# Patient Record
Sex: FEMALE | Race: WHITE | Employment: OTHER | URBAN - METROPOLITAN AREA
[De-identification: names, ages, dates, MRNs, and addresses within clinical notes are randomized per-mention and may not be internally consistent; named-entity substitution may affect disease eponyms.]

---

## 2017-01-23 PROBLEM — E78.5 HYPERLIPIDEMIA: Status: ACTIVE | Noted: 2017-01-23

## 2017-01-23 PROBLEM — G43.109 MIGRAINE WITH VERTIGO: Status: ACTIVE | Noted: 2017-01-23

## 2017-01-23 PROBLEM — R06.00 DYSPNEA: Status: ACTIVE | Noted: 2017-01-23

## 2017-01-23 PROBLEM — K21.9 GERD (GASTROESOPHAGEAL REFLUX DISEASE): Status: ACTIVE | Noted: 2017-01-23

## 2017-01-23 PROBLEM — I48.91 ATRIAL FIBRILLATION (HCC): Status: ACTIVE | Noted: 2017-01-23

## 2017-01-23 PROBLEM — I10 HYPERTENSION: Status: ACTIVE | Noted: 2017-01-23

## 2017-02-26 ENCOUNTER — HOSPITAL ENCOUNTER (EMERGENCY)
Age: 72
Discharge: HOME | End: 2017-02-26
Attending: EMERGENCY MEDICINE
Payer: MEDICARE

## 2017-02-26 VITALS
OXYGEN SATURATION: 97 % | WEIGHT: 182 LBS | SYSTOLIC BLOOD PRESSURE: 124 MMHG | BODY MASS INDEX: 31.07 KG/M2 | TEMPERATURE: 98 F | DIASTOLIC BLOOD PRESSURE: 53 MMHG | HEIGHT: 64 IN | HEART RATE: 58 BPM | RESPIRATION RATE: 16 BRPM

## 2017-02-26 DIAGNOSIS — I10 ESSENTIAL HYPERTENSION: ICD-10-CM

## 2017-02-26 DIAGNOSIS — Z01.30 BLOOD PRESSURE CHECK: Primary | ICD-10-CM

## 2017-02-26 DIAGNOSIS — R00.1 BRADYCARDIA: ICD-10-CM

## 2017-02-26 PROCEDURE — 99283 EMERGENCY DEPT VISIT LOW MDM: CPT | Performed by: EMERGENCY MEDICINE

## 2017-02-27 NOTE — ED PROVIDER NOTES
HPI Comments: Patient to ed with c/o low blood pressure at home stating BP was 127/57 at home--patient reports she takes her blood pressure at home when she feels bad but is unable to give symptoms of why she feels bad today she just states \"I dont know\"--patient is awake, alert and oriented x4 at this time--patient is ambulatory to triage room without difficulty. Heart rate was 47-currently 55 and often in 50's. Patient is a 66 y.o. female presenting with hypotension. The history is provided by the patient. Hypotension    This is a new problem. The current episode started 1 to 2 hours ago. The problem has been resolved. Pertinent negatives include no confusion, no unresponsiveness, no weakness, no tingling and no numbness. Mental status baseline is normal.         Past Medical History:   Diagnosis Date    Arthritis     Biceps tendonitis     CAD (coronary artery disease)     MI 1999    Dysphagia     Dysuria     Elevated glucose     Hypertension     Other ill-defined conditions(799.89)     high cholesterol    Pain in limb     Postmenopausal     Psychiatric disorder     anxiety    Sciatica     Tachycardia     Vaginitis     Vertigo        Past Surgical History:   Procedure Laterality Date    ABDOMEN SURGERY PROC UNLISTED      cyst removed from upper ABD.  HX BREAST BIOPSY Right     HX GYN      hysterectomy    HX KNEE ARTHROSCOPY Bilateral          Family History:   Problem Relation Age of Onset    Coronary Artery Disease Neg Hx        Social History     Social History    Marital status:      Spouse name: N/A    Number of children: N/A    Years of education: N/A     Occupational History    Not on file.      Social History Main Topics    Smoking status: Never Smoker    Smokeless tobacco: Never Used    Alcohol use No    Drug use: No    Sexual activity: Not Currently     Other Topics Concern    Not on file     Social History Narrative         ALLERGIES: Review of patient's allergies indicates no known allergies. Review of Systems   Constitutional: Negative for chills and fever. HENT: Negative for congestion, rhinorrhea and sore throat. Eyes: Negative for photophobia and redness. Respiratory: Negative for cough and shortness of breath. Cardiovascular: Negative for chest pain and leg swelling. Gastrointestinal: Negative for abdominal pain, diarrhea, nausea and vomiting. Endocrine: Negative for polydipsia and polyuria. Genitourinary: Negative for dysuria. Musculoskeletal: Negative for back pain and myalgias. Neurological: Negative for tingling, weakness, numbness and headaches. Psychiatric/Behavioral: Negative for confusion. Vitals:    02/26/17 2055   BP: 141/57   Pulse: (!) 55   Resp: 18   Temp: 97.7 °F (36.5 °C)   SpO2: 96%   Weight: 82.6 kg (182 lb)   Height: 5' 4\" (1.626 m)            Physical Exam   Constitutional: She is oriented to person, place, and time. She appears well-developed and well-nourished. Eyes: Conjunctivae are normal. Pupils are equal, round, and reactive to light. Neck: Normal range of motion. Neck supple. Cardiovascular: Normal rate, regular rhythm, normal heart sounds and intact distal pulses. No murmur heard. Pulmonary/Chest: Breath sounds normal. No respiratory distress. Abdominal: Soft. She exhibits no distension. There is no tenderness. There is no rebound and no guarding. Musculoskeletal: Normal range of motion. She exhibits no edema or tenderness. Neurological: She is alert and oriented to person, place, and time. She has normal strength. No cranial nerve deficit or sensory deficit. She exhibits normal muscle tone. Coordination normal.   Skin: Skin is warm and dry. MDM  Number of Diagnoses or Management Options  Diagnosis management comments: Blood pressure check. No symptoms. Follow up with her cardiologist or primary care doctor later this coming week for blood pressure and heart rate recheck.     ED Course       Procedures

## 2017-02-27 NOTE — ED TRIAGE NOTES
Patient to ed with c/o low blood pressure at home stating BP was 127/57 at home--patient reports she takes her blood pressure at home when she feels bad but is unable to give symptoms of why she feels bad today she just states \"I dont know\"--patient is awake, alert and oriented x4 at this time--patient is ambulatory to triage room without difficulty

## 2017-09-14 ENCOUNTER — HOSPITAL ENCOUNTER (OUTPATIENT)
Dept: MAMMOGRAPHY | Age: 72
Discharge: HOME | End: 2017-09-14
Attending: FAMILY MEDICINE
Payer: MEDICARE

## 2017-09-14 DIAGNOSIS — Z12.31 VISIT FOR SCREENING MAMMOGRAM: ICD-10-CM

## 2017-09-14 PROCEDURE — 77067 SCR MAMMO BI INCL CAD: CPT

## 2017-10-16 PROBLEM — I48.19 PERSISTENT ATRIAL FIBRILLATION (HCC): Status: ACTIVE | Noted: 2017-01-23

## 2018-02-16 ENCOUNTER — APPOINTMENT (OUTPATIENT)
Dept: GENERAL RADIOLOGY | Age: 73
End: 2018-02-16
Attending: STUDENT IN AN ORGANIZED HEALTH CARE EDUCATION/TRAINING PROGRAM
Payer: MEDICARE

## 2018-02-16 ENCOUNTER — HOSPITAL ENCOUNTER (EMERGENCY)
Age: 73
Discharge: HOME | End: 2018-02-16
Attending: EMERGENCY MEDICINE
Payer: MEDICARE

## 2018-02-16 VITALS
HEIGHT: 64 IN | BODY MASS INDEX: 32.1 KG/M2 | OXYGEN SATURATION: 97 % | SYSTOLIC BLOOD PRESSURE: 112 MMHG | WEIGHT: 188 LBS | TEMPERATURE: 97.8 F | HEART RATE: 68 BPM | RESPIRATION RATE: 16 BRPM | DIASTOLIC BLOOD PRESSURE: 58 MMHG

## 2018-02-16 DIAGNOSIS — R06.00 DYSPNEA, UNSPECIFIED TYPE: Primary | ICD-10-CM

## 2018-02-16 LAB
ALBUMIN SERPL-MCNC: 3.6 G/DL (ref 3.2–4.6)
ALBUMIN/GLOB SERPL: 0.8 {RATIO} (ref 1.2–3.5)
ALP SERPL-CCNC: 94 U/L (ref 50–136)
ALT SERPL-CCNC: 36 U/L (ref 12–65)
ANION GAP SERPL CALC-SCNC: 15 MMOL/L (ref 7–16)
AST SERPL-CCNC: 23 U/L (ref 15–37)
ATRIAL RATE: 394 BPM
BASOPHILS # BLD: 0 K/UL (ref 0–0.2)
BASOPHILS NFR BLD: 0 % (ref 0–2)
BILIRUB SERPL-MCNC: 0.7 MG/DL (ref 0.2–1.1)
BNP SERPL-MCNC: 293 PG/ML
BUN SERPL-MCNC: 25 MG/DL (ref 8–23)
CALCIUM SERPL-MCNC: 9 MG/DL (ref 8.3–10.4)
CALCULATED R AXIS, ECG10: -2 DEGREES
CALCULATED T AXIS, ECG11: -4 DEGREES
CHLORIDE SERPL-SCNC: 105 MMOL/L (ref 98–107)
CO2 SERPL-SCNC: 21 MMOL/L (ref 21–32)
CREAT SERPL-MCNC: 1.71 MG/DL (ref 0.6–1)
DIAGNOSIS, 93000: NORMAL
DIFFERENTIAL METHOD BLD: ABNORMAL
EOSINOPHIL # BLD: 0.1 K/UL (ref 0–0.8)
EOSINOPHIL NFR BLD: 1 % (ref 0.5–7.8)
ERYTHROCYTE [DISTWIDTH] IN BLOOD BY AUTOMATED COUNT: 14.9 % (ref 11.9–14.6)
GLOBULIN SER CALC-MCNC: 4.4 G/DL (ref 2.3–3.5)
GLUCOSE SERPL-MCNC: 109 MG/DL (ref 65–100)
HCT VFR BLD AUTO: 43.7 % (ref 35.8–46.3)
HGB BLD-MCNC: 14 G/DL (ref 11.7–15.4)
IMM GRANULOCYTES # BLD: 0 K/UL (ref 0–0.5)
IMM GRANULOCYTES NFR BLD AUTO: 0 % (ref 0–5)
INR PPP: 2
LYMPHOCYTES # BLD: 2.7 K/UL (ref 0.5–4.6)
LYMPHOCYTES NFR BLD: 33 % (ref 13–44)
MCH RBC QN AUTO: 24.9 PG (ref 26.1–32.9)
MCHC RBC AUTO-ENTMCNC: 32 G/DL (ref 31.4–35)
MCV RBC AUTO: 77.8 FL (ref 79.6–97.8)
MONOCYTES # BLD: 0.7 K/UL (ref 0.1–1.3)
MONOCYTES NFR BLD: 8 % (ref 4–12)
NEUTS SEG # BLD: 4.8 K/UL (ref 1.7–8.2)
NEUTS SEG NFR BLD: 58 % (ref 43–78)
PLATELET # BLD AUTO: 281 K/UL (ref 150–450)
PMV BLD AUTO: 10.8 FL (ref 10.8–14.1)
POTASSIUM SERPL-SCNC: 4.4 MMOL/L (ref 3.5–5.1)
PROT SERPL-MCNC: 8 G/DL (ref 6.3–8.2)
PROTHROMBIN TIME: 23.1 SEC (ref 11.5–14.5)
Q-T INTERVAL, ECG07: 412 MS
QRS DURATION, ECG06: 86 MS
QTC CALCULATION (BEZET), ECG08: 441 MS
RBC # BLD AUTO: 5.62 M/UL (ref 4.05–5.25)
SODIUM SERPL-SCNC: 141 MMOL/L (ref 136–145)
TROPONIN I SERPL-MCNC: <0.04 NG/ML (ref 0.02–0.05)
VENTRICULAR RATE, ECG03: 69 BPM
WBC # BLD AUTO: 8.3 K/UL (ref 4.3–11.1)

## 2018-02-16 PROCEDURE — 74011250636 HC RX REV CODE- 250/636: Performed by: EMERGENCY MEDICINE

## 2018-02-16 PROCEDURE — 80053 COMPREHEN METABOLIC PANEL: CPT | Performed by: STUDENT IN AN ORGANIZED HEALTH CARE EDUCATION/TRAINING PROGRAM

## 2018-02-16 PROCEDURE — 96374 THER/PROPH/DIAG INJ IV PUSH: CPT | Performed by: EMERGENCY MEDICINE

## 2018-02-16 PROCEDURE — 71046 X-RAY EXAM CHEST 2 VIEWS: CPT

## 2018-02-16 PROCEDURE — 85610 PROTHROMBIN TIME: CPT | Performed by: EMERGENCY MEDICINE

## 2018-02-16 PROCEDURE — 99284 EMERGENCY DEPT VISIT MOD MDM: CPT | Performed by: EMERGENCY MEDICINE

## 2018-02-16 PROCEDURE — 83880 ASSAY OF NATRIURETIC PEPTIDE: CPT | Performed by: EMERGENCY MEDICINE

## 2018-02-16 PROCEDURE — 84484 ASSAY OF TROPONIN QUANT: CPT | Performed by: EMERGENCY MEDICINE

## 2018-02-16 PROCEDURE — 93005 ELECTROCARDIOGRAM TRACING: CPT | Performed by: STUDENT IN AN ORGANIZED HEALTH CARE EDUCATION/TRAINING PROGRAM

## 2018-02-16 PROCEDURE — 85025 COMPLETE CBC W/AUTO DIFF WBC: CPT | Performed by: STUDENT IN AN ORGANIZED HEALTH CARE EDUCATION/TRAINING PROGRAM

## 2018-02-16 RX ORDER — FUROSEMIDE 10 MG/ML
40 INJECTION INTRAMUSCULAR; INTRAVENOUS
Status: COMPLETED | OUTPATIENT
Start: 2018-02-16 | End: 2018-02-16

## 2018-02-16 RX ADMIN — FUROSEMIDE 40 MG: 10 INJECTION, SOLUTION INTRAMUSCULAR; INTRAVENOUS at 17:16

## 2018-02-16 NOTE — ED PROVIDER NOTES
HPI Comments: 68-year-old female with history of persistent atrial fibrillation, MI in 1999, vertigo, HTN, anxiety presents with shortness of breath and generalized weakness has been gradually worsening over the past 2 weeks. She was seen by her primary care physician last week for flulike symptoms. She was prescribed amoxicillin and \"vertigo medication. \"  She had a mild cough with ear fullness. Cough has improved. She denies chest pain. She has chronic leg swelling that has slightly worsened since sleeping in a lazy boy chair over the past 3 days as this helps with her vertigo. Vertigo has also resolved. She denies fevers or chills. She reports shortness of breath and weakness mostly with doing work. No headache. Denies changes in medications. No vomiting or diarrhea. Patient is a 74 y.o. female presenting with shortness of breath. The history is provided by the patient. Shortness of Breath   Associated symptoms include cough and leg swelling. Pertinent negatives include no fever, no headaches, no rhinorrhea, no chest pain, no vomiting, no abdominal pain and no rash. Past Medical History:   Diagnosis Date    Arthritis     Biceps tendonitis     CAD (coronary artery disease)     MI 1999    Dysphagia     Dysuria     Elevated glucose     Hypertension     Other ill-defined conditions(799.89)     high cholesterol    Pain in limb     Postmenopausal     Psychiatric disorder     anxiety    Sciatica     Tachycardia     Vaginitis     Vertigo        Past Surgical History:   Procedure Laterality Date    ABDOMEN SURGERY PROC UNLISTED      cyst removed from upper ABD.     HX BREAST BIOPSY Right     HX GYN      hysterectomy    HX KNEE ARTHROSCOPY Bilateral          Family History:   Problem Relation Age of Onset    Coronary Artery Disease Neg Hx        Social History     Social History    Marital status:      Spouse name: N/A    Number of children: N/A    Years of education: N/A Occupational History    Not on file. Social History Main Topics    Smoking status: Never Smoker    Smokeless tobacco: Never Used    Alcohol use No    Drug use: No    Sexual activity: Not Currently     Other Topics Concern    Not on file     Social History Narrative         ALLERGIES: Review of patient's allergies indicates no known allergies. Review of Systems   Constitutional: Positive for fatigue. Negative for chills and fever. HENT: Negative for hearing loss, rhinorrhea and sinus pressure. Eyes: Negative for visual disturbance. Respiratory: Positive for cough and shortness of breath. Cardiovascular: Positive for leg swelling. Negative for chest pain and palpitations. Gastrointestinal: Positive for nausea. Negative for abdominal pain, diarrhea and vomiting. Genitourinary: Negative for difficulty urinating and dysuria. Musculoskeletal: Negative for back pain. Skin: Negative for rash. Neurological: Positive for dizziness. Negative for weakness and headaches. Psychiatric/Behavioral: Negative for confusion. Vitals:    02/16/18 1509   BP: 119/66   Pulse: 82   Resp: 16   Temp: 97.5 °F (36.4 °C)   SpO2: 96%   Weight: 85.3 kg (188 lb)   Height: 5' 4\" (1.626 m)            Physical Exam   Constitutional: She appears well-developed and well-nourished. HENT:   Head: Normocephalic and atraumatic. Right Ear: External ear normal. Tympanic membrane is not injected. No middle ear effusion. Left Ear: External ear normal. Tympanic membrane is not injected. No middle ear effusion. Nose: Nose normal.   Mouth/Throat: Oropharynx is clear and moist.   Eyes: Conjunctivae are normal. Pupils are equal, round, and reactive to light. Neck: Normal range of motion. Neck supple. Cardiovascular: Normal heart sounds and intact distal pulses. An irregularly irregular rhythm present. Pulmonary/Chest: Effort normal and breath sounds normal. No respiratory distress. She has no wheezes. Abdominal: Soft. Bowel sounds are normal. She exhibits no distension. There is no tenderness. Musculoskeletal: Normal range of motion. She exhibits edema. Mild bilateral ankle edema   Neurological: She is alert. Skin: Skin is warm and dry. Psychiatric: Judgment normal.   Nursing note and vitals reviewed. MDM  Number of Diagnoses or Management Options  Diagnosis management comments: Parts of this document were created using dragon voice recognition software. The chart has been reviewed but errors may still be present. Persistent A. Fib, rate controlled. Labs unremarkable. Chest x-ray clear. Sats 96%. Blood pressure controlled. 5:08 PM  Last EF 50-55% 3/17. Mild elevation BNP with increased leg swelling, sob, and afib. Will give lasix. No indication for admission. Advised cardiology follow up. Lungs clear. I discussed the results of all labs, procedures, radiographs, and treatments with the patient and available family. Treatment plan is agreed upon and the patient is ready for discharge. Questions about treatment in the ED and differential diagnosis of presenting condition were answered. Patient was given verbal discharge instructions including, but not limited to, importance of returning to the emergency department for any concern of worsening or continued symptoms. Instructions were given to follow up with a primary care provider or specialist within 1-2 days. Adverse effects of medications, if prescribed, were discussed and patient was advised to refrain from significant physical activity until followed up by primary care physician and to not drive or operate heavy machinery after taking any sedating substances.            Amount and/or Complexity of Data Reviewed  Clinical lab tests: ordered and reviewed (Results for orders placed or performed during the hospital encounter of 02/16/18  -CBC WITH AUTOMATED DIFF       Result                                            Value Ref Range                       WBC                                               8.3                           4.3 - 11.1 K/uL                 RBC                                               5.62 (H)                      4.05 - 5.25 M/uL                HGB                                               14.0                          11.7 - 15.4 g/dL                HCT                                               43.7                          35.8 - 46.3 %                   MCV                                               77.8 (L)                      79.6 - 97.8 FL                  MCH                                               24.9 (L)                      26.1 - 32.9 PG                  MCHC                                              32.0                          31.4 - 35.0 g/dL                RDW                                               14.9 (H)                      11.9 - 14.6 %                   PLATELET                                          281                           150 - 450 K/uL                  MPV                                               10.8                          10.8 - 14.1 FL                  DF                                                AUTOMATED                                                     NEUTROPHILS                                       58                            43 - 78 %                       LYMPHOCYTES                                       33                            13 - 44 %                       MONOCYTES                                         8                             4.0 - 12.0 %                    EOSINOPHILS                                       1                             0.5 - 7.8 %                     BASOPHILS                                         0                             0.0 - 2.0 %                     IMMATURE GRANULOCYTES                             0                             0.0 - 5.0 % ABS. NEUTROPHILS                                  4.8                           1.7 - 8.2 K/UL                  ABS. LYMPHOCYTES                                  2.7                           0.5 - 4.6 K/UL                  ABS. MONOCYTES                                    0.7                           0.1 - 1.3 K/UL                  ABS. EOSINOPHILS                                  0.1                           0.0 - 0.8 K/UL                  ABS. BASOPHILS                                    0.0                           0.0 - 0.2 K/UL                  ABS. IMM.  GRANS.                                  0.0                           0.0 - 0.5 K/UL             -METABOLIC PANEL, COMPREHENSIVE       Result                                            Value                         Ref Range                       Sodium                                            141                           136 - 145 mmol/L                Potassium                                         4.4                           3.5 - 5.1 mmol/L                Chloride                                          105                           98 - 107 mmol/L                 CO2                                               21                            21 - 32 mmol/L                  Anion gap                                         15                            7 - 16 mmol/L                   Glucose                                           109 (H)                       65 - 100 mg/dL                  BUN                                               25 (H)                        8 - 23 MG/DL                    Creatinine                                        1.71 (H)                      0.6 - 1.0 MG/DL                 GFR est AA                                        38 (L)                        >60 ml/min/1.73m2               GFR est non-AA                                    31 (L)                        >60 ml/min/1.73m2               Calcium 9.0                           8.3 - 10.4 MG/DL                Bilirubin, total                                  0.7                           0.2 - 1.1 MG/DL                 ALT (SGPT)                                        36                            12 - 65 U/L                     AST (SGOT)                                        23                            15 - 37 U/L                     Alk. phosphatase                                  94                            50 - 136 U/L                    Protein, total                                    8.0                           6.3 - 8.2 g/dL                  Albumin                                           3.6                           3.2 - 4.6 g/dL                  Globulin                                          4.4 (H)                       2.3 - 3.5 g/dL                  A-G Ratio                                         0.8 (L)                       1.2 - 3.5                  -PROTHROMBIN TIME + INR       Result                                            Value                         Ref Range                       Prothrombin time                                  23.1 (H)                      11.5 - 14.5 sec                 INR                                               2.0                                                      -BNP       Result                                            Value                         Ref Range                       BNP                                               293                           pg/mL                      -TROPONIN I       Result                                            Value                         Ref Range                       Troponin-I, Qt.                                   <0.04                         0.02 - 0.05 NG/ML          -EKG, 12 LEAD, INITIAL       Result                                            Value                         Ref Range Ventricular Rate                                  69                            BPM                             Atrial Rate                                       394                           BPM                             QRS Duration                                      86                            ms                              Q-T Interval                                      412                           ms                              QTC Calculation (Bezet)                           441                           ms                              Calculated R Axis                                 -2                            degrees                         Calculated T Axis                                 -4                            degrees                         Diagnosis                                                                                                   Atrial fibrillation   Nonspecific ST and T wave abnormality   Abnormal ECG   When compared with ECG of 24-OCT-2015 19:18,   Atrial fibrillation has replaced Sinus rhythm   Nonspecific T wave abnormality now evident in Inferior leads   Nonspecific T wave abnormality now evident in Anterior leads   Confirmed by Landon Butcher ([id number]) on 2/16/2018 4:06:12 PM     )  Tests in the radiology section of CPT®: ordered and reviewed (Xr Chest Pa Lat    Result Date: 2/16/2018  2 View Chest X-Ray 2/16/2018 3:22 PM INDICATION: Intermittent shortness of breath for the past week COMPARISON: 12/21/2014 FINDINGS: Upright PA and Lateral views are submitted. Cardiac silhouette slightly prominent today. Mediastinal contours normal. The lungs are normally inflated and clear, with normal pulmonary vascularity. There is no focal consolidation, nodule, or pleural effusion. Grossly, the chest wall structures are intact.      IMPRESSION: Borderline cardiomegaly, no acute infiltrates seen.     )  Tests in the medicine section of CPT®: ordered and reviewed          ED Course       Procedures

## 2018-02-16 NOTE — ED NOTES
I have reviewed discharge instructions with the patient and spouse. The patient and spouse verbalized understanding. Patient left ED via Discharge Method: ambulatory to Home with Pavan Cochran her . Opportunity for questions and clarification provided. Patient given 0 scripts. To continue your aftercare when you leave the hospital, you may receive an automated call from our care team to check in on how you are doing. This is a free service and part of our promise to provide the best care and service to meet your aftercare needs.  If you have questions, or wish to unsubscribe from this service please call [phone]. Thank you for Choosing our [facility] Emergency Department.

## 2018-02-16 NOTE — DISCHARGE INSTRUCTIONS
Shortness of Breath: Care Instructions  Your Care Instructions  Shortness of breath has many causes. Sometimes conditions such as anxiety can lead to shortness of breath. Some people get mild shortness of breath when they exercise. Trouble breathing also can be a symptom of a serious problem, such as asthma, lung disease, emphysema, heart problems, and pneumonia. If your shortness of breath continues, you may need tests and treatment. Watch for any changes in your breathing and other symptoms. Follow-up care is a key part of your treatment and safety. Be sure to make and go to all appointments, and call your doctor if you are having problems. It's also a good idea to know your test results and keep a list of the medicines you take. How can you care for yourself at home? · Do not smoke or allow others to smoke around you. If you need help quitting, talk to your doctor about stop-smoking programs and medicines. These can increase your chances of quitting for good. · Get plenty of rest and sleep. · Take your medicines exactly as prescribed. Call your doctor if you think you are having a problem with your medicine. · Find healthy ways to deal with stress. ¨ Exercise daily. ¨ Get plenty of sleep. ¨ Eat regularly and well. When should you call for help? Call 911 anytime you think you may need emergency care. For example, call if:  ? · You have severe shortness of breath. ? · You have symptoms of a heart attack. These may include:  ¨ Chest pain or pressure, or a strange feeling in the chest.  ¨ Sweating. ¨ Shortness of breath. ¨ Nausea or vomiting. ¨ Pain, pressure, or a strange feeling in the back, neck, jaw, or upper belly or in one or both shoulders or arms. ¨ Lightheadedness or sudden weakness. ¨ A fast or irregular heartbeat. After you call 911, the  may tell you to chew 1 adult-strength or 2 to 4 low-dose aspirin. Wait for an ambulance. Do not try to drive yourself.    ?Call your doctor now or seek immediate medical care if:  ? · Your shortness of breath gets worse or you start to wheeze. Wheezing is a high-pitched sound when you breathe. ? · You wake up at night out of breath or have to prop your head up on several pillows to breathe. ? · You are short of breath after only light activity or while at rest.   ? Watch closely for changes in your health, and be sure to contact your doctor if:  ? · You do not get better over the next 1 to 2 days. Where can you learn more? Go to http://cheko-tay.info/. Enter S780 in the search box to learn more about \"Shortness of Breath: Care Instructions. \"  Current as of: May 12, 2017  Content Version: 11.4  © 0835-4098 [employer]. Care instructions adapted under license by [employer] (which disclaims liability or warranty for this information). If you have questions about a medical condition or this instruction, always ask your healthcare professional. [address] any warranty or liability for your use of this information.

## 2018-02-16 NOTE — ED TRIAGE NOTES
Patient advises shortness of breath and increased weakness over past two weeks. Patient denies any complaints of pain, vomiting, diarrhea or change in stool appearance. Patient advises that she is having some nausea however new today. Patient with no further complaints at this time.

## 2018-02-16 NOTE — Clinical Note
Call cardiology on Monday to arrange follow-up appointment. Return for worsening or concerning symptoms.

## 2018-02-26 ENCOUNTER — HOSPITAL ENCOUNTER (OUTPATIENT)
Dept: LAB | Age: 73
Discharge: HOME | End: 2018-02-26
Payer: MEDICARE

## 2018-02-26 DIAGNOSIS — Z79.899 LONG TERM CURRENT USE OF ANTIARRHYTHMIC DRUG: ICD-10-CM

## 2018-02-26 LAB
ALBUMIN SERPL-MCNC: 3.7 G/DL (ref 3.2–4.6)
ALBUMIN/GLOB SERPL: 0.9 {RATIO}
ALP SERPL-CCNC: 91 U/L (ref 50–136)
ALT SERPL-CCNC: 28 U/L (ref 12–65)
ANION GAP SERPL CALC-SCNC: 8 MMOL/L
AST SERPL-CCNC: 15 U/L (ref 15–37)
BILIRUB SERPL-MCNC: 0.6 MG/DL (ref 0.2–1.1)
BUN SERPL-MCNC: 19 MG/DL (ref 8–23)
CALCIUM SERPL-MCNC: 8.8 MG/DL (ref 8.3–10.4)
CHLORIDE SERPL-SCNC: 106 MMOL/L (ref 98–107)
CO2 SERPL-SCNC: 27 MMOL/L (ref 21–32)
CREAT SERPL-MCNC: 1 MG/DL (ref 0.6–1)
GLOBULIN SER CALC-MCNC: 4.3 G/DL
GLUCOSE SERPL-MCNC: 80 MG/DL (ref 65–100)
POTASSIUM SERPL-SCNC: 3.5 MMOL/L (ref 3.5–5.1)
PROT SERPL-MCNC: 8 G/DL (ref 6.3–8.2)
SODIUM SERPL-SCNC: 141 MMOL/L (ref 136–145)
T4 FREE SERPL-MCNC: 1 NG/DL (ref 0.78–1.46)
TSH SERPL DL<=0.005 MIU/L-ACNC: 1.05 UIU/ML (ref 0.36–3.74)

## 2018-02-26 PROCEDURE — 36415 COLL VENOUS BLD VENIPUNCTURE: CPT | Performed by: INTERNAL MEDICINE

## 2018-02-26 PROCEDURE — 84439 ASSAY OF FREE THYROXINE: CPT | Performed by: INTERNAL MEDICINE

## 2018-02-26 PROCEDURE — 84443 ASSAY THYROID STIM HORMONE: CPT | Performed by: INTERNAL MEDICINE

## 2018-02-26 PROCEDURE — 80053 COMPREHEN METABOLIC PANEL: CPT | Performed by: INTERNAL MEDICINE

## 2018-05-01 PROBLEM — Z79.01 LONG TERM (CURRENT) USE OF ANTICOAGULANTS: Status: ACTIVE | Noted: 2018-05-01

## 2019-01-23 ENCOUNTER — HOSPITAL ENCOUNTER (EMERGENCY)
Age: 74
Discharge: HOME | End: 2019-01-23
Attending: EMERGENCY MEDICINE | Admitting: EMERGENCY MEDICINE
Payer: MEDICARE

## 2019-01-23 VITALS
TEMPERATURE: 98.1 F | RESPIRATION RATE: 18 BRPM | BODY MASS INDEX: 32.78 KG/M2 | SYSTOLIC BLOOD PRESSURE: 138 MMHG | DIASTOLIC BLOOD PRESSURE: 61 MMHG | WEIGHT: 192 LBS | HEIGHT: 64 IN | OXYGEN SATURATION: 95 % | HEART RATE: 61 BPM

## 2019-01-23 DIAGNOSIS — A59.9 TRICHOMONAS INFECTION: Primary | ICD-10-CM

## 2019-01-23 LAB
BACTERIA URNS QL MICRO: 0 /HPF
CASTS URNS QL MICRO: 0 /LPF
CRYSTALS URNS QL MICRO: 0 /LPF
EPI CELLS #/AREA URNS HPF: NORMAL /HPF
MUCOUS THREADS URNS QL MICRO: NORMAL /LPF
RBC #/AREA URNS HPF: 0 /HPF
TRICHOMONAS UR QL MICRO: NORMAL
WBC URNS QL MICRO: NORMAL /HPF

## 2019-01-23 PROCEDURE — 74011250636 HC RX REV CODE- 250/636: Performed by: EMERGENCY MEDICINE

## 2019-01-23 PROCEDURE — 81015 MICROSCOPIC EXAM OF URINE: CPT

## 2019-01-23 PROCEDURE — 96372 THER/PROPH/DIAG INJ SC/IM: CPT | Performed by: EMERGENCY MEDICINE

## 2019-01-23 PROCEDURE — 74011250637 HC RX REV CODE- 250/637: Performed by: EMERGENCY MEDICINE

## 2019-01-23 PROCEDURE — 99284 EMERGENCY DEPT VISIT MOD MDM: CPT | Performed by: EMERGENCY MEDICINE

## 2019-01-23 PROCEDURE — 81003 URINALYSIS AUTO W/O SCOPE: CPT | Performed by: EMERGENCY MEDICINE

## 2019-01-23 RX ORDER — METRONIDAZOLE 500 MG/1
500 TABLET ORAL 2 TIMES DAILY
Qty: 14 TAB | Refills: 0 | Status: SHIPPED | OUTPATIENT
Start: 2019-01-23 | End: 2019-01-30

## 2019-01-23 RX ORDER — AZITHROMYCIN 250 MG/1
1000 TABLET, FILM COATED ORAL
Status: COMPLETED | OUTPATIENT
Start: 2019-01-23 | End: 2019-01-23

## 2019-01-23 RX ADMIN — CEFTRIAXONE SODIUM 250 MG: 250 INJECTION, POWDER, FOR SOLUTION INTRAMUSCULAR; INTRAVENOUS at 12:54

## 2019-01-23 RX ADMIN — AZITHROMYCIN 1000 MG: 250 TABLET, FILM COATED ORAL at 12:55

## 2019-01-23 NOTE — ED PROVIDER NOTES
75-year-old -American female presents with urinary discomfort and weight vaginal discharge for the past week. No fever or vomiting. No abdominal pain. No recent antibiotics. The history is provided by the patient. Urinary Pain Pertinent negatives include no nausea, no vomiting, no abdominal pain and no back pain. Past Medical History:  
Diagnosis Date  Arthritis  Biceps tendonitis  CAD (coronary artery disease) MI 1999  Dysphagia  Dysuria  Elevated glucose  Hypertension  Other ill-defined conditions(799.89)   
 high cholesterol  Pain in limb  Postmenopausal   
 Psychiatric disorder   
 anxiety  Sciatica  Tachycardia  Vaginitis  Vertigo Past Surgical History:  
Procedure Laterality Date  ABDOMEN SURGERY PROC UNLISTED    
 cyst removed from upper ABD.  HX BREAST BIOPSY Right  HX GYN    
 hysterectomy  HX KNEE ARTHROSCOPY Bilateral   
 
   
Family History:  
Problem Relation Age of Onset  Coronary Artery Disease Neg Hx Social History Socioeconomic History  Marital status:  Spouse name: Not on file  Number of children: Not on file  Years of education: Not on file  Highest education level: Not on file Social Needs  Financial resource strain: Not on file  Food insecurity - worry: Not on file  Food insecurity - inability: Not on file  Transportation needs - medical: Not on file  Transportation needs - non-medical: Not on file Occupational History  Not on file Tobacco Use  Smoking status: Never Smoker  Smokeless tobacco: Never Used Substance and Sexual Activity  Alcohol use: No  
 Drug use: No  
 Sexual activity: Not Currently Other Topics Concern  Not on file Social History Narrative  Not on file ALLERGIES: Patient has no known allergies. Review of Systems Constitutional: Negative for fever. HENT: Negative for congestion. Respiratory: Negative for cough and shortness of breath. Cardiovascular: Negative for chest pain. Gastrointestinal: Negative for abdominal pain, nausea and vomiting. Genitourinary: Positive for dysuria. Musculoskeletal: Negative for back pain and neck pain. Skin: Negative for rash. Neurological: Negative for headaches. Vitals:  
 01/23/19 1031 BP: 142/56 Pulse: (!) 59 Resp: 18 Temp: 98.1 °F (36.7 °C) SpO2: 93% Weight: 87.1 kg (192 lb) Height: 5' 4\" (1.626 m) Physical Exam  
Constitutional: She is oriented to person, place, and time. She appears well-developed and well-nourished. No distress. HENT:  
Head: Normocephalic and atraumatic. Neck: Normal range of motion. Cardiovascular: Normal rate and regular rhythm. Pulmonary/Chest: Effort normal and breath sounds normal.  
Abdominal: Soft. She exhibits no distension. There is no tenderness. Musculoskeletal: Normal range of motion. Neurological: She is alert and oriented to person, place, and time. Skin: Skin is warm and dry. Psychiatric: She has a normal mood and affect. Her behavior is normal.  
Nursing note and vitals reviewed. MDM Number of Diagnoses or Management Options Diagnosis management comments: Urinalysis has white cells and Trichomonas. No bacteria noted on microscopic. We will treat for STD to include Rocephin, Zithromax and Flagyl. Amount and/or Complexity of Data Reviewed Clinical lab tests: ordered and reviewed Tests in the medicine section of CPT®: ordered and reviewed Risk of Complications, Morbidity, and/or Mortality Presenting problems: low Diagnostic procedures: low Management options: low Procedures

## 2019-01-23 NOTE — ED NOTES
I have reviewed discharge instructions with the patient. The patient verbalized understanding. Patient left ED via Discharge Method:  to Home with family). Opportunity for questions and clarification provided. Patient given 1 scripts. To continue your aftercare when you leave the hospital, you may receive an automated call from our care team to check in on how you are doing. This is a free service and part of our promise to provide the best care and service to meet your aftercare needs.  If you have questions, or wish to unsubscribe from this service please call [phone]. Thank you for Choosing our [facility].

## 2019-01-23 NOTE — DISCHARGE INSTRUCTIONS
Patient Education        Trichomoniasis: Care Instructions  Your Care Instructions  Trichomoniasis is a sexually transmitted infection (STI) that is spread by having sex with an infected partner. Trichomoniasis is commonly called trich (say \"trick\"). In women, trich may cause vaginal itching and a smelly discharge. But in many cases, especially in men, there are no symptoms. Stephanie Sage is treated so that you do not spread it to others. Both you and your sex partner or partners should be treated at the same time so you do not infect each other again. Trich may cause problems with pregnancy. Your doctor will talk with you about treatment for Trich if you are pregnant. Follow-up care is a key part of your treatment and safety. Be sure to make and go to all appointments, and call your doctor if you are having problems. It's also a good idea to know your test results and keep a list of the medicines you take. How can you care for yourself at home? · Take your antibiotics as directed. Do not stop taking them just because you feel better. You need to take the full course of antibiotics. · Do not have sex while you are being treated. If your doctor gave you a single dose of antibiotics, do not have sex for one week after being treated and until your partner also has been treated. · Tell your sex partner (or partners) that he or she will also need to be tested and treated. · Use a cold water compress or cool baths to relieve itching. To prevent trichomoniasis in the future  · Use latex condoms every time you have sex. Use them from the beginning to the end of sexual contact. · Talk to your partner before having sex. Find out if he or she has or is at risk for trich or any other STI. Keep in mind that a person may be able to spread an STI even if he or she does not have symptoms. · Do not have sex if you are being treated for trich or any other STI.   · Do not have sex with anyone who has symptoms of an STI, such as sores on the genitals or mouth. · Having one sex partner (who does not have STIs and does not have sex with anyone else) is a good way to avoid STIs. When should you call for help? Call your doctor now or seek immediate medical care if:    · You have unusual vaginal bleeding.     · You have a fever.     · You have new discharge from the vagina or penis.     · You have pelvic pain.    Watch closely for changes in your health, and be sure to contact your doctor if:    · You do not get better as expected.     · You have any new symptoms or your symptoms get worse. Where can you learn more? Go to http://cheko-tay.info/. Enter V331 in the search box to learn more about \"Trichomoniasis: Care Instructions. \"  Current as of: September 11, 2018  Content Version: 11.9  © 7436-1298 [employer]. Care instructions adapted under license by [employer] (which disclaims liability or warranty for this information). If you have questions about a medical condition or this instruction, always ask your healthcare professional. [address] any warranty or liability for your use of this information.

## 2019-02-11 ENCOUNTER — HOSPITAL ENCOUNTER (OUTPATIENT)
Dept: LAB | Age: 74
Discharge: HOME | End: 2019-02-11
Attending: INTERNAL MEDICINE
Payer: MEDICARE

## 2019-02-11 DIAGNOSIS — Z79.01 LONG TERM (CURRENT) USE OF ANTICOAGULANTS: ICD-10-CM

## 2019-02-11 LAB
ALBUMIN SERPL-MCNC: 3.9 G/DL (ref 3.2–4.6)
ALBUMIN/GLOB SERPL: 1 {RATIO}
ALP SERPL-CCNC: 90 U/L (ref 50–136)
ALT SERPL-CCNC: 47 U/L (ref 12–65)
ANION GAP SERPL CALC-SCNC: 8 MMOL/L
AST SERPL-CCNC: 27 U/L (ref 15–37)
BILIRUB SERPL-MCNC: 0.5 MG/DL (ref 0.2–1.1)
BUN SERPL-MCNC: 20 MG/DL (ref 8–23)
CALCIUM SERPL-MCNC: 8.3 MG/DL (ref 8.3–10.4)
CHLORIDE SERPL-SCNC: 107 MMOL/L (ref 98–107)
CO2 SERPL-SCNC: 25 MMOL/L (ref 21–32)
CREAT SERPL-MCNC: 1.4 MG/DL (ref 0.6–1)
GLOBULIN SER CALC-MCNC: 3.8 G/DL
GLUCOSE SERPL-MCNC: 89 MG/DL (ref 65–100)
POTASSIUM SERPL-SCNC: 3.6 MMOL/L (ref 3.5–5.1)
PROT SERPL-MCNC: 7.7 G/DL (ref 6.3–8.2)
SODIUM SERPL-SCNC: 140 MMOL/L (ref 136–145)
T4 FREE SERPL-MCNC: 1.3 NG/DL (ref 0.78–1.46)
TSH SERPL DL<=0.005 MIU/L-ACNC: 0.96 UIU/ML (ref 0.36–3.74)

## 2019-02-11 PROCEDURE — 84443 ASSAY THYROID STIM HORMONE: CPT

## 2019-02-11 PROCEDURE — 84439 ASSAY OF FREE THYROXINE: CPT

## 2019-02-11 PROCEDURE — 80053 COMPREHEN METABOLIC PANEL: CPT

## 2019-02-11 PROCEDURE — 36415 COLL VENOUS BLD VENIPUNCTURE: CPT

## 2019-04-12 ENCOUNTER — APPOINTMENT (OUTPATIENT)
Dept: GENERAL RADIOLOGY | Age: 74
End: 2019-04-12
Attending: EMERGENCY MEDICINE
Payer: MEDICARE

## 2019-04-12 ENCOUNTER — HOSPITAL ENCOUNTER (EMERGENCY)
Age: 74
Discharge: HOME | End: 2019-04-12
Attending: EMERGENCY MEDICINE
Payer: MEDICARE

## 2019-04-12 VITALS
RESPIRATION RATE: 16 BRPM | OXYGEN SATURATION: 96 % | SYSTOLIC BLOOD PRESSURE: 138 MMHG | TEMPERATURE: 98.2 F | DIASTOLIC BLOOD PRESSURE: 68 MMHG | HEART RATE: 68 BPM

## 2019-04-12 DIAGNOSIS — N18.9 ACUTE RENAL FAILURE SUPERIMPOSED ON CHRONIC KIDNEY DISEASE, UNSPECIFIED CKD STAGE, UNSPECIFIED ACUTE RENAL FAILURE TYPE (HCC): ICD-10-CM

## 2019-04-12 DIAGNOSIS — N17.9 ACUTE RENAL FAILURE SUPERIMPOSED ON CHRONIC KIDNEY DISEASE, UNSPECIFIED CKD STAGE, UNSPECIFIED ACUTE RENAL FAILURE TYPE (HCC): ICD-10-CM

## 2019-04-12 DIAGNOSIS — J20.9 ACUTE BRONCHITIS, UNSPECIFIED ORGANISM: Primary | ICD-10-CM

## 2019-04-12 LAB
ANION GAP SERPL CALC-SCNC: 7 MMOL/L (ref 7–16)
ANION GAP SERPL CALC-SCNC: 9 MMOL/L (ref 7–16)
ATRIAL RATE: 57 BPM
BASOPHILS # BLD: 0 K/UL (ref 0–0.2)
BASOPHILS NFR BLD: 0 % (ref 0–2)
BUN SERPL-MCNC: 26 MG/DL (ref 8–23)
BUN SERPL-MCNC: 28 MG/DL (ref 8–23)
CALCIUM SERPL-MCNC: 8.8 MG/DL (ref 8.3–10.4)
CALCIUM SERPL-MCNC: 9.3 MG/DL (ref 8.3–10.4)
CALCULATED P AXIS, ECG09: 47 DEGREES
CALCULATED R AXIS, ECG10: -6 DEGREES
CALCULATED T AXIS, ECG11: -2 DEGREES
CHLORIDE SERPL-SCNC: 104 MMOL/L (ref 98–107)
CHLORIDE SERPL-SCNC: 108 MMOL/L (ref 98–107)
CO2 SERPL-SCNC: 26 MMOL/L (ref 21–32)
CO2 SERPL-SCNC: 26 MMOL/L (ref 21–32)
CREAT SERPL-MCNC: 1.79 MG/DL (ref 0.6–1)
CREAT SERPL-MCNC: 2.19 MG/DL (ref 0.6–1)
DIAGNOSIS, 93000: NORMAL
DIFFERENTIAL METHOD BLD: ABNORMAL
EOSINOPHIL # BLD: 0.1 K/UL (ref 0–0.8)
EOSINOPHIL NFR BLD: 1 % (ref 0.5–7.8)
ERYTHROCYTE [DISTWIDTH] IN BLOOD BY AUTOMATED COUNT: 14.6 % (ref 11.9–14.6)
GLUCOSE SERPL-MCNC: 119 MG/DL (ref 65–100)
GLUCOSE SERPL-MCNC: 96 MG/DL (ref 65–100)
HCT VFR BLD AUTO: 40.1 % (ref 35.8–46.3)
HGB BLD-MCNC: 12.3 G/DL (ref 11.7–15.4)
IMM GRANULOCYTES # BLD AUTO: 0 K/UL (ref 0–0.5)
IMM GRANULOCYTES NFR BLD AUTO: 0 % (ref 0–5)
LYMPHOCYTES # BLD: 2.1 K/UL (ref 0.5–4.6)
LYMPHOCYTES NFR BLD: 30 % (ref 13–44)
MCH RBC QN AUTO: 25.5 PG (ref 26.1–32.9)
MCHC RBC AUTO-ENTMCNC: 30.7 G/DL (ref 31.4–35)
MCV RBC AUTO: 83.2 FL (ref 79.6–97.8)
MONOCYTES # BLD: 0.5 K/UL (ref 0.1–1.3)
MONOCYTES NFR BLD: 8 % (ref 4–12)
NEUTS SEG # BLD: 4.2 K/UL (ref 1.7–8.2)
NEUTS SEG NFR BLD: 61 % (ref 43–78)
NRBC # BLD: 0 K/UL (ref 0–0.2)
P-R INTERVAL, ECG05: 166 MS
PLATELET # BLD AUTO: 270 K/UL (ref 150–450)
PMV BLD AUTO: 11.1 FL (ref 9.4–12.3)
POTASSIUM SERPL-SCNC: 3 MMOL/L (ref 3.5–5.1)
POTASSIUM SERPL-SCNC: 3 MMOL/L (ref 3.5–5.1)
Q-T INTERVAL, ECG07: 490 MS
QRS DURATION, ECG06: 92 MS
QTC CALCULATION (BEZET), ECG08: 476 MS
RBC # BLD AUTO: 4.82 M/UL (ref 4.05–5.2)
SODIUM SERPL-SCNC: 139 MMOL/L (ref 136–145)
SODIUM SERPL-SCNC: 141 MMOL/L (ref 136–145)
TROPONIN I BLD-MCNC: 0.01 NG/ML (ref 0.02–0.05)
VENTRICULAR RATE, ECG03: 57 BPM
WBC # BLD AUTO: 7 K/UL (ref 4.3–11.1)

## 2019-04-12 PROCEDURE — 99284 EMERGENCY DEPT VISIT MOD MDM: CPT | Performed by: EMERGENCY MEDICINE

## 2019-04-12 PROCEDURE — 80048 BASIC METABOLIC PNL TOTAL CA: CPT

## 2019-04-12 PROCEDURE — 74011250637 HC RX REV CODE- 250/637: Performed by: EMERGENCY MEDICINE

## 2019-04-12 PROCEDURE — 84484 ASSAY OF TROPONIN QUANT: CPT

## 2019-04-12 PROCEDURE — 85025 COMPLETE CBC W/AUTO DIFF WBC: CPT

## 2019-04-12 PROCEDURE — 74011250636 HC RX REV CODE- 250/636: Performed by: EMERGENCY MEDICINE

## 2019-04-12 PROCEDURE — 93005 ELECTROCARDIOGRAM TRACING: CPT | Performed by: EMERGENCY MEDICINE

## 2019-04-12 PROCEDURE — 96360 HYDRATION IV INFUSION INIT: CPT | Performed by: EMERGENCY MEDICINE

## 2019-04-12 PROCEDURE — 71046 X-RAY EXAM CHEST 2 VIEWS: CPT

## 2019-04-12 RX ORDER — POTASSIUM CHLORIDE 20 MEQ/1
40 TABLET, EXTENDED RELEASE ORAL
Status: COMPLETED | OUTPATIENT
Start: 2019-04-12 | End: 2019-04-12

## 2019-04-12 RX ADMIN — POTASSIUM CHLORIDE 40 MEQ: 20 TABLET, EXTENDED RELEASE ORAL at 18:50

## 2019-04-12 RX ADMIN — SODIUM CHLORIDE 1000 ML: 900 INJECTION, SOLUTION INTRAVENOUS at 17:37

## 2019-04-12 NOTE — ED TRIAGE NOTES
Pt ambulatory to triage and states she has been intermittently sweating and having dizziness and feels as if she will pass out. Pt states earlier in the week she was coughing up phlegm that is white. Pt states she went to the doctor and she was given ABT= Amoxicillin and cough medicine and an inhaler. Pt states she is eating and drinking well. Pt denies fever, cp, and SOB. Dr. Brittney Coreas to triage.

## 2019-04-12 NOTE — ED PROVIDER NOTES
The patient is an 73-year-old female who presented to the emergency department today secondary to an episode of near syncope and diaphoretic this morning. Patient states she got up and was getting ready when she just started pouring sweat and got lightheaded. Patient denies any actual loss of consciousness. Patient states that she did not have any chest pain or shortness of breath with onset of the symptoms. She says that she's had the sweating episodes 2 days in a row now. She is currently in taking amoxicillin for suspected community acquired pneumonia which was being treated by her nurse practitioner however no x-ray or blood work was done. She also was given [employer] and an inhaler. She says that the [employer] work she is currently out of them. Signed By: Janell Atkinson DO April 12, 2019 Past Medical History:  
Diagnosis Date  Arthritis  Biceps tendonitis  CAD (coronary artery disease) MI 1999  Dysphagia  Dysuria  Elevated glucose  Hypertension  Other ill-defined conditions(799.89)   
 high cholesterol  Pain in limb  Postmenopausal   
 Psychiatric disorder   
 anxiety  Sciatica  Tachycardia  Vaginitis  Vertigo Past Surgical History:  
Procedure Laterality Date  ABDOMEN SURGERY PROC UNLISTED    
 cyst removed from upper ABD.  HX BREAST BIOPSY Right  HX GYN    
 hysterectomy  HX KNEE ARTHROSCOPY Bilateral   
 
   
Family History:  
Problem Relation Age of Onset  Coronary Artery Disease Neg Hx Social History Socioeconomic History  Marital status:  Spouse name: Not on file  Number of children: Not on file  Years of education: Not on file  Highest education level: Not on file Occupational History  Not on file Social Needs  Financial resource strain: Not on file  Food insecurity:  
  Worry: Not on file Inability: Not on file  Transportation needs:  
  Medical: Not on file Non-medical: Not on file Tobacco Use  Smoking status: Never Smoker  Smokeless tobacco: Never Used Substance and Sexual Activity  Alcohol use: No  
 Drug use: No  
 Sexual activity: Not Currently Lifestyle  Physical activity:  
  Days per week: Not on file Minutes per session: Not on file  Stress: Not on file Relationships  Social connections:  
  Talks on phone: Not on file Gets together: Not on file Attends [employer] service: Not on file Active member of club or organization: Not on file Attends meetings of clubs or organizations: Not on file Relationship status: Not on file  Intimate partner violence:  
  Fear of current or ex partner: Not on file Emotionally abused: Not on file Physically abused: Not on file Forced sexual activity: Not on file Other Topics Concern  Not on file Social History Narrative  Not on file ALLERGIES: Patient has no known allergies. Review of Systems Constitutional: Negative for activity change, appetite change, chills and diaphoresis. HENT: Negative. Eyes: Negative. Respiratory: Positive for cough. Negative for chest tightness and shortness of breath. Cardiovascular: Negative for chest pain, palpitations and leg swelling. Gastrointestinal: Negative. Genitourinary: Negative. Musculoskeletal: Negative. Neurological: Positive for light-headedness. Negative for dizziness, tremors, seizures, syncope, facial asymmetry, speech difficulty, weakness, numbness and headaches. Hematological: Negative. Vitals:  
 04/12/19 1555 BP: 93/65 Pulse: (!) 59 Resp: 16 Temp: 98.1 °F (36.7 °C) SpO2: 99% Physical Exam  
Constitutional: She is oriented to person, place, and time. She appears well-developed. No distress. HENT:  
Head: Normocephalic and atraumatic. Eyes: Pupils are equal, round, and reactive to light. Conjunctivae and EOM are normal.  
Neck: Normal range of motion. Neck supple. Cardiovascular: Normal rate, regular rhythm, normal heart sounds and intact distal pulses. Pulmonary/Chest: Effort normal and breath sounds normal. No stridor. No respiratory distress. She has no wheezes. She has no rales. She exhibits no tenderness. Abdominal: Soft. Bowel sounds are normal. She exhibits no distension and no mass. There is no tenderness. There is no rebound and no guarding. Neurological: She is alert and oriented to person, place, and time. She displays normal reflexes. No cranial nerve deficit or sensory deficit. She exhibits normal muscle tone. Coordination normal.  
Skin: Skin is warm and dry. Capillary refill takes less than 2 seconds. She is not diaphoretic. No erythema. No pallor. Psychiatric: She has a normal mood and affect. MDM Number of Diagnoses or Management Options Acute bronchitis, unspecified organism:  
Acute renal failure superimposed on chronic kidney disease, unspecified CKD stage, unspecified acute renal failure type [facility]):  
Diagnosis management comments: EKG rate of 57. Left axis deviation. No STEMI noted. Nonspecific T-wave flattening noted anterior lateral. 
Creatinine is elevated. We'll obtain orthostatic. We'll give IV fluid. Chest x-ray unremarkable. Troponins negative. EKG and troponin not consistent with acute STEMI. Recommended to stop Augmentin. may contribute to worsening kidney function. After 1 L of normal saline patient kidney function improved. Stable for discharge. Low suspicion for acute ACS, dissection. Stable for: Home and follow up with primary care physician. No further questions or concerns. Amount and/or Complexity of Data Reviewed Clinical lab tests: ordered and reviewed Tests in the radiology section of CPT®: ordered and reviewed Tests in the medicine section of CPT®: ordered and reviewed Procedures

## 2019-04-12 NOTE — DISCHARGE INSTRUCTIONS
Follow up with your doctor for repeat check up of your kidney. Stop Augmentin for now. Take cough medication as needed. Drink plenty of fluids.

## 2019-04-13 NOTE — ED NOTES
I have reviewed discharge instructions with the patient. The patient verbalized understanding. Patient left ED via Discharge Method: ambulatory to Home with family. Opportunity for questions and clarification provided. Patient given 0 scripts. To continue your aftercare when you leave the hospital, you may receive an automated call from our care team to check in on how you are doing. This is a free service and part of our promise to provide the best care and service to meet your aftercare needs.  If you have questions, or wish to unsubscribe from this service please call [phone]. Thank you for Choosing our [facility].

## 2019-05-14 ENCOUNTER — HOSPITAL ENCOUNTER (OUTPATIENT)
Dept: LAB | Age: 74
Discharge: HOME | End: 2019-05-14
Payer: MEDICARE

## 2019-05-14 DIAGNOSIS — I48.0 PAROXYSMAL ATRIAL FIBRILLATION (HCC): ICD-10-CM

## 2019-05-14 PROBLEM — I49.5 SSS (SICK SINUS SYNDROME) (HCC): Status: ACTIVE | Noted: 2019-05-14

## 2019-05-14 PROBLEM — R42 DIZZINESS: Status: ACTIVE | Noted: 2019-05-14

## 2019-05-14 LAB
ALBUMIN SERPL-MCNC: 3.8 G/DL (ref 3.2–4.6)
ALBUMIN/GLOB SERPL: 0.8 {RATIO} (ref 1.2–3.5)
ALP SERPL-CCNC: 112 U/L (ref 50–136)
ALT SERPL-CCNC: 37 U/L (ref 12–65)
ANION GAP SERPL CALC-SCNC: 6 MMOL/L (ref 7–16)
AST SERPL-CCNC: 28 U/L (ref 15–37)
BASOPHILS # BLD: 0 K/UL (ref 0–0.2)
BASOPHILS NFR BLD: 1 % (ref 0–2)
BILIRUB SERPL-MCNC: 0.5 MG/DL (ref 0.2–1.1)
BUN SERPL-MCNC: 25 MG/DL (ref 8–23)
CALCIUM SERPL-MCNC: 8.9 MG/DL (ref 8.3–10.4)
CHLORIDE SERPL-SCNC: 106 MMOL/L (ref 98–107)
CO2 SERPL-SCNC: 30 MMOL/L (ref 21–32)
CREAT SERPL-MCNC: 1.3 MG/DL (ref 0.6–1)
DIFFERENTIAL METHOD BLD: ABNORMAL
EOSINOPHIL # BLD: 0.1 K/UL (ref 0–0.8)
EOSINOPHIL NFR BLD: 1 % (ref 0.5–7.8)
ERYTHROCYTE [DISTWIDTH] IN BLOOD BY AUTOMATED COUNT: 15.1 % (ref 11.9–14.6)
GLOBULIN SER CALC-MCNC: 4.7 G/DL (ref 2.3–3.5)
GLUCOSE SERPL-MCNC: 84 MG/DL (ref 65–100)
HCT VFR BLD AUTO: 41.8 % (ref 35.8–46.3)
HGB BLD-MCNC: 13.1 G/DL (ref 11.7–15.4)
IMM GRANULOCYTES # BLD AUTO: 0 K/UL (ref 0–0.5)
IMM GRANULOCYTES NFR BLD AUTO: 0 % (ref 0–5)
LYMPHOCYTES # BLD: 2.2 K/UL (ref 0.5–4.6)
LYMPHOCYTES NFR BLD: 33 % (ref 13–44)
MAGNESIUM SERPL-MCNC: 2 MG/DL (ref 1.8–2.4)
MCH RBC QN AUTO: 25.2 PG (ref 26.1–32.9)
MCHC RBC AUTO-ENTMCNC: 31.3 G/DL (ref 31.4–35)
MCV RBC AUTO: 80.4 FL (ref 79.6–97.8)
MONOCYTES # BLD: 0.5 K/UL (ref 0.1–1.3)
MONOCYTES NFR BLD: 8 % (ref 4–12)
NEUTS SEG # BLD: 3.8 K/UL (ref 1.7–8.2)
NEUTS SEG NFR BLD: 57 % (ref 43–78)
NRBC # BLD: 0 K/UL (ref 0–0.2)
PLATELET # BLD AUTO: 255 K/UL (ref 150–450)
PMV BLD AUTO: 10.8 FL (ref 9.4–12.3)
POTASSIUM SERPL-SCNC: 3.6 MMOL/L (ref 3.5–5.1)
PROT SERPL-MCNC: 8.5 G/DL (ref 6.3–8.2)
RBC # BLD AUTO: 5.2 M/UL (ref 4.05–5.2)
SODIUM SERPL-SCNC: 142 MMOL/L (ref 136–145)
TSH SERPL DL<=0.005 MIU/L-ACNC: 1.94 UIU/ML (ref 0.36–3.74)
WBC # BLD AUTO: 6.6 K/UL (ref 4.3–11.1)

## 2019-05-14 PROCEDURE — 83735 ASSAY OF MAGNESIUM: CPT

## 2019-05-14 PROCEDURE — 80053 COMPREHEN METABOLIC PANEL: CPT

## 2019-05-14 PROCEDURE — 84443 ASSAY THYROID STIM HORMONE: CPT

## 2019-05-14 PROCEDURE — 85025 COMPLETE CBC W/AUTO DIFF WBC: CPT

## 2019-05-14 PROCEDURE — 36415 COLL VENOUS BLD VENIPUNCTURE: CPT

## 2019-05-14 NOTE — PROGRESS NOTES
Please call her. Labs are fine, K is better. Continue with plan to stop the BB and HCTZ. Keep plan for echo and seeing Dr. Yvan Moran. Call us for issues.    
Thanks

## 2019-06-24 ENCOUNTER — HOSPITAL ENCOUNTER (OUTPATIENT)
Dept: ULTRASOUND IMAGING | Age: 74
Discharge: HOME | End: 2019-06-24
Attending: FAMILY MEDICINE
Payer: MEDICARE

## 2019-06-24 DIAGNOSIS — R55 FAINTNESS: ICD-10-CM

## 2019-06-24 PROCEDURE — 93880 EXTRACRANIAL BILAT STUDY: CPT

## 2019-06-28 ENCOUNTER — APPOINTMENT (OUTPATIENT)
Dept: GENERAL RADIOLOGY | Age: 74
End: 2019-06-28
Attending: EMERGENCY MEDICINE
Payer: MEDICARE

## 2019-06-28 ENCOUNTER — APPOINTMENT (OUTPATIENT)
Dept: CT IMAGING | Age: 74
End: 2019-06-28
Attending: NURSE PRACTITIONER
Payer: MEDICARE

## 2019-06-28 ENCOUNTER — HOSPITAL ENCOUNTER (EMERGENCY)
Age: 74
Discharge: HOME | End: 2019-06-28
Attending: EMERGENCY MEDICINE
Payer: MEDICARE

## 2019-06-28 VITALS
HEIGHT: 64 IN | WEIGHT: 186 LBS | TEMPERATURE: 97.9 F | DIASTOLIC BLOOD PRESSURE: 77 MMHG | RESPIRATION RATE: 18 BRPM | HEART RATE: 72 BPM | SYSTOLIC BLOOD PRESSURE: 148 MMHG | OXYGEN SATURATION: 98 % | BODY MASS INDEX: 31.76 KG/M2

## 2019-06-28 DIAGNOSIS — S80.00XA CONTUSION OF KNEE, UNSPECIFIED LATERALITY, INITIAL ENCOUNTER: ICD-10-CM
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
## 2019-06-28 DIAGNOSIS — S16.1XXA STRAIN OF NECK MUSCLE, INITIAL ENCOUNTER: ICD-10-CM

## 2019-06-28 PROCEDURE — 72040 X-RAY EXAM NECK SPINE 2-3 VW: CPT

## 2019-06-28 PROCEDURE — 73562 X-RAY EXAM OF KNEE 3: CPT

## 2019-06-28 PROCEDURE — 70450 CT HEAD/BRAIN W/O DYE: CPT

## 2019-06-28 PROCEDURE — 99284 EMERGENCY DEPT VISIT MOD MDM: CPT | Performed by: EMERGENCY MEDICINE

## 2019-06-28 RX ORDER — METAXALONE 400 MG/1
400 TABLET ORAL
Qty: 14 TAB | Refills: 0 | Status: SHIPPED | OUTPATIENT
Start: 2019-06-28 | End: 2020-06-19
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
## 2019-06-28 NOTE — ED NOTES
I have reviewed discharge instructions with the patient. The patient verbalized understanding. Patient left ED via Discharge Method: ambulatory to Home with family    Opportunity for questions and clarification provided. Patient 1 script sent to pharmacy. To continue your aftercare when you leave the hospital, you may receive an automated call from our care team to check in on how you are doing. This is a free service and part of our promise to provide the best care and service to meet your aftercare needs.  If you have questions, or wish to unsubscribe from this service please call [phone]. Thank you for Choosing our [facility] Emergency Department. no abrasions, no jaundice, no lesions, no pruritis, and no rashes.

## 2019-06-28 NOTE — ED NOTES
12:27 PM  I have attempted to see pt 3 times and no one in room, I suspect in xray  I will try to see again shortly
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
## 2019-07-05 ENCOUNTER — HOSPITAL ENCOUNTER (OUTPATIENT)
Dept: GENERAL RADIOLOGY | Age: 74
Discharge: HOME | End: 2019-07-05
Payer: MEDICARE

## 2019-07-05 DIAGNOSIS — M54.50 LOW BACK PAIN: ICD-10-CM

## 2019-07-05 PROCEDURE — 72100 X-RAY EXAM L-S SPINE 2/3 VWS: CPT

## 2020-01-12 ENCOUNTER — APPOINTMENT (OUTPATIENT)
Dept: GENERAL RADIOLOGY | Age: 75
End: 2020-01-12
Attending: EMERGENCY MEDICINE
Payer: MEDICARE

## 2020-01-12 ENCOUNTER — HOSPITAL ENCOUNTER (EMERGENCY)
Age: 75
Discharge: HOME | End: 2020-01-12
Attending: EMERGENCY MEDICINE
Payer: MEDICARE

## 2020-01-12 VITALS
WEIGHT: 186 LBS | HEIGHT: 64 IN | HEART RATE: 64 BPM | OXYGEN SATURATION: 95 % | TEMPERATURE: 98.5 F | RESPIRATION RATE: 16 BRPM | DIASTOLIC BLOOD PRESSURE: 73 MMHG | BODY MASS INDEX: 31.76 KG/M2 | SYSTOLIC BLOOD PRESSURE: 127 MMHG

## 2020-01-12 DIAGNOSIS — J20.9 ACUTE BRONCHITIS, UNSPECIFIED ORGANISM: Primary | ICD-10-CM

## 2020-01-12 LAB
FLUAV AG NPH QL IA: NEGATIVE
FLUBV AG NPH QL IA: NEGATIVE
SPECIMEN SOURCE: NORMAL

## 2020-01-12 PROCEDURE — 71046 X-RAY EXAM CHEST 2 VIEWS: CPT

## 2020-01-12 PROCEDURE — 99282 EMERGENCY DEPT VISIT SF MDM: CPT | Performed by: EMERGENCY MEDICINE

## 2020-01-12 PROCEDURE — 87804 INFLUENZA ASSAY W/OPTIC: CPT

## 2020-01-12 RX ORDER — AZITHROMYCIN 250 MG/1
TABLET, FILM COATED ORAL
Qty: 6 TAB | Refills: 0 | Status: SHIPPED | OUTPATIENT
Start: 2020-01-12 | End: 2020-05-22 | Stop reason: ALTCHOICE

## 2020-01-12 NOTE — ED NOTES
I have reviewed discharge instructions with the spouse. The patient verbalized understanding. Patient left ED via Discharge Method: ambulatory to Home with   Opportunity for questions and clarification provided. Patient  Scripts sent to pharmacy         To continue your aftercare when you leave the hospital, you may receive an automated call from our care team to check in on how you are doing. This is a free service and part of our promise to provide the best care and service to meet your aftercare needs.  If you have questions, or wish to unsubscribe from this service please call [phone]. Thank you for Choosing our [facility] Emergency Department.

## 2020-01-12 NOTE — DISCHARGE INSTRUCTIONS
Complete the course of antibiotics as prescribed.  follow-up with your doctor or return for any other acute concerns

## 2020-01-12 NOTE — ED TRIAGE NOTES
Pt reports she has been having pain in her torso when she coughs, generalized body aches, nasal congestion for a week. Pt states she has slight yellow sputum as well. Pt states she has been feeling nauseous as well. Also reports headache. Pt has tenderness to sternal area when palpated in triage.

## 2020-06-19 ENCOUNTER — HOSPITAL ENCOUNTER (OUTPATIENT)
Dept: LAB | Age: 75
Discharge: HOME | End: 2020-06-19
Attending: INTERNAL MEDICINE
Payer: MEDICARE

## 2020-06-19 DIAGNOSIS — Z79.899 LONG TERM CURRENT USE OF ANTIARRHYTHMIC DRUG: ICD-10-CM

## 2020-06-19 DIAGNOSIS — R53.83 FATIGUE, UNSPECIFIED TYPE: ICD-10-CM

## 2020-06-19 LAB
ALBUMIN SERPL-MCNC: 3.9 G/DL (ref 3.2–4.6)
ALBUMIN/GLOB SERPL: 1.1 {RATIO} (ref 1.2–3.5)
ALP SERPL-CCNC: 146 U/L (ref 50–136)
ALT SERPL-CCNC: 31 U/L (ref 12–65)
ANION GAP SERPL CALC-SCNC: 8 MMOL/L (ref 7–16)
AST SERPL-CCNC: 18 U/L (ref 15–37)
BILIRUB SERPL-MCNC: 0.7 MG/DL (ref 0.2–1.1)
BUN SERPL-MCNC: 22 MG/DL (ref 8–23)
CALCIUM SERPL-MCNC: 8.5 MG/DL (ref 8.3–10.4)
CHLORIDE SERPL-SCNC: 112 MMOL/L (ref 98–107)
CO2 SERPL-SCNC: 22 MMOL/L (ref 21–32)
CREAT SERPL-MCNC: 1.41 MG/DL (ref 0.6–1)
GLOBULIN SER CALC-MCNC: 3.7 G/DL (ref 2.3–3.5)
GLUCOSE SERPL-MCNC: 105 MG/DL (ref 65–100)
POTASSIUM SERPL-SCNC: 4.1 MMOL/L (ref 3.5–5.1)
PROT SERPL-MCNC: 7.6 G/DL (ref 6.3–8.2)
SODIUM SERPL-SCNC: 142 MMOL/L (ref 136–145)
T4 FREE SERPL-MCNC: 1.2 NG/DL (ref 0.78–1.46)
TSH SERPL DL<=0.005 MIU/L-ACNC: 1.25 UIU/ML (ref 0.36–3.74)

## 2020-06-19 PROCEDURE — 80053 COMPREHEN METABOLIC PANEL: CPT

## 2020-06-19 PROCEDURE — 36415 COLL VENOUS BLD VENIPUNCTURE: CPT

## 2020-06-19 PROCEDURE — 84443 ASSAY THYROID STIM HORMONE: CPT

## 2020-06-19 PROCEDURE — 84439 ASSAY OF FREE THYROXINE: CPT

## 2021-03-05 ENCOUNTER — HOSPITAL ENCOUNTER (EMERGENCY)
Age: 76
Discharge: HOME | End: 2021-03-05
Attending: STUDENT IN AN ORGANIZED HEALTH CARE EDUCATION/TRAINING PROGRAM
Payer: MEDICARE

## 2021-03-05 ENCOUNTER — APPOINTMENT (OUTPATIENT)
Dept: GENERAL RADIOLOGY | Age: 76
End: 2021-03-05
Attending: EMERGENCY MEDICINE
Payer: MEDICARE

## 2021-03-05 VITALS
SYSTOLIC BLOOD PRESSURE: 176 MMHG | BODY MASS INDEX: 31.58 KG/M2 | WEIGHT: 185 LBS | HEART RATE: 65 BPM | TEMPERATURE: 98.1 F | OXYGEN SATURATION: 96 % | RESPIRATION RATE: 18 BRPM | HEIGHT: 64 IN | DIASTOLIC BLOOD PRESSURE: 80 MMHG

## 2021-03-05 DIAGNOSIS — Z71.1 PHYSICALLY WELL BUT WORRIED: ICD-10-CM

## 2021-03-05 DIAGNOSIS — R45.0 NERVOUSNESS: Primary | ICD-10-CM

## 2021-03-05 LAB
ALBUMIN SERPL-MCNC: 4 G/DL (ref 3.2–4.6)
ALBUMIN/GLOB SERPL: 0.8 {RATIO} (ref 1.2–3.5)
ALP SERPL-CCNC: 142 U/L (ref 50–136)
ALT SERPL-CCNC: 28 U/L (ref 12–65)
ANION GAP SERPL CALC-SCNC: 9 MMOL/L (ref 7–16)
AST SERPL-CCNC: 36 U/L (ref 15–37)
ATRIAL RATE: 68 BPM
BASOPHILS # BLD: 0 K/UL (ref 0–0.2)
BASOPHILS NFR BLD: 0 % (ref 0–2)
BILIRUB SERPL-MCNC: 0.6 MG/DL (ref 0.2–1.1)
BUN SERPL-MCNC: 16 MG/DL (ref 8–23)
CALCIUM SERPL-MCNC: 9.6 MG/DL (ref 8.3–10.4)
CALCULATED P AXIS, ECG09: 74 DEGREES
CALCULATED R AXIS, ECG10: -5 DEGREES
CALCULATED T AXIS, ECG11: 71 DEGREES
CHLORIDE SERPL-SCNC: 108 MMOL/L (ref 98–107)
CO2 SERPL-SCNC: 21 MMOL/L (ref 21–32)
CREAT SERPL-MCNC: 1.17 MG/DL (ref 0.6–1)
DIAGNOSIS, 93000: NORMAL
DIFFERENTIAL METHOD BLD: ABNORMAL
EOSINOPHIL # BLD: 0 K/UL (ref 0–0.8)
EOSINOPHIL NFR BLD: 0 % (ref 0.5–7.8)
ERYTHROCYTE [DISTWIDTH] IN BLOOD BY AUTOMATED COUNT: 15.5 % (ref 11.9–14.6)
GLOBULIN SER CALC-MCNC: 4.8 G/DL (ref 2.3–3.5)
GLUCOSE SERPL-MCNC: 165 MG/DL (ref 65–100)
HCT VFR BLD AUTO: 37.4 % (ref 35.8–46.3)
HGB BLD-MCNC: 11.1 G/DL (ref 11.7–15.4)
IMM GRANULOCYTES # BLD AUTO: 0 K/UL (ref 0–0.5)
IMM GRANULOCYTES NFR BLD AUTO: 0 % (ref 0–5)
LYMPHOCYTES # BLD: 0.8 K/UL (ref 0.5–4.6)
LYMPHOCYTES NFR BLD: 11 % (ref 13–44)
MCH RBC QN AUTO: 23.7 PG (ref 26.1–32.9)
MCHC RBC AUTO-ENTMCNC: 29.7 G/DL (ref 31.4–35)
MCV RBC AUTO: 79.9 FL (ref 79.6–97.8)
MONOCYTES # BLD: 0 K/UL (ref 0.1–1.3)
MONOCYTES NFR BLD: 0 % (ref 4–12)
NEUTS SEG # BLD: 6.1 K/UL (ref 1.7–8.2)
NEUTS SEG NFR BLD: 88 % (ref 43–78)
NRBC # BLD: 0 K/UL (ref 0–0.2)
P-R INTERVAL, ECG05: 160 MS
PLATELET # BLD AUTO: 300 K/UL (ref 150–450)
PMV BLD AUTO: 10.9 FL (ref 9.4–12.3)
POTASSIUM SERPL-SCNC: 4.9 MMOL/L (ref 3.5–5.1)
PROT SERPL-MCNC: 8.8 G/DL (ref 6.3–8.2)
Q-T INTERVAL, ECG07: 380 MS
QRS DURATION, ECG06: 100 MS
QTC CALCULATION (BEZET), ECG08: 404 MS
RBC # BLD AUTO: 4.68 M/UL (ref 4.05–5.2)
SODIUM SERPL-SCNC: 138 MMOL/L (ref 136–145)
TROPONIN-HIGH SENSITIVITY: 10.5 PG/ML (ref 0–14)
VENTRICULAR RATE, ECG03: 68 BPM
WBC # BLD AUTO: 6.9 K/UL (ref 4.3–11.1)

## 2021-03-05 PROCEDURE — 85025 COMPLETE CBC W/AUTO DIFF WBC: CPT

## 2021-03-05 PROCEDURE — 74011250636 HC RX REV CODE- 250/636: Performed by: STUDENT IN AN ORGANIZED HEALTH CARE EDUCATION/TRAINING PROGRAM

## 2021-03-05 PROCEDURE — 93005 ELECTROCARDIOGRAM TRACING: CPT | Performed by: EMERGENCY MEDICINE

## 2021-03-05 PROCEDURE — 84484 ASSAY OF TROPONIN QUANT: CPT

## 2021-03-05 PROCEDURE — 80053 COMPREHEN METABOLIC PANEL: CPT

## 2021-03-05 PROCEDURE — 71045 X-RAY EXAM CHEST 1 VIEW: CPT

## 2021-03-05 PROCEDURE — 99285 EMERGENCY DEPT VISIT HI MDM: CPT

## 2021-03-05 RX ORDER — MECLIZINE HYDROCHLORIDE 25 MG/1
25 TABLET ORAL
Status: COMPLETED | OUTPATIENT
Start: 2021-03-05 | End: 2021-03-05

## 2021-03-05 RX ADMIN — MECLIZINE HYDROCHLORIDE 25 MG: 25 TABLET ORAL at 03:23

## 2021-03-05 NOTE — ED TRIAGE NOTES
Pt coming from home by [facility] for palpitations and dizziness that started around 2300. States she went to bed and when she woke up around 0200 she felt worse. Hx of vertigo. States weakness all over. EMS states she was able to walk through the house without distress. Denies headache. Denies fever. Denies SOb or chest px. States she is having palpitations. Hx of HTN. /80, HR 77, 98.2 temp oral, RR 16, 98% RA.

## 2021-03-05 NOTE — ED NOTES
I have reviewed discharge instructions with the patient and caregiver. The patient and caregiver verbalized understanding. Patient left ED via Discharge Method: wheelchair to Home with her daughter. Opportunity for questions and clarification provided. Patient given 0 scripts. To continue your aftercare when you leave the hospital, you may receive an automated call from our care team to check in on how you are doing.  This is a free service and part of our promise to provide the best care and service to meet your aftercare needs. \" If you have questions, or wish to unsubscribe from this service please call [phone].  Thank you for Choosing our [address] [place] [address] Emergency Department.

## 2021-03-05 NOTE — ED PROVIDER NOTES
41-year-old female history of vertigo as well as anxiety. Reports she woke from bed, states her  was not home when she became anxious as well as agitated. She also endorsed an episode of dizziness at that time. She called family to bring her to the emergency department. She reports no symptoms at this time. States she feels well. Reports she was concerned for possible stroke or heart attack although she had no symptoms of either. Reports she was shaking uncontrollably at home. Denies fever or actual chills. Reports she was very agitated but feels much improved at this time. Denies fever, chills, chest pain, shortness breath, abdominal pain, nausea, vomiting, diarrhea. Past Medical History:   Diagnosis Date    Arthritis     Biceps tendonitis     CAD (coronary artery disease)     MI 1999    Dysphagia     Dysuria     Elevated glucose     Hypertension     Other ill-defined conditions(799.89)     high cholesterol    Pain in limb     Postmenopausal     Psychiatric disorder     anxiety    Sciatica     Tachycardia     Vaginitis     Vertigo        Past Surgical History:   Procedure Laterality Date    HX BREAST BIOPSY Right     HX GYN      hysterectomy    HX KNEE ARTHROSCOPY Bilateral     MI ABDOMEN SURGERY PROC UNLISTED      cyst removed from upper ABD.          Family History:   Problem Relation Age of Onset    Coronary Artery Disease Neg Hx        Social History     Socioeconomic History    Marital status:      Spouse name: Not on file    Number of children: Not on file    Years of education: Not on file    Highest education level: Not on file   Occupational History    Not on file   Social Needs    Financial resource strain: Not on file    Food insecurity     Worry: Not on file     Inability: Not on file    Transportation needs     Medical: Not on file     Non-medical: Not on file   Tobacco Use    Smoking status: Never Smoker    Smokeless tobacco: Never Used Substance and Sexual Activity    Alcohol use: No    Drug use: No    Sexual activity: Not Currently   Lifestyle    Physical activity     Days per week: Not on file     Minutes per session: Not on file    Stress: Not on file   Relationships    Social connections     Talks on phone: Not on file     Gets together: Not on file     Attends Yarsanism service: Not on file     Active member of club or organization: Not on file     Attends meetings of clubs or organizations: Not on file     Relationship status: Not on file    Intimate partner violence     Fear of current or ex partner: Not on file     Emotionally abused: Not on file     Physically abused: Not on file     Forced sexual activity: Not on file   Other Topics Concern    Not on file   Social History Narrative    Not on file         ALLERGIES: Patient has no known allergies. Review of Systems   Constitutional: Negative for chills, fatigue and fever. HENT: Negative for facial swelling and sore throat. Eyes: Negative for visual disturbance. Respiratory: Negative for cough, chest tightness and shortness of breath. Cardiovascular: Negative for chest pain and palpitations. Gastrointestinal: Negative for abdominal pain, diarrhea, nausea and vomiting. Genitourinary: Negative for difficulty urinating, dysuria and flank pain. Musculoskeletal: Negative for myalgias, neck pain and neck stiffness. Skin: Negative for color change. Neurological: Negative for dizziness, speech difficulty, weakness, numbness and headaches. Psychiatric/Behavioral: Negative for confusion. Vitals:    03/05/21 0259 03/05/21 0301 03/05/21 0313   BP: (!) 141/73 (!) 141/73    Pulse: 67 72    Resp: 24 18    Temp:  98.1 °F (36.7 °C)    SpO2: 98% 98% 98%   Weight:  83.9 kg (185 lb)    Height:  5' 4\" (1.626 m)             Physical Exam  Vitals signs and nursing note reviewed. Constitutional:       Appearance: Normal appearance.  She is not ill-appearing or toxic-appearing. HENT:      Head: Normocephalic and atraumatic. Nose: Nose normal.      Mouth/Throat:      Mouth: Mucous membranes are moist.   Eyes:      Extraocular Movements: Extraocular movements intact. Pupils: Pupils are equal, round, and reactive to light. Neck:      Musculoskeletal: Normal range of motion. No neck rigidity. Cardiovascular:      Rate and Rhythm: Normal rate and regular rhythm. Pulses: Normal pulses. Heart sounds: Normal heart sounds. Pulmonary:      Effort: Pulmonary effort is normal. No respiratory distress. Breath sounds: Normal breath sounds. Abdominal:      General: Abdomen is flat. There is no distension. Palpations: Abdomen is soft. Tenderness: There is no abdominal tenderness. Musculoskeletal: Normal range of motion. Skin:     General: Skin is warm and dry. Neurological:      General: No focal deficit present. Mental Status: She is alert and oriented to person, place, and time. Cranial Nerves: No cranial nerve deficit. Sensory: No sensory deficit. Motor: No weakness. Comments: Normal finger-nose-finger exam.   Psychiatric:         Mood and Affect: Mood normal.          MDM  Number of Diagnoses or Management Options  Nervousness  Physically well but worried  Diagnosis management comments: Extremely well-appearing 56-year-old female. Presents the ER after episode of nervousness with shaking at home. She also endorsed an episode of dizziness. She was empirically given meclizine here in the ER. Denies any symptoms at this time, states she feels extremely well. EKG obtained shows sinus rhythm, rate 68, , , QTc 404, normal axis, no significant ST elevation or depression. Labs are normal white count, stable H&H, normal Electrolytes, baseline kidney dysfunction, GFR 58, troponin 10.5.   Patient with no dizziness nor any symptoms currently, no physical exam abnormalities nor significant lab abnormalities. We discharged home. Follow-up with family medicine in 2 to 3 days peer return to the ER for worsening or worrisome symptoms. She voiced understanding agreement with this plan.        Amount and/or Complexity of Data Reviewed  Clinical lab tests: ordered and reviewed  Tests in the radiology section of CPT®: ordered and reviewed  Independent visualization of images, tracings, or specimens: yes           Procedures

## 2021-03-08 ENCOUNTER — TRANSCRIBE ORDER (OUTPATIENT)
Dept: SCHEDULING | Age: 76
End: 2021-03-08

## 2021-03-08 DIAGNOSIS — R42 DIZZINESS AND GIDDINESS: Primary | ICD-10-CM

## 2021-03-14 ENCOUNTER — HOSPITAL ENCOUNTER (EMERGENCY)
Age: 76
Discharge: HOME | End: 2021-03-14
Attending: STUDENT IN AN ORGANIZED HEALTH CARE EDUCATION/TRAINING PROGRAM
Payer: MEDICARE

## 2021-03-14 ENCOUNTER — APPOINTMENT (OUTPATIENT)
Dept: GENERAL RADIOLOGY | Age: 76
End: 2021-03-14
Attending: EMERGENCY MEDICINE
Payer: MEDICARE

## 2021-03-14 VITALS
DIASTOLIC BLOOD PRESSURE: 70 MMHG | HEART RATE: 60 BPM | SYSTOLIC BLOOD PRESSURE: 165 MMHG | TEMPERATURE: 98 F | WEIGHT: 185 LBS | HEIGHT: 64 IN | OXYGEN SATURATION: 98 % | RESPIRATION RATE: 16 BRPM | BODY MASS INDEX: 31.58 KG/M2

## 2021-03-14 DIAGNOSIS — H81.90 EPISODIC RECURRENT VERTIGO: Primary | ICD-10-CM

## 2021-03-14 LAB
ALBUMIN SERPL-MCNC: 3.4 G/DL (ref 3.2–4.6)
ALBUMIN/GLOB SERPL: 0.8 {RATIO} (ref 1.2–3.5)
ALP SERPL-CCNC: 140 U/L (ref 50–136)
ALT SERPL-CCNC: 21 U/L (ref 12–65)
ANION GAP SERPL CALC-SCNC: 6 MMOL/L (ref 7–16)
AST SERPL-CCNC: 16 U/L (ref 15–37)
BASOPHILS # BLD: 0 K/UL (ref 0–0.2)
BASOPHILS NFR BLD: 0 % (ref 0–2)
BILIRUB SERPL-MCNC: 0.4 MG/DL (ref 0.2–1.1)
BUN SERPL-MCNC: 15 MG/DL (ref 8–23)
CALCIUM SERPL-MCNC: 8.6 MG/DL (ref 8.3–10.4)
CHLORIDE SERPL-SCNC: 111 MMOL/L (ref 98–107)
CO2 SERPL-SCNC: 24 MMOL/L (ref 21–32)
CREAT SERPL-MCNC: 0.97 MG/DL (ref 0.6–1)
DIFFERENTIAL METHOD BLD: ABNORMAL
EOSINOPHIL # BLD: 0.1 K/UL (ref 0–0.8)
EOSINOPHIL NFR BLD: 2 % (ref 0.5–7.8)
ERYTHROCYTE [DISTWIDTH] IN BLOOD BY AUTOMATED COUNT: 15.6 % (ref 11.9–14.6)
GLOBULIN SER CALC-MCNC: 4.1 G/DL (ref 2.3–3.5)
GLUCOSE SERPL-MCNC: 78 MG/DL (ref 65–100)
HCT VFR BLD AUTO: 33 % (ref 35.8–46.3)
HGB BLD-MCNC: 9.7 G/DL (ref 11.7–15.4)
IMM GRANULOCYTES # BLD AUTO: 0 K/UL (ref 0–0.5)
IMM GRANULOCYTES NFR BLD AUTO: 0 % (ref 0–5)
INR PPP: 1.9
LYMPHOCYTES # BLD: 1.9 K/UL (ref 0.5–4.6)
LYMPHOCYTES NFR BLD: 29 % (ref 13–44)
MCH RBC QN AUTO: 23.7 PG (ref 26.1–32.9)
MCHC RBC AUTO-ENTMCNC: 29.4 G/DL (ref 31.4–35)
MCV RBC AUTO: 80.7 FL (ref 79.6–97.8)
MONOCYTES # BLD: 0.8 K/UL (ref 0.1–1.3)
MONOCYTES NFR BLD: 12 % (ref 4–12)
NEUTS SEG # BLD: 3.6 K/UL (ref 1.7–8.2)
NEUTS SEG NFR BLD: 57 % (ref 43–78)
NRBC # BLD: 0 K/UL (ref 0–0.2)
PLATELET # BLD AUTO: 278 K/UL (ref 150–450)
PMV BLD AUTO: 11.2 FL (ref 9.4–12.3)
POTASSIUM SERPL-SCNC: 3.8 MMOL/L (ref 3.5–5.1)
PROT SERPL-MCNC: 7.5 G/DL (ref 6.3–8.2)
PROTHROMBIN TIME: 22.3 SEC (ref 12.5–14.7)
RBC # BLD AUTO: 4.09 M/UL (ref 4.05–5.2)
SODIUM SERPL-SCNC: 141 MMOL/L (ref 136–145)
TROPONIN-HIGH SENSITIVITY: 12 PG/ML (ref 0–14)
TROPONIN-HIGH SENSITIVITY: 12.5 PG/ML (ref 0–14)
WBC # BLD AUTO: 6.4 K/UL (ref 4.3–11.1)

## 2021-03-14 PROCEDURE — 85025 COMPLETE CBC W/AUTO DIFF WBC: CPT

## 2021-03-14 PROCEDURE — 93005 ELECTROCARDIOGRAM TRACING: CPT

## 2021-03-14 PROCEDURE — 71046 X-RAY EXAM CHEST 2 VIEWS: CPT

## 2021-03-14 PROCEDURE — 84484 ASSAY OF TROPONIN QUANT: CPT

## 2021-03-14 PROCEDURE — 99284 EMERGENCY DEPT VISIT MOD MDM: CPT

## 2021-03-14 PROCEDURE — 85610 PROTHROMBIN TIME: CPT

## 2021-03-14 PROCEDURE — 80053 COMPREHEN METABOLIC PANEL: CPT

## 2021-03-14 RX ORDER — SCOLOPAMINE TRANSDERMAL SYSTEM 1 MG/1
1 PATCH, EXTENDED RELEASE TRANSDERMAL
Qty: 10 PATCH | Refills: 0 | Status: SHIPPED | OUTPATIENT
Start: 2021-03-14 | End: 2021-05-13

## 2021-03-14 NOTE — DISCHARGE INSTRUCTIONS
Use scopolamine patches for dizziness along with meclizine. Follow-up with family physician in 1 to 2 days. Return to the ER for worsening or worrisome symptoms. You will need repeat hemoglobin checked in 2 to 3 days.

## 2021-03-14 NOTE — ED PROVIDER NOTES
64-year-old female presents to the emergency department with intermittent vertigo. She was seen here in the ER 1 week ago with the same complaint. Actually evaluate patient then. At that time her symptoms had resolved. Today she reports intermittent episodes of continued vertigo, worse with moving her head, worse with laying down flat. Reports this is the same symptoms she has had every time that she has had vertigo. Reports intermittent episodes of lightheadedness as well. Denies fever, chills, chest pain, shortness breath, abdominal pain, nausea, vomiting, diarrhea. She does endorse dark-colored stool. Patient is on warfarin. Denies any falls or head trauma. Past Medical History:   Diagnosis Date    Arthritis     Biceps tendonitis     CAD (coronary artery disease)     MI 1999    Dysphagia     Dysuria     Elevated glucose     Hypertension     Other ill-defined conditions(799.89)     high cholesterol    Pain in limb     Postmenopausal     Psychiatric disorder     anxiety    Sciatica     Tachycardia     Vaginitis     Vertigo        Past Surgical History:   Procedure Laterality Date    HX BREAST BIOPSY Right     HX GYN      hysterectomy    HX KNEE ARTHROSCOPY Bilateral     MI ABDOMEN SURGERY PROC UNLISTED      cyst removed from upper ABD.          Family History:   Problem Relation Age of Onset    Coronary Artery Disease Neg Hx        Social History     Socioeconomic History    Marital status:      Spouse name: Not on file    Number of children: Not on file    Years of education: Not on file    Highest education level: Not on file   Occupational History    Not on file   Social Needs    Financial resource strain: Not on file    Food insecurity     Worry: Not on file     Inability: Not on file    Transportation needs     Medical: Not on file     Non-medical: Not on file   Tobacco Use    Smoking status: Never Smoker    Smokeless tobacco: Never Used   Substance and Sexual Activity    Alcohol use: No    Drug use: No    Sexual activity: Not Currently   Lifestyle    Physical activity     Days per week: Not on file     Minutes per session: Not on file    Stress: Not on file   Relationships    Social connections     Talks on phone: Not on file     Gets together: Not on file     Attends Orthodox service: Not on file     Active member of club or organization: Not on file     Attends meetings of clubs or organizations: Not on file     Relationship status: Not on file    Intimate partner violence     Fear of current or ex partner: Not on file     Emotionally abused: Not on file     Physically abused: Not on file     Forced sexual activity: Not on file   Other Topics Concern    Not on file   Social History Narrative    Not on file         ALLERGIES: Patient has no known allergies. Review of Systems   Constitutional: Negative for chills, fatigue and fever. HENT: Negative for facial swelling and sore throat. Eyes: Negative for visual disturbance. Respiratory: Negative for cough, chest tightness and shortness of breath. Cardiovascular: Negative for chest pain and palpitations. Gastrointestinal: Negative for abdominal pain, diarrhea, nausea and vomiting. Genitourinary: Negative for difficulty urinating, dysuria and flank pain. Musculoskeletal: Negative for myalgias, neck pain and neck stiffness. Skin: Negative for color change. Neurological: Positive for dizziness. Negative for speech difficulty, weakness, numbness and headaches. Psychiatric/Behavioral: Negative for confusion. Vitals:    03/14/21 1545   BP: 138/65   Pulse: (!) 52   Resp: 16   Temp: 98 °F (36.7 °C)   SpO2: 98%   Weight: 83.9 kg (185 lb)   Height: 5' 4\" (1.626 m)            Physical Exam  Vitals signs and nursing note reviewed. Exam conducted with a chaperone present. Constitutional:       Appearance: Normal appearance. She is not ill-appearing or toxic-appearing.    HENT:      Head: Normocephalic and atraumatic. Nose: Nose normal.      Mouth/Throat:      Mouth: Mucous membranes are moist.   Eyes:      Extraocular Movements: Extraocular movements intact. Pupils: Pupils are equal, round, and reactive to light. Neck:      Musculoskeletal: Normal range of motion. No neck rigidity. Cardiovascular:      Rate and Rhythm: Normal rate and regular rhythm. Pulses: Normal pulses. Heart sounds: Normal heart sounds. Pulmonary:      Effort: Pulmonary effort is normal. No respiratory distress. Breath sounds: Normal breath sounds. Abdominal:      General: Abdomen is flat. There is no distension. Palpations: Abdomen is soft. Tenderness: There is no abdominal tenderness. Genitourinary:     Comments: Rectal exam revealed brown stool, chaperoned by the patient's nurse. Stool was minimally positive, Hemoccult turned to a light green color, not the deep blue color which represents positive test  Musculoskeletal: Normal range of motion. Skin:     General: Skin is warm and dry. Neurological:      General: No focal deficit present. Mental Status: She is alert and oriented to person, place, and time. Cranial Nerves: No cranial nerve deficit. Sensory: No sensory deficit. Motor: No weakness. Coordination: Coordination normal.      Gait: Gait normal.      Comments: Normal finger nose finger exam, normal heel to shin. Ambulates w/o difficulty   Psychiatric:         Mood and Affect: Mood normal.          MDM  Number of Diagnoses or Management Options  Episodic recurrent vertigo  Diagnosis management comments: 68-year-old female history of vertigo who presents to the ER with continued episodes which been intermittent of dizziness. Reports the room is spinning. States this is consistent with multiple prior episodes. She reports she just keeps having persistent intermittent symptoms.  Denies sensory or strength abnormalities, denies difficulty walking or gait abnormalities. Patient again only has symptoms while lying flat and states it comes and goes. Reports taking meclizine at home. Patient had normal troponin x2. Labs show white count 6.4, hemoglobin 9.7, 9 days ago hemoglobin was 11.1. Patient with question mild positive Hemoccult but no melena or hematochezia. Grossly normal electrolytes, normal kidney function. I discussed these findings with patient and family. I Offered admission, but through shared decision-making, patient opts for outpatient follow-up, she has appointment tomorrow with her primary care physician. Patient with normal vital signs and is in no distress. Patient with no ataxia, no other concerning signs or symptoms. No dizziness at this time. Will give scopolamine patches for use at home if symptoms of vertigo return. Return to the ER for worsening or worrisome symptoms. She voiced understanding and agreement with this plan.        Amount and/or Complexity of Data Reviewed  Clinical lab tests: ordered and reviewed  Tests in the radiology section of CPT®: reviewed and ordered  Decide to obtain previous medical records or to obtain history from someone other than the patient: yes  Review and summarize past medical records: yes  Independent visualization of images, tracings, or specimens: yes    Risk of Complications, Morbidity, and/or Mortality  Presenting problems: moderate  Diagnostic procedures: moderate  Management options: moderate           Procedures

## 2021-03-14 NOTE — ED TRIAGE NOTES
Patient ambulatory to triage with mask in place. Patient reports dizziness that worse when lying down. Pt also reports palpations and shob. Denies chest pain, fever or chills.

## 2021-03-15 ENCOUNTER — HOSPITAL ENCOUNTER (OUTPATIENT)
Dept: MRI IMAGING | Age: 76
Discharge: HOME | End: 2021-03-15
Attending: FAMILY MEDICINE
Payer: MEDICARE

## 2021-03-15 DIAGNOSIS — R42 DIZZINESS AND GIDDINESS: ICD-10-CM

## 2021-03-15 LAB
ATRIAL RATE: 52 BPM
CALCULATED P AXIS, ECG09: 56 DEGREES
CALCULATED R AXIS, ECG10: 1 DEGREES
CALCULATED T AXIS, ECG11: 22 DEGREES
DIAGNOSIS, 93000: NORMAL
P-R INTERVAL, ECG05: 154 MS
Q-T INTERVAL, ECG07: 468 MS
QRS DURATION, ECG06: 94 MS
QTC CALCULATION (BEZET), ECG08: 435 MS
VENTRICULAR RATE, ECG03: 52 BPM

## 2021-03-15 PROCEDURE — 70551 MRI BRAIN STEM W/O DYE: CPT

## 2021-03-15 NOTE — ED NOTES
I have reviewed discharge instructions with the patient. The patient verbalized understanding. Patient left ED via Discharge Method: wheelchair to Home with family. Opportunity for questions and clarification provided. Patient given 1 scripts. To continue your aftercare when you leave the hospital, you may receive an automated call from our care team to check in on how you are doing. This is a free service and part of our promise to provide the best care and service to meet your aftercare needs.  If you have questions, or wish to unsubscribe from this service please call [phone]. Thank you for Choosing our [facility].

## 2021-05-19 ENCOUNTER — HOSPITAL ENCOUNTER (OUTPATIENT)
Age: 76
Setting detail: OUTPATIENT SURGERY
Discharge: HOME | End: 2021-05-19
Attending: INTERNAL MEDICINE | Admitting: INTERNAL MEDICINE
Payer: MEDICARE

## 2021-05-19 ENCOUNTER — ANESTHESIA (OUTPATIENT)
Dept: ENDOSCOPY | Age: 76
End: 2021-05-19
Payer: MEDICARE

## 2021-05-19 ENCOUNTER — ANESTHESIA EVENT (OUTPATIENT)
Dept: ENDOSCOPY | Age: 76
End: 2021-05-19
Payer: MEDICARE

## 2021-05-19 VITALS
DIASTOLIC BLOOD PRESSURE: 72 MMHG | HEIGHT: 65 IN | BODY MASS INDEX: 30.99 KG/M2 | RESPIRATION RATE: 14 BRPM | TEMPERATURE: 97 F | WEIGHT: 186 LBS | HEART RATE: 71 BPM | SYSTOLIC BLOOD PRESSURE: 151 MMHG | OXYGEN SATURATION: 97 %

## 2021-05-19 LAB
INR BLD: 1.1 (ref 0.9–1.2)
PT BLD: 12.9 SECS (ref 9.6–11.6)

## 2021-05-19 PROCEDURE — 88305 TISSUE EXAM BY PATHOLOGIST: CPT

## 2021-05-19 PROCEDURE — 85610 PROTHROMBIN TIME: CPT

## 2021-05-19 PROCEDURE — 74011000250 HC RX REV CODE- 250: Performed by: NURSE ANESTHETIST, CERTIFIED REGISTERED

## 2021-05-19 PROCEDURE — 76060000032 HC ANESTHESIA 0.5 TO 1 HR: Performed by: INTERNAL MEDICINE

## 2021-05-19 PROCEDURE — 74011250636 HC RX REV CODE- 250/636: Performed by: ANESTHESIOLOGY

## 2021-05-19 PROCEDURE — 76040000026: Performed by: INTERNAL MEDICINE

## 2021-05-19 PROCEDURE — 74011250636 HC RX REV CODE- 250/636: Performed by: NURSE ANESTHETIST, CERTIFIED REGISTERED

## 2021-05-19 PROCEDURE — 77030021593 HC FCPS BIOP ENDOSC BSC -A: Performed by: INTERNAL MEDICINE

## 2021-05-19 PROCEDURE — 2709999900 HC NON-CHARGEABLE SUPPLY: Performed by: INTERNAL MEDICINE

## 2021-05-19 RX ORDER — GLYCOPYRROLATE 0.2 MG/ML
INJECTION INTRAMUSCULAR; INTRAVENOUS AS NEEDED
Status: DISCONTINUED | OUTPATIENT
Start: 2021-05-19 | End: 2021-05-19 | Stop reason: HOSPADM

## 2021-05-19 RX ORDER — PROPOFOL 10 MG/ML
INJECTION, EMULSION INTRAVENOUS AS NEEDED
Status: DISCONTINUED | OUTPATIENT
Start: 2021-05-19 | End: 2021-05-19 | Stop reason: HOSPADM

## 2021-05-19 RX ORDER — SODIUM CHLORIDE, SODIUM LACTATE, POTASSIUM CHLORIDE, CALCIUM CHLORIDE 600; 310; 30; 20 MG/100ML; MG/100ML; MG/100ML; MG/100ML
1000 INJECTION, SOLUTION INTRAVENOUS CONTINUOUS
Status: DISCONTINUED | OUTPATIENT
Start: 2021-05-19 | End: 2021-05-19 | Stop reason: HOSPADM

## 2021-05-19 RX ORDER — PROPOFOL 10 MG/ML
INJECTION, EMULSION INTRAVENOUS
Status: DISCONTINUED | OUTPATIENT
Start: 2021-05-19 | End: 2021-05-19 | Stop reason: HOSPADM

## 2021-05-19 RX ORDER — LIDOCAINE HYDROCHLORIDE 20 MG/ML
INJECTION, SOLUTION EPIDURAL; INFILTRATION; INTRACAUDAL; PERINEURAL AS NEEDED
Status: DISCONTINUED | OUTPATIENT
Start: 2021-05-19 | End: 2021-05-19 | Stop reason: HOSPADM

## 2021-05-19 RX ORDER — SODIUM CHLORIDE, SODIUM LACTATE, POTASSIUM CHLORIDE, CALCIUM CHLORIDE 600; 310; 30; 20 MG/100ML; MG/100ML; MG/100ML; MG/100ML
INJECTION, SOLUTION INTRAVENOUS
Status: DISCONTINUED | OUTPATIENT
Start: 2021-05-19 | End: 2021-05-19 | Stop reason: HOSPADM

## 2021-05-19 RX ORDER — EPHEDRINE SULFATE/0.9% NACL/PF 50 MG/5 ML
SYRINGE (ML) INTRAVENOUS AS NEEDED
Status: DISCONTINUED | OUTPATIENT
Start: 2021-05-19 | End: 2021-05-19 | Stop reason: HOSPADM

## 2021-05-19 RX ADMIN — PROPOFOL 160 MCG/KG/MIN: 10 INJECTION, EMULSION INTRAVENOUS at 12:11

## 2021-05-19 RX ADMIN — GLYCOPYRROLATE 0.1 MG: 0.2 INJECTION, SOLUTION INTRAMUSCULAR; INTRAVENOUS at 12:32

## 2021-05-19 RX ADMIN — SODIUM CHLORIDE, SODIUM LACTATE, POTASSIUM CHLORIDE, AND CALCIUM CHLORIDE: 600; 310; 30; 20 INJECTION, SOLUTION INTRAVENOUS at 12:07

## 2021-05-19 RX ADMIN — PROPOFOL 40 MG: 10 INJECTION, EMULSION INTRAVENOUS at 12:11

## 2021-05-19 RX ADMIN — SODIUM CHLORIDE, SODIUM LACTATE, POTASSIUM CHLORIDE, AND CALCIUM CHLORIDE 1000 ML: 600; 310; 30; 20 INJECTION, SOLUTION INTRAVENOUS at 11:50

## 2021-05-19 RX ADMIN — LIDOCAINE HYDROCHLORIDE 100 MG: 20 INJECTION, SOLUTION EPIDURAL; INFILTRATION; INTRACAUDAL; PERINEURAL at 12:11

## 2021-05-19 RX ADMIN — GLYCOPYRROLATE 0.1 MG: 0.2 INJECTION, SOLUTION INTRAMUSCULAR; INTRAVENOUS at 12:24

## 2021-05-19 RX ADMIN — PROPOFOL 20 MG: 10 INJECTION, EMULSION INTRAVENOUS at 12:14

## 2021-05-19 RX ADMIN — Medication 10 MG: at 12:41

## 2021-05-19 RX ADMIN — GLYCOPYRROLATE 0.1 MG: 0.2 INJECTION, SOLUTION INTRAMUSCULAR; INTRAVENOUS at 12:26

## 2021-05-19 RX ADMIN — PROPOFOL 20 MG: 10 INJECTION, EMULSION INTRAVENOUS at 12:13

## 2021-05-19 RX ADMIN — GLYCOPYRROLATE 0.1 MG: 0.2 INJECTION, SOLUTION INTRAMUSCULAR; INTRAVENOUS at 12:28

## 2021-05-19 RX ADMIN — PROPOFOL 20 MG: 10 INJECTION, EMULSION INTRAVENOUS at 12:12

## 2021-05-19 RX ADMIN — Medication 10 MG: at 12:32

## 2021-05-19 NOTE — ANESTHESIA POSTPROCEDURE EVALUATION
Procedure(s):  COLONOSCOPY / BMI 31  ESOPHAGOGASTRODUODENOSCOPY (EGD)  ESOPHAGOGASTRODUODENAL (EGD) BIOPSY. total IV anesthesia    Anesthesia Post Evaluation        Patient location during evaluation: PACU  Patient participation: complete - patient participated  Level of consciousness: awake and alert  Pain management: adequate  Airway patency: patent  Anesthetic complications: no  Cardiovascular status: acceptable  Respiratory status: acceptable  Hydration status: acceptable  Post anesthesia nausea and vomiting:  none      INITIAL Post-op Vital signs:   Vitals Value Taken Time   /79 05/19/21 1325   Temp 36.1 °C (97 °F) 05/19/21 1248   Pulse 81 05/19/21 1328   Resp 14 05/19/21 1316   SpO2 95 % 05/19/21 1328   Vitals shown include unvalidated device data.

## 2021-05-19 NOTE — OP NOTES
Gastroenterology Procedure Note           Procedure:  Colonoscopy    Date of Procedure:  5/19/2021    Patient:  Kleber Gaytan     1945    Indication:   Melena, DEBORAH    Sedation:  MAC    Pre-Procedure Exam:    Mental status:  alert and oriented  Airway:  normal oropharyngeal airway and neck mobility  CV:  regular rate and rhythm   Respiratory:  clear to auscultation    Procedure:  A History and Physical has been performed, and patient medication allergies have been reviewed. Risks of perforation, hemorrhage, adverse drug reaction, and aspiration were discussed. Informed consent was obtained for the procedure, including sedation. The patient was placed in the left lateral decubitus position. The heart rate, oxygen saturations, blood pressure, and response to care were monitored throughout the procedure. After performing a rectal exam, the colonoscope was passed through the anus and advanced under direct vision to the cecum, identified by appendiceal orifice and ileocecal valve. The quality of prep was adequate. A careful inspection was made as the colonoscope was withdrawn, including a retroflexed view of the rectum. The patient tolerated the procedure well. Findings:     ANUS:  Anal exam reveals no masses or hemorrhoids. RECTUM:  Internal hemorrhoids are seen. SIGMOID COLON:  Multiple diverticula are seen. DESCENDING COLON:  The mucosa is normal with good vascular pattern and without ulcers, diverticula, and polyps. SPLENIC FLEXURE:  The splenic flexure is normal.   TRANSVERSE COLON:  The mucosa is normal with good vascular pattern and without ulcers, diverticula, and polyps. HEPATIC FLEXURE:  The hepatic flexure is normal.   ASCENDING COLON:  The mucosa is normal with good vascular pattern and without ulcers, diverticula, and polyps. CECUM:  The appendiceal orifice appears normal. The ileocecal valve appears normal.   TERMINAL ILEUM:  The terminal ileum was not entered. Specimen:  No    Estimated Blood Loss:  None    Implant:  None           Impression:    Diverticulosis. Internal hemorrhoids. Plan:  1. High fiber diet. 2. No further screening colonoscopies. 3. Return to office in 2 months.     Signed:  Carolann Berry MD  5/19/2021  12:06 PM

## 2021-05-19 NOTE — ANESTHESIA PREPROCEDURE EVALUATION
Relevant Problems   No relevant active problems       Anesthetic History               Review of Systems / Medical History  Patient summary reviewed and pertinent labs reviewed    Pulmonary                   Neuro/Psych         Psychiatric history     Cardiovascular            Dysrhythmias   Past MI and CAD    Exercise tolerance: <4 METS  Comments: H/o afib and SSS but denies pacemaker or recent active cardiac condition   GI/Hepatic/Renal                Endo/Other        Anemia     Other Findings              Physical Exam    Airway  Mallampati: I  TM Distance: 4 - 6 cm    Mouth opening: Normal     Cardiovascular    Rhythm: regular  Rate: normal         Dental  No notable dental hx       Pulmonary  Breath sounds clear to auscultation               Abdominal         Other Findings            Anesthetic Plan    ASA: 3  Anesthesia type: total IV anesthesia          Induction: Intravenous  Anesthetic plan and risks discussed with: Patient

## 2021-05-19 NOTE — OP NOTES
Gastroenterology Procedure Note           Procedure:  Esophagogastroduodenoscopy    Date of Procedure:  5/19/2021    Patient:  Alvaro Vaca     1945    Indication:  Melena, DEBORAH    Sedation:  MAC    Pre-Procedure Physical Exam:    Mental status:  alert and oriented  Airway:  normal oropharyngeal airway and neck mobility  CV:  regular rate and rhythm  Respiratory:  clear to auscultation    Procedure:  A History and Physical has been performed, and patient medication allergies have been reviewed. Risks of perforation, hemorrhage, adverse drug reaction, and aspiration were discussed. Informed consent was obtained for the procedure, including sedation. The patient was placed in the left lateral decubitus position. The heart rate, oxygen saturations, blood pressure, and response to care were monitored throughout the procedure. The gastroscope was passed through the mouth and advanced under direct vision to the second portion of the duodenum. A careful inspection was made as the gastroscope was withdrawn, including a retroflexed view of the proximal stomach. The patient tolerated the procedure well. Findings:     OROPHARYNX: Cords and pyriform recesses appear normal.   ESOPHAGUS: The esophagus is normal. The proximal, mid, and distal portions are normal. The Z-Line is intact. STOMACH: A 2 cm hiatal hernia is seen. A 1 cm subepithelial lesion is seen in the cardia. The fundus on antegrade and retroflexed views is normal. The body, antrum, and pylorus are normal.   DUODENUM: The bulb and second portions are normal. Biopsied. Specimen:  Yes    Estimated Blood Loss:  Minimal    Implant:  None           Impression:    Small hiatal hernia. Gastric subepithelial lesion. Plan:  1. Will discuss EUS to evaluate gastric nodule. 2. Colonoscopy today.     Signed:  Rodri Chavez MD  5/19/2021  12:05 PM

## 2021-05-19 NOTE — H&P
History and Physical for Procedures             Date: 5/19/2021     History of Present Illness:  Patient presents to undergo EGD and colonoscopy. Past Medical History:   Diagnosis Date    Arrhythmia     A-fib    Arthritis     Biceps tendonitis     CAD (coronary artery disease)     MI 1999    Dysphagia     Dysuria     Elevated glucose     GERD (gastroesophageal reflux disease)     managed with medication    Hypertension     controlled with medication    Other ill-defined conditions(799.89)     high cholesterol    Pain in limb     Postmenopausal     Psychiatric disorder     anxiety    Sciatica     Tachycardia     Vaginitis     Vertigo      Past Surgical History:   Procedure Laterality Date    HX BREAST BIOPSY Right     HX GYN      hysterectomy    HX KNEE ARTHROSCOPY Bilateral     IA ABDOMEN SURGERY PROC UNLISTED      cyst removed from upper ABD. Family History   Problem Relation Age of Onset    Coronary Artery Disease Neg Hx      Social History     Tobacco Use    Smoking status: Never Smoker    Smokeless tobacco: Never Used   Substance Use Topics    Alcohol use: No        No Known Allergies  Current Facility-Administered Medications   Medication Dose Route Frequency    lactated Ringers infusion 1,000 mL  1,000 mL IntraVENous CONTINUOUS        Review of Systems:  A detailed 10 organ review of systems is obtained with pertinent positives as listed in the History of Present Illness. All others are negative. Objective:     Physical Exam:  Visit Vitals  BP (!) 197/79   Pulse 61   Temp 98.3 °F (36.8 °C)   Resp 17   Ht 5' 5\" (1.651 m)   Wt 84.4 kg (186 lb)   SpO2 99%   BMI 30.95 kg/m²      General:  Alert and oriented. Heart: Regular rate and rhythm  Lungs:  Clear to auscultation bilaterally  Abdomen: Soft, nontender, nondistended    Impression/Plan:     Proceed with EGD and colonoscopy as planned.  I have discussed with the patient the technique, benefits, alternatives, and risks of these procedures, including medication reaction, immediate or delayed bleeding, or perforation of the gastrointestinal tract.      Signed By: Vinicio Andrews MD     May 19, 2021

## 2021-05-19 NOTE — PROGRESS NOTES
's pre-procedure visit and prayer with patient as requested.     Mare Vuong MDiv, BS  Board Certified

## 2021-05-19 NOTE — DISCHARGE INSTRUCTIONS
Gastrointestinal Esophagogastroduodenoscopy (EGD) - Upper Exam Discharge Instructions    1. Call Dr. Denney Lennox at [phone] for any problems or questions. 2. Contact the doctor's office for follow up appointment as directed. 3. Medication may cause drowsiness for several hours, therefore:  · Do not drive or operate machinery for remainder of the day. · No alcohol today. · Do not make any important or legal decisions for 24 hours. · Do not sign any legal documents for 24 hours. 5. Ordinarily, you may resume regular diet and activity after exam unless otherwise  specified by your physician. 6. For mild soreness in your throat you may use Cepacol throat lozenges or warm  salt-water gargles as needed. Any additional instructions:  Plan:  1. Will discuss EUS to evaluate gastric nodule     Instructions given to Stalinfrancine Camara and other family members. Instructions given by:      Gastrointestinal Colonoscopy/Flexible Sigmoidoscopy - Lower Exam Discharge Instructions  1. Call Dr. Denney Lennox at [phone] for any problems or questions. 2. Contact the doctors office for follow up appointment as directed  3. Medication may cause drowsiness for several hours, therefore:  · Do not drive or operate machinery for reminder of the day. · No alcohol today. · Do not make any important or legal decisions for 24 hours. · Do not sign any legal documents for 24 hours. 4. Ordinarily, you may resume regular diet and activity after exam unless otherwise specified by your physician. 5. Because of air put into your colon during exam, you may experience some abdominal distension, relieved by the passage of gas, for several hours. 6. Contact your physician if you have any of the following:  · Excessive amount of bleeding - large amount when having a bowel movement. Occasional specks of blood with bowel movement would not be unusual.  · Severe abdominal pain  · Fever or Chills  Any additional instructions:  Plan:  1. High fiber diet. 2. No further screening colonoscopies. 3. Return to office in 2 months. Instructions given to [facility] and other family members.   Instructions given by:

## 2021-07-12 ENCOUNTER — HOSPITAL ENCOUNTER (INPATIENT)
Age: 76
LOS: 7 days | Discharge: NURSING, REHAB OR LONG-TERM CARE | DRG: 522 | End: 2021-07-19
Attending: EMERGENCY MEDICINE | Admitting: HOSPITALIST
Payer: MEDICARE

## 2021-07-12 ENCOUNTER — APPOINTMENT (OUTPATIENT)
Dept: GENERAL RADIOLOGY | Age: 76
DRG: 522 | End: 2021-07-12
Attending: EMERGENCY MEDICINE
Payer: MEDICARE

## 2021-07-12 DIAGNOSIS — S72.001A CLOSED DISPLACED FRACTURE OF RIGHT FEMORAL NECK (HCC): Primary | ICD-10-CM

## 2021-07-12 DIAGNOSIS — I48.91 ATRIAL FIBRILLATION, UNSPECIFIED TYPE (HCC): ICD-10-CM

## 2021-07-12 PROBLEM — S72.009A HIP FRACTURE (HCC): Status: ACTIVE | Noted: 2021-07-12

## 2021-07-12 LAB
ABO + RH BLD: NORMAL
ALBUMIN SERPL-MCNC: 3.7 G/DL (ref 3.2–4.6)
ALBUMIN/GLOB SERPL: 1 {RATIO} (ref 1.2–3.5)
ALP SERPL-CCNC: 135 U/L (ref 50–136)
ALT SERPL-CCNC: 34 U/L (ref 12–65)
ANION GAP SERPL CALC-SCNC: 6 MMOL/L (ref 7–16)
APPEARANCE UR: CLEAR
AST SERPL-CCNC: 26 U/L (ref 15–37)
ATRIAL RATE: 72 BPM
BASOPHILS # BLD: 0 K/UL (ref 0–0.2)
BASOPHILS NFR BLD: 1 % (ref 0–2)
BILIRUB SERPL-MCNC: 0.6 MG/DL (ref 0.2–1.1)
BILIRUB UR QL: NEGATIVE
BLOOD GROUP ANTIBODIES SERPL: NORMAL
BUN SERPL-MCNC: 21 MG/DL (ref 8–23)
CALCIUM SERPL-MCNC: 8.9 MG/DL (ref 8.3–10.4)
CALCULATED P AXIS, ECG09: 74 DEGREES
CALCULATED R AXIS, ECG10: 12 DEGREES
CALCULATED T AXIS, ECG11: 59 DEGREES
CHLORIDE SERPL-SCNC: 113 MMOL/L (ref 98–107)
CO2 SERPL-SCNC: 22 MMOL/L (ref 21–32)
COLOR UR: YELLOW
CREAT SERPL-MCNC: 1.19 MG/DL (ref 0.6–1)
DIAGNOSIS, 93000: NORMAL
DIFFERENTIAL METHOD BLD: ABNORMAL
EOSINOPHIL # BLD: 0 K/UL (ref 0–0.8)
EOSINOPHIL NFR BLD: 0 % (ref 0.5–7.8)
ERYTHROCYTE [DISTWIDTH] IN BLOOD BY AUTOMATED COUNT: 17.4 % (ref 11.9–14.6)
FERRITIN SERPL-MCNC: 10 NG/ML (ref 8–388)
FOLATE SERPL-MCNC: 6.5 NG/ML (ref 3.1–17.5)
GLOBULIN SER CALC-MCNC: 3.8 G/DL (ref 2.3–3.5)
GLUCOSE SERPL-MCNC: 78 MG/DL (ref 65–100)
GLUCOSE UR STRIP.AUTO-MCNC: NEGATIVE MG/DL
HCT VFR BLD AUTO: 29.1 % (ref 35.8–46.3)
HGB BLD-MCNC: 8.4 G/DL (ref 11.7–15.4)
HGB UR QL STRIP: NEGATIVE
IMM GRANULOCYTES # BLD AUTO: 0 K/UL (ref 0–0.5)
IMM GRANULOCYTES NFR BLD AUTO: 1 % (ref 0–5)
INR PPP: 2.2
IRON SATN MFR SERPL: 4 %
IRON SERPL-MCNC: 21 UG/DL (ref 35–150)
KETONES UR QL STRIP.AUTO: NEGATIVE MG/DL
LEUKOCYTE ESTERASE UR QL STRIP.AUTO: NEGATIVE
LYMPHOCYTES # BLD: 2.3 K/UL (ref 0.5–4.6)
LYMPHOCYTES NFR BLD: 27 % (ref 13–44)
MAGNESIUM SERPL-MCNC: 2 MG/DL (ref 1.8–2.4)
MCH RBC QN AUTO: 21.5 PG (ref 26.1–32.9)
MCHC RBC AUTO-ENTMCNC: 28.9 G/DL (ref 31.4–35)
MCV RBC AUTO: 74.4 FL (ref 79.6–97.8)
MONOCYTES # BLD: 0.7 K/UL (ref 0.1–1.3)
MONOCYTES NFR BLD: 8 % (ref 4–12)
NEUTS SEG # BLD: 5.4 K/UL (ref 1.7–8.2)
NEUTS SEG NFR BLD: 64 % (ref 43–78)
NITRITE UR QL STRIP.AUTO: NEGATIVE
NRBC # BLD: 0 K/UL (ref 0–0.2)
P-R INTERVAL, ECG05: 168 MS
PH UR STRIP: 7 [PH] (ref 5–9)
PLATELET # BLD AUTO: 315 K/UL (ref 150–450)
PMV BLD AUTO: 10.7 FL (ref 9.4–12.3)
POTASSIUM SERPL-SCNC: 3.5 MMOL/L (ref 3.5–5.1)
PROT SERPL-MCNC: 7.5 G/DL (ref 6.3–8.2)
PROT UR STRIP-MCNC: NEGATIVE MG/DL
PROTHROMBIN TIME: 25 SEC (ref 12.5–14.7)
Q-T INTERVAL, ECG07: 466 MS
QRS DURATION, ECG06: 102 MS
QTC CALCULATION (BEZET), ECG08: 510 MS
RBC # BLD AUTO: 3.91 M/UL (ref 4.05–5.2)
SODIUM SERPL-SCNC: 141 MMOL/L (ref 136–145)
SP GR UR REFRACTOMETRY: 1.01 (ref 1–1.02)
SPECIMEN EXP DATE BLD: NORMAL
TIBC SERPL-MCNC: 484 UG/DL (ref 250–450)
TRANSFERRIN SERPL-MCNC: 347 MG/DL (ref 202–364)
UROBILINOGEN UR QL STRIP.AUTO: 0.2 EU/DL (ref 0.2–1)
VENTRICULAR RATE, ECG03: 72 BPM
VIT B12 SERPL-MCNC: 964 PG/ML (ref 193–986)
WBC # BLD AUTO: 8.5 K/UL (ref 4.3–11.1)

## 2021-07-12 PROCEDURE — 83540 ASSAY OF IRON: CPT

## 2021-07-12 PROCEDURE — 99285 EMERGENCY DEPT VISIT HI MDM: CPT

## 2021-07-12 PROCEDURE — 82746 ASSAY OF FOLIC ACID SERUM: CPT

## 2021-07-12 PROCEDURE — 85610 PROTHROMBIN TIME: CPT

## 2021-07-12 PROCEDURE — 82728 ASSAY OF FERRITIN: CPT

## 2021-07-12 PROCEDURE — 81003 URINALYSIS AUTO W/O SCOPE: CPT

## 2021-07-12 PROCEDURE — 93005 ELECTROCARDIOGRAM TRACING: CPT | Performed by: EMERGENCY MEDICINE

## 2021-07-12 PROCEDURE — 74011250636 HC RX REV CODE- 250/636

## 2021-07-12 PROCEDURE — 65270000029 HC RM PRIVATE

## 2021-07-12 PROCEDURE — 96376 TX/PRO/DX INJ SAME DRUG ADON: CPT

## 2021-07-12 PROCEDURE — 80053 COMPREHEN METABOLIC PANEL: CPT

## 2021-07-12 PROCEDURE — 74011250636 HC RX REV CODE- 250/636: Performed by: EMERGENCY MEDICINE

## 2021-07-12 PROCEDURE — 86901 BLOOD TYPING SEROLOGIC RH(D): CPT

## 2021-07-12 PROCEDURE — 73502 X-RAY EXAM HIP UNI 2-3 VIEWS: CPT

## 2021-07-12 PROCEDURE — 82607 VITAMIN B-12: CPT

## 2021-07-12 PROCEDURE — 86580 TB INTRADERMAL TEST: CPT | Performed by: HOSPITALIST

## 2021-07-12 PROCEDURE — 71045 X-RAY EXAM CHEST 1 VIEW: CPT

## 2021-07-12 PROCEDURE — 77030040361 HC SLV COMPR DVT MDII -B

## 2021-07-12 PROCEDURE — 77030036696 HC BOOT TRACT BUCKS S2SG -A

## 2021-07-12 PROCEDURE — 51702 INSERT TEMP BLADDER CATH: CPT

## 2021-07-12 PROCEDURE — 74011000258 HC RX REV CODE- 258: Performed by: HOSPITALIST

## 2021-07-12 PROCEDURE — 99284 EMERGENCY DEPT VISIT MOD MDM: CPT

## 2021-07-12 PROCEDURE — 96374 THER/PROPH/DIAG INJ IV PUSH: CPT

## 2021-07-12 PROCEDURE — 83735 ASSAY OF MAGNESIUM: CPT

## 2021-07-12 PROCEDURE — 73552 X-RAY EXAM OF FEMUR 2/>: CPT

## 2021-07-12 PROCEDURE — 85025 COMPLETE CBC W/AUTO DIFF WBC: CPT

## 2021-07-12 PROCEDURE — 74011000302 HC RX REV CODE- 302: Performed by: HOSPITALIST

## 2021-07-12 PROCEDURE — 74011250636 HC RX REV CODE- 250/636: Performed by: HOSPITALIST

## 2021-07-12 PROCEDURE — 2709999900 HC NON-CHARGEABLE SUPPLY

## 2021-07-12 RX ORDER — HYDRALAZINE HYDROCHLORIDE 20 MG/ML
10 INJECTION INTRAMUSCULAR; INTRAVENOUS
Status: DISCONTINUED | OUTPATIENT
Start: 2021-07-12 | End: 2021-07-19 | Stop reason: HOSPADM

## 2021-07-12 RX ORDER — SENNOSIDES 8.6 MG/1
2 TABLET ORAL
Status: DISCONTINUED | OUTPATIENT
Start: 2021-07-12 | End: 2021-07-19 | Stop reason: HOSPADM

## 2021-07-12 RX ORDER — HYDROCODONE BITARTRATE AND ACETAMINOPHEN 7.5; 325 MG/1; MG/1
1 TABLET ORAL
Status: DISCONTINUED | OUTPATIENT
Start: 2021-07-12 | End: 2021-07-14

## 2021-07-12 RX ORDER — MORPHINE SULFATE 2 MG/ML
INJECTION, SOLUTION INTRAMUSCULAR; INTRAVENOUS
Status: COMPLETED
Start: 2021-07-12 | End: 2021-07-12

## 2021-07-12 RX ORDER — SODIUM CHLORIDE 0.9 % (FLUSH) 0.9 %
5-40 SYRINGE (ML) INJECTION EVERY 8 HOURS
Status: DISCONTINUED | OUTPATIENT
Start: 2021-07-12 | End: 2021-07-16

## 2021-07-12 RX ORDER — MORPHINE SULFATE 2 MG/ML
2 INJECTION, SOLUTION INTRAMUSCULAR; INTRAVENOUS
Status: DISCONTINUED | OUTPATIENT
Start: 2021-07-12 | End: 2021-07-14

## 2021-07-12 RX ORDER — SODIUM CHLORIDE 9 MG/ML
75 INJECTION, SOLUTION INTRAVENOUS CONTINUOUS
Status: CANCELLED | OUTPATIENT
Start: 2021-07-12 | End: 2021-07-14

## 2021-07-12 RX ORDER — VENLAFAXINE 25 MG/1
25 TABLET ORAL DAILY
Status: DISCONTINUED | OUTPATIENT
Start: 2021-07-13 | End: 2021-07-19 | Stop reason: HOSPADM

## 2021-07-12 RX ORDER — ONDANSETRON 2 MG/ML
4 INJECTION INTRAMUSCULAR; INTRAVENOUS
Status: DISCONTINUED | OUTPATIENT
Start: 2021-07-12 | End: 2021-07-19 | Stop reason: HOSPADM

## 2021-07-12 RX ORDER — PANTOPRAZOLE SODIUM 40 MG/1
40 TABLET, DELAYED RELEASE ORAL
Status: DISCONTINUED | OUTPATIENT
Start: 2021-07-12 | End: 2021-07-19 | Stop reason: HOSPADM

## 2021-07-12 RX ORDER — ACETAMINOPHEN 650 MG/1
650 SUPPOSITORY RECTAL
Status: DISCONTINUED | OUTPATIENT
Start: 2021-07-12 | End: 2021-07-19 | Stop reason: HOSPADM

## 2021-07-12 RX ORDER — CLONIDINE HYDROCHLORIDE 0.2 MG/1
0.2 TABLET ORAL
Status: DISCONTINUED | OUTPATIENT
Start: 2021-07-12 | End: 2021-07-19 | Stop reason: HOSPADM

## 2021-07-12 RX ORDER — AMLODIPINE BESYLATE 5 MG/1
5 TABLET ORAL DAILY
Status: DISCONTINUED | OUTPATIENT
Start: 2021-07-13 | End: 2021-07-19 | Stop reason: HOSPADM

## 2021-07-12 RX ORDER — AMIODARONE HYDROCHLORIDE 200 MG/1
200 TABLET ORAL DAILY
Status: DISCONTINUED | OUTPATIENT
Start: 2021-07-13 | End: 2021-07-19 | Stop reason: HOSPADM

## 2021-07-12 RX ORDER — LOSARTAN POTASSIUM 25 MG/1
25 TABLET ORAL DAILY
Status: DISCONTINUED | OUTPATIENT
Start: 2021-07-13 | End: 2021-07-19 | Stop reason: HOSPADM

## 2021-07-12 RX ORDER — POTASSIUM CHLORIDE 20 MEQ/1
40 TABLET, EXTENDED RELEASE ORAL
Status: DISPENSED | OUTPATIENT
Start: 2021-07-12 | End: 2021-07-13

## 2021-07-12 RX ORDER — HYDROMORPHONE HYDROCHLORIDE 1 MG/ML
0.5 INJECTION, SOLUTION INTRAMUSCULAR; INTRAVENOUS; SUBCUTANEOUS ONCE
Status: COMPLETED | OUTPATIENT
Start: 2021-07-12 | End: 2021-07-12

## 2021-07-12 RX ORDER — ACETAMINOPHEN 325 MG/1
650 TABLET ORAL
Status: DISCONTINUED | OUTPATIENT
Start: 2021-07-12 | End: 2021-07-19 | Stop reason: HOSPADM

## 2021-07-12 RX ORDER — SODIUM CHLORIDE 0.9 % (FLUSH) 0.9 %
5-40 SYRINGE (ML) INJECTION AS NEEDED
Status: DISCONTINUED | OUTPATIENT
Start: 2021-07-12 | End: 2021-07-16

## 2021-07-12 RX ORDER — POTASSIUM CHLORIDE AND SODIUM CHLORIDE 900; 300 MG/100ML; MG/100ML
INJECTION, SOLUTION INTRAVENOUS CONTINUOUS
Status: DISPENSED | OUTPATIENT
Start: 2021-07-13 | End: 2021-07-13

## 2021-07-12 RX ADMIN — HYDROMORPHONE HYDROCHLORIDE 0.5 MG: 1 INJECTION, SOLUTION INTRAMUSCULAR; INTRAVENOUS; SUBCUTANEOUS at 17:42

## 2021-07-12 RX ADMIN — POTASSIUM CHLORIDE AND SODIUM CHLORIDE: 900; 300 INJECTION, SOLUTION INTRAVENOUS at 23:08

## 2021-07-12 RX ADMIN — HYDROMORPHONE HYDROCHLORIDE 0.5 MG: 1 INJECTION, SOLUTION INTRAMUSCULAR; INTRAVENOUS; SUBCUTANEOUS at 18:42

## 2021-07-12 RX ADMIN — PHYTONADIONE 10 MG: 10 INJECTION, EMULSION INTRAMUSCULAR; INTRAVENOUS; SUBCUTANEOUS at 23:08

## 2021-07-12 RX ADMIN — Medication 5 ML: at 23:01

## 2021-07-12 RX ADMIN — MORPHINE SULFATE 2 MG: 2 INJECTION, SOLUTION INTRAMUSCULAR; INTRAVENOUS at 22:17

## 2021-07-12 RX ADMIN — TUBERCULIN PURIFIED PROTEIN DERIVATIVE 5 UNITS: 5 INJECTION, SOLUTION INTRADERMAL at 23:03

## 2021-07-12 NOTE — ED PROVIDER NOTES
Patient presents the ER after having a fall. Patient states she was outside bending over fixing her flowers when she fell. States she landed onto her right hip. Since then has had significant intense pain and inability to walk. Denies any other trauma physically denies any head trauma. Denies any associated lightheadedness or palpitations before falling. EMS reports significant pain to right hip with trying to move patient. She denies any chest pain shortness of breath or abdominal pain. The history is provided by the patient and the EMS personnel. Hip Pain   This is a new problem. The current episode started 1 to 2 hours ago. The problem occurs constantly. The problem has not changed since onset. The pain is present in the right hip. The quality of the pain is described as aching. The pain is at a severity of 5/10. The pain is moderate. Associated symptoms include limited range of motion. Pertinent negatives include no back pain. There has been a history of trauma. Past Medical History:   Diagnosis Date    Arrhythmia     A-fib    Arthritis     Biceps tendonitis     CAD (coronary artery disease)     MI 1999    Dysphagia     Dysuria     Elevated glucose     GERD (gastroesophageal reflux disease)     managed with medication    Hypertension     controlled with medication    Other ill-defined conditions(799.89)     high cholesterol    Pain in limb     Postmenopausal     Psychiatric disorder     anxiety    Sciatica     Tachycardia     Vaginitis     Vertigo        Past Surgical History:   Procedure Laterality Date    COLONOSCOPY N/A 5/19/2021    COLONOSCOPY / BMI 31 performed by Ann-Marie Baker MD at [facility] ENDOSCOPY    HX BREAST BIOPSY Right     HX GYN      hysterectomy    HX KNEE ARTHROSCOPY Bilateral     TX ABDOMEN SURGERY PROC UNLISTED      cyst removed from upper ABD.          Family History:   Problem Relation Age of Onset    Coronary Artery Disease Neg Hx        Social History Socioeconomic History    Marital status:      Spouse name: Not on file    Number of children: Not on file    Years of education: Not on file    Highest education level: Not on file   Occupational History    Not on file   Tobacco Use    Smoking status: Never Smoker    Smokeless tobacco: Never Used   Vaping Use    Vaping Use: Never used   Substance and Sexual Activity    Alcohol use: No    Drug use: No    Sexual activity: Not Currently   Other Topics Concern    Not on file   Social History Narrative    Not on file     Social Determinants of Health     Financial Resource Strain:     Difficulty of Paying Living Expenses:    Food Insecurity:     Worried About Running Out of Food in the Last Year:     [address] in the Last Year:    Transportation Needs:     Lack of Transportation (Medical):  Lack of Transportation (Non-Medical):    Physical Activity:     Days of Exercise per Week:     Minutes of Exercise per Session:    Stress:     Feeling of Stress :    Social Connections:     Frequency of Communication with Friends and Family:     Frequency of Social Gatherings with Friends and Family:     Attends [employer] Services:     Active Member of Clubs or Organizations:     Attends Club or Organization Meetings:     Marital Status:    Intimate Partner Violence:     Fear of Current or Ex-Partner:     Emotionally Abused:     Physically Abused:     Sexually Abused: ALLERGIES: Patient has no known allergies. Review of Systems   Constitutional: Negative for fatigue and fever. HENT: Negative for congestion, dental problem, trouble swallowing and voice change. Eyes: Negative for photophobia and redness. Respiratory: Negative for choking, chest tightness and shortness of breath. Cardiovascular: Negative for chest pain and leg swelling. Gastrointestinal: Negative for abdominal pain, anal bleeding, rectal pain and vomiting.    Endocrine: Negative for polyphagia and polyuria. Genitourinary: Negative for enuresis and flank pain. Musculoskeletal: Negative for back pain, gait problem and neck stiffness. Skin: Negative for color change and pallor. Neurological: Negative for tremors, syncope and weakness. Hematological: Negative for adenopathy. Does not bruise/bleed easily. Psychiatric/Behavioral: Negative for behavioral problems, confusion and dysphoric mood. All other systems reviewed and are negative. There were no vitals filed for this visit. Physical Exam  Vitals and nursing note reviewed. Constitutional:       General: She is not in acute distress. Appearance: Normal appearance. She is not ill-appearing. HENT:      Head: Normocephalic and atraumatic. Right Ear: External ear normal.      Left Ear: External ear normal.      Nose: No congestion or rhinorrhea. Eyes:      Extraocular Movements: Extraocular movements intact. Pupils: Pupils are equal, round, and reactive to light. Cardiovascular:      Rate and Rhythm: Normal rate and regular rhythm. Pulses: Normal pulses. Dorsalis pedis pulses are 2+ on the right side and 2+ on the left side. Heart sounds: Normal heart sounds. Pulmonary:      Effort: Pulmonary effort is normal. No respiratory distress. Breath sounds: Normal breath sounds. No stridor. No wheezing. Abdominal:      General: Abdomen is flat. Bowel sounds are normal. There is no distension. Palpations: Abdomen is soft. There is no mass. Tenderness: There is no abdominal tenderness. Musculoskeletal:         General: No swelling, deformity or signs of injury. Cervical back: Normal range of motion and neck supple. No rigidity. Right hip: Tenderness present. Decreased range of motion. Skin:     General: Skin is warm. Capillary Refill: Capillary refill takes less than 2 seconds. Coloration: Skin is not jaundiced or pale. Findings: No bruising or erythema. Neurological:      General: No focal deficit present. Mental Status: She is alert and oriented to person, place, and time. Cranial Nerves: No cranial nerve deficit. Sensory: No sensory deficit. Motor: No weakness. Coordination: Coordination normal.   Psychiatric:         Mood and Affect: Mood normal.         Behavior: Behavior normal.          MDM  Number of Diagnoses or Management Options  Diagnosis management comments: Tenderness and decreased range of motion at right hip. Neurovascular intact distally with equal pulses. Plan for x-rays right hip. We will check screening labs or EKG as well as coags. 7:41 PM  X-ray is consistent with closed right displaced femoral neck fracture. Patient remains neurovascular intact distally. INR is 2.2, hemoglobin today is 8, previous was 9 but no reported blood in stool or hematemesis.   Plan discussed case with hospitalist and orthopedist       Amount and/or Complexity of Data Reviewed  Clinical lab tests: ordered and reviewed  Tests in the radiology section of CPT®: ordered and reviewed  Review and summarize past medical records: yes  Discuss the patient with other providers: yes  Independent visualization of images, tracings, or specimens: yes (EKG interpretation: Sinus rhythm, rate of 72, normal axis, right bundle branch block, no acute ST segment changes, comparison EKG performed March 14, 2021)    Risk of Complications, Morbidity, and/or Mortality  Presenting problems: moderate  Diagnostic procedures: moderate  Management options: high  General comments: High risk due to use of parenteral narcotic medication    Patient Progress  Patient progress: stable         Procedures      Results Include:    Recent Results (from the past 24 hour(s))   CBC WITH AUTOMATED DIFF    Collection Time: 07/12/21  5:20 PM   Result Value Ref Range    WBC 8.5 4.3 - 11.1 K/uL    RBC 3.91 (L) 4.05 - 5.2 M/uL    HGB 8.4 (L) 11.7 - 15.4 g/dL    HCT 29.1 (L) 35.8 - 46.3 %    MCV 74.4 (L) 79.6 - 97.8 FL    MCH 21.5 (L) 26.1 - 32.9 PG    MCHC 28.9 (L) 31.4 - 35.0 g/dL    RDW 17.4 (H) 11.9 - 14.6 %    PLATELET 041 616 - 832 K/uL    MPV 10.7 9.4 - 12.3 FL    ABSOLUTE NRBC 0.00 0.0 - 0.2 K/uL    DF AUTOMATED      NEUTROPHILS 64 43 - 78 %    LYMPHOCYTES 27 13 - 44 %    MONOCYTES 8 4.0 - 12.0 %    EOSINOPHILS 0 (L) 0.5 - 7.8 %    BASOPHILS 1 0.0 - 2.0 %    IMMATURE GRANULOCYTES 1 0.0 - 5.0 %    ABS. NEUTROPHILS 5.4 1.7 - 8.2 K/UL    ABS. LYMPHOCYTES 2.3 0.5 - 4.6 K/UL    ABS. MONOCYTES 0.7 0.1 - 1.3 K/UL    ABS. EOSINOPHILS 0.0 0.0 - 0.8 K/UL    ABS. BASOPHILS 0.0 0.0 - 0.2 K/UL    ABS. IMM. GRANS. 0.0 0.0 - 0.5 K/UL   METABOLIC PANEL, COMPREHENSIVE    Collection Time: 07/12/21  5:20 PM   Result Value Ref Range    Sodium 141 136 - 145 mmol/L    Potassium 3.5 3.5 - 5.1 mmol/L    Chloride 113 (H) 98 - 107 mmol/L    CO2 22 21 - 32 mmol/L    Anion gap 6 (L) 7 - 16 mmol/L    Glucose 78 65 - 100 mg/dL    BUN 21 8 - 23 MG/DL    Creatinine 1.19 (H) 0.6 - 1.0 MG/DL    GFR est AA 56 (L) >60 ml/min/1.73m2    GFR est non-AA 47 (L) >60 ml/min/1.73m2    Calcium 8.9 8.3 - 10.4 MG/DL    Bilirubin, total 0.6 0.2 - 1.1 MG/DL    ALT (SGPT) 34 12 - 65 U/L    AST (SGOT) 26 15 - 37 U/L    Alk.  phosphatase 135 50 - 136 U/L    Protein, total 7.5 6.3 - 8.2 g/dL    Albumin 3.7 3.2 - 4.6 g/dL    Globulin 3.8 (H) 2.3 - 3.5 g/dL    A-G Ratio 1.0 (L) 1.2 - 3.5     TYPE & SCREEN    Collection Time: 07/12/21  5:20 PM   Result Value Ref Range    Crossmatch Expiration 07/15/2021,2359     ABO/Rh(D) [place] POSITIVE     Antibody screen PENDING    PROTHROMBIN TIME + INR    Collection Time: 07/12/21  5:20 PM   Result Value Ref Range    Prothrombin time 25.0 (H) 12.5 - 14.7 sec    INR 2.2     EKG, 12 LEAD, INITIAL    Collection Time: 07/12/21  5:28 PM   Result Value Ref Range    Ventricular Rate 72 BPM    Atrial Rate 72 BPM    P-R Interval 168 ms    QRS Duration 102 ms    Q-T Interval 466 ms    QTC Calculation (Bezet) 510 ms Calculated P Axis 74 degrees    Calculated R Axis 12 degrees    Calculated T Axis 59 degrees    Diagnosis       !! AGE AND GENDER SPECIFIC ECG ANALYSIS !! Normal sinus rhythm  Incomplete right bundle branch block  Septal infarct (cited on or before 12-APR-2019)  Prolonged QT  Abnormal ECG  When compared with ECG of 14-MAR-2021 15:47,  Questionable change in initial forces of Septal leads  QT has lengthened       Voice dictation software was used during the making of this note. This software is not perfect and grammatical and other typographical errors may be present. This note has been proofread, but may still contain errors.   Karla Mercer MD; 7/12/2021 @7:41 PM   ===================================================================

## 2021-07-12 NOTE — ED TRIAGE NOTES
Pt arrives via EMS from home. Fell 2 steps from [facility]. Reports significant right leg shortening and rotation. Given nitrous for pain. Did not hit head. Hx of a fib, takes warfarin. Alert and oriented.  VSS

## 2021-07-12 NOTE — H&P
INTERNAL MEDICINE H&P/CONSULT    Subjective:     64years old female with history of afib on warfarin, HTN, vertigo and and anxiety, presents after having a fall. Patient states she was outside bending over fixing her flowers when she fell. States she landed onto her right hip. Since then has had significant intense pain and inability to walk. Denies any other trauma physically denies any head trauma. Denies any associated lightheadedness or palpitations before falling. EMS reports significant pain to right hip with trying to move patient. She denies any chest pain shortness of breath or abdominal pain. On further questioning, pt slipped when getting down 1 step outdoor, no head injury or LOC. Pain controlled at this time. She had black hard stools in last 2 months and had upper and lower endoscopies w/ no source of bleeding. Past Medical History:   Diagnosis Date    Arrhythmia     A-fib    Arthritis     Biceps tendonitis     CAD (coronary artery disease)     MI 1999    Dysphagia     Dysuria     Elevated glucose     GERD (gastroesophageal reflux disease)     managed with medication    Hypertension     controlled with medication    Other ill-defined conditions(799.89)     high cholesterol    Pain in limb     Postmenopausal     Psychiatric disorder     anxiety    Sciatica     Tachycardia     Vaginitis     Vertigo       Past Surgical History:   Procedure Laterality Date    COLONOSCOPY N/A 5/19/2021    COLONOSCOPY / BMI 31 performed by S[facility]so [facility] at [facility] ENDOSCOPY    HX BREAST BIOPSY Right     HX GYN      hysterectomy    HX KNEE ARTHROSCOPY Bilateral     OR ABDOMEN SURGERY PROC UNLISTED      cyst removed from upper ABD. Prior to Admission medications    Medication Sig Start Date End Date Taking?  Authorizing Provider   losartan (COZAAR) 100 mg tablet TAKE 1 TABLET BY MOUTH DAILY 6/24/21   Tabitha Cabrera MD   multivits,Stress Formula-Zinc tablet Take 1 Tab by mouth daily.    Provider, Historical   aspirin delayed-release 81 mg tablet Take  by mouth daily. Provider, Historical   acetaminophen (Tylenol Extra Strength) 500 mg tablet Take 1,000 mg by mouth daily. Provider, Historical   atorvastatin (LIPITOR) 20 mg tablet Take 1 Tab by mouth daily. 8/16/20   Leverette Heimlich, MD   amLODIPine (NORVASC) 5 mg tablet Take 1 Tab by mouth daily. 6/25/20   Leverette Heimlich, MD   amiodarone (CORDARONE) 200 mg tablet Take 1 Tab by mouth daily. Indications: 200mg bid for 2 weeks and then 200mg daily 5/28/20   Leverette Heimlich, MD   cyanocobalamin 1,000 mcg tablet Take 1,000 mcg by mouth daily. Provider, Historical   famotidine (PEPCID) 40 mg tablet Take 40 mg by mouth daily. Provider, Historical   warfarin (COUMADIN) 2.5 mg tablet 1 to 1&1/2 tabs every day or as directed  Patient taking differently: 1 to 1&1/2 tabs every day or as directed take 1/2 tab on Mondays and 1 tab all other days 5/22/20   Leverette Heimlich, MD   [facility]) 75 mg tablet Take 25 mg by mouth daily. Provider, Historical   meclizine (ANTIVERT) 25 mg tablet Take 25 mg by mouth three (3) times daily as needed. Other, MD Matteo     No Known Allergies   Social History     Tobacco Use    Smoking status: Never Smoker    Smokeless tobacco: Never Used   Substance Use Topics    Alcohol use: No        Family History:  HTN    Review of Systems   A comprehensive review of systems was negative except for that written in the HPI. Objective: Intake / Output:  No intake/output data recorded. 07/11 0701 - 07/12 1900  In: 150   Out: -     Physical Exam:  Visit Vitals  BP (!) 174/78   Pulse 69   Temp 98 °F (36.7 °C)   Resp 15   SpO2 94%     General appearance: awake, alert, cooperative, moderate distress, appears stated age - Pale, No icterus, Dry mucous membranes  Head: Normocephalic, without obvious abnormality, atraumatic  Back: symmetric, no curvature. ROM normal. No CVA tenderness.   Lungs: clear to auscultation bilaterally   Heart: regular rate and rhythm, S1, S2 normal, no murmur, click, rub or gallop. Abdomen: soft, no tenderness, no distension, normal bowel sound, no masses, no organomegaly  Extremities: atraumatic, no cyanosis - Bilateral lower limbs edema none. R groin tenderness  Skin: No rashes or ulceration. Neurologic: Grossly intact    ECG: sinus rhythm     Data Review (Labs):   Recent Results (from the past 24 hour(s))   CBC WITH AUTOMATED DIFF    Collection Time: 07/12/21  5:20 PM   Result Value Ref Range    WBC 8.5 4.3 - 11.1 K/uL    RBC 3.91 (L) 4.05 - 5.2 M/uL    HGB 8.4 (L) 11.7 - 15.4 g/dL    HCT 29.1 (L) 35.8 - 46.3 %    MCV 74.4 (L) 79.6 - 97.8 FL    MCH 21.5 (L) 26.1 - 32.9 PG    MCHC 28.9 (L) 31.4 - 35.0 g/dL    RDW 17.4 (H) 11.9 - 14.6 %    PLATELET 523 233 - 785 K/uL    MPV 10.7 9.4 - 12.3 FL    ABSOLUTE NRBC 0.00 0.0 - 0.2 K/uL    DF AUTOMATED      NEUTROPHILS 64 43 - 78 %    LYMPHOCYTES 27 13 - 44 %    MONOCYTES 8 4.0 - 12.0 %    EOSINOPHILS 0 (L) 0.5 - 7.8 %    BASOPHILS 1 0.0 - 2.0 %    IMMATURE GRANULOCYTES 1 0.0 - 5.0 %    ABS. NEUTROPHILS 5.4 1.7 - 8.2 K/UL    ABS. LYMPHOCYTES 2.3 0.5 - 4.6 K/UL    ABS. MONOCYTES 0.7 0.1 - 1.3 K/UL    ABS. EOSINOPHILS 0.0 0.0 - 0.8 K/UL    ABS. BASOPHILS 0.0 0.0 - 0.2 K/UL    ABS. IMM. GRANS. 0.0 0.0 - 0.5 K/UL   METABOLIC PANEL, COMPREHENSIVE    Collection Time: 07/12/21  5:20 PM   Result Value Ref Range    Sodium 141 136 - 145 mmol/L    Potassium 3.5 3.5 - 5.1 mmol/L    Chloride 113 (H) 98 - 107 mmol/L    CO2 22 21 - 32 mmol/L    Anion gap 6 (L) 7 - 16 mmol/L    Glucose 78 65 - 100 mg/dL    BUN 21 8 - 23 MG/DL    Creatinine 1.19 (H) 0.6 - 1.0 MG/DL    GFR est AA 56 (L) >60 ml/min/1.73m2    GFR est non-AA 47 (L) >60 ml/min/1.73m2    Calcium 8.9 8.3 - 10.4 MG/DL    Bilirubin, total 0.6 0.2 - 1.1 MG/DL    ALT (SGPT) 34 12 - 65 U/L    AST (SGOT) 26 15 - 37 U/L    Alk.  phosphatase 135 50 - 136 U/L    Protein, total 7.5 6.3 - 8.2 g/dL    Albumin 3.7 3.2 - 4.6 g/dL    Globulin 3.8 (H) 2.3 - 3.5 g/dL    A-G Ratio 1.0 (L) 1.2 - 3.5     TYPE & SCREEN    Collection Time: 07/12/21  5:20 PM   Result Value Ref Range    Crossmatch Expiration 07/15/2021,2359     ABO/Rh(D) Jorge A Ibrahim POSITIVE     Antibody screen PENDING    PROTHROMBIN TIME + INR    Collection Time: 07/12/21  5:20 PM   Result Value Ref Range    Prothrombin time 25.0 (H) 12.5 - 14.7 sec    INR 2.2     EKG, 12 LEAD, INITIAL    Collection Time: 07/12/21  5:28 PM   Result Value Ref Range    Ventricular Rate 72 BPM    Atrial Rate 72 BPM    P-R Interval 168 ms    QRS Duration 102 ms    Q-T Interval 466 ms    QTC Calculation (Bezet) 510 ms    Calculated P Axis 74 degrees    Calculated R Axis 12 degrees    Calculated T Axis 59 degrees    Diagnosis       !! AGE AND GENDER SPECIFIC ECG ANALYSIS !! Normal sinus rhythm  Incomplete right bundle branch block  Septal infarct (cited on or before 12-APR-2019)  Prolonged QT  Abnormal ECG  When compared with ECG of 14-MAR-2021 15:47,  Questionable change in initial forces of Septal leads  QT has lengthened         Assessment:     1- R. Femoral neck fx, displaced, due to mechanical fall. 2- HTN, not well controlled  3- Hx of afib, on warfarin, therapeutic, INR 2.2  4- Microcytic anemia, Hb 8.4 today lower than baseline, no SSx of bleeding    Plan:     Bed rest  Pain control  Vitamin K iv x1  Hold warfarin  Monitor INR, goal for surgery per Orthopedic  Follow UA and anemia w/u  PPD placed  NPO tonight  IVF hydration  Blood pressure control  AM lab  Continue essential home medications. Patient is full code. Further management depends on patient progress. Thank you for the oppourtinity to contribute in the care of your patient.     Signed By: Jonny Justice MD     July 12, 2021

## 2021-07-13 ENCOUNTER — ANESTHESIA EVENT (OUTPATIENT)
Dept: SURGERY | Age: 76
DRG: 522 | End: 2021-07-13
Payer: MEDICARE

## 2021-07-13 LAB
ANION GAP SERPL CALC-SCNC: 7 MMOL/L (ref 7–16)
BUN SERPL-MCNC: 16 MG/DL (ref 8–23)
CALCIUM SERPL-MCNC: 8.4 MG/DL (ref 8.3–10.4)
CHLORIDE SERPL-SCNC: 113 MMOL/L (ref 98–107)
CO2 SERPL-SCNC: 23 MMOL/L (ref 21–32)
CREAT SERPL-MCNC: 0.95 MG/DL (ref 0.6–1)
ERYTHROCYTE [DISTWIDTH] IN BLOOD BY AUTOMATED COUNT: 17.3 % (ref 11.9–14.6)
GLUCOSE SERPL-MCNC: 111 MG/DL (ref 65–100)
HCT VFR BLD AUTO: 28.6 % (ref 35.8–46.3)
HGB BLD-MCNC: 8.3 G/DL (ref 11.7–15.4)
INR PPP: 1.7
MCH RBC QN AUTO: 21.5 PG (ref 26.1–32.9)
MCHC RBC AUTO-ENTMCNC: 29 G/DL (ref 31.4–35)
MCV RBC AUTO: 74.1 FL (ref 79.6–97.8)
MM INDURATION POC: 0 MM (ref 0–5)
NRBC # BLD: 0 K/UL (ref 0–0.2)
PLATELET # BLD AUTO: 309 K/UL (ref 150–450)
PMV BLD AUTO: 10.1 FL (ref 9.4–12.3)
POTASSIUM SERPL-SCNC: 3.9 MMOL/L (ref 3.5–5.1)
PPD POC: NEGATIVE NEGATIVE
PROTHROMBIN TIME: 19.8 SEC (ref 12.5–14.7)
RBC # BLD AUTO: 3.86 M/UL (ref 4.05–5.2)
SODIUM SERPL-SCNC: 143 MMOL/L (ref 136–145)
WBC # BLD AUTO: 10.3 K/UL (ref 4.3–11.1)

## 2021-07-13 PROCEDURE — 74011250637 HC RX REV CODE- 250/637: Performed by: HOSPITALIST

## 2021-07-13 PROCEDURE — 2709999900 HC NON-CHARGEABLE SUPPLY

## 2021-07-13 PROCEDURE — 85610 PROTHROMBIN TIME: CPT

## 2021-07-13 PROCEDURE — 36415 COLL VENOUS BLD VENIPUNCTURE: CPT

## 2021-07-13 PROCEDURE — 80048 BASIC METABOLIC PNL TOTAL CA: CPT

## 2021-07-13 PROCEDURE — 74011250636 HC RX REV CODE- 250/636: Performed by: HOSPITALIST

## 2021-07-13 PROCEDURE — 85027 COMPLETE CBC AUTOMATED: CPT

## 2021-07-13 PROCEDURE — 65270000029 HC RM PRIVATE

## 2021-07-13 RX ORDER — CEFAZOLIN SODIUM/WATER 2 G/20 ML
2 SYRINGE (ML) INTRAVENOUS
Status: DISCONTINUED | OUTPATIENT
Start: 2021-07-13 | End: 2021-07-13

## 2021-07-13 RX ORDER — CEFAZOLIN SODIUM/WATER 2 G/20 ML
2 SYRINGE (ML) INTRAVENOUS
Status: COMPLETED | OUTPATIENT
Start: 2021-07-14 | End: 2021-07-14

## 2021-07-13 RX ADMIN — Medication 10 ML: at 22:03

## 2021-07-13 RX ADMIN — POTASSIUM CHLORIDE AND SODIUM CHLORIDE: 900; 300 INJECTION, SOLUTION INTRAVENOUS at 08:50

## 2021-07-13 RX ADMIN — HYDROCODONE BITARTRATE AND ACETAMINOPHEN 1 TABLET: 7.5; 325 TABLET ORAL at 16:32

## 2021-07-13 RX ADMIN — LOSARTAN POTASSIUM 25 MG: 25 TABLET, FILM COATED ORAL at 08:55

## 2021-07-13 RX ADMIN — VENLAFAXINE 25 MG: 25 TABLET ORAL at 08:56

## 2021-07-13 RX ADMIN — AMIODARONE HYDROCHLORIDE 200 MG: 200 TABLET ORAL at 08:56

## 2021-07-13 RX ADMIN — MORPHINE SULFATE 2 MG: 2 INJECTION, SOLUTION INTRAMUSCULAR; INTRAVENOUS at 05:27

## 2021-07-13 RX ADMIN — HYDROCODONE BITARTRATE AND ACETAMINOPHEN 1 TABLET: 7.5; 325 TABLET ORAL at 22:03

## 2021-07-13 RX ADMIN — Medication 10 ML: at 05:19

## 2021-07-13 RX ADMIN — HYDROCODONE BITARTRATE AND ACETAMINOPHEN 1 TABLET: 7.5; 325 TABLET ORAL at 08:56

## 2021-07-13 RX ADMIN — PANTOPRAZOLE SODIUM 40 MG: 40 TABLET, DELAYED RELEASE ORAL at 16:32

## 2021-07-13 RX ADMIN — AMLODIPINE BESYLATE 5 MG: 5 TABLET ORAL at 08:56

## 2021-07-13 RX ADMIN — Medication 10 ML: at 13:30

## 2021-07-13 RX ADMIN — MORPHINE SULFATE 2 MG: 2 INJECTION, SOLUTION INTRAMUSCULAR; INTRAVENOUS at 11:28

## 2021-07-13 NOTE — PROGRESS NOTES
's follow-up visit with Ms. Vibha Medina. I offered prayer as requested for relief for her hip and surgery on tomorrow. Pateint and family expressed appreciation for the visit.      Kee ChapmanPenn State Health Milton S. Hershey Medical Centerbecca  Board Certified

## 2021-07-13 NOTE — CONSULTS
Consult    Patient: Caitlin Johns MRN: [id number]  SSN: [id number]    YOB: 1945  Age: 64 y.o. Sex: female      Subjective:      Caitlin Johns is a 64 y.o. female who fell and injured her right hip. She was seen in the emergency room and found to have a right displaced femoral neck fracture. She does take Coumadin for her atrial fibrillation. Her INR was over 2 when she first came in and she did get vitamin K yesterday. This morning his INR is 1.7. Denies any other pain besides her right hip. She says the pain medication seems to help as long she does not move this with her right hip. Past Medical History:   Diagnosis Date    Arrhythmia     A-fib    Arthritis     Biceps tendonitis     CAD (coronary artery disease)     MI 1999    Dysphagia     Dysuria     Elevated glucose     GERD (gastroesophageal reflux disease)     managed with medication    Hypertension     controlled with medication    Other ill-defined conditions(799.89)     high cholesterol    Pain in limb     Postmenopausal     Psychiatric disorder     anxiety    Sciatica     Tachycardia     Vaginitis     Vertigo      Past Surgical History:   Procedure Laterality Date    COLONOSCOPY N/A 5/19/2021    COLONOSCOPY / BMI 31 performed by Emmie Milan MD at [facility] ENDOSCOPY    HX BREAST BIOPSY Right     HX GYN      hysterectomy    HX KNEE ARTHROSCOPY Bilateral     [place] ABDOMEN SURGERY PROC UNLISTED      cyst removed from upper ABD.       FAMHX -No history of inflammatory arthritis   Social History     Tobacco Use    Smoking status: Never Smoker    Smokeless tobacco: Never Used   Substance Use Topics    Alcohol use: No      Current Facility-Administered Medications   Medication Dose Route Frequency Provider Last Rate Last Admin    amiodarone (CORDARONE) tablet 200 mg  200 mg Oral DAILY Candance Nares, MD        amLODIPine (NORVASC) tablet 5 mg  5 mg Oral DAILY Candance Nares, MD Patton Hoffmann losartan (COZAAR) tablet 25 mg  25 mg Oral DAILY Mag Ortiz MD        venlafaxine [facility]) tablet 25 mg  25 mg Oral DAILY Mag Ortiz MD        sodium chloride (NS) flush 5-40 mL  5-40 mL IntraVENous Q8H Mag Ortiz MD   10 mL at 07/13/21 0519    sodium chloride (NS) flush 5-40 mL  5-40 mL IntraVENous PRN Mag Ortiz MD        acetaminophen (TYLENOL) tablet 650 mg  650 mg Oral Q6H PRN Mag Ortiz MD        [facility] acetaminophen (TYLENOL) suppository 650 mg  650 mg Rectal Q6H PRN Mag Ortiz MD        senna (SENOKOT) tablet 17.2 mg  2 Tablet Oral BID PRN Mag Ortiz MD        ondansetron [facility]) injection 4 mg  4 mg IntraVENous Q6H PRN Mag Ortiz MD        cloNIDine HCL (CATAPRES) tablet 0.2 mg  0.2 mg Oral BID PRN Mag Ortiz MD        morphine injection 2 mg  2 mg IntraVENous Q4H PRN Mag Ortiz MD   2 mg at 07/13/21 7455    tuberculin injection 5 Units  5 Units IntraDERMal Elfego Stacy MD   5 Units at 07/12/21 2303    hydrALAZINE (APRESOLINE) 20 mg/mL injection 10 mg  10 mg IntraVENous Q6H PRN Mag Ortiz MD        HYDROcodone-acetaminophen (NORCO) 7.5-325 mg per tablet 1 Tablet  1 Tablet Oral Q4H PRN Mag Ortiz MD        potassium chloride (K-DUR, KLOR-CON) SR tablet 40 mEq  40 mEq Oral NOW Mag Ortiz MD        pantoprazole (PROTONIX) tablet 40 mg  40 mg Oral ACB&D Mag Ortiz MD        0.9% sodium chloride with KCl 40 mEq/L infusion   IntraVENous CONTINUOUS Mag Ortiz  mL/hr at 07/12/21 2308 New Bag at 07/12/21 2308        No Known Allergies    Review of Systems:  A comprehensive review of systems was negative except for that written in the History of Present Illness.     Objective:     Vitals:    07/12/21 2122 07/12/21 2220 07/13/21 0018 07/13/21 0337   BP: (!) 175/79 (!) 159/65 (!) 149/71 (!) 156/70   Pulse:  78 69 69   Resp:  15 16 16   Temp:  98.4 °F (36.9 °C) 99.1 °F (37.3 °C) 99.4 °F (37.4 °C)   SpO2:  97% 95% 90% Physical Exam:  Physical Exam:  General:  Alert, cooperative, no distress, appears stated age. Orientation she is alert and oriented person place time and situation   Eyes:  Conjunctivae/corneas clear. PERRL, EOMs intact. Fundi benign   Ears:  Normal TMs and external ear canals both ears. Nose: Nares normal. Septum midline. Mucosa normal. No drainage or sinus tenderness. Mouth/Throat: Lips, mucosa, and tongue normal. Teeth and gums normal.   Neck: Supple, symmetrical, trachea midline, no adenopathy, thyroid: no enlargment/tenderness/nodules, no carotid bruit and no JVD. Back:   Symmetric, no curvature. ROM normal. No CVA tenderness. Lungs:   Clear to auscultation bilaterally. Heart:  Regular rate and rhythm, S1, S2 normal, no murmur, click, rub or gallop. Abdomen:   Soft, non-tender. Bowel sounds normal. No masses,  No organomegaly. No lymphadenopathy in all 4 extremities  Alignment- pt has some obvious deformity of the right hip  Range of motion- with any range of motion right hip  Vascular-distal pulses palpable in right lower extremity  Sensory/motor-deep tendon reflexes normal right lower extremity. Motor and sensory function intact.   Stability- is difficult to assess stability because of the presence of the fracture  Tenderness to palpation over the right greater trochanter area  Skin- no rashes ulcerations or open wounds right lower extremity  Gait-pt cannot put any weight on the right lower extremity      Assessment:     Hospital Problems  Date Reviewed: 12/18/2020        Codes Class Noted POA    Hip fracture [facility]) ICD-10-CM: E76.313L  ICD-9-CM: 820.8  7/12/2021 Unknown            Closed displaced right femoral neck fracture    Xrays and or studies:    AP and lateral views of the right hip shows a displaced right femoral neck fracture  Plan:     Since she does take Coumadin and her INR is still 1.7 this morning I do think it would be reasonable to hold off on surgery and try to proceed tomorrow if her INR is improved. It is not the last thing we could give her some plasma to see if we can get this down below 1.5 before proceeding with her right hip hemiarthroplasty. As long as the primary care team is okay with this that would be the plan. I guess the other option would be to try to give her plasma and do this this afternoon. I will talk to the hospitalist team and see which they think is more appropriate for her.   So the plan will be to proceed with right hip hemiarthroplasty for right femoral neck fracture either this afternoon or tomorrow    Signed By: Maura Hyde MD

## 2021-07-13 NOTE — PROGRESS NOTES
Hospitalist Progress Note     Name:  Cynthia Umanzor  Age:76 y.o. Sex:female   :  1945    MRN:  [id number]   Admit Date:  2021  Presenting Complaint: Hip Pain    Initial Admission Diagnosis: Hip fracture [facility]) [address] Course/Interval history:     Patient with past medical history of    AF on Warfartin   Hypertension   Vertigo   Anxiety     Patient came to hospital after a fall. She landed on the right hip and is found to have right femoral neck fracture. INR is 2.2 on admission and is down to 1.7 today after Warfarin is on hold. Orthopedics is consulted and plans to operate on her tomorrow. Subjective (21):    21   Complaining of pain at the right hip with manipulation. Improved with pain medications. Review of Systems: 14 point review of systems is otherwise negative with the exception of the elements mentioned above. Assessment & Plan     S/P fall   Right femoral neck fracture   Pain control   Plan for ORIF tomorrow due to INR is being prolonged still today. AF on Warfarin prior to admission   Hold Warfarin for now. INR is 1.7 today. Will check INR tomorrow. HR is 67 today and controlled. On Amiodarone 200 mg po q day. EKG on 2021 shows sinus rhythm. GERD   On PPI   Continue this     Hypertension  Monitor blood pressure and manage accordingly. Continue home medications, Amlodipine and Losartan. I have discussed the plan of care with patient and spouse at bedside.       Dispo/Discharge Planning:  to be determined     Diet:  ADULT DIET Regular; Low Fat/Low Chol/High Fiber/PILI  DIET NPO  DVT PPx: SCD   Code status: Full Code    Objective:     Patient Vitals for the past 24 hrs:   Temp Pulse Resp BP SpO2   21 0723 98.3 °F (36.8 °C) 67 16 (!) 155/74 98 %   21 0337 99.4 °F (37.4 °C) 69 16 (!) 156/70 90 %   21 0018 99.1 °F (37.3 °C) 69 16 (!) 149/71 95 %   21 2220 98.4 °F (36.9 °C) 78 15 (!) 159/65 97 %   07/12/21 2122    (!) 175/79    07/12/21 2021    (!) 171/71    07/12/21 1822  69 15 (!) 174/78    07/12/21 1742 98 °F (36.7 °C) 69 (!) 52 (!) 178/78 94 %     Oxygen Therapy  O2 Sat (%): 98 % (07/13/21 0723)  Pulse via Oximetry: 70 beats per minute (07/12/21 1742)  O2 Device: None (Room air) (07/13/21 0325)    There is no height or weight on file to calculate BMI. Physical Exam:   General:  No acute distress, speaking in full sentences, no use of accessory muscles   Lungs:  Clear to auscultation bilaterally   CV:  Regular rate and rhythm with normal S1 and S2   Abdomen:  Soft, nontender, nondistended, normoactive bowel sounds   Extremities:  No cyanosis clubbing or edema. Pain at the right hip area. Limitation of movement.    Neuro:  Nonfocal, A&O x3   Psych:  Normal affect     Data Review:  I have reviewed all labs, meds, and studies from the last 24 hours:    Labs:    Recent Results (from the past 24 hour(s))   TRANSFERRIN SATURATION    Collection Time: 07/12/21  5:19 PM   Result Value Ref Range    Iron 21 (L) 35 - 150 ug/dL    TIBC 484 (H) 250 - 450 ug/dL    Transferrin Saturation 4 (L) >20 %   TRANSFERRIN    Collection Time: 07/12/21  5:19 PM   Result Value Ref Range    Transferrin 347 202 - 364 mg/dL   FOLATE    Collection Time: 07/12/21  5:19 PM   Result Value Ref Range    Folate 6.5 3.1 - 17.5 ng/mL   VITAMIN B12    Collection Time: 07/12/21  5:19 PM   Result Value Ref Range    Vitamin B12 964 193 - 986 pg/mL   FERRITIN    Collection Time: 07/12/21  5:19 PM   Result Value Ref Range    Ferritin 10 8 - 388 NG/ML   CBC WITH AUTOMATED DIFF    Collection Time: 07/12/21  5:20 PM   Result Value Ref Range    WBC 8.5 4.3 - 11.1 K/uL    RBC 3.91 (L) 4.05 - 5.2 M/uL    HGB 8.4 (L) 11.7 - 15.4 g/dL    HCT 29.1 (L) 35.8 - 46.3 %    MCV 74.4 (L) 79.6 - 97.8 FL    MCH 21.5 (L) 26.1 - 32.9 PG    MCHC 28.9 (L) 31.4 - 35.0 g/dL    RDW 17.4 (H) 11.9 - 14.6 %    PLATELET 711 500 - 779 K/uL    MPV 10.7 9.4 - 12.3 FL ABSOLUTE NRBC 0.00 0.0 - 0.2 K/uL    DF AUTOMATED      NEUTROPHILS 64 43 - 78 %    LYMPHOCYTES 27 13 - 44 %    MONOCYTES 8 4.0 - 12.0 %    EOSINOPHILS 0 (L) 0.5 - 7.8 %    BASOPHILS 1 0.0 - 2.0 %    IMMATURE GRANULOCYTES 1 0.0 - 5.0 %    ABS. NEUTROPHILS 5.4 1.7 - 8.2 K/UL    ABS. LYMPHOCYTES 2.3 0.5 - 4.6 K/UL    ABS. MONOCYTES 0.7 0.1 - 1.3 K/UL    ABS. EOSINOPHILS 0.0 0.0 - 0.8 K/UL    ABS. BASOPHILS 0.0 0.0 - 0.2 K/UL    ABS. IMM. GRANS. 0.0 0.0 - 0.5 K/UL   METABOLIC PANEL, COMPREHENSIVE    Collection Time: 07/12/21  5:20 PM   Result Value Ref Range    Sodium 141 136 - 145 mmol/L    Potassium 3.5 3.5 - 5.1 mmol/L    Chloride 113 (H) 98 - 107 mmol/L    CO2 22 21 - 32 mmol/L    Anion gap 6 (L) 7 - 16 mmol/L    Glucose 78 65 - 100 mg/dL    BUN 21 8 - 23 MG/DL    Creatinine 1.19 (H) 0.6 - 1.0 MG/DL    GFR est AA 56 (L) >60 ml/min/1.73m2    GFR est non-AA 47 (L) >60 ml/min/1.73m2    Calcium 8.9 8.3 - 10.4 MG/DL    Bilirubin, total 0.6 0.2 - 1.1 MG/DL    ALT (SGPT) 34 12 - 65 U/L    AST (SGOT) 26 15 - 37 U/L    Alk.  phosphatase 135 50 - 136 U/L    Protein, total 7.5 6.3 - 8.2 g/dL    Albumin 3.7 3.2 - 4.6 g/dL    Globulin 3.8 (H) 2.3 - 3.5 g/dL    A-G Ratio 1.0 (L) 1.2 - 3.5     TYPE & SCREEN    Collection Time: 07/12/21  5:20 PM   Result Value Ref Range    Crossmatch Expiration 07/15/2021,2359     ABO/Rh(D) Hendry Lorenzo POSITIVE     Antibody screen NEG    PROTHROMBIN TIME + INR    Collection Time: 07/12/21  5:20 PM   Result Value Ref Range    Prothrombin time 25.0 (H) 12.5 - 14.7 sec    INR 2.2     MAGNESIUM    Collection Time: 07/12/21  5:20 PM   Result Value Ref Range    Magnesium 2.0 1.8 - 2.4 mg/dL   EKG, 12 LEAD, INITIAL    Collection Time: 07/12/21  5:28 PM   Result Value Ref Range    Ventricular Rate 72 BPM    Atrial Rate 72 BPM    P-R Interval 168 ms    QRS Duration 102 ms    Q-T Interval 466 ms    QTC Calculation (Bezet) 510 ms    Calculated P Axis 74 degrees    Calculated R Axis 12 degrees    Calculated T Axis 59 degrees    Diagnosis       !! AGE AND GENDER SPECIFIC ECG ANALYSIS !!   Normal sinus rhythm  Incomplete right bundle branch block  Septal infarct (cited on or before 12-APR-2019)  Prolonged QT  Abnormal ECG  When compared with ECG of 14-MAR-2021 15:47,  Questionable change in initial forces of Septal leads  QT has lengthened  Confirmed by Varun Cassidy ([id number]) on 7/12/2021 8:48:53 PM     URINALYSIS W/ RFLX MICROSCOPIC    Collection Time: 07/12/21  8:52 PM   Result Value Ref Range    Color YELLOW      Appearance CLEAR      Specific gravity 1.013 1.001 - 1.023      pH (UA) 7.0 5.0 - 9.0      Protein Negative NEG mg/dL    Glucose Negative mg/dL    Ketone Negative NEG mg/dL    Bilirubin Negative NEG      Blood Negative NEG      Urobilinogen 0.2 0.2 - 1.0 EU/dL    Nitrites Negative NEG      Leukocyte Esterase Negative NEG     METABOLIC PANEL, BASIC    Collection Time: 07/13/21  5:10 AM   Result Value Ref Range    Sodium 143 136 - 145 mmol/L    Potassium 3.9 3.5 - 5.1 mmol/L    Chloride 113 (H) 98 - 107 mmol/L    CO2 23 21 - 32 mmol/L    Anion gap 7 7 - 16 mmol/L    Glucose 111 (H) 65 - 100 mg/dL    BUN 16 8 - 23 MG/DL    Creatinine 0.95 0.6 - 1.0 MG/DL    GFR est AA >60 >60 ml/min/1.73m2    GFR est non-AA >60 >60 ml/min/1.73m2    Calcium 8.4 8.3 - 10.4 MG/DL   CBC W/O DIFF    Collection Time: 07/13/21  5:10 AM   Result Value Ref Range    WBC 10.3 4.3 - 11.1 K/uL    RBC 3.86 (L) 4.05 - 5.2 M/uL    HGB 8.3 (L) 11.7 - 15.4 g/dL    HCT 28.6 (L) 35.8 - 46.3 %    MCV 74.1 (L) 79.6 - 97.8 FL    MCH 21.5 (L) 26.1 - 32.9 PG    MCHC 29.0 (L) 31.4 - 35.0 g/dL    RDW 17.3 (H) 11.9 - 14.6 %    PLATELET 082 669 - 597 K/uL    MPV 10.1 9.4 - 12.3 FL    ABSOLUTE NRBC 0.00 0.0 - 0.2 K/uL   PROTHROMBIN TIME + INR    Collection Time: 07/13/21  5:10 AM   Result Value Ref Range    Prothrombin time 19.8 (H) 12.5 - 14.7 sec    INR 1.7         All Micro Results     None          EKG Results     Procedure 720 Value Units Date/Time    EKG 12 LEAD INITIAL [[id number]] Collected: 07/12/21 1728    Order Status: Completed Updated: 07/12/21 2049     Ventricular Rate 72 BPM      Atrial Rate 72 BPM      P-R Interval 168 ms      QRS Duration 102 ms      Q-T Interval 466 ms      QTC Calculation (Bezet) 510 ms      Calculated P Axis 74 degrees      Calculated R Axis 12 degrees      Calculated T Axis 59 degrees      Diagnosis --     !! AGE AND GENDER SPECIFIC ECG ANALYSIS !! Normal sinus rhythm  Incomplete right bundle branch block  Septal infarct (cited on or before 12-APR-2019)  Prolonged QT  Abnormal ECG  When compared with ECG of 14-MAR-2021 15:47,  Questionable change in initial forces of Septal leads  QT has lengthened  Confirmed by Paul Machuca ([id number]) on 7/12/2021 8:48:53 PM            Other Studies:  XR HIP RT W OR WO PELV 2-3 VWS    Result Date: 7/12/2021  THREE-VIEW RIGHT HIP, TWO-VIEW RIGHT FEMUR: CLINICAL HISTORY:  Right hip pain after a fall. COMPARISON:  None. FINDINGS:  There is an oblique fracture through the right femoral neck. The distal fragment is displaced approximately 2 cm superolaterally. The distal femur appears intact moderate osteoarthritis at the knee. No persistent radiopaque foreign body is seen. Displaced, oblique transcervical fracture of the right hip. XR FEMUR RT 2 VS    Result Date: 7/12/2021  THREE-VIEW RIGHT HIP, TWO-VIEW RIGHT FEMUR: CLINICAL HISTORY:  Right hip pain after a fall. COMPARISON:  None. FINDINGS:  There is an oblique fracture through the right femoral neck. The distal fragment is displaced approximately 2 cm superolaterally. The distal femur appears intact moderate osteoarthritis at the knee. No persistent radiopaque foreign body is seen. Displaced, oblique transcervical fracture of the right hip. XR CHEST PORT    Result Date: 7/12/2021  PORTABLE CHEST, July 12, 2021 at 1854 hours CLINICAL HISTORY:  Preoperative evaluation for right hip fracture repair. COMPARISON:  March 14, 2021.  FINDINGS:  AP supine image demonstrates no confluent infiltrate or significant pleural fluid. The heart size is within normal limits without evidence of congestive heart failure or pneumothorax. The bony thorax appears intact on this view. There are overlying radiopaque support devices. NO ACUTE CARDIOPULMONARY DISEASE OR BONY ABNORMALITY IDENTIFIED.       Current Meds:   Current Facility-Administered Medications   Medication Dose Route Frequency    [START ON 7/14/2021] ceFAZolin (ANCEF) 2 g/20 mL in sterile water IV syringe  2 g IntraVENous ON CALL TO OR    amiodarone (CORDARONE) tablet 200 mg  200 mg Oral DAILY    amLODIPine (NORVASC) tablet 5 mg  5 mg Oral DAILY    losartan (COZAAR) tablet 25 mg  25 mg Oral DAILY    venlafaxine (EFFEXOR) tablet 25 mg  25 mg Oral DAILY    sodium chloride (NS) flush 5-40 mL  5-40 mL IntraVENous Q8H    sodium chloride (NS) flush 5-40 mL  5-40 mL IntraVENous PRN    acetaminophen (TYLENOL) tablet 650 mg  650 mg Oral Q6H PRN    Or    acetaminophen (TYLENOL) suppository 650 mg  650 mg Rectal Q6H PRN    senna (SENOKOT) tablet 17.2 mg  2 Tablet Oral BID PRN    ondansetron (ZOFRAN) injection 4 mg  4 mg IntraVENous Q6H PRN    cloNIDine HCL (CATAPRES) tablet 0.2 mg  0.2 mg Oral BID PRN    morphine injection 2 mg  2 mg IntraVENous Q4H PRN    tuberculin injection 5 Units  5 Units IntraDERMal ONCE    hydrALAZINE (APRESOLINE) 20 mg/mL injection 10 mg  10 mg IntraVENous Q6H PRN    HYDROcodone-acetaminophen (NORCO) 7.5-325 mg per tablet 1 Tablet  1 Tablet Oral Q4H PRN    pantoprazole (PROTONIX) tablet 40 mg  40 mg Oral ACB&D       Problem List:  Hospital Problems as of 7/13/2021 Date Reviewed: 12/18/2020        Codes Class Noted - Resolved POA    * (Principal) [facility] ICD-10-CM: I68.008A  ICD-9-CM: 820.8  7/12/2021 - Present Unknown        Long term (current) use of anticoagulants ICD-10-CM: Z79.01  ICD-9-CM: V58.61  5/1/2018 - Present Yes        Hypertension ICD-10-CM: I10  ICD-9-CM: 401.9  1/23/2017 - Present Yes        Paroxysmal atrial fibrillation (HCC) ICD-10-CM: I48.0  ICD-9-CM: 427.31  1/23/2017 - Present Yes        GERD (gastroesophageal reflux disease) ICD-10-CM: K21.9  ICD-9-CM: 530.81  1/23/2017 - Present Yes        Hyperlipidemia ICD-10-CM: E78.5  ICD-9-CM: 272.4  1/23/2017 - Present Yes                   Part of this note was written by using a voice dictation software and the note has been proof read but may still contain some grammatical/other typographical errors.     Signed By: Aki Horn MD   [employer] [facility]    July 13, 2021  5:15 PM

## 2021-07-13 NOTE — PROGRESS NOTES
Problem: Falls - Risk of  Goal: *Absence of Falls  Description: Document [place] Fall Risk and appropriate interventions in the flowsheet.   Outcome: Progressing Towards Goal  Note: Fall Risk Interventions:            Medication Interventions: Evaluate medications/consider consulting pharmacy    Elimination Interventions: Call light in reach    History of Falls Interventions: Bed/chair exit alarm, Door open when patient unattended, Room close to nurse's station         Problem: Patient Education: Go to Patient Education Activity  Goal: Patient/Family Education  Outcome: Progressing Towards Goal

## 2021-07-13 NOTE — PROGRESS NOTES
Conferred with staff and explored patient needs. A follow-up visit is planned for today.      Daphne Izquierdo 66  Board Certified

## 2021-07-13 NOTE — PROGRESS NOTES
Spoke with Dr. Blossom Schumacher about possible surgery for this patient. He states we will wait till tomorrow and have surgery. Patient is allowed to eat today and will be NPO at midnight.

## 2021-07-13 NOTE — PROGRESS NOTES
Pt is a 63 yo female admitted for surgical repair of a right femoral neck fracture. Pt will have surgery once her INR is in an acceptable range, possibly tomorrow. Chart reviewed for baseline assessment. PT/OT will be ordered post-op. PPD ordered 7/12/21. SW following to assist with pt's dc planning. Care Management Interventions  PCP Verified by CM:  Yes  Last Visit to PCP: 06/22/21  Discharge Durable Medical Equipment: No  Physical Therapy Consult: No  Occupational Therapy Consult: No  Speech Therapy Consult: No  Current Support Network: Own Home, Lives with Spouse  Confirm Follow Up Transport: Family  Discharge Location  Discharge Placement: Unable to determine at this time

## 2021-07-13 NOTE — PROGRESS NOTES
TRANSFER - IN REPORT:    Verbal report received from [facility] (name) on Taylor Rivas  being received from ER (unit) for routine progression of care      Report consisted of patients Situation, Background, Assessment and   Recommendations(SBAR). Information from the following report(s) SBAR, ED Summary, Intake/Output, MAR, Recent Results and Med Rec Status was reviewed with the receiving nurse. Opportunity for questions and clarification was provided. Assessment completed upon patients arrival to unit and care assumed.

## 2021-07-13 NOTE — ANESTHESIA PREPROCEDURE EVALUATION
Relevant Problems   RESPIRATORY SYSTEM   (+) Dyspnea      CARDIOVASCULAR   (+) Hypertension   (+) Paroxysmal atrial fibrillation (HCC)   (+) SSS (sick sinus syndrome) (HCC)      GASTROINTESTINAL   (+) GERD (gastroesophageal reflux disease)       Anesthetic History   No history of anesthetic complications            Review of Systems / Medical History  Patient summary reviewed and pertinent labs reviewed    Pulmonary  Within defined limits                 Neuro/Psych         Psychiatric history     Cardiovascular    Hypertension: well controlled        Dysrhythmias (Coumadin held since 7/11) : atrial fibrillation  Past MI (1999 - Pt reports no intervention and no further sequelae)    Exercise tolerance: >4 METS  Comments: Echo 5/19 - EF nml, mild MR, mild asymmetric septal hypertrophy    Stress test 2018 - no ischemia, low risk scan   GI/Hepatic/Renal     GERD           Endo/Other        Arthritis and anemia (Hgb 8.3)     Other Findings   Comments: INR 1.7 (7/13)           Physical Exam    Airway  Mallampati: II  TM Distance: 4 - 6 cm  Neck ROM: normal range of motion   Mouth opening: Normal     Cardiovascular    Rhythm: irregular  Rate: normal         Dental    Dentition: Full lower dentures and Full upper dentures     Pulmonary  Breath sounds clear to auscultation               Abdominal         Other Findings            Anesthetic Plan    ASA: 3  Anesthesia type: spinal            Anesthetic plan and risks discussed with: Patient and Son / Daughter      Discussed possibility of SAB if INR < 1.5

## 2021-07-13 NOTE — PROGRESS NOTES
Patient arrived to room 717 at this time via stretcher and patient transport. Spouse at bedside. Patient complaints of 10/10 pain upon arrival with movement of R leg/hip. 2 mg IV morphine given at this time. CHG surgical bath completed upon arrival. Dual skin assessment performed with ANDRE Puente. Skin dry and intact with no signs of pressure injury.

## 2021-07-13 NOTE — ED NOTES
TRANSFER - OUT REPORT:    Verbal report given to Yoselyn(name) on Ivonne Watt  being transferred to 717(unit) for routine progression of care       Report consisted of patients Situation, Background, Assessment and   Recommendations(SBAR). Information from the following report(s) SBAR, ED Summary, [facility] and Recent Results was reviewed with the receiving nurse. Lines:   Peripheral IV 07/12/21 Anterior;Proximal;Right Forearm (Active)        Opportunity for questions and clarification was provided.       Patient transported with:   GRID

## 2021-07-14 ENCOUNTER — APPOINTMENT (OUTPATIENT)
Dept: GENERAL RADIOLOGY | Age: 76
DRG: 522 | End: 2021-07-14
Attending: ORTHOPAEDIC SURGERY
Payer: MEDICARE

## 2021-07-14 ENCOUNTER — ANESTHESIA (OUTPATIENT)
Dept: SURGERY | Age: 76
DRG: 522 | End: 2021-07-14
Payer: MEDICARE

## 2021-07-14 LAB
HGB BLD-MCNC: 8.4 G/DL (ref 11.7–15.4)
INR BLD: 1.1 (ref 0.9–1.2)
INR PPP: 1.2
PROTHROMBIN TIME: 15.7 SEC (ref 12.5–14.7)
PT BLD: 12.8 SECS (ref 9.6–11.6)

## 2021-07-14 PROCEDURE — 74011250637 HC RX REV CODE- 250/637: Performed by: ORTHOPAEDIC SURGERY

## 2021-07-14 PROCEDURE — 65270000029 HC RM PRIVATE

## 2021-07-14 PROCEDURE — 74011000258 HC RX REV CODE- 258: Performed by: ORTHOPAEDIC SURGERY

## 2021-07-14 PROCEDURE — 77030003665 HC NDL SPN BBMI -A: Performed by: ANESTHESIOLOGY

## 2021-07-14 PROCEDURE — 85018 HEMOGLOBIN: CPT

## 2021-07-14 PROCEDURE — 77030006835 HC BLD SAW SAG STRY -B: Performed by: ORTHOPAEDIC SURGERY

## 2021-07-14 PROCEDURE — 77030040922 HC BLNKT HYPOTHRM STRY -A: Performed by: ANESTHESIOLOGY

## 2021-07-14 PROCEDURE — 74011250637 HC RX REV CODE- 250/637: Performed by: HOSPITALIST

## 2021-07-14 PROCEDURE — 73502 X-RAY EXAM HIP UNI 2-3 VIEWS: CPT

## 2021-07-14 PROCEDURE — 0SRR01Z REPLACEMENT OF RIGHT HIP JOINT, FEMORAL SURFACE WITH METAL SYNTHETIC SUBSTITUTE, OPEN APPROACH: ICD-10-PCS | Performed by: ORTHOPAEDIC SURGERY

## 2021-07-14 PROCEDURE — 77030017016 HC DSG ANTIMIC BARR2 S&N -B: Performed by: ORTHOPAEDIC SURGERY

## 2021-07-14 PROCEDURE — 74011250636 HC RX REV CODE- 250/636: Performed by: ORTHOPAEDIC SURGERY

## 2021-07-14 PROCEDURE — 27236 TREAT THIGH FRACTURE: CPT | Performed by: ORTHOPAEDIC SURGERY

## 2021-07-14 PROCEDURE — 94760 N-INVAS EAR/PLS OXIMETRY 1: CPT

## 2021-07-14 PROCEDURE — 77030033067 HC SUT PDO STRATFX SPIR J&J -B: Performed by: ORTHOPAEDIC SURGERY

## 2021-07-14 PROCEDURE — C1776 JOINT DEVICE (IMPLANTABLE): HCPCS | Performed by: ORTHOPAEDIC SURGERY

## 2021-07-14 PROCEDURE — 74011000250 HC RX REV CODE- 250: Performed by: NURSE ANESTHETIST, CERTIFIED REGISTERED

## 2021-07-14 PROCEDURE — 36415 COLL VENOUS BLD VENIPUNCTURE: CPT

## 2021-07-14 PROCEDURE — 77030007880 HC KT SPN EPDRL BBMI -B: Performed by: ANESTHESIOLOGY

## 2021-07-14 PROCEDURE — 2709999900 HC NON-CHARGEABLE SUPPLY: Performed by: ORTHOPAEDIC SURGERY

## 2021-07-14 PROCEDURE — 74011250636 HC RX REV CODE- 250/636: Performed by: NURSE ANESTHETIST, CERTIFIED REGISTERED

## 2021-07-14 PROCEDURE — 74011000250 HC RX REV CODE- 250: Performed by: ORTHOPAEDIC SURGERY

## 2021-07-14 PROCEDURE — 76010000161 HC OR TIME 1 TO 1.5 HR INTENSV-TIER 1: Performed by: ORTHOPAEDIC SURGERY

## 2021-07-14 PROCEDURE — 77030018673: Performed by: ORTHOPAEDIC SURGERY

## 2021-07-14 PROCEDURE — 74011250636 HC RX REV CODE- 250/636: Performed by: ANESTHESIOLOGY

## 2021-07-14 PROCEDURE — 77030008462 HC STPLR SKN PROX J&J -A: Performed by: ORTHOPAEDIC SURGERY

## 2021-07-14 PROCEDURE — 77010033678 HC OXYGEN DAILY

## 2021-07-14 PROCEDURE — 76060000033 HC ANESTHESIA 1 TO 1.5 HR: Performed by: ORTHOPAEDIC SURGERY

## 2021-07-14 PROCEDURE — 74011000250 HC RX REV CODE- 250: Performed by: ANESTHESIOLOGY

## 2021-07-14 PROCEDURE — 2709999900 HC NON-CHARGEABLE SUPPLY

## 2021-07-14 PROCEDURE — 85610 PROTHROMBIN TIME: CPT

## 2021-07-14 PROCEDURE — 77030031139 HC SUT VCRL2 J&J -A: Performed by: ORTHOPAEDIC SURGERY

## 2021-07-14 PROCEDURE — 77030018836 HC SOL IRR NACL ICUM -A: Performed by: ORTHOPAEDIC SURGERY

## 2021-07-14 PROCEDURE — 76210000006 HC OR PH I REC 0.5 TO 1 HR: Performed by: ORTHOPAEDIC SURGERY

## 2021-07-14 DEVICE — BIPOLAR COMPONENT
Type: IMPLANTABLE DEVICE | Site: HIP | Status: FUNCTIONAL
Brand: UHR

## 2021-07-14 DEVICE — LOW FRICTION ION TREATMENT
Type: IMPLANTABLE DEVICE | Site: HIP | Status: FUNCTIONAL
Brand: C-TAPER HEAD

## 2021-07-14 DEVICE — HIP H4 HEMI UNI BIPLR -- IMPL CAPPED H4: Type: IMPLANTABLE DEVICE | Status: FUNCTIONAL

## 2021-07-14 DEVICE — HIP STEM
Type: IMPLANTABLE DEVICE | Site: HIP | Status: FUNCTIONAL
Brand: OMNIFIT

## 2021-07-14 RX ORDER — SODIUM CHLORIDE 0.9 % (FLUSH) 0.9 %
5-40 SYRINGE (ML) INJECTION AS NEEDED
Status: DISCONTINUED | OUTPATIENT
Start: 2021-07-14 | End: 2021-07-14 | Stop reason: HOSPADM

## 2021-07-14 RX ORDER — FERROUS SULFATE, DRIED 160(50) MG
1 TABLET, EXTENDED RELEASE ORAL
Status: DISCONTINUED | OUTPATIENT
Start: 2021-07-14 | End: 2021-07-19 | Stop reason: HOSPADM

## 2021-07-14 RX ORDER — SODIUM CHLORIDE, SODIUM LACTATE, POTASSIUM CHLORIDE, CALCIUM CHLORIDE 600; 310; 30; 20 MG/100ML; MG/100ML; MG/100ML; MG/100ML
75 INJECTION, SOLUTION INTRAVENOUS CONTINUOUS
Status: DISCONTINUED | OUTPATIENT
Start: 2021-07-14 | End: 2021-07-14 | Stop reason: HOSPADM

## 2021-07-14 RX ORDER — MINERAL OIL
OIL (ML) MISCELLANEOUS AS NEEDED
Status: DISCONTINUED | OUTPATIENT
Start: 2021-07-14 | End: 2021-07-14 | Stop reason: HOSPADM

## 2021-07-14 RX ORDER — PROPOFOL 10 MG/ML
INJECTION, EMULSION INTRAVENOUS
Status: DISCONTINUED | OUTPATIENT
Start: 2021-07-14 | End: 2021-07-14 | Stop reason: HOSPADM

## 2021-07-14 RX ORDER — MORPHINE SULFATE 2 MG/ML
2 INJECTION, SOLUTION INTRAMUSCULAR; INTRAVENOUS
Status: DISCONTINUED | OUTPATIENT
Start: 2021-07-14 | End: 2021-07-19 | Stop reason: HOSPADM

## 2021-07-14 RX ORDER — OXYCODONE AND ACETAMINOPHEN 5; 325 MG/1; MG/1
1 TABLET ORAL AS NEEDED
Status: DISCONTINUED | OUTPATIENT
Start: 2021-07-14 | End: 2021-07-14 | Stop reason: HOSPADM

## 2021-07-14 RX ORDER — SODIUM CHLORIDE 0.9 % (FLUSH) 0.9 %
5-40 SYRINGE (ML) INJECTION EVERY 8 HOURS
Status: DISCONTINUED | OUTPATIENT
Start: 2021-07-14 | End: 2021-07-14 | Stop reason: HOSPADM

## 2021-07-14 RX ORDER — SODIUM CHLORIDE 0.9 % (FLUSH) 0.9 %
5-40 SYRINGE (ML) INJECTION AS NEEDED
Status: DISCONTINUED | OUTPATIENT
Start: 2021-07-14 | End: 2021-07-19 | Stop reason: HOSPADM

## 2021-07-14 RX ORDER — EPHEDRINE SULFATE/0.9% NACL/PF 50 MG/5 ML
SYRINGE (ML) INTRAVENOUS AS NEEDED
Status: DISCONTINUED | OUTPATIENT
Start: 2021-07-14 | End: 2021-07-14 | Stop reason: HOSPADM

## 2021-07-14 RX ORDER — BUPIVACAINE HYDROCHLORIDE 7.5 MG/ML
INJECTION, SOLUTION INTRASPINAL
Status: COMPLETED | OUTPATIENT
Start: 2021-07-14 | End: 2021-07-14

## 2021-07-14 RX ORDER — VANCOMYCIN HYDROCHLORIDE 1 G/20ML
INJECTION, POWDER, LYOPHILIZED, FOR SOLUTION INTRAVENOUS AS NEEDED
Status: DISCONTINUED | OUTPATIENT
Start: 2021-07-14 | End: 2021-07-14 | Stop reason: HOSPADM

## 2021-07-14 RX ORDER — HYDROMORPHONE HYDROCHLORIDE 2 MG/ML
0.5 INJECTION, SOLUTION INTRAMUSCULAR; INTRAVENOUS; SUBCUTANEOUS
Status: DISCONTINUED | OUTPATIENT
Start: 2021-07-14 | End: 2021-07-14 | Stop reason: HOSPADM

## 2021-07-14 RX ORDER — HYDROCODONE BITARTRATE AND ACETAMINOPHEN 7.5; 325 MG/1; MG/1
2 TABLET ORAL
Status: DISCONTINUED | OUTPATIENT
Start: 2021-07-14 | End: 2021-07-19 | Stop reason: HOSPADM

## 2021-07-14 RX ORDER — MIDAZOLAM HYDROCHLORIDE 1 MG/ML
1 INJECTION, SOLUTION INTRAMUSCULAR; INTRAVENOUS
Status: DISCONTINUED | OUTPATIENT
Start: 2021-07-14 | End: 2021-07-14 | Stop reason: HOSPADM

## 2021-07-14 RX ORDER — MAG HYDROX/ALUMINUM HYD/SIMETH 200-200-20
30 SUSPENSION, ORAL (FINAL DOSE FORM) ORAL
Status: DISCONTINUED | OUTPATIENT
Start: 2021-07-14 | End: 2021-07-19 | Stop reason: HOSPADM

## 2021-07-14 RX ORDER — ACETAMINOPHEN 325 MG/1
650 TABLET ORAL ONCE
Status: DISCONTINUED | OUTPATIENT
Start: 2021-07-14 | End: 2021-07-14 | Stop reason: HOSPADM

## 2021-07-14 RX ORDER — NALOXONE HYDROCHLORIDE 0.4 MG/ML
0.2 INJECTION, SOLUTION INTRAMUSCULAR; INTRAVENOUS; SUBCUTANEOUS AS NEEDED
Status: DISCONTINUED | OUTPATIENT
Start: 2021-07-14 | End: 2021-07-14 | Stop reason: HOSPADM

## 2021-07-14 RX ORDER — SODIUM CHLORIDE 0.9 % (FLUSH) 0.9 %
5-40 SYRINGE (ML) INJECTION EVERY 8 HOURS
Status: DISCONTINUED | OUTPATIENT
Start: 2021-07-14 | End: 2021-07-19 | Stop reason: HOSPADM

## 2021-07-14 RX ORDER — LIDOCAINE HYDROCHLORIDE 10 MG/ML
0.1 INJECTION INFILTRATION; PERINEURAL AS NEEDED
Status: DISCONTINUED | OUTPATIENT
Start: 2021-07-14 | End: 2021-07-14 | Stop reason: HOSPADM

## 2021-07-14 RX ADMIN — HYDROCODONE BITARTRATE AND ACETAMINOPHEN 2 TABLET: 7.5; 325 TABLET ORAL at 20:05

## 2021-07-14 RX ADMIN — PANTOPRAZOLE SODIUM 40 MG: 40 TABLET, DELAYED RELEASE ORAL at 06:46

## 2021-07-14 RX ADMIN — CEFAZOLIN SODIUM 1 G: 1 INJECTION, POWDER, FOR SOLUTION INTRAMUSCULAR; INTRAVENOUS at 21:12

## 2021-07-14 RX ADMIN — AMLODIPINE BESYLATE 5 MG: 5 TABLET ORAL at 06:46

## 2021-07-14 RX ADMIN — BUPIVACAINE HYDROCHLORIDE IN DEXTROSE 12 MG: 7.5 INJECTION, SOLUTION SUBARACHNOID at 13:00

## 2021-07-14 RX ADMIN — HYDROCODONE BITARTRATE AND ACETAMINOPHEN 1 TABLET: 7.5; 325 TABLET ORAL at 06:45

## 2021-07-14 RX ADMIN — PANTOPRAZOLE SODIUM 40 MG: 40 TABLET, DELAYED RELEASE ORAL at 17:51

## 2021-07-14 RX ADMIN — CEFAZOLIN SODIUM 2 G: 100 INJECTION, POWDER, LYOPHILIZED, FOR SOLUTION INTRAVENOUS at 13:16

## 2021-07-14 RX ADMIN — VENLAFAXINE 25 MG: 25 TABLET ORAL at 06:46

## 2021-07-14 RX ADMIN — PROPOFOL 30 MG: 10 INJECTION, EMULSION INTRAVENOUS at 12:55

## 2021-07-14 RX ADMIN — Medication 20 MG: at 13:30

## 2021-07-14 RX ADMIN — Medication 10 ML: at 21:12

## 2021-07-14 RX ADMIN — SODIUM CHLORIDE, SODIUM LACTATE, POTASSIUM CHLORIDE, AND CALCIUM CHLORIDE 75 ML/HR: 600; 310; 30; 20 INJECTION, SOLUTION INTRAVENOUS at 11:37

## 2021-07-14 RX ADMIN — Medication 20 MG: at 14:14

## 2021-07-14 RX ADMIN — PHENYLEPHRINE HYDROCHLORIDE 100 MCG: 10 INJECTION INTRAVENOUS at 13:52

## 2021-07-14 RX ADMIN — PROPOFOL 100 MCG/KG/MIN: 10 INJECTION, EMULSION INTRAVENOUS at 13:00

## 2021-07-14 RX ADMIN — Medication 1 TABLET: at 17:51

## 2021-07-14 RX ADMIN — Medication 20 MG: at 13:20

## 2021-07-14 RX ADMIN — Medication 10 MG: at 13:36

## 2021-07-14 RX ADMIN — AMIODARONE HYDROCHLORIDE 200 MG: 200 TABLET ORAL at 06:50

## 2021-07-14 RX ADMIN — ACETAMINOPHEN 650 MG: 325 TABLET ORAL at 21:12

## 2021-07-14 RX ADMIN — Medication 10 MG: at 14:05

## 2021-07-14 RX ADMIN — Medication 10 ML: at 06:47

## 2021-07-14 NOTE — ANESTHESIA POSTPROCEDURE EVALUATION
Procedure(s):  RIGHT HIP HEMIARTHROPLASTY/TAO. general    Anesthesia Post Evaluation      Multimodal analgesia: multimodal analgesia used between 6 hours prior to anesthesia start to PACU discharge  Patient location during evaluation: PACU  Patient participation: complete - patient participated  Level of consciousness: awake and alert  Pain management: adequate  Airway patency: patent  Anesthetic complications: no  Cardiovascular status: acceptable  Respiratory status: acceptable  Hydration status: acceptable  Post anesthesia nausea and vomiting:  none  Final Post Anesthesia Temperature Assessment:  Normothermia (36.0-37.5 degrees C)      INITIAL Post-op Vital signs:   Vitals Value Taken Time   /51 07/14/21 1427   Temp 37.1 °C (98.7 °F) 07/14/21 1417   Pulse 70 07/14/21 1430   Resp 12 07/14/21 1417   SpO2 93 % 07/14/21 1430   Vitals shown include unvalidated device data.

## 2021-07-14 NOTE — PROGRESS NOTES
Spiritual Care visit. Initial Visit, Pre Surgery Consult. Visit and prayer before patient goes to surgery.     Visit by Valarie Middleton M.Ed., Th.B. ,Staff

## 2021-07-14 NOTE — PROGRESS NOTES
Hospitalist Progress Note     Name:  Mai Manzo  Age:76 y.o. Sex:female   :  1945    MRN:  [id number]   Admit Date:  2021  Presenting Complaint: Hip Pain    Initial Admission Diagnosis: Hip fracture [facility]) [address] Course/Interval history:     Patient with past medical history of    AF on Warfartin   Hypertension   Vertigo   Anxiety     Patient came to hospital after a fall. She landed on the right hip and is found to have right femoral neck fracture. INR is 2.2 on admission and is down to 1.7 today after Warfarin is on hold. Orthopedics is consulted and plans to operate on her tomorrow. Subjective (21):    21   Complaining of pain at the right hip with manipulation. Improved with pain medications. 21   INR is lower to 1.2 this morning. Orthopedics service saw the patient and plans for ORIF today. Patient denies fever. No shortness of breath. No chest pain. Review of Systems: 14 point review of systems is otherwise negative with the exception of the elements mentioned above. Assessment & Plan     S/P fall   Right femoral neck fracture   Pain control   Plan for ORIF today. Jayy Adas is 1.2 and is acceptable for surgery. AF on Warfarin prior to admission   Hold Warfarin for now. INR is 1.2 today. HR is 78 today and controlled. On Amiodarone 200 mg po q day. EKG on 2021 shows sinus rhythm. GERD   On PPI   Continue this     Hypertension  Monitor blood pressure and manage accordingly. Continue home medications, Amlodipine and Losartan. BP is 160/70's ranges. Will monitor closely. I have discussed the plan of care with patient and spouse and daughter at bedside.       Dispo/Discharge Planning:  to be determined     Diet:  DIET NPO  DVT PPx: SCD   Code status: Full Code    Objective:     Patient Vitals for the past 24 hrs:   Temp Pulse Resp BP SpO2   21 0734 97.3 °F (36.3 °C) 78 16 (!) 160/74 91 %   21 0427 100.3 °F (37.9 °C) 69 20 (!) 149/81 90 %   07/13/21 2311 99.9 °F (37.7 °C) 70 20 (!) 160/68 92 %   07/13/21 1933 100.1 °F (37.8 °C) 68 16 (!) 163/72 91 %   07/13/21 1525 98 °F (36.7 °C) 85 16 (!) 149/74 94 %   07/13/21 1134 98.2 °F (36.8 °C) 79 15 (!) 150/96 92 %     Oxygen Therapy  O2 Sat (%): 91 % (07/14/21 0734)  Pulse via Oximetry: 70 beats per minute (07/12/21 1742)  O2 Device: None (Room air) (07/13/21 0325)    Body mass index is 32.3 kg/m². Physical Exam:   General:  No acute distress, speaking in full sentences, no use of accessory muscles. Patient is resting comfortably in bed. Lungs:  Clear to auscultation bilaterally   CV:  Regular rate and rhythm with normal S1 and S2   Abdomen:  Soft, nontender, nondistended, normoactive bowel sounds   Extremities:  No cyanosis clubbing or edema. Pain at the right hip area. Limitation of movement.    Neuro:  Nonfocal, A&O x3   Psych:  Normal affect     Data Review:  I have reviewed all labs, meds, and studies from the last 24 hours:    Labs:    Recent Results (from the past 24 hour(s))   PLEASE READ & DOCUMENT PPD TEST IN 24 HRS    Collection Time: 07/13/21 11:03 PM   Result Value Ref Range    PPD Negative Negative    mm Induration 0 0 - 5 mm   PROTHROMBIN TIME + INR    Collection Time: 07/14/21  5:29 AM   Result Value Ref Range    Prothrombin time 15.7 (H) 12.5 - 14.7 sec    INR 1.2     HEMOGLOBIN    Collection Time: 07/14/21  5:29 AM   Result Value Ref Range    HGB 8.4 (L) 11.7 - 15.4 g/dL       All Micro Results     None          EKG Results     Procedure 720 Value Units Date/Time    EKG 12 LEAD INITIAL [[id number]] Collected: 07/12/21 1728    Order Status: Completed Updated: 07/12/21 2049     Ventricular Rate 72 BPM      Atrial Rate 72 BPM      P-R Interval 168 ms      QRS Duration 102 ms      Q-T Interval 466 ms      QTC Calculation (Bezet) 510 ms      Calculated P Axis 74 degrees      Calculated R Axis 12 degrees      Calculated T Axis 59 degrees Diagnosis --     !! AGE AND GENDER SPECIFIC ECG ANALYSIS !! Normal sinus rhythm  Incomplete right bundle branch block  Septal infarct (cited on or before 12-APR-2019)  Prolonged QT  Abnormal ECG  When compared with ECG of 14-MAR-2021 15:47,  Questionable change in initial forces of Septal leads  QT has lengthened  Confirmed by Christy Sharp ([id number]) on 7/12/2021 8:48:53 PM            Other Studies:  No results found.     Current Meds:   Current Facility-Administered Medications   Medication Dose Route Frequency    ceFAZolin (ANCEF) 2 g/20 mL in sterile water IV syringe  2 g IntraVENous ON CALL TO OR    amiodarone (CORDARONE) tablet 200 mg  200 mg Oral DAILY    amLODIPine (NORVASC) tablet 5 mg  5 mg Oral DAILY    losartan (COZAAR) tablet 25 mg  25 mg Oral DAILY    venlafaxine (EFFEXOR) tablet 25 mg  25 mg Oral DAILY    sodium chloride (NS) flush 5-40 mL  5-40 mL IntraVENous Q8H    sodium chloride (NS) flush 5-40 mL  5-40 mL IntraVENous PRN    acetaminophen (TYLENOL) tablet 650 mg  650 mg Oral Q6H PRN    Or    acetaminophen (TYLENOL) suppository 650 mg  650 mg Rectal Q6H PRN    senna (SENOKOT) tablet 17.2 mg  2 Tablet Oral BID PRN    ondansetron (ZOFRAN) injection 4 mg  4 mg IntraVENous Q6H PRN    cloNIDine HCL (CATAPRES) tablet 0.2 mg  0.2 mg Oral BID PRN    morphine injection 2 mg  2 mg IntraVENous Q4H PRN    hydrALAZINE (APRESOLINE) 20 mg/mL injection 10 mg  10 mg IntraVENous Q6H PRN    HYDROcodone-acetaminophen (NORCO) 7.5-325 mg per tablet 1 Tablet  1 Tablet Oral Q4H PRN    pantoprazole (PROTONIX) tablet 40 mg  40 mg Oral ACB&D       Problem List:  Hospital Problems as of 7/14/2021 Date Reviewed: 12/18/2020        Codes Class Noted - Resolved POA    * (Principal) [facility] ICD-10-CM: V54.750V  ICD-9-CM: 820.8  7/12/2021 - Present Unknown        Long term (current) use of anticoagulants ICD-10-CM: Z79.01  ICD-9-CM: V58.61  5/1/2018 - Present Yes        Hypertension ICD-10-CM: I10  ICD-9-CM: 401.9  1/23/2017 - Present Yes        Paroxysmal atrial fibrillation (HCC) ICD-10-CM: I48.0  ICD-9-CM: 427.31  1/23/2017 - Present Yes        GERD (gastroesophageal reflux disease) ICD-10-CM: K21.9  ICD-9-CM: 530.81  1/23/2017 - Present Yes        Hyperlipidemia ICD-10-CM: E78.5  ICD-9-CM: 272.4  1/23/2017 - Present Yes                   Part of this note was written by using a voice dictation software and the note has been proof read but may still contain some grammatical/other typographical errors.     Signed By: Simi Espinoza MD   [facility]    July 14, 2021  5:15 PM

## 2021-07-14 NOTE — ANESTHESIA PROCEDURE NOTES
Spinal Block    Start time: 7/14/2021 12:52 PM  End time: 7/14/2021 1:00 PM  Performed by: Monika Kahn MD  Authorized by: Monika Kahn MD     Pre-procedure:   Indications: primary anesthetic  Preanesthetic Checklist: patient identified, risks and benefits discussed, anesthesia consent, site marked, patient being monitored and timeout performed    Timeout Time: 12:52 EDT          Spinal Block:   Patient Position:  Seated  Prep Region:  Lumbar  Prep: chlorhexidine      Location:  L3-4  Technique:  Single shot    Local Dose (mL):  1    Needle:   Needle Type:  Pencan  Needle Gauge:  25 G  Attempts:  1      Events: CSF confirmed, no blood with aspiration and no paresthesia        Assessment:  Insertion:  Uncomplicated  Patient tolerance:  Patient tolerated the procedure well with no immediate complications

## 2021-07-14 NOTE — INTERVAL H&P NOTE
Update History & Physical    The Patient's History and Physical of 7/12/2021 was reviewed with the patient and I examined the patient. There was no change. The surgical site was confirmed by the patient and me. Plan:  The risk, benefits, expected outcome, and alternative to the recommended procedure have been discussed with the patient. Patient understands and wants to proceed with right hip hemiarthroplasty for right femoral neck fracture.   Electronically signed by Alka Singh MD on 7/14/2021 at 6:50 AM

## 2021-07-14 NOTE — INTERVAL H&P NOTE
Update History & Physical    The Patient's History and Physical of 7/12/2021 was reviewed with the patient and I examined the patient. There was no change, he did have an INR today which is 1.2. The surgical site was confirmed by the patient and me. Plan:  The risk, benefits, expected outcome, and alternative to the recommended procedure have been discussed with the patient. Patient understands and wants to proceed with right hip hemiarthroplasty for right femoral neck fracture.     Electronically signed by Alice Charles MD on 7/14/2021 at 12:39 PM

## 2021-07-14 NOTE — OP NOTES
Operative Report    Patient: Cynthia Umanzor MRN: [id number]  SSN: [id number]    YOB: 1945  Age: 64 y.o. Sex: female       Date of Surgery: 7/14/2021     History:  Cynthia Umanzor is a 64 y.o. female who fell and injured her right hip. She was seen in the emergency room and found to have a right femoral neck fracture. She does take Coumadin and her INR was elevated. However she did respond to some vitamin K and by this morning her INR had normalized to 1.2 so we thought was reasonable to go ahead and proceed with her right hip hemiarthroplasty. I had talked to the patient and her family regarding the nature of her injury as well as the nature of her proposed surgical procedure which would be a partial hip replacement. We try to explain exactly what this would involve. After talking him about this she seemed to feel comfortable consenting. I talked to the patient and/or their representative and explained the exact nature the procedure. I also went through a detailed list of the material risks associated with  the procedure which included risk of bleeding, infection, injury to nearby structures, worsening the situation, as well as the risks associate with anesthesia and finally death. Also talked with him regarding the benefits and alternatives to the procedure.     Preoperative Diagnosis: Closed fracture of right hip, initial encounter ([facility] [S72.001A]     Postoperative Diagnosis: Closed fracture of right hip, initial encounter ([facility] [S72.001A]     Surgeon(s) and Role:     * Karl Kimbrough MD - Primary    Anesthesia: Spinal     Procedure: Procedure(s):  RIGHT HIP HEMIARTHROPLASTY for right femoral neck fracture/open treatment of right neck fracture with prosthetic replacement    Procedure in Detail: After successful induction of spinal anesthesia patient was placed in the left lateral decubitus position the right hip was prepped draped usual sterile fashion I then utilized a standard anterolateral approach to the femoral neck. The femoral neck was exposed and I made a provisional cut with an oscillating saw I then used a power corkscrew to remove the femoral head from the acetabulum. After this was done we sized this for a 46 mm femoral head. I then began the process of preparing the proximal femur using first a box osteotome then a canal finder and then starting with a #5 broach and ending with a 9 broach. The #9 broach appeared to fill the canal very well. I then remove the broach and irrigated with a copious amount of normal saline for both the proximal femur as well as the acetabulum and then placed the actual stem corresponding with the size of the final broach. Once the stem was in place I placed a 28 mm head which was a -3 head as well as a 46 mm bipolar component and then reduced the hip and took the hip through full range of motion making sure that it was very stable. Once I was assured of this I then irrigated once more and reattached the gluteus minimus and medius tendons using #1 Vicryl in figure-of-eight fashion to suture the tendons directly back to the area of the proximal femur near the area of the greater trochanter. After this was done I closed the deep fascia with #2 strata fix suture and then the subcutaneous tissue with 2.0 strata fix suture and the skin was closed with staples. Dressings were applied. The patient was awakened and taken to PACU in stable condition        Estimated Blood Loss: 120 cc    Tourniquet Time: * No tourniquets in log *      Implants:   Implant Name Type Inv.  Item Serial No.  Lot No. LRB No. Used Action   HEAD FEM OD46MM ID28MM UNIV CO CHROM COMPHSVE SZ HOWARD FOR - [id number]  HEAD FEM OD46MM ID28MM UNIV CO CHROM COMPHSVE SZ JANIYAY FOR  [employer] HOWM_WD [id number] Right 1 Implanted   HEAD FEM [id number] -3MM OFFSET C TAPR CO CHROM MTL ON POLY - [id number]  HEAD FEM [id number] -3MM OFFSET C TAPR CO CHROM MTL ON POLY  [employer] HOWM_WD [id number] Right 1 Implanted   STEM BPLR SZ 9 L150MM NK L35MM 41MM OFFSET 132DEG CO CHROME - [id number]  STEM BPLR SZ 9 L150MM NK L35MM 41MM OFFSET 132DEG CO CHROME  [employer] HOWM_WD [id number] Right 1 Implanted               Specimens: * No specimens in log *        Drains: None                Complications: None    Counts: Sponge and needle counts were correct times two.     Signed By:  Aleda Epley, MD     July 14, 2021

## 2021-07-14 NOTE — PROGRESS NOTES
TRANSFER - OUT REPORT:    Verbal report given to ANDRE Salas on [employer]  being transferred to Preop for routine progression of care       Report consisted of patients Situation, Background, Assessment and   Recommendations(SBAR). Information from the following report(s) SBAR was reviewed with the receiving nurse. Lines:   Peripheral IV 07/12/21 Anterior;Proximal;Right Forearm (Active)   Site Assessment Clean, dry, & intact 07/13/21 1921   Phlebitis Assessment 0 07/13/21 1921   Infiltration Assessment 0 07/13/21 1921   Dressing Status Clean, dry, & intact 07/13/21 1921   Dressing Type Transparent;Tape 07/13/21 1921   Hub Color/Line Status Pink;Capped 07/13/21 1921   Alcohol Cap Used No 07/12/21 2220        Opportunity for questions and clarification was provided.

## 2021-07-14 NOTE — PERIOP NOTES
TRANSFER - OUT REPORT:    Verbal report given to Kelsey Montalvo on Brit Stephens  being transferred to [facility] for routine progression of care       Report consisted of patients Situation, Background, Assessment and   Recommendations(SBAR). Information from the following report(s) SBAR, OR Summary, Procedure Summary, Intake/Output, MAR and Cardiac Rhythm sinus was reviewed with the receiving nurse. Lines:   Peripheral IV 07/12/21 Anterior;Proximal;Right Forearm (Active)   Site Assessment Clean, dry, & intact 07/14/21 1417   Phlebitis Assessment 0 07/14/21 1417   Infiltration Assessment 0 07/14/21 1417   Dressing Status Clean, dry, & intact 07/14/21 1417   Dressing Type Transparent;Tape 07/14/21 1417   Hub Color/Line Status Pink;Patent; Infusing;Flushed 07/14/21 1137   Alcohol Cap Used No 07/14/21 0734        Opportunity for questions and clarification was provided. Patient transported with:   O2 @ 2 liters  Tech    VTE prophylaxis orders have been written for Brit Stephens. Patient and family given floor number and nurses name. Family updated re: pt status after security code verified.

## 2021-07-14 NOTE — PROGRESS NOTES
TRANSFER - IN REPORT:    Verbal report received from ANDRE Coon on [employer]  being received from [phone] for routine progression of care      Report consisted of patients Situation, Background, Assessment and   Recommendations(SBAR). Information from the following report(s) SBAR was reviewed with the receiving nurse. Opportunity for questions and clarification was provided. Assessment completed upon patients arrival to unit and care assumed.

## 2021-07-15 ENCOUNTER — APPOINTMENT (OUTPATIENT)
Dept: GENERAL RADIOLOGY | Age: 76
DRG: 522 | End: 2021-07-15
Attending: FAMILY MEDICINE
Payer: MEDICARE

## 2021-07-15 LAB
BASOPHILS # BLD: 0 K/UL (ref 0–0.2)
BASOPHILS NFR BLD: 0 % (ref 0–2)
DIFFERENTIAL METHOD BLD: ABNORMAL
EOSINOPHIL # BLD: 0.2 K/UL (ref 0–0.8)
EOSINOPHIL NFR BLD: 1 % (ref 0.5–7.8)
ERYTHROCYTE [DISTWIDTH] IN BLOOD BY AUTOMATED COUNT: 17.7 % (ref 11.9–14.6)
HCT VFR BLD AUTO: 31.6 % (ref 35.8–46.3)
HGB BLD-MCNC: 8.9 G/DL (ref 11.7–15.4)
IMM GRANULOCYTES # BLD AUTO: 0 K/UL (ref 0–0.5)
IMM GRANULOCYTES NFR BLD AUTO: 0 % (ref 0–5)
LYMPHOCYTES # BLD: 1.3 K/UL (ref 0.5–4.6)
LYMPHOCYTES NFR BLD: 10 % (ref 13–44)
MCH RBC QN AUTO: 21.3 PG (ref 26.1–32.9)
MCHC RBC AUTO-ENTMCNC: 28.2 G/DL (ref 31.4–35)
MCV RBC AUTO: 75.8 FL (ref 79.6–97.8)
MM INDURATION POC: 0 MM (ref 0–5)
MONOCYTES # BLD: 1.1 K/UL (ref 0.1–1.3)
MONOCYTES NFR BLD: 9 % (ref 4–12)
NEUTS SEG # BLD: 10.2 K/UL (ref 1.7–8.2)
NEUTS SEG NFR BLD: 80 % (ref 43–78)
NRBC # BLD: 0 K/UL (ref 0–0.2)
PLATELET # BLD AUTO: 215 K/UL (ref 150–450)
PMV BLD AUTO: 9.7 FL (ref 9.4–12.3)
PPD POC: NEGATIVE NEGATIVE
RBC # BLD AUTO: 4.17 M/UL (ref 4.05–5.2)
WBC # BLD AUTO: 12.9 K/UL (ref 4.3–11.1)

## 2021-07-15 PROCEDURE — 65270000029 HC RM PRIVATE

## 2021-07-15 PROCEDURE — 36415 COLL VENOUS BLD VENIPUNCTURE: CPT

## 2021-07-15 PROCEDURE — 74011000258 HC RX REV CODE- 258: Performed by: ORTHOPAEDIC SURGERY

## 2021-07-15 PROCEDURE — 97530 THERAPEUTIC ACTIVITIES: CPT

## 2021-07-15 PROCEDURE — 85025 COMPLETE CBC W/AUTO DIFF WBC: CPT

## 2021-07-15 PROCEDURE — 97535 SELF CARE MNGMENT TRAINING: CPT

## 2021-07-15 PROCEDURE — 77030027138 HC INCENT SPIROMETER -A

## 2021-07-15 PROCEDURE — 2709999900 HC NON-CHARGEABLE SUPPLY

## 2021-07-15 PROCEDURE — 71045 X-RAY EXAM CHEST 1 VIEW: CPT

## 2021-07-15 PROCEDURE — 74011250637 HC RX REV CODE- 250/637: Performed by: ORTHOPAEDIC SURGERY

## 2021-07-15 PROCEDURE — 74011250636 HC RX REV CODE- 250/636: Performed by: ORTHOPAEDIC SURGERY

## 2021-07-15 PROCEDURE — 97165 OT EVAL LOW COMPLEX 30 MIN: CPT

## 2021-07-15 PROCEDURE — 97162 PT EVAL MOD COMPLEX 30 MIN: CPT

## 2021-07-15 PROCEDURE — 77030038269 HC DRN EXT URIN PURWCK BARD -A

## 2021-07-15 RX ADMIN — PANTOPRAZOLE SODIUM 40 MG: 40 TABLET, DELAYED RELEASE ORAL at 16:15

## 2021-07-15 RX ADMIN — PANTOPRAZOLE SODIUM 40 MG: 40 TABLET, DELAYED RELEASE ORAL at 05:03

## 2021-07-15 RX ADMIN — Medication 5 ML: at 15:23

## 2021-07-15 RX ADMIN — Medication 10 ML: at 05:03

## 2021-07-15 RX ADMIN — Medication 1 TABLET: at 12:14

## 2021-07-15 RX ADMIN — Medication 5 ML: at 13:30

## 2021-07-15 RX ADMIN — CEFAZOLIN SODIUM 1 G: 1 INJECTION, POWDER, FOR SOLUTION INTRAMUSCULAR; INTRAVENOUS at 05:02

## 2021-07-15 RX ADMIN — AMIODARONE HYDROCHLORIDE 200 MG: 200 TABLET ORAL at 09:35

## 2021-07-15 RX ADMIN — VENLAFAXINE 25 MG: 25 TABLET ORAL at 12:14

## 2021-07-15 RX ADMIN — Medication 10 ML: at 21:18

## 2021-07-15 RX ADMIN — HYDROCODONE BITARTRATE AND ACETAMINOPHEN 2 TABLET: 7.5; 325 TABLET ORAL at 09:35

## 2021-07-15 RX ADMIN — ACETAMINOPHEN 650 MG: 325 TABLET ORAL at 21:11

## 2021-07-15 RX ADMIN — LOSARTAN POTASSIUM 25 MG: 25 TABLET, FILM COATED ORAL at 09:35

## 2021-07-15 RX ADMIN — Medication 1 TABLET: at 09:35

## 2021-07-15 RX ADMIN — AMLODIPINE BESYLATE 5 MG: 5 TABLET ORAL at 09:35

## 2021-07-15 RX ADMIN — Medication 1 TABLET: at 16:15

## 2021-07-15 RX ADMIN — Medication 5 ML: at 13:29

## 2021-07-15 RX ADMIN — HYDROCODONE BITARTRATE AND ACETAMINOPHEN 2 TABLET: 7.5; 325 TABLET ORAL at 05:02

## 2021-07-15 NOTE — PROGRESS NOTES
ACUTE PHYSICAL THERAPY GOALS:  (Developed with and agreed upon by patient and/or caregiver.)  Goals:  (1.)Ms. Lida Bowles will move from supine to sit and sit to supine , scoot up and down and roll side to side in bed with MODIFIED INDEPENDENCE within 7 treatment day(s). (2.)Ms. Lida Bowles will transfer from bed to chair and chair to bed with CONTACT GUARD ASSIST using the least restrictive device within 7 treatment day(s). (3.)Ms. Lida Bowles will ambulate with CONTACT GUARD ASSIST for 15--20 feet with the least restrictive device within 7 treatment day(s). (4.)Ms. Lida Bowles will participate in RLE AAROM exercises in supine and sitting to improve movement, strength and mobility within 7 treatment day(s)    PHYSICAL THERAPY ASSESSMENT: Initial Assessment, Daily Note and AM PT Treatment Day # 1      Chad Gleason is a 64 y.o. female   PRIMARY DIAGNOSIS: Hip fracture ([address]  Hip fracture ([address] [S72.009A]  Procedure(s) (LRB):  RIGHT HIP HEMIARTHROPLASTY/TAO (Right)  1 Day Post-Op     Reason for Referral:    ICD-10: Treatment Diagnosis: Generalized Muscle Weakness (M62.81)  Difficulty in walking, Not elsewhere classified (R26.2)  History of falling (Z91.81)  INPATIENT: Payor: HUMAN MEDICARE / Plan: [facility] [employer] MEDICARE HMO / Product Type: Managed Care Medicare /     ASSESSMENT:     REHAB RECOMMENDATIONS:   Recommendation to date pending progress:  Setting:   Short-term Rehab  Equipment:    None     PRIOR LEVEL OF FUNCTION:  (Prior to Hospitalization) INITIAL/CURRENT LEVEL OF FUNCTION:  (Most Recently Demonstrated)   Bed Mobility:   Independent  Sit to Stand:   Independent  Transfers:   Independent  Gait/Mobility:   Independent Bed Mobility:   Maximal Assistance  (x 2 for sit to supine)  Sit to Stand:   Not tested  Transfers:   Not tested  Gait/Mobility:   Not tested     ASSESSMENT:  Ms. Lida Bowles is a 64year old AAF s/p RIGHT HIP HEMIARTHROPLASTY/TAO (Right) secondary to a fall at home resulting in a RLE hip fracture. Her PMH includes A-fib, HTN, Vertigo and anxiety. She is WBAT on her RLE. She presents today lying back in the bed stiff and fearful of moving secondary to pain. She reports she had already been given pain medication. She is anxious about moving. She barely tolerates moving her HOB. She needed much encouragement to work on [address] on her RLE and she only tolerated 1/4 of knee flexion in the bed. Supine to sit was a work in progress using the bed controls and bed pad to move her into a propped sitting position on the EOB. She avoided weight bearing on her RLE hip and needed min assist for forward bending to sit up more without propping. She resisted most movements and remained propped back on the EOB. No standing or gait were attempted this morning. Nursing was requested to assist sit to supine and it required max assist x 2. She also needed max assist x 2 for positioning higher up in the bed.    is functioning below her PLOF and would benefit from continued skilled PT while in the hospital.       SUBJECTIVE:   Ms. Nisha Langley states, \"You know I also have vertigo and cannot tolerate lying too flat\"    SOCIAL HISTORY/LIVING ENVIRONMENT:   Home Environment: Private residence  # Steps to Enter: 1  One/Two Story Residence: One story  Living Alone: No  Support Systems: Spouse/Significant Other/Partner  OBJECTIVE:     PAIN: VITAL SIGNS: LINES/DRAINS:   Pre Treatment:  6./10  Post Treatment: 10/10 with movement     Visit Vitals  /65 (BP 1 Location: Left arm)   Pulse 76   Temp 98.3 °F (36.8 °C)   Resp 16   Wt 88 kg (194 lb 1.6 oz)   SpO2 90%   Breastfeeding No   BMI 32.30 kg/m²      O2 Device: None (Room air)     GROSS EVALUATION:   Within Functional Limits Abnormal/ Functional Abnormal/ Non-Functional (see comments) Not Tested Comments:   AROM [] [] [x] []    PROM [] [] [x] []    Strength [] [x] [] []    Balance [] [x] [] []    Posture [x] [] [] []    Sensation [] [] [] [] Coordination [] [] [] [x]    Tone [x] [] [] []    Edema [] [x] [] [] BLE with RLE> LLE   Activity Tolerance [] [x] [] [] Low pain tolerance and resistive with all movement    [] [] [] []      COGNITION/  PERCEPTION: Intact Impaired   (see comments) Comments:   Orientation [x] []    Vision [x] []    Hearing [x] []    Command Following [x] []    Safety Awareness [x] []     [] []      MOBILITY: I Mod I S SBA CGA Min Mod Max Total  NT x2 Comments:   Bed Mobility    Rolling [] [] [] [] [] [] [] [] [] [x] []    Supine to Sit [] [] [] [] [] [] [] [x] [] [] [] Bed controls used to assist   Scooting [] [] [] [] [] [] [] [x] [] [] []    Sit to Supine [] [] [] [] [] [] [] [x] [] [] [x]    Transfers    Sit to Stand [] [] [] [] [] [] [] [] [] [x] []    Bed to Chair [] [] [] [] [] [] [] [] [] [x] []    Stand to Sit [] [] [] [] [] [] [] [] [] [x] []    I=Independent, Mod I=Modified Independent, S=Supervision, SBA=Standby Assistance, CGA=Contact Guard Assistance,   Min=Minimal Assistance, Mod=Moderate Assistance, Max=Maximal Assistance, Total=Total Assistance, NT=Not Tested  GAIT: I Mod I S SBA CGA Min Mod Max Total  NT x2 Comments:   Level of Assistance [] [] [] [] [] [] [] [] [] [x] []    Distance 0    DME N/A    Gait Quality N/A    Weightbearing Status WBAT RLE     I=Independent, Mod I=Modified Independent, S=Supervision, SBA=Standby Assistance, CGA=Contact Guard Assistance,   Min=Minimal Assistance, Mod=Moderate Assistance, Max=Maximal Assistance, Total=Total Assistance, NT=Not Tested    [facility] [address] [facility] Inpatient Short Form       How much difficulty does the patient currently have. .. Unable A Lot A Little None   1. Turning over in bed (including adjusting bedclothes, sheets and blankets)? [] 1   [] 2   [x] 3   [] 4   2. Sitting down on and standing up from a chair with arms ( e.g., wheelchair, bedside commode, etc.)   [x] 1   [] 2   [] 3   [] 4   3.   Moving from lying on back to sitting on the side of the bed? [] 1   [x] 2   [] 3   [] 4   How much help from another person does the patient currently need. .. Total A Lot A Little None   4. Moving to and from a bed to a chair (including a wheelchair)? [x] 1   [] 2   [] 3   [] 4   5. Need to walk in hospital room? [x] 1   [] 2   [] 3   [] 4   6. Climbing 3-5 steps with a railing? [x] 1   [] 2   [] 3   [] 4   © 2007, Trustees of [address] [place] [address] [address] [id number], under license to [employer]. All rights reserved     Score:  Initial: 9 Most Recent: X (Date: -- )    Interpretation of Tool:  Represents activities that are increasingly more difficult (i.e. Bed mobility, Transfers, Gait). PLAN:   FREQUENCY/DURATION: PT Plan of Care: BID for duration of hospital stay or until stated goals are met, whichever comes first.    PROBLEM LIST:   (Skilled intervention is medically necessary to address:)  1. Decreased ADL/Functional Activities  2. Decreased Activity Tolerance  3. Decreased AROM/PROM  4. Decreased Gait Ability  5. Decreased Strength  6. Decreased Transfer Abilities  7. Increased Pain   INTERVENTIONS PLANNED:   (Benefits and precautions of physical therapy have been discussed with the patient.)  1. Therapeutic Activity  2. Therapeutic Exercise/HEP  3. Neuromuscular Re-education  4. Gait Training  5. Education     TREATMENT:     EVALUATION: Moderate Complexity : (Untimed Charge)    TREATMENT:   ($$ Therapeutic Activity: 8-22 mins    )  Therapeutic Activity (16 Minutes): Therapeutic activity included Supine to Sit, Sit to Supine, Scooting and Sitting balance  to improve functional Mobility, Strength, ROM and Activity tolerance.     TREATMENT GRID:   Date:  7/15/21 Date:   Date:     Activity/Exercise Parameters Parameters Parameters   Ankle pumps 10 B     Heel slides AA RLE 1/4 range     Hip abduction AA RLE 1/4 range     Seated knee extension 10 RLD     Forward trunk flexion 2 x 10 AA sitting beside patient rocking forward                   AFTER TREATMENT POSITION/PRECAUTIONS:  Bed, Needs within reach and RN notified    INTERDISCIPLINARY COLLABORATION:  RN/PCT    TOTAL TREATMENT DURATION:  PT Patient Time In/Time Out  Time In: 1018  Time Out: 3013 Hardik Espinoza

## 2021-07-15 NOTE — PROGRESS NOTES
CM met with pt and family at bedside, STR list provided to family and instructed them that CM staff will follow up with them to get choices to make referrals if needed. Family wants to discus about home with [facility] vs STR.

## 2021-07-15 NOTE — PROGRESS NOTES
Progress Note    Patient: Mai Manzo MRN: [id number]  SSN: [id number]    YOB: 1945  Age: 64 y.o. Sex: female      Admit Date: 7/12/2021    LOS: 3 days     Subjective:     Patient seems to be doing very well with her pain she says the pain medication is helping    Objective:     Vitals:    07/14/21 2219 07/14/21 2319 07/15/21 0322 07/15/21 0718   BP:  (!) 125/55 (!) 140/55 113/65   Pulse:  70 77 76   Resp:  16 16 16   Temp: 98.3 °F (36.8 °C) 98.6 °F (37 °C) 98.4 °F (36.9 °C) 98.3 °F (36.8 °C)   SpO2:  92% 93% 90%   Weight:            Intake and Output:  Current Shift: No intake/output data recorded. Last three shifts: 07/13 1901 - 07/15 0700  In: 800 [I.V.:800]  Out: 0 [Urine:950]    alert and oriented to person place time and situation  Skin - dressing clean dry and intact  Motor and sensory function intact in RIGHT LOWER extremity  Pulses palpable in RIGHT LOWER extremity       Lab/Data Review:  CBC:   Lab Results   Component Value Date/Time    WBC 12.9 (H) 07/15/2021 05:03 AM    HGB 8.9 (L) 07/15/2021 05:03 AM    HCT 31.6 (L) 07/15/2021 05:03 AM     07/15/2021 05:03 AM          Assessment:     Acute postop blood loss anemia with hemoglobin stable at 8.9, POD #1 from right hip hemiarthroplasty for right femoral neck fracture    Plan:     Patient may be weightbearing as tolerated on the right lower extremity. They can be full active and passive range of motion of the right hip. They can have dry dressing change starting on postoperative day 2 and then after that daily and as needed. She may restart her Coumadin on postoperative day 2 as DVT prophylaxis as well as SCDs. They will need to have orthopedic follow-up approximately 2 weeks after discharge.     Signed By: Jyothi Devlin MD     July 15, 2021

## 2021-07-15 NOTE — PROGRESS NOTES
Hospitalist Progress Note     Name:  Mai Manzo  Age:76 y.o. Sex:female   :  1945    MRN:  [id number]   Admit Date:  2021    Presenting Complaint: Hip Pain    Initial Admission Diagnosis: Hip fracture [facility]) [address] Course/Interval history:     Ms. Richard Pollack is a 65 y/o AAF with past medical history of AF on warfarin, hypertension, vertigo, and anxiety. She was transported to the hospital after a fall. She landed on her right hip and was found to have a right femoral neck fracture. INR is 2.2 on admission. Orthopedic surgery performed right hip hemiarthroplasty on . Subjective (07/15/21):    POD1. No AEO. She denies CP/SOB. Patient may resume warfarin tomorrow per Ortho. Assessment & Plan     S/P fall   Right femoral neck fracture s/p R hip hemiarthroplasty  Pain control   INR 1.2 and was acceptable for surgery on      AF on warfarin prior to admission   Hold warfarin for now. Cont amiodarone 200 mg po q day. EKG on 2021 shows sinus rhythm. Jul/15: May resume her home warfarin on POD2 per Ortho    Hypertension  Monitor blood pressure and manage accordingly. Continue home medications, Amlodipine and Losartan. BP is 160/70's ranges. Will monitor closely.      Anemia, chronic  Jul/15: Trend labs    GERD   On PPI     Dispo/Discharge Plannin-3 midnights    Diet:  ADULT DIET Regular  DVT PPx: SCD  Code status: Full Code    Objective:     Patient Vitals for the past 24 hrs:   Temp Pulse Resp BP SpO2   07/15/21 1127 98.3 °F (36.8 °C) 90 18 109/62 98 %   07/15/21 0718 98.3 °F (36.8 °C) 76 16 113/65 90 %   07/15/21 0322 98.4 °F (36.9 °C) 77 16 (!) 140/55 93 %   21 2319 98.6 °F (37 °C) 70 16 (!) 125/55 92 %   21 2219 98.3 °F (36.8 °C)       21 2050 (!) 101.9 °F (38.8 °C) 82 16 (!) 135/54 91 %   21 1634 98.1 °F (36.7 °C) 80 15 138/75 93 %   21 1446 99 °F (37.2 °C) 67 17 (!) 106/52 93 %   21 1442  66 18 (!) 111/56 94 %   07/14/21 1436  73 16 (!) 108/53 93 %   07/14/21 1431  74 16 (!) 118/49 95 %   07/14/21 1427  69 12 (!) 103/51 93 %   07/14/21 1422  68 15 (!) 104/53 98 %   07/14/21 1417 98.7 °F (37.1 °C) 69 12 (!) 91/54 98 %   07/14/21 1411     (!) 87 %     Oxygen Therapy  O2 Sat (%): 98 % (07/15/21 1127)  Pulse via Oximetry: 67 beats per minute (07/14/21 1446)  O2 Device: None (Room air) (07/14/21 2319)  O2 Flow Rate (L/min): 2 l/min (07/14/21 1446)    Body mass index is 32.3 kg/m². Physical Exam:   General:  No acute distress, speaking in full sentences, no use of accessory muscles   Lungs:  Clear to auscultation bilaterally   CV:  Regular rate and rhythm with normal S1 and S2   Abdomen:  Soft, nontender, nondistended  Extremities:  No cyanosis clubbing or edema   Neuro:  Nonfocal, A&O x3   Psych:  Normal affect     Data Review:  I have reviewed all labs, meds, and studies from the last 24 hours:    Labs:    Recent Results (from the past 24 hour(s))   CBC WITH AUTOMATED DIFF    Collection Time: 07/15/21  5:03 AM   Result Value Ref Range    WBC 12.9 (H) 4.3 - 11.1 K/uL    RBC 4.17 4.05 - 5.2 M/uL    HGB 8.9 (L) 11.7 - 15.4 g/dL    HCT 31.6 (L) 35.8 - 46.3 %    MCV 75.8 (L) 79.6 - 97.8 FL    MCH 21.3 (L) 26.1 - 32.9 PG    MCHC 28.2 (L) 31.4 - 35.0 g/dL    RDW 17.7 (H) 11.9 - 14.6 %    PLATELET 355 063 - 125 K/uL    MPV 9.7 9.4 - 12.3 FL    ABSOLUTE NRBC 0.00 0.0 - 0.2 K/uL    DF AUTOMATED      NEUTROPHILS 80 (H) 43 - 78 %    LYMPHOCYTES 10 (L) 13 - 44 %    MONOCYTES 9 4.0 - 12.0 %    EOSINOPHILS 1 0.5 - 7.8 %    BASOPHILS 0 0.0 - 2.0 %    IMMATURE GRANULOCYTES 0 0.0 - 5.0 %    ABS. NEUTROPHILS 10.2 (H) 1.7 - 8.2 K/UL    ABS. LYMPHOCYTES 1.3 0.5 - 4.6 K/UL    ABS. MONOCYTES 1.1 0.1 - 1.3 K/UL    ABS. EOSINOPHILS 0.2 0.0 - 0.8 K/UL    ABS. BASOPHILS 0.0 0.0 - 0.2 K/UL    ABS. IMM.  GRANS. 0.0 0.0 - 0.5 K/UL       All Micro Results     None          EKG Results     Procedure 720 Value Units Date/Time EKG 12 LEAD INITIAL [[id number]] Collected: 07/12/21 1728    Order Status: Completed Updated: 07/12/21 2049     Ventricular Rate 72 BPM      Atrial Rate 72 BPM      P-R Interval 168 ms      QRS Duration 102 ms      Q-T Interval 466 ms      QTC Calculation (Bezet) 510 ms      Calculated P Axis 74 degrees      Calculated R Axis 12 degrees      Calculated T Axis 59 degrees      Diagnosis --     !! AGE AND GENDER SPECIFIC ECG ANALYSIS !! Normal sinus rhythm  Incomplete right bundle branch block  Septal infarct (cited on or before 12-APR-2019)  Prolonged QT  Abnormal ECG  When compared with ECG of 14-MAR-2021 15:47,  Questionable change in initial forces of Septal leads  QT has lengthened  Confirmed by Peterson Desouza ([id number]) on 7/12/2021 8:48:53 PM            Other Studies:  XR HIP RT W OR WO PELV 2-3 VWS    Result Date: 7/14/2021  Exam: XR HIP RT W OR WO PELV 2-3 VWS on 7/14/2021 3:57 PM Clinical History: The Female patient is 64years old  presenting for hip replacement. Comparison:  none Findings: 3 views of the pelvis and right hip were obtained. No fracture or dislocation is identified. A bipolar right hip prosthesis has been placed with anatomic alignment and positioning demonstrated. There are overlying surgical staples. 1. Bipolar right hip prosthesis in place.        Current Meds:   Current Facility-Administered Medications   Medication Dose Route Frequency    morphine injection 2 mg  2 mg IntraVENous Q1H PRN    HYDROcodone-acetaminophen (NORCO) 7.5-325 mg per tablet 2 Tablet  2 Tablet Oral Q4H PRN    sodium chloride (NS) flush 5-40 mL  5-40 mL IntraVENous Q8H    sodium chloride (NS) flush 5-40 mL  5-40 mL IntraVENous PRN    alum-mag hydroxide-simeth (MYLANTA) oral suspension 30 mL  30 mL Oral Q4H PRN    calcium-vitamin D (OS-DAGOBERTO +D3) 500 mg-200 unit per tablet 1 Tablet  1 Tablet Oral TID WITH MEALS    amiodarone (CORDARONE) tablet 200 mg  200 mg Oral DAILY    amLODIPine (NORVASC) tablet 5 mg  5 mg Oral DAILY    losartan (COZAAR) tablet 25 mg  25 mg Oral DAILY    venlafaxine (EFFEXOR) tablet 25 mg  25 mg Oral DAILY    sodium chloride (NS) flush 5-40 mL  5-40 mL IntraVENous Q8H    sodium chloride (NS) flush 5-40 mL  5-40 mL IntraVENous PRN    acetaminophen (TYLENOL) tablet 650 mg  650 mg Oral Q6H PRN    Or    acetaminophen (TYLENOL) suppository 650 mg  650 mg Rectal Q6H PRN    senna (SENOKOT) tablet 17.2 mg  2 Tablet Oral BID PRN    ondansetron (ZOFRAN) injection 4 mg  4 mg IntraVENous Q6H PRN    cloNIDine HCL (CATAPRES) tablet 0.2 mg  0.2 mg Oral BID PRN    hydrALAZINE (APRESOLINE) 20 mg/mL injection 10 mg  10 mg IntraVENous Q6H PRN    pantoprazole (PROTONIX) tablet 40 mg  40 mg Oral ACB&D       Problem List:  Hospital Problems as of 7/15/2021 Date Reviewed: 12/18/2020        Codes Class Noted - Resolved POA    * (Principal) [facility] ICD-10-CM: L06.866Y  ICD-9-CM: 820.8  7/12/2021 - Present Unknown        Long term (current) use of anticoagulants ICD-10-CM: Z79.01  ICD-9-CM: V58.61  5/1/2018 - Present Yes        Hypertension ICD-10-CM: I10  ICD-9-CM: 401.9  1/23/2017 - Present Yes        Paroxysmal atrial fibrillation (HCC) ICD-10-CM: I48.0  ICD-9-CM: 427.31  1/23/2017 - Present Yes        GERD (gastroesophageal reflux disease) ICD-10-CM: K21.9  ICD-9-CM: 530.81  1/23/2017 - Present Yes        Hyperlipidemia ICD-10-CM: E78.5  ICD-9-CM: 272.4  1/23/2017 - Present Yes               Part of this note was written by using a voice dictation software and the note has been proof read but may still contain some grammatical/other typographical errors.     Signed By: Jarrett Merino NP   [employer] [facility]    July 15, 2021  5:15 PM

## 2021-07-15 NOTE — ADDENDUM NOTE
Addendum  created 07/15/21 1018 by Kim Atkinson CRNA    Flowsheet accepted, Intraprocedure Flowsheets edited

## 2021-07-15 NOTE — PROGRESS NOTES
ACUTE OT GOALS:  (Developed with and agreed upon by patient and/or caregiver.)  1. Patient will complete lower body bathing and dressing with MIN A and adaptive equipment as needed. 2.Patient will complete upper body bathing and dressing with SETUP ASSIST and adaptive equipment as needed. 3. Patient will complete toileting with MOD A.   4. Patient will tolerate 25 minutes of OT treatment with 1-2 rest breaks to increase activity tolerance for ADLs. 5. Patient will complete functional transfers with MIN A and adaptive equipment as needed. 6. Patient will complete functional activity with MIN A and adaptive equipment as needed.     Timeframe: 7 visits       OCCUPATIONAL THERAPY ASSESSMENT: Initial Assessment and Daily Note OT Treatment Day # 1    Serjio Alexis is a 64 y.o. female   PRIMARY DIAGNOSIS: Hip fracture ([facility] [address]  Hip fracture ([facility] [address] [S72.009A]  Procedure(s) (LRB):  RIGHT HIP HEMIARTHROPLASTY/TAO (Right)  1 Day Post-Op  Reason for Referral:    ICD-10: Treatment Diagnosis: Generalized Muscle Weakness (M62.81)  Other lack of cordination (R27.8)  Difficulty in walking, Not elsewhere classified (R26.2)  History of falling (Z91.81)  INPATIENT: Payor: [employer] MEDICARE / Plan: [facility] [employer] MEDICARE HMO / Product Type: [employer] /   ASSESSMENT:     REHAB RECOMMENDATIONS:   Recommendation to date pending progress:  Setting:   Short-term Rehab  Equipment:    To Be Determined     PRIOR LEVEL OF FUNCTION:  (Prior to Hospitalization)  INITIAL/CURRENT LEVEL OF FUNCTION:  (Based on today's evaluation)   Bathing:   Independent  Dressing:   Independent  Feeding/Grooming:   Independent  Toileting:   Independent  Functional Mobility:   Independent Bathing:   Maximal Assistance  Dressing:   Maximal Assistance  Feeding/Grooming:   Minimal Assistance  Toileting:   Maximal Assistance  Functional Mobility:   Maximal Assistance x 2     ASSESSMENT:  Ms. Trev Bauer presented to the hospital following a fall at home. S/p R hip total arthroplasty and is WBAT RLE. At baseline, pt is independent for all ADLs/IADLs. Today, pt was received supine in bed. Completed bed mobility maxA x2, sat EOB with CGA (VCs to remain in midline), performed grooming task sitting EOB CGA, total A for LB dressing, sit>stand maxA x2, unable to ambulate on this date. Ms. Kevyn Love is currently functioning below baseline due to decreased strength, coordination, balance, and activity tolerance for completion on ADLs/functional mobility. Would benefit from skilled OT services in order to address functional deficits and OT goals listed above. Recommend STR. SUBJECTIVE:   Ms. Kevyn Love states, \"No walking today. \"    SOCIAL HISTORY/LIVING ENVIRONMENT: pt lives with her spouse in a one level home with one-step to get in, tub shower with a shower chair, independent for ADLs/IADLs  Home Environment: Private residence  # Steps to Enter: 1  One/Two Story Residence: One story  Living Alone: No  Support Systems: Spouse/Significant Other/Partner    OBJECTIVE:     PAIN: VITAL SIGNS: LINES/DRAINS:   Pre Treatment: Pain Screen  Pain Scale 1: Numeric (0 - 10)  Pain Intensity 1: 10  Pain Location 1: Hip  Pain Orientation 1: Right  Post Treatment: no change    none  O2 Device: None (Room air)     GROSS EVALUATION:  BUEs Within Functional Limits Abnormal/ Functional Abnormal/ Non-Functional (see comments) Not Tested Comments:   AROM [x] [] [] []    PROM [] [] [] [x]    Strength [x] [] [] []    Balance [] [x] [] []    Posture [] [x] [] []    Sensation [x] [] [] []    Coordination [] [x] [] []    Tone [x] [] [] []    Edema [x] [] [] []    Activity Tolerance [] [x] [] []     [] [] [] []      COGNITION/  PERCEPTION: Intact Impaired   (see comments) Comments:   Orientation [x] []    Vision [x] []    Hearing [x] []    Judgment/ Insight [] [x] Poor insight into current functional deficits    Attention [x] []    Memory [x] []    Command Following [x] []    Emotional Regulation [x] []     [] []      ACTIVITIES OF DAILY LIVING: I Mod I S SBA CGA Min Mod Max Total NT Comments   BASIC ADLs:              Bathing/ Showering [] [] [] [] [] [] [] [] [] [x]    Toileting [] [] [] [] [] [] [] [] [] [x]    Dressing [] [] [] [] [] [] [] [] [x] [] Donned socks    Feeding [] [] [] [] [] [] [] [] [] [x]    Grooming [] [] [] [] [x] [] [] [] [] [] Washed face sitting EOB   Personal Device Care [] [] [] [] [] [] [] [] [] [x]    Functional Mobility [] [] [] [] [] [] [] [x] [] [] x2 with RW, unable to ambulate    I=Independent, Mod I=Modified Independent, S=Supervision, SBA=Standby Assistance, CGA=Contact Guard Assistance,   Min=Minimal Assistance, Mod=Moderate Assistance, Max=Maximal Assistance, Total=Total Assistance, NT=Not Tested    MOBILITY: I Mod I S SBA CGA Min Mod Max Total  NT x2 Comments:   Supine to sit [] [] [] [] [] [] [] [x] [] [] [x]    Sit to supine [] [] [] [] [] [] [] [x] [] [] [x]    Sit to stand [] [] [] [] [] [] [] [x] [] [] [x]    Bed to chair [] [] [] [] [] [] [] [] [] [x] [] Unable to tolerate    I=Independent, Mod I=Modified Independent, S=Supervision, SBA=Standby Assistance, CGA=Contact Guard Assistance,   Min=Minimal Assistance, Mod=Moderate Assistance, Max=Maximal Assistance, Total=Total Assistance, NT=Not Tested    [facility] [address] [address] AM-PAC 6 Clicks   Daily Activity Inpatient Short Form        How much help from another person does the patient currently need. .. Total A Lot A Little None   1. Putting on and taking off regular lower body clothing? [x] 1   [] 2   [] 3   [] 4   2. Bathing (including washing, rinsing, drying)? [] 1   [x] 2   [] 3   [] 4   3. Toileting, which includes using toilet, bedpan or urinal?   [] 1   [x] 2   [] 3   [] 4   4. Putting on and taking off regular upper body clothing? [] 1   [] 2   [x] 3   [] 4   5. Taking care of personal grooming such as brushing teeth? [] 1   [] 2   [x] 3   [] 4   6. Eating meals?    [] 1   [] 2   [] 3 [x] 4   © 2007, Trustees of [address] [place] [address] [address] [address], under license to [employer]. All rights reserved     Score:  Initial: 15 completed on 7/15/21 Most Recent: X (Date: -- )   Interpretation of Tool:  Represents activities that are increasingly more difficult (i.e. Bed mobility, Transfers, Gait). PLAN:   FREQUENCY/DURATION: OT Plan of Care: 6 times/week for duration of hospital stay or until stated goals are met, whichever comes first.    PROBLEM LIST:   (Skilled intervention is medically necessary to address:)  1. Decreased ADL/Functional Activities  2. Decreased Activity Tolerance  3. Decreased Balance  4. Decreased Cognition  5. Decreased Coordination  6. Decreased Strength  7. Decreased Transfer Abilities  8. Increased Pain   INTERVENTIONS PLANNED:   (Benefits and precautions of occupational therapy have been discussed with the patient.)  1. Self Care Training  2. Therapeutic Activity  3. Therapeutic Exercise/HEP  4. Neuromuscular Re-education  5. Education     TREATMENT:     EVALUATION: Low Complexity : (Untimed Charge)    TREATMENT:   ($$ Self Care/Home Management: 8-22 mins    )  Co-Treatment PT/OT necessary due to patient's decreased overall endurance/tolerance levels, as well as need for high level skilled assistance to complete functional transfers/mobility and functional tasks  Self Care (10 Minutes): Self care including Lower Body Dressing and Grooming to increase independence and decrease level of assistance required.     TREATMENT GRID:  N/A    AFTER TREATMENT POSITION/PRECAUTIONS:  Bed, Needs within reach and RN notified    INTERDISCIPLINARY COLLABORATION:  RN/PCT, PT/PTA and OT/COSTELLO    TOTAL TREATMENT DURATION:  OT Patient Time In/Time Out  Time In: 1332  Time Out: [address] Vitor Brady OT

## 2021-07-15 NOTE — PROGRESS NOTES
ACUTE PHYSICAL THERAPY GOALS:  (Developed with and agreed upon by patient and/or caregiver.)  Goals:  (1.)Ms. Hayden Guzman will move from supine to sit and sit to supine , scoot up and down and roll side to side in bed with MODIFIED INDEPENDENCE within 7 treatment day(s). (2.)Ms. Hayden Guzman will transfer from bed to chair and chair to bed with CONTACT GUARD ASSIST using the least restrictive device within 7 treatment day(s). (3.)Ms. Hayden Guzman will ambulate with CONTACT GUARD ASSIST for 15--20 feet with the least restrictive device within 7 treatment day(s). (4.)Ms. Hayden Guzman will participate in RLE AAROM exercises in supine and sitting to improve movement, strength and mobility within 7 treatment day(s)    PHYSICAL THERAPY: Daily Note and PM Treatment Day # 1    Briana Hernandez is a 64 y.o. female   PRIMARY DIAGNOSIS: Hip fracture (H[facility])  Hip fracture ([facility] [S72.009A]  Procedure(s) (LRB):  RIGHT HIP HEMIARTHROPLASTY/TAO (Right)  1 Day Post-Op    ASSESSMENT:     REHAB RECOMMENDATIONS: CURRENT LEVEL OF FUNCTION:  (Most Recently Demonstrated)   Recommendation to date pending progress:  Setting:   Short-term Rehab  Equipment:    To Be Determined Bed Mobility:   Maximal Assistance x 2  Sit to Stand:   Moderate Assistance x 2 bed elevation used to assist  Transfers:   Unable to perform  Gait/Mobility:   Unable to perform     ASSESSMENT:  Ms. Hayden Guzman presents this afternoon in supine with her  and brother in law at the bedside. She reports she has been given pain medication. She remains anxious regarding moving. PT/OT co-treated this afternoon to allow for higher level activities to be attempted. She participated in RLE AAROM exercises in supine with much encouragement to help with the movements. Supine to sit remains max assist x 2 with slightly improved propped sitting on the EOB.   She continues to need verbal and manual cues for staying forward during sitting and she avoids weight bearing on her RLE hip.  Sit to stand was mod assist x 2 with use of the RW and bed elevation. Static standing with the RW was poor with flexed standing posture and limited weight on her RLE. She maintained standing for 1-2 minutes with min assist x 2 then asked to sit. Sit to supine was max assist x 2 and she required max assist x 2 for positioning in the bed. PT/OT discussed the need for STR with the patient based on her inability to assist much with her mobility at this time. PT will continue to follow and progress towards her goals as tolerated. SUBJECTIVE:   Ms. Tonia Gurrola states, Glendy Cuello is it that time again?   I was resting really good\"    SOCIAL HISTORY/ LIVING ENVIRONMENT:   Home Environment: Private residence  # Steps to Enter: 1  One/Two Story Residence: One story  Living Alone: No  Support Systems: Spouse/Significant Other/Partner  OBJECTIVE:     PAIN: VITAL SIGNS: LINES/DRAINS:   Pre Treatment: Pain Screen  Pain Scale 1: Numeric (0 - 10)  Pain Intensity 1: 10  Pain Location 1: Hip  Pain Orientation 1: Right  Post Treatment: 10/10 with RLE movement     Visit Vitals  /62 (BP 1 Location: Left arm)   Pulse 90   Temp 98.3 °F (36.8 °C)   Resp 18   Wt 88 kg (194 lb 1.6 oz)   SpO2 98%   Breastfeeding No   BMI 32.30 kg/m²      O2 Device: None (Room air)     MOBILITY: I Mod I S SBA CGA Min Mod Max Total  NT x2 Comments:   Bed Mobility    Rolling [] [] [] [] [] [] [] [] [] [x] []    Supine to Sit [] [] [] [] [] [] [] [x] [] [] [x]    Scooting [] [] [] [] [] [] [] [x] [] [] [x]    Sit to Supine [] [] [] [] [] [] [] [x] [] [] [x]    Transfers    Sit to Stand [] [] [] [] [] [] [x] [] [] [] [x] Bed elevation used to assist   Bed to Chair [] [] [] [] [] [] [] [] [] [x] []    Stand to Sit [] [] [] [] [] [] [] [] [] [x] []    I=Independent, Mod I=Modified Independent, S=Supervision, SBA=Standby Assistance, CGA=Contact Guard Assistance,   Min=Minimal Assistance, Mod=Moderate Assistance, Max=Maximal Assistance, Total=Total Assistance, NT=Not Tested    BALANCE: Good Fair+ Fair Fair- Poor NT Comments   Sitting Static [] [] [x] [] [] [] Prop sits and leans to the left to avoid RLE weight bearing   Sitting Dynamic [] [] [x] [] [] []              Standing Static [] [] [] [] [x] [] Poor standing posture with weight primarily on her LLE    Standing Dynamic [] [] [] [] [] []      GAIT: I Mod I S SBA CGA Min Mod Max Total  NT x2 Comments:   Level of Assistance [] [] [] [] [] [] [] [] [] [] []    Distance 0    DME N/A    Gait Quality N/A    Weightbearing  Status N/A     I=Independent, Mod I=Modified Independent, S=Supervision, SBA=Standby Assistance, CGA=Contact Guard Assistance,   Min=Minimal Assistance, Mod=Moderate Assistance, Max=Maximal Assistance, Total=Total Assistance, NT=Not Tested    PLAN:   FREQUENCY/DURATION: PT Plan of Care: BID for duration of hospital stay or until stated goals are met, whichever comes first.  TREATMENT:     TREATMENT:   ($$ Therapeutic Activity: 23-37 mins    )  Co-Treatment PT/OT necessary due to patient's decreased overall endurance/tolerance levels, as well as need for high level skilled assistance to complete functional transfers/mobility and functional tasks  Therapeutic Activity (23 Minutes): Therapeutic activity included Supine to Sit, Sit to Supine, Scooting, Sitting balance , Standing balance and sit to stand to improve functional Mobility, Strength, ROM and Activity tolerance.     TREATMENT GRID:   Date:  7/15/21 Date:   Date:     Activity/Exercise Parameters Parameters Parameters   RLE ankle pumps 10 times AA     RLE heel slides thru 1/4 range 3 x 5 times AA     RLE hip abd/adduction thru 1/4 range 10 AA     Forward trunk flexion 10 times                         AFTER TREATMENT POSITION/PRECAUTIONS:  Bed, Needs within reach and RN notified    INTERDISCIPLINARY COLLABORATION:  RN/PCT and RN Case Manager/     TOTAL TREATMENT DURATION:  PT Patient Time In/Time Out  Time In: 1332  Time Out: [address] [facility]

## 2021-07-16 PROBLEM — S72.001A FRACTURE OF FEMORAL NECK, RIGHT, CLOSED (HCC): Status: ACTIVE | Noted: 2021-07-16

## 2021-07-16 LAB
ANION GAP SERPL CALC-SCNC: 8 MMOL/L (ref 7–16)
BASOPHILS # BLD: 0 K/UL (ref 0–0.2)
BASOPHILS NFR BLD: 0 % (ref 0–2)
BUN SERPL-MCNC: 26 MG/DL (ref 8–23)
CALCIUM SERPL-MCNC: 8.4 MG/DL (ref 8.3–10.4)
CHLORIDE SERPL-SCNC: 106 MMOL/L (ref 98–107)
CO2 SERPL-SCNC: 24 MMOL/L (ref 21–32)
CREAT SERPL-MCNC: 1.4 MG/DL (ref 0.6–1)
DIFFERENTIAL METHOD BLD: ABNORMAL
EOSINOPHIL # BLD: 0.1 K/UL (ref 0–0.8)
EOSINOPHIL NFR BLD: 1 % (ref 0.5–7.8)
ERYTHROCYTE [DISTWIDTH] IN BLOOD BY AUTOMATED COUNT: 17.5 % (ref 11.9–14.6)
GLUCOSE SERPL-MCNC: 111 MG/DL (ref 65–100)
HCT VFR BLD AUTO: 26.4 % (ref 35.8–46.3)
HGB BLD-MCNC: 7.7 G/DL (ref 11.7–15.4)
IMM GRANULOCYTES # BLD AUTO: 0.1 K/UL (ref 0–0.5)
IMM GRANULOCYTES NFR BLD AUTO: 0 % (ref 0–5)
INR PPP: 1.2
LYMPHOCYTES # BLD: 1.3 K/UL (ref 0.5–4.6)
LYMPHOCYTES NFR BLD: 10 % (ref 13–44)
MCH RBC QN AUTO: 21.5 PG (ref 26.1–32.9)
MCHC RBC AUTO-ENTMCNC: 29.2 G/DL (ref 31.4–35)
MCV RBC AUTO: 73.7 FL (ref 79.6–97.8)
MM INDURATION POC: 0 MM (ref 0–5)
MONOCYTES # BLD: 1.2 K/UL (ref 0.1–1.3)
MONOCYTES NFR BLD: 9 % (ref 4–12)
NEUTS SEG # BLD: 10.9 K/UL (ref 1.7–8.2)
NEUTS SEG NFR BLD: 80 % (ref 43–78)
NRBC # BLD: 0.02 K/UL (ref 0–0.2)
PLATELET # BLD AUTO: 202 K/UL (ref 150–450)
PMV BLD AUTO: 10.3 FL (ref 9.4–12.3)
POTASSIUM SERPL-SCNC: 4.2 MMOL/L (ref 3.5–5.1)
PPD POC: NEGATIVE NEGATIVE
PROTHROMBIN TIME: 15.3 SEC (ref 12.5–14.7)
RBC # BLD AUTO: 3.58 M/UL (ref 4.05–5.2)
SODIUM SERPL-SCNC: 138 MMOL/L (ref 136–145)
WBC # BLD AUTO: 13.6 K/UL (ref 4.3–11.1)

## 2021-07-16 PROCEDURE — 97530 THERAPEUTIC ACTIVITIES: CPT

## 2021-07-16 PROCEDURE — 85610 PROTHROMBIN TIME: CPT

## 2021-07-16 PROCEDURE — 36415 COLL VENOUS BLD VENIPUNCTURE: CPT

## 2021-07-16 PROCEDURE — 97535 SELF CARE MNGMENT TRAINING: CPT

## 2021-07-16 PROCEDURE — 2709999900 HC NON-CHARGEABLE SUPPLY

## 2021-07-16 PROCEDURE — 97112 NEUROMUSCULAR REEDUCATION: CPT

## 2021-07-16 PROCEDURE — 74011250636 HC RX REV CODE- 250/636: Performed by: NURSE PRACTITIONER

## 2021-07-16 PROCEDURE — 74011250637 HC RX REV CODE- 250/637: Performed by: NURSE PRACTITIONER

## 2021-07-16 PROCEDURE — 65270000029 HC RM PRIVATE

## 2021-07-16 PROCEDURE — 80048 BASIC METABOLIC PNL TOTAL CA: CPT

## 2021-07-16 PROCEDURE — 85025 COMPLETE CBC W/AUTO DIFF WBC: CPT

## 2021-07-16 PROCEDURE — 77030040830 HC CATH URETH FOL MDII -A

## 2021-07-16 PROCEDURE — 74011250637 HC RX REV CODE- 250/637: Performed by: ORTHOPAEDIC SURGERY

## 2021-07-16 RX ORDER — SODIUM CHLORIDE, SODIUM LACTATE, POTASSIUM CHLORIDE, CALCIUM CHLORIDE 600; 310; 30; 20 MG/100ML; MG/100ML; MG/100ML; MG/100ML
150 INJECTION, SOLUTION INTRAVENOUS CONTINUOUS
Status: DISCONTINUED | OUTPATIENT
Start: 2021-07-16 | End: 2021-07-17

## 2021-07-16 RX ORDER — WARFARIN 2.5 MG/1
2.5 TABLET ORAL ONCE
Status: COMPLETED | OUTPATIENT
Start: 2021-07-16 | End: 2021-07-16

## 2021-07-16 RX ADMIN — ACETAMINOPHEN 650 MG: 325 TABLET ORAL at 05:59

## 2021-07-16 RX ADMIN — Medication 1 TABLET: at 09:00

## 2021-07-16 RX ADMIN — Medication 10 ML: at 06:00

## 2021-07-16 RX ADMIN — AMLODIPINE BESYLATE 5 MG: 5 TABLET ORAL at 09:00

## 2021-07-16 RX ADMIN — LOSARTAN POTASSIUM 25 MG: 25 TABLET, FILM COATED ORAL at 09:00

## 2021-07-16 RX ADMIN — HYDROCODONE BITARTRATE AND ACETAMINOPHEN 1 TABLET: 7.5; 325 TABLET ORAL at 09:56

## 2021-07-16 RX ADMIN — VENLAFAXINE 25 MG: 25 TABLET ORAL at 09:00

## 2021-07-16 RX ADMIN — Medication 10 ML: at 13:59

## 2021-07-16 RX ADMIN — Medication 1 TABLET: at 11:38

## 2021-07-16 RX ADMIN — Medication 10 ML: at 21:24

## 2021-07-16 RX ADMIN — ACETAMINOPHEN 650 MG: 325 TABLET ORAL at 21:28

## 2021-07-16 RX ADMIN — HYDROCODONE BITARTRATE AND ACETAMINOPHEN 1 TABLET: 7.5; 325 TABLET ORAL at 18:02

## 2021-07-16 RX ADMIN — SODIUM CHLORIDE, SODIUM LACTATE, POTASSIUM CHLORIDE, AND CALCIUM CHLORIDE 150 ML/HR: 600; 310; 30; 20 INJECTION, SOLUTION INTRAVENOUS at 20:19

## 2021-07-16 RX ADMIN — WARFARIN SODIUM 2.5 MG: 2.5 TABLET ORAL at 13:57

## 2021-07-16 RX ADMIN — Medication 10 ML: at 05:59

## 2021-07-16 RX ADMIN — SODIUM CHLORIDE, SODIUM LACTATE, POTASSIUM CHLORIDE, AND CALCIUM CHLORIDE 100 ML/HR: 600; 310; 30; 20 INJECTION, SOLUTION INTRAVENOUS at 08:55

## 2021-07-16 RX ADMIN — Medication 1 TABLET: at 16:45

## 2021-07-16 RX ADMIN — PANTOPRAZOLE SODIUM 40 MG: 40 TABLET, DELAYED RELEASE ORAL at 16:45

## 2021-07-16 RX ADMIN — AMIODARONE HYDROCHLORIDE 200 MG: 200 TABLET ORAL at 09:00

## 2021-07-16 RX ADMIN — PANTOPRAZOLE SODIUM 40 MG: 40 TABLET, DELAYED RELEASE ORAL at 05:59

## 2021-07-16 NOTE — PROGRESS NOTES
Sent the following message to Jerardo Duong NP via [employer] \"Just a heads up, this patient has only voided maybe 150ml today. I know you started fluids this morning and she's drank 600ml. \"    Ordered ramos catheter insertion.

## 2021-07-16 NOTE — PROGRESS NOTES
Dr. Ted Khoury notified of pt being placed on 4L O2 via nc to maintain oxygen saturation above 90%, O2 sat back up to 93% with O2. Notified MD of early sepsis risk message in chart. Per MD, she will review pts chart and place orders if necessary. Current plan of care continued.

## 2021-07-16 NOTE — PROGRESS NOTES
Problem: Falls - Risk of  Goal: *Absence of Falls  Description: Document Zenobia Adairccasin Fall Risk and appropriate interventions in the flowsheet. Outcome: Progressing Towards Goal  Note: Fall Risk Interventions:  Mobility Interventions: Communicate number of staff needed for ambulation/transfer, Patient to call before getting OOB         Medication Interventions: Patient to call before getting OOB, Teach patient to arise slowly    Elimination Interventions: Call light in reach, Patient to call for help with toileting needs, Toileting schedule/hourly rounds    History of Falls Interventions: Bed/chair exit alarm         Problem: Patient Education: Go to Patient Education Activity  Goal: Patient/Family Education  Outcome: Progressing Towards Goal     Problem: Pressure Injury - Risk of  Goal: *Prevention of pressure injury  Description: Document Forrest Scale and appropriate interventions in the flowsheet.   Outcome: Progressing Towards Goal  Note: Pressure Injury Interventions:  Sensory Interventions: Assess changes in LOC    Moisture Interventions: Absorbent underpads, Limit adult briefs, Maintain skin hydration (lotion/cream), Minimize layers    Activity Interventions: Increase time out of bed, Pressure redistribution bed/mattress(bed type), PT/OT evaluation    Mobility Interventions: HOB 30 degrees or less, Pressure redistribution bed/mattress (bed type), PT/OT evaluation    Nutrition Interventions: Document food/fluid/supplement intake, Offer support with meals,snacks and hydration    Friction and Shear Interventions: HOB 30 degrees or less, Minimize layers                Problem: Patient Education: Go to Patient Education Activity  Goal: Patient/Family Education  Outcome: Progressing Towards Goal

## 2021-07-16 NOTE — PROGRESS NOTES
Warfarin dosing per pharmacist    Tomás Turner is a 64 y.o. female. Height: 5' 5\" (165.1 cm) Weight: 88 kg (194 lb 1.6 oz)    Indication:  Afib    Goal INR:  2-3    Home dose:  1.25 mg Mon, 2.5 mg all other days    Risk factors or significant drug interactions:  Amiodarone (home med)    Other anticoagulants:  none    Daily Monitoring  Date  INR     Warfarin dose HGB              Notes  7/16  1.2  2.5 mg  7.7        Pharmacy is consulted to assist in dosing Ms. Jones's warfarin this admission. Patients INR was therapeutic upon admission at 2.2. Prior to surgery 10 mg IV vitamin K was administered 7/12 and INR decreased to 1.7. Reinitiating warfarin therapy today, INR is subtherapeutic at 1.2. Based on outpatient records patient is very sensitive to warfarin. Will give home dose of 2.5 mg tonight and continue to closely monitor rise in INR. Daily INR will continue. Pharmacy will continue to monitor and adjust dosing as clinically indicated. Please call with any questions.     Thank you,  Lj Bolaños, PharmD  Clinical Pharmacy Specialist  [phone]

## 2021-07-16 NOTE — PROGRESS NOTES
ACUTE PHYSICAL THERAPY GOALS:  (Developed with and agreed upon by patient and/or caregiver.)  Goals:  (1.)Ms. Jada Morin will move from supine to sit and sit to supine , scoot up and down and roll side to side in bed with MODIFIED INDEPENDENCE within 7 treatment day(s). (2.)Ms. Jada Morin will transfer from bed to chair and chair to bed with CONTACT GUARD ASSIST using the least restrictive device within 7 treatment day(s). (3.)Ms. Jada Morin will ambulate with CONTACT GUARD ASSIST for 15--20 feet with the least restrictive device within 7 treatment day(s). (4.)Ms. Jada Morin will participate in RLE AAROM exercises in supine and sitting to improve movement, strength and mobility within 7 treatment day(s)    PHYSICAL THERAPY: Daily Note and PM Treatment Day # 2    Ted Burnett is a 64 y.o. female   PRIMARY DIAGNOSIS: Fracture of femoral neck, right, closed ([address]  Hip fracture ([facility] [address] [S72.009A]  Procedure(s) (LRB):  RIGHT HIP HEMIARTHROPLASTY/TAO (Right)  2 Days Post-Op    ASSESSMENT:     REHAB RECOMMENDATIONS: CURRENT LEVEL OF FUNCTION:  (Most Recently Demonstrated)   Recommendation to date pending progress:  Setting:   Short-term Rehab  Equipment:    To Be Determined Bed Mobility:   Maximal Assistance x 2  Sit to Stand:   Maximal Assistance x 2   Transfers:   Unable to perform  Gait/Mobility:   Unable to perform     ASSESSMENT:  Ms. Jada Morin demonstrated good progress this session with improved participation and mobility. She continues to require max Ax2, but was able to assist more with scooting and supine to sit. Sitting balance improved with occasional unsupported sitting. Able to perform STS and maintain standing for 20 sec with min-mod Ax2. Good session.       SUBJECTIVE:   Ms. Jada Morin states, \"I'm trying\"    SOCIAL HISTORY/ LIVING ENVIRONMENT:   Home Environment: Private residence  # Steps to Enter: 1  One/Two Story Residence: One story  Living Alone: No  Support Systems: Spouse/Significant Other/Partner  OBJECTIVE:     PAIN: VITAL SIGNS: LINES/DRAINS:   Pre Treatment: Pain Screen  Pain Scale 1: Numeric (0 - 10)  Pain Intensity 1: 0  Post Treatment: 0     Visit Vitals  /60 (BP 1 Location: Left upper arm, BP Patient Position: At rest)   Pulse 71   Temp 99.2 °F (37.3 °C)   Resp 18   Ht 5' 5\" (1.651 m)   Wt 88 kg (194 lb 1.6 oz)   SpO2 94%   Breastfeeding No   BMI 32.30 kg/m²      O2 Device: None (Room air)     MOBILITY: I Mod I S SBA CGA Min Mod Max Total  NT x2 Comments:   Bed Mobility    Rolling [] [] [] [] [] [] [] [x] [] [] [x]    Supine to Sit [] [] [] [] [] [] [] [x] [] [] [x]    Scooting [] [] [] [] [] [] [] [x] [] [] [x]    Sit to Supine [] [] [] [] [] [] [] [x] [] [] [x]    Transfers    Sit to Stand [] [] [] [] [] [] [] [x] [] [] [x]    Bed to Chair [] [] [] [] [] [] [] [] [] [x] []    Stand to Sit [] [] [] [] [] [] [] [x] [] [] [x]    I=Independent, Mod I=Modified Independent, S=Supervision, SBA=Standby Assistance, CGA=Contact Guard Assistance,   Min=Minimal Assistance, Mod=Moderate Assistance, Max=Maximal Assistance, Total=Total Assistance, NT=Not Tested    BALANCE: Good Fair+ Fair Fair- Poor NT Comments   Sitting Static [] [] [] [x] [] [] Prop sits and leans to the left to avoid RLE weight bearing   Sitting Dynamic [] [] [] [x] [] []              Standing Static [] [] [] [x] [x] []    Standing Dynamic [] [] [] [] [x] []      GAIT: I Mod I S SBA CGA Min Mod Max Total  NT x2 Comments:   Level of Assistance [] [] [] [] [] [] [] [] [] [x] []    Distance 0    DME N/A    Gait Quality N/A    Weightbearing  Status N/A     I=Independent, Mod I=Modified Independent, S=Supervision, SBA=Standby Assistance, CGA=Contact Guard Assistance,   Min=Minimal Assistance, Mod=Moderate Assistance, Max=Maximal Assistance, Total=Total Assistance, NT=Not Tested    PLAN:   FREQUENCY/DURATION: PT Plan of Care: BID for duration of hospital stay or until stated goals are met, whichever comes first.  TREATMENT: TREATMENT:   ($$ Therapeutic Activity: 38-52 mins    )  Co-Treatment PT/OT necessary due to patient's decreased overall endurance/tolerance levels, as well as need for high level skilled assistance to complete functional transfers/mobility and functional tasks  Therapeutic Activity (41 Minutes): Therapeutic activity included Rolling, Supine to Sit, Sit to Supine, Scooting, Lateral Scooting, Transfer Training, Sitting balance  and Standing balance to improve functional Mobility, Strength, ROM and Activity tolerance.     TREATMENT GRID:   Date:  7/15/21 Date:   Date:     Activity/Exercise Parameters Parameters Parameters   RLE ankle pumps 10 times AA     RLE heel slides thru 1/4 range 3 x 5 times AA     RLE hip abd/adduction thru 1/4 range 10 AA     Forward trunk flexion 10 times                         AFTER TREATMENT POSITION/PRECAUTIONS:  Bed, Needs within reach and RN notified    INTERDISCIPLINARY COLLABORATION:  RN/PCT, PT/PTA and OT/COSTELLO    TOTAL TREATMENT DURATION:  PT Patient Time In/Time Out  Time In: 1407  Time Out: [address] [facility]

## 2021-07-16 NOTE — PROGRESS NOTES
SW met with pt to discuss choice of STR facility. She said she thought they [place] Referral submitted to [facility] and she was accepted for admission for Monday pending insurance approval.  Pt asked SW to contact her dtr. TC to pt's dtr, Boom Abdullahi. She confirmed that their preference is [facility]. SW updated her re: bed offer and she was in agreement. Insurance auth request initaited today. SW following to facilitate pt's transfer to rehab once insurance approves and pt is medically cleared for dc.

## 2021-07-16 NOTE — PROGRESS NOTES
ACUTE PHYSICAL THERAPY GOALS:  (Developed with and agreed upon by patient and/or caregiver.)  Goals:  (1.)Ms. Omaira Riojas will move from supine to sit and sit to supine , scoot up and down and roll side to side in bed with MODIFIED INDEPENDENCE within 7 treatment day(s). (2.)Ms. Omaira Riojas will transfer from bed to chair and chair to bed with CONTACT GUARD ASSIST using the least restrictive device within 7 treatment day(s). (3.)Ms. Omaira Riojas will ambulate with CONTACT GUARD ASSIST for 15--20 feet with the least restrictive device within 7 treatment day(s). (4.)Ms. Omaira Riojas will participate in RLE AAROM exercises in supine and sitting to improve movement, strength and mobility within 7 treatment day(s)    PHYSICAL THERAPY: Daily Note and AM Treatment Day # 2    Carol Aquino is a 64 y.o. female   PRIMARY DIAGNOSIS: Hip fracture ([facility]  Hip fracture ([facility] [address] [facility] [S72.009A]  Procedure(s) (LRB):  RIGHT HIP HEMIARTHROPLASTY/TAO (Right)  2 Days Post-Op    ASSESSMENT:     REHAB RECOMMENDATIONS: CURRENT LEVEL OF FUNCTION:  (Most Recently Demonstrated)   Recommendation to date pending progress:  Setting:   Short-term Rehab  Equipment:    To Be Determined Bed Mobility:   Maximal Assistance x 2  Sit to Stand:   Not tested   Transfers:   Unable to perform  Gait/Mobility:   Unable to perform     ASSESSMENT:  Ms. Omaira Riojas is making limited progress toward goals at this time. She continues to require max Ax2 for supine to sit, additional time, and extensive cueing for technique. She demonstrated poor sitting balance EOB and was only able to sit unsupported for a few seconds at a time when cued manually for hand placement. Continues to be limited by pain and fear.       SUBJECTIVE:   Ms. Omaira Riojas states, \"It's going to hurt\"    SOCIAL HISTORY/ LIVING ENVIRONMENT:   Home Environment: Private residence  # Steps to Enter: 1  One/Two Story Residence: One story  Living Alone: No  Support Systems: Spouse/Significant Other/Partner  OBJECTIVE:     PAIN: VITAL SIGNS: LINES/DRAINS:   Pre Treatment: Pain Screen  Pain Scale 1: Numeric (0 - 10)  Pain Intensity 1: 6  Post Treatment: 10/10 with RLE movement     Visit Vitals  /67 (BP 1 Location: Left upper arm, BP Patient Position: At rest)   Pulse 74   Temp 98.6 °F (37 °C)   Resp 17   Ht 5' 5\" (1.651 m)   Wt 88 kg (194 lb 1.6 oz)   SpO2 93%   Breastfeeding No   BMI 32.30 kg/m²      O2 Device: None (Room air)     MOBILITY: I Mod I S SBA CGA Min Mod Max Total  NT x2 Comments:   Bed Mobility    Rolling [] [] [] [] [] [] [] [x] [] [] [x]    Supine to Sit [] [] [] [] [] [] [] [x] [] [] [x]    Scooting [] [] [] [] [] [] [] [x] [] [] [x]    Sit to Supine [] [] [] [] [] [] [] [x] [x] [] [x]    Transfers    Sit to Stand [] [] [] [] [] [] [] [] [] [x] []    Bed to Chair [] [] [] [] [] [] [] [] [] [x] []    Stand to Sit [] [] [] [] [] [] [] [] [] [x] []    I=Independent, Mod I=Modified Independent, S=Supervision, SBA=Standby Assistance, CGA=Contact Guard Assistance,   Min=Minimal Assistance, Mod=Moderate Assistance, Max=Maximal Assistance, Total=Total Assistance, NT=Not Tested    BALANCE: Good Fair+ Fair Fair- Poor NT Comments   Sitting Static [] [] [] [x] [] [] Prop sits and leans to the left to avoid RLE weight bearing   Sitting Dynamic [] [] [] [x] [x] []              Standing Static [] [] [] [] [] [x]    Standing Dynamic [] [] [] [] [] [x]      GAIT: I Mod I S SBA CGA Min Mod Max Total  NT x2 Comments:   Level of Assistance [] [] [] [] [] [] [] [] [] [x] []    Distance 0    DME N/A    Gait Quality N/A    Weightbearing  Status N/A     I=Independent, Mod I=Modified Independent, S=Supervision, SBA=Standby Assistance, CGA=Contact Guard Assistance,   Min=Minimal Assistance, Mod=Moderate Assistance, Max=Maximal Assistance, Total=Total Assistance, NT=Not Tested    PLAN:   FREQUENCY/DURATION: PT Plan of Care: BID for duration of hospital stay or until stated goals are met, whichever comes first.  TREATMENT:     TREATMENT:   ($$ Therapeutic Activity: 38-52 mins    )  Co-Treatment PT/OT necessary due to patient's decreased overall endurance/tolerance levels, as well as need for high level skilled assistance to complete functional transfers/mobility and functional tasks  Therapeutic Activity (39 Minutes): Therapeutic activity included Rolling, Supine to Sit, Sit to Supine, Scooting, Lateral Scooting and Sitting balance  to improve functional Mobility, Strength, ROM and Activity tolerance.     TREATMENT GRID:   Date:  7/15/21 Date:   Date:     Activity/Exercise Parameters Parameters Parameters   RLE ankle pumps 10 times AA     RLE heel slides thru 1/4 range 3 x 5 times AA     RLE hip abd/adduction thru 1/4 range 10 AA     Forward trunk flexion 10 times                         AFTER TREATMENT POSITION/PRECAUTIONS:  Bed, Needs within reach and RN notified    INTERDISCIPLINARY COLLABORATION:  RN/PCT, PT/PTA and OT/COSTELLO    TOTAL TREATMENT DURATION:  PT Patient Time In/Time Out  Time In: 0946  Time Out: 1005 [address]

## 2021-07-16 NOTE — PROGRESS NOTES
Hospitalist Progress Note     Name:  Rachel Ragsdale  Age:76 y.o. Sex:female   :  1945    MRN:  [id number]   Admit Date:  2021    Presenting Complaint: Hip Pain    Initial Admission Diagnosis: Hip fracture [facility]) [address] Course/Interval history:     Ms. Nisha Langley is a 63 y/o AAF with past medical history of AF on warfarin, hypertension, vertigo, and anxiety. She was transported to the hospital after a fall. She landed on her right hip and was found to have a right femoral neck fracture. INR is 2.2 on admission. Orthopedic surgery performed right hip hemiarthroplasty on . Jul/15: POD1. No AEO. She denies CP/SOB. Patient may resume warfarin tomorrow per Ortho. Subjective (21):    No AEO. Pain is better controlled today. Educated her on IS use. She states her intake is down; starting maintenance fluids. Warfarin resumed. Planning for STR. F/u with Ortho in 2 weeks. Assessment & Plan     S/P fall   Right femoral neck fracture s/p R hip hemiarthroplasty  Pain control   INR 1.2 and was acceptable for surgery on      AF on warfarin prior to admission   Hold warfarin for now. Cont amiodarone 200 mg po q day. EKG on 2021 shows sinus rhythm. : Resume her home warfarin today     Hypertension  Monitor blood pressure and manage accordingly. Continue home medications, Amlodipine and Losartan.   BP is 160/70's ranges.  Will monitor closely.      Anemia, chronic  Jul/15: Trend labs  : Check iron studies     GERD   On PPI     Body mass index 32.30    Dispo/Discharge Plannin-3 midnights    Diet:  ADULT DIET Regular  DVT PPx: warfarin  Code status: Full Code    Objective:     Patient Vitals for the past 24 hrs:   Temp Pulse Resp BP SpO2   21 0753 98.1 °F (36.7 °C) 68 18 (!) 112/59 91 %   21 0439 98.3 °F (36.8 °C) 69 20 (!) 108/54 95 %   21 0047 98.9 °F (37.2 °C) 71 19 122/68 97 %   07/15/21 2030 98.9 °F (37.2 °C) 82 20 (!) 146/75 93 %   07/15/21 2025     (!) 74 %   07/15/21 1558 98.3 °F (36.8 °C) 87 19 115/65 96 %   07/15/21 1127 98.3 °F (36.8 °C) 90 18 109/62 98 %     Oxygen Therapy  O2 Sat (%): 91 % (07/16/21 0753)  Pulse via Oximetry: 67 beats per minute (07/14/21 1446)  O2 Device: None (Room air) (07/16/21 0900)  O2 Flow Rate (L/min): 3 l/min (07/16/21 0047)    Body mass index is 32.3 kg/m². Physical Exam:   General:  No acute distress, speaking in full sentences, no use of accessory muscles   Lungs:  Clear to auscultation bilaterally   CV:  Regular rate and rhythm with normal S1 and S2   Abdomen:  Soft, nontender, nondistended  Extremities:  No cyanosis clubbing or edema   Neuro:  Nonfocal, A&O x3   Psych:  Normal affect     Data Review:  I have reviewed all labs, meds, and studies from the last 24 hours:    Labs:    Recent Results (from the past 24 hour(s))   CBC WITH AUTOMATED DIFF    Collection Time: 07/16/21  5:43 AM   Result Value Ref Range    WBC 13.6 (H) 4.3 - 11.1 K/uL    RBC 3.58 (L) 4.05 - 5.2 M/uL    HGB 7.7 (L) 11.7 - 15.4 g/dL    HCT 26.4 (L) 35.8 - 46.3 %    MCV 73.7 (L) 79.6 - 97.8 FL    MCH 21.5 (L) 26.1 - 32.9 PG    MCHC 29.2 (L) 31.4 - 35.0 g/dL    RDW 17.5 (H) 11.9 - 14.6 %    PLATELET 570 100 - 190 K/uL    MPV 10.3 9.4 - 12.3 FL    ABSOLUTE NRBC 0.02 0.0 - 0.2 K/uL    DF AUTOMATED      NEUTROPHILS 80 (H) 43 - 78 %    LYMPHOCYTES 10 (L) 13 - 44 %    MONOCYTES 9 4.0 - 12.0 %    EOSINOPHILS 1 0.5 - 7.8 %    BASOPHILS 0 0.0 - 2.0 %    IMMATURE GRANULOCYTES 0 0.0 - 5.0 %    ABS. NEUTROPHILS 10.9 (H) 1.7 - 8.2 K/UL    ABS. LYMPHOCYTES 1.3 0.5 - 4.6 K/UL    ABS. MONOCYTES 1.2 0.1 - 1.3 K/UL    ABS. EOSINOPHILS 0.1 0.0 - 0.8 K/UL    ABS. BASOPHILS 0.0 0.0 - 0.2 K/UL    ABS. IMM.  GRANS. 0.1 0.0 - 0.5 K/UL   METABOLIC PANEL, BASIC    Collection Time: 07/16/21  5:43 AM   Result Value Ref Range    Sodium 138 136 - 145 mmol/L    Potassium 4.2 3.5 - 5.1 mmol/L    Chloride 106 98 - 107 mmol/L    CO2 24 21 - 32 mmol/L    Anion gap 8 7 - 16 mmol/L    Glucose 111 (H) 65 - 100 mg/dL    BUN 26 (H) 8 - 23 MG/DL    Creatinine 1.40 (H) 0.6 - 1.0 MG/DL    GFR est AA 47 (L) >60 ml/min/1.73m2    GFR est non-AA 39 (L) >60 ml/min/1.73m2    Calcium 8.4 8.3 - 10.4 MG/DL       All Micro Results     None          EKG Results     Procedure 720 Value Units Date/Time    EKG 12 LEAD INITIAL [[id number]] Collected: 07/12/21 1728    Order Status: Completed Updated: 07/12/21 2049     Ventricular Rate 72 BPM      Atrial Rate 72 BPM      P-R Interval 168 ms      QRS Duration 102 ms      Q-T Interval 466 ms      QTC Calculation (Bezet) 510 ms      Calculated P Axis 74 degrees      Calculated R Axis 12 degrees      Calculated T Axis 59 degrees      Diagnosis --     !! AGE AND GENDER SPECIFIC ECG ANALYSIS !! Normal sinus rhythm  Incomplete right bundle branch block  Septal infarct (cited on or before 12-APR-2019)  Prolonged QT  Abnormal ECG  When compared with ECG of 14-MAR-2021 15:47,  Questionable change in initial forces of Septal leads  QT has lengthened  Confirmed by Kate Shah ([id number]) on 7/12/2021 8:48:53 PM            Other Studies:  XR CHEST SNGL V    Result Date: 7/15/2021   Portable view of the chest COMPARISON: July 12, 2021. CLINICAL HISTORY: Hypoxia. FINDINGS: Heart is mildly enlarged. Mediastinal contour is within normal limits. There is no focal pulmonary consolidation, pleural effusion or pneumothorax. No pulmonary edema. Surrounding bones are intact. 1. No evidence of pneumonia or pulmonary edema.  2. No significant change compared to prior exam.      Current Meds:   Current Facility-Administered Medications   Medication Dose Route Frequency    lactated Ringers infusion  100 mL/hr IntraVENous CONTINUOUS    warfarin (COUMADIN) tablet 2.5 mg  2.5 mg Oral DAILY    morphine injection 2 mg  2 mg IntraVENous Q1H PRN    HYDROcodone-acetaminophen (NORCO) 7.5-325 mg per tablet 2 Tablet  2 Tablet Oral Q4H PRN    sodium chloride (NS) flush 5-40 mL  5-40 mL IntraVENous Q8H    sodium chloride (NS) flush 5-40 mL  5-40 mL IntraVENous PRN    alum-mag hydroxide-simeth (MYLANTA) oral suspension 30 mL  30 mL Oral Q4H PRN    calcium-vitamin D (OS-DAGOBERTO +D3) 500 mg-200 unit per tablet 1 Tablet  1 Tablet Oral TID WITH MEALS    amiodarone (CORDARONE) tablet 200 mg  200 mg Oral DAILY    amLODIPine (NORVASC) tablet 5 mg  5 mg Oral DAILY    losartan (COZAAR) tablet 25 mg  25 mg Oral DAILY    venlafaxine (EFFEXOR) tablet 25 mg  25 mg Oral DAILY    sodium chloride (NS) flush 5-40 mL  5-40 mL IntraVENous Q8H    sodium chloride (NS) flush 5-40 mL  5-40 mL IntraVENous PRN    acetaminophen (TYLENOL) tablet 650 mg  650 mg Oral Q6H PRN    Or    acetaminophen (TYLENOL) suppository 650 mg  650 mg Rectal Q6H PRN    senna (SENOKOT) tablet 17.2 mg  2 Tablet Oral BID PRN    ondansetron (ZOFRAN) injection 4 mg  4 mg IntraVENous Q6H PRN    cloNIDine HCL (CATAPRES) tablet 0.2 mg  0.2 mg Oral BID PRN    hydrALAZINE (APRESOLINE) 20 mg/mL injection 10 mg  10 mg IntraVENous Q6H PRN    pantoprazole (PROTONIX) tablet 40 mg  40 mg Oral ACB&D       Problem List:  Hospital Problems as of 7/16/2021 Date Reviewed: 12/18/2020        Codes Class Noted - Resolved POA    * (Principal) [facility] fracture ([facility] ICD-10-CM: C19.236S  ICD-9-CM: 820.8  7/12/2021 - Present Unknown        Long term (current) use of anticoagulants ICD-10-CM: Z79.01  ICD-9-CM: V58.61  5/1/2018 - Present Yes        Hypertension ICD-10-CM: I10  ICD-9-CM: 401.9  1/23/2017 - Present Yes        Paroxysmal atrial fibrillation (HCC) ICD-10-CM: I48.0  ICD-9-CM: 427.31  1/23/2017 - Present Yes        GERD (gastroesophageal reflux disease) ICD-10-CM: K21.9  ICD-9-CM: 530.81  1/23/2017 - Present Yes        Hyperlipidemia ICD-10-CM: E78.5  ICD-9-CM: 272.4  1/23/2017 - Present Yes              Part of this note was written by using a voice dictation software and the note has been proof read but may still contain some grammatical/other typographical errors.     Signed By: Vamsi Jewell NP   [facility]    July 16, 2021  5:15 PM

## 2021-07-16 NOTE — PROGRESS NOTES
Sent the following message to Bel Peres NP via perfect serve \"Just a heads up, this patient has only voided maybe 150ml today. I know you started fluids this morning and she's drank 600ml. \"

## 2021-07-16 NOTE — PROGRESS NOTES
ACUTE OT GOALS:  (Developed with and agreed upon by patient and/or caregiver.)  1. Patient will complete lower body bathing and dressing with MIN A and adaptive equipment as needed. 2.Patient will complete upper body bathing and dressing with SETUP ASSIST and adaptive equipment as needed. 3. Patient will complete toileting with MOD A.   4. Patient will tolerate 25 minutes of OT treatment with 1-2 rest breaks to increase activity tolerance for ADLs. 5. Patient will complete functional transfers with MIN A and adaptive equipment as needed. 6. Patient will complete functional activity with MIN A and adaptive equipment as needed.     Timeframe: 7 visits   OCCUPATIONAL THERAPY: Daily Note OT Treatment Day # 2    Neftali Bernal is a 64 y.o. female   PRIMARY DIAGNOSIS: Fracture of femoral neck, right, closed ([facility]  Hip fracture ([facility] [address] [S72.009A]  Procedure(s) (LRB):  RIGHT HIP HEMIARTHROPLASTY/TAO (Right)  2 Days Post-Op  Payor: Dominick Thorne / Plan: [address] [place] [address] HMO / Product Type: [employer] /   ASSESSMENT:     REHAB RECOMMENDATIONS: CURRENT LEVEL OF FUNCTION:  (Most Recently Demonstrated)   Recommendation to date pending progress:  Setting:   Short-term Rehab  Equipment:    To Be Determined Bathing:   Maximal Assistance  Dressing:   Maximal Assistance  Feeding/Grooming:   Maximal Assistance  Toileting:   Not tested  Functional Mobility:   Maximal Assistance x 2     ASSESSMENT:  AM Session:  Ms. Amina Villatoro is slowly progressing with OT services but continues to demonstrate decreased strength, coordination, balance, and activity tolerance for completion of ADLs/functional mobility. Today, pt was received supine in bed. MaxA x2 for bed mobility. MaxA to sit EOB due to posterior and left side lean. Grooming task sitting EOB maxA due to poor sitting balance. MaxA LB dressing. MaxA x2 for scooting EOB.  Pt received pain medication prior to session--pt became very drowsy and unable to keep eyes open. RN notified of session. Continue with POC in order to address functional deficits and OT goals listed above. PM Session:  Pt received supine in bed. MaxA x2 bed mobility. MaxA sitting balance exercises--unable to maintain midline without maxA due to posterior and left side lean. Sit>stand maxA x2. Static standing maxA with L side lean to offload weight off RLE. Promoted R side weight shift through elbow prop--maxA to facilitate and maintain. Pt did show progress and decreased pain in PM session--able to participate longer. Continue with OT POC in order to address functional deficits and OT goals listed above. SUBJECTIVE:   Ms. Brandon Gutierrez states, \"Do I have to do it? \"    SOCIAL HISTORY/LIVING ENVIRONMENT: pt lives with her spouse in a one level home with one-step to get in, tub shower with a shower chair, independent for ADLs/IADLs  Home Environment: Private residence  # Steps to Enter: 1  One/Two Story Residence: One story  Living Alone: No  Support Systems: Spouse/Significant Other/Partner    OBJECTIVE:     PAIN: VITAL SIGNS: LINES/DRAINS:   Pre Treatment: Pain Screen  Pain Scale 1: Numeric (0 - 10)  Pain Intensity 1: 6  Post Treatment: no change    IV  O2 Device: None (Room air)     ACTIVITIES OF DAILY LIVING: I Mod I S SBA CGA Min Mod Max Total NT Comments   BASIC ADLs:              Bathing/ Showering [] [] [] [] [] [] [] [x] [] [] Washed UB   Toileting [] [] [] [] [] [] [] [] [] [x]    Dressing [] [] [] [] [] [] [] [x] [] [] Doffed and donned gown    Feeding [] [] [] [] [] [] [] [] [] [x]    Grooming [] [] [] [] [] [] [] [x] [] [] Washed face sitting EOB   Personal Device Care [] [] [] [] [] [] [] [] [] [x]    Functional Mobility [] [] [] [] [] [] [] [x] [] [] x2   I=Independent, Mod I=Modified Independent, S=Supervision, SBA=Standby Assistance, CGA=Contact Guard Assistance,   Min=Minimal Assistance, Mod=Moderate Assistance, Max=Maximal Assistance, Total=Total Assistance, NT=Not Tested    MOBILITY: I Mod I S SBA CGA Min Mod Max Total  NT x2 Comments:   Supine to sit [] [] [] [] [] [] [] [x] [] [] [x]    Sit to supine [] [] [] [] [] [] [] [x] [] [] [x]    Sit to stand [] [] [] [] [] [] [] [x] [] [] [x] Posterior and L side lean    Bed to chair [] [] [] [] [] [] [] [] [] [x] []    I=Independent, Mod I=Modified Independent, S=Supervision, SBA=Standby Assistance, CGA=Contact Guard Assistance,   Min=Minimal Assistance, Mod=Moderate Assistance, Max=Maximal Assistance, Total=Total Assistance, NT=Not Tested    BALANCE: Good Fair+ Fair Fair- Poor NT Comments   Sitting Static [] [] [] [] [x] [] Posterior and L side lean    Sitting Dynamic [] [] [] [] [x] [] Posterior and L side lean             Standing Static [] [] [] [] [x] [] Posterior and L side lean   Standing Dynamic [] [] [] [] [x] [] Posterior and L side lean     PLAN:   FREQUENCY/DURATION: OT Plan of Care: 6 times/week for duration of hospital stay or until stated goals are met, whichever comes first.    TREATMENT:   TREATMENT:   ($$ Self Care/Home Management: 23-37 mins$$ Neuromuscular Re-Education: 53-67 mins   )  Co-Treatment PT/OT necessary due to patient's decreased overall endurance/tolerance levels, as well as need for high level skilled assistance to complete functional transfers/mobility and functional tasks  Self Care (28 Minutes): Self care including Upper Body Bathing, Upper Body Dressing, Lower Body Dressing and Grooming to increase independence and decrease level of assistance required. Neuromuscular Re-education (51 Minutes): Neuromuscular Re-education included Balance Training, Postural training, Sitting balance training and Standing balance training to improve Balance, Coordination, Functional Mobility and Postural Control.     TREATMENT GRID:  N/A    AFTER TREATMENT POSITION/PRECAUTIONS:  Alarm Activated, Bed, Needs within reach and RN notified    INTERDISCIPLINARY COLLABORATION:  RN/PCT, PT/PTA and OT/COSTELLO    TOTAL TREATMENT DURATION:  OT Patient Time In/Time Out  Time In: 0946  Time Out: Avelina [address], OT

## 2021-07-16 NOTE — PROGRESS NOTES
Progress Note    Patient: Taylor Rivas MRN: [id number]  SSN: [id number]    YOB: 1945  Age: 64 y.o. Sex: female      Admit Date: 7/12/2021    LOS: 4 days     Subjective:     Patient states her hip pain feels much better today    Objective:     Vitals:    07/15/21 2030 07/16/21 0047 07/16/21 0439 07/16/21 0753   BP: (!) 146/75 122/68 (!) 108/54 (!) 112/59   Pulse: 82 71 69 68   Resp: 20 19 20 18   Temp: 98.9 °F (37.2 °C) 98.9 °F (37.2 °C) 98.3 °F (36.8 °C) 98.1 °F (36.7 °C)   SpO2: 93% 97% 95% 91%   Weight:            Intake and Output:  Current Shift: No intake/output data recorded. Last three shifts: No intake/output data recorded. alert and oriented to person place time and situation  Skin - dressing clean dry and intact  Motor and sensory function intact in RIGHT LOWER extremity  Pulses palpable in RIGHT LOWER extremity       Lab/Data Review:  CBC:   Lab Results   Component Value Date/Time    WBC 13.6 (H) 07/16/2021 05:43 AM    HGB 7.7 (L) 07/16/2021 05:43 AM    HCT 26.4 (L) 07/16/2021 05:43 AM     07/16/2021 05:43 AM          Assessment:     Acute postop blood loss anemia with hemoglobin stable at 7.7, POD #2 from right hip hemiarthroplasty for right femoral neck fracture    Plan:     Patient may be weightbearing as tolerated on the right lower extremity. They can be full active and passive range of motion of the right hip. They can have dry dressing change starting on postoperative day 2 and then after that daily and as needed. They will need aspirin 325 mg 1 p.o. daily for 4 weeks as DVT prophylaxis as well as SCDs while hospitalized unless they are on a preoperative anticoagulant chronically and in this case they can restart this on postoperative day 2 and use this instead of the aspirin. They will need to have orthopedic follow-up approximately 2 weeks after discharge.     Signed By: Maura Hyde MD     July 16, 2021

## 2021-07-17 PROBLEM — D62 ACUTE POSTOPERATIVE ANEMIA DUE TO EXPECTED BLOOD LOSS: Status: ACTIVE | Noted: 2021-07-17

## 2021-07-17 LAB
ANION GAP SERPL CALC-SCNC: 8 MMOL/L (ref 7–16)
BUN SERPL-MCNC: 27 MG/DL (ref 8–23)
CALCIUM SERPL-MCNC: 8.3 MG/DL (ref 8.3–10.4)
CHLORIDE SERPL-SCNC: 107 MMOL/L (ref 98–107)
CK SERPL-CCNC: 443 U/L (ref 21–215)
CO2 SERPL-SCNC: 22 MMOL/L (ref 21–32)
CREAT SERPL-MCNC: 1.01 MG/DL (ref 0.6–1)
ERYTHROCYTE [DISTWIDTH] IN BLOOD BY AUTOMATED COUNT: 17.4 % (ref 11.9–14.6)
FERRITIN SERPL-MCNC: 66 NG/ML (ref 8–388)
GLUCOSE SERPL-MCNC: 100 MG/DL (ref 65–100)
HCT VFR BLD AUTO: 24.8 % (ref 35.8–46.3)
HGB BLD-MCNC: 7.2 G/DL (ref 11.7–15.4)
HISTORY CHECKED?,CKHIST: NORMAL
INR PPP: 1.2
IRON SATN MFR SERPL: 3 %
IRON SERPL-MCNC: 8 UG/DL (ref 35–150)
MCH RBC QN AUTO: 21.4 PG (ref 26.1–32.9)
MCHC RBC AUTO-ENTMCNC: 29 G/DL (ref 31.4–35)
MCV RBC AUTO: 73.8 FL (ref 79.6–97.8)
NRBC # BLD: 0.02 K/UL (ref 0–0.2)
PLATELET # BLD AUTO: 176 K/UL (ref 150–450)
PMV BLD AUTO: 10.2 FL (ref 9.4–12.3)
POTASSIUM SERPL-SCNC: 4.1 MMOL/L (ref 3.5–5.1)
PROTHROMBIN TIME: 15.5 SEC (ref 12.5–14.7)
RBC # BLD AUTO: 3.36 M/UL (ref 4.05–5.2)
SODIUM SERPL-SCNC: 137 MMOL/L (ref 136–145)
TIBC SERPL-MCNC: 275 UG/DL (ref 250–450)
WBC # BLD AUTO: 10.3 K/UL (ref 4.3–11.1)

## 2021-07-17 PROCEDURE — 85027 COMPLETE CBC AUTOMATED: CPT

## 2021-07-17 PROCEDURE — 74011250637 HC RX REV CODE- 250/637: Performed by: ORTHOPAEDIC SURGERY

## 2021-07-17 PROCEDURE — 82550 ASSAY OF CK (CPK): CPT

## 2021-07-17 PROCEDURE — 74011250636 HC RX REV CODE- 250/636: Performed by: NURSE PRACTITIONER

## 2021-07-17 PROCEDURE — 74011250637 HC RX REV CODE- 250/637: Performed by: HOSPITALIST

## 2021-07-17 PROCEDURE — 80048 BASIC METABOLIC PNL TOTAL CA: CPT

## 2021-07-17 PROCEDURE — 36415 COLL VENOUS BLD VENIPUNCTURE: CPT

## 2021-07-17 PROCEDURE — 97530 THERAPEUTIC ACTIVITIES: CPT

## 2021-07-17 PROCEDURE — 82728 ASSAY OF FERRITIN: CPT

## 2021-07-17 PROCEDURE — 36430 TRANSFUSION BLD/BLD COMPNT: CPT

## 2021-07-17 PROCEDURE — P9016 RBC LEUKOCYTES REDUCED: HCPCS

## 2021-07-17 PROCEDURE — 65270000029 HC RM PRIVATE

## 2021-07-17 PROCEDURE — 86900 BLOOD TYPING SEROLOGIC ABO: CPT

## 2021-07-17 PROCEDURE — 85610 PROTHROMBIN TIME: CPT

## 2021-07-17 PROCEDURE — 30233N1 TRANSFUSION OF NONAUTOLOGOUS RED BLOOD CELLS INTO PERIPHERAL VEIN, PERCUTANEOUS APPROACH: ICD-10-PCS | Performed by: HOSPITALIST

## 2021-07-17 PROCEDURE — 2709999900 HC NON-CHARGEABLE SUPPLY

## 2021-07-17 PROCEDURE — 83540 ASSAY OF IRON: CPT

## 2021-07-17 PROCEDURE — 86923 COMPATIBILITY TEST ELECTRIC: CPT

## 2021-07-17 RX ORDER — SODIUM CHLORIDE 9 MG/ML
250 INJECTION, SOLUTION INTRAVENOUS AS NEEDED
Status: DISCONTINUED | OUTPATIENT
Start: 2021-07-17 | End: 2021-07-19 | Stop reason: HOSPADM

## 2021-07-17 RX ORDER — WARFARIN 2.5 MG/1
2.5 TABLET ORAL EVERY EVENING
Status: DISCONTINUED | OUTPATIENT
Start: 2021-07-17 | End: 2021-07-18

## 2021-07-17 RX ADMIN — AMIODARONE HYDROCHLORIDE 200 MG: 200 TABLET ORAL at 08:21

## 2021-07-17 RX ADMIN — PANTOPRAZOLE SODIUM 40 MG: 40 TABLET, DELAYED RELEASE ORAL at 17:01

## 2021-07-17 RX ADMIN — HYDROCODONE BITARTRATE AND ACETAMINOPHEN 2 TABLET: 7.5; 325 TABLET ORAL at 22:54

## 2021-07-17 RX ADMIN — Medication 10 ML: at 22:12

## 2021-07-17 RX ADMIN — Medication 1 TABLET: at 17:01

## 2021-07-17 RX ADMIN — SODIUM CHLORIDE, SODIUM LACTATE, POTASSIUM CHLORIDE, AND CALCIUM CHLORIDE 150 ML/HR: 600; 310; 30; 20 INJECTION, SOLUTION INTRAVENOUS at 05:28

## 2021-07-17 RX ADMIN — Medication 1 TABLET: at 08:20

## 2021-07-17 RX ADMIN — Medication 10 ML: at 14:00

## 2021-07-17 RX ADMIN — AMLODIPINE BESYLATE 5 MG: 5 TABLET ORAL at 08:22

## 2021-07-17 RX ADMIN — ACETAMINOPHEN 650 MG: 325 TABLET ORAL at 11:10

## 2021-07-17 RX ADMIN — VENLAFAXINE 25 MG: 25 TABLET ORAL at 08:20

## 2021-07-17 RX ADMIN — LOSARTAN POTASSIUM 25 MG: 25 TABLET, FILM COATED ORAL at 08:21

## 2021-07-17 RX ADMIN — WARFARIN SODIUM 2.5 MG: 2.5 TABLET ORAL at 17:01

## 2021-07-17 RX ADMIN — Medication 1 TABLET: at 11:09

## 2021-07-17 RX ADMIN — Medication 10 ML: at 05:27

## 2021-07-17 RX ADMIN — ACETAMINOPHEN 650 MG: 325 TABLET ORAL at 17:01

## 2021-07-17 RX ADMIN — PANTOPRAZOLE SODIUM 40 MG: 40 TABLET, DELAYED RELEASE ORAL at 05:28

## 2021-07-17 NOTE — PROGRESS NOTES
Problem: Falls - Risk of  Goal: *Absence of Falls  Description: Document Sasha Peacock Fall Risk and appropriate interventions in the flowsheet. Outcome: Progressing Towards Goal  Note: Fall Risk Interventions:  Mobility Interventions: Communicate number of staff needed for ambulation/transfer         Medication Interventions: Patient to call before getting OOB    Elimination Interventions: Call light in reach    History of Falls Interventions: Door open when patient unattended         Problem: Pressure Injury - Risk of  Goal: *Prevention of pressure injury  Description: Document Forrest Scale and appropriate interventions in the flowsheet.   Outcome: Progressing Towards Goal  Note: Pressure Injury Interventions:  Sensory Interventions: Assess changes in LOC    Moisture Interventions: Absorbent underpads, Limit adult briefs, Maintain skin hydration (lotion/cream), Minimize layers    Activity Interventions: Increase time out of bed    Mobility Interventions: HOB 30 degrees or less    Nutrition Interventions: Document food/fluid/supplement intake    Friction and Shear Interventions: HOB 30 degrees or less, Minimize layers

## 2021-07-17 NOTE — PROGRESS NOTES
ACUTE PHYSICAL THERAPY GOALS:  (Developed with and agreed upon by patient and/or caregiver.)  Goals:  (1.)Ms. Karishma Estrella will move from supine to sit and sit to supine , scoot up and down and roll side to side in bed with MODIFIED INDEPENDENCE within 7 treatment day(s). (2.)Ms. Karishma Estrella will transfer from bed to chair and chair to bed with CONTACT GUARD ASSIST using the least restrictive device within 7 treatment day(s). (3.)Ms. Karishma Estrella will ambulate with CONTACT GUARD ASSIST for 15--20 feet with the least restrictive device within 7 treatment day(s). (4.)Ms. Karishma Estrella will participate in RLE AAROM exercises in supine and sitting to improve movement, strength and mobility within 7 treatment day(s)    PHYSICAL THERAPY: Daily Note and PM Treatment Day # 3    Ahmet Estrella is a 64 y.o. female   PRIMARY DIAGNOSIS: Fracture of femoral neck, right, closed ([facility]  Hip fracture ([facility] [facility] [S72.009A]  Procedure(s) (LRB):  RIGHT HIP HEMIARTHROPLASTY/TAO (Right)  3 Days Post-Op    ASSESSMENT:     REHAB RECOMMENDATIONS: CURRENT LEVEL OF FUNCTION:  (Most Recently Demonstrated)   Recommendation to date pending progress:  Setting:   Short-term Rehab  Equipment:    To Be Determined Bed Mobility:   Maximal Assistance x 2  Sit to Stand:   Maximal Assistance x 2   Transfers:   Unable to perform  Gait/Mobility:   Unable to perform     ASSESSMENT:  Ms. Karishma Estrella reports having less pain this PM, but is still very stiff and limited in mobility. RN preparing to administer blood, so no OOB at this time. Worked on static and dynamic supported and unsupported sitting with bed in chair position. Reaching, leaning, and abdominal strengthening with cueing.       SUBJECTIVE:   Ms. Karishma Estrella states, \"I do need a new gown\"    SOCIAL HISTORY/ LIVING ENVIRONMENT:   Home Environment: Private residence  # Steps to Enter: 1  One/Two Story Residence: One story  Living Alone: No  Support Systems: Spouse/Significant Other/Partner  OBJECTIVE: PAIN: VITAL SIGNS: LINES/DRAINS:   Pre Treatment: Pain Screen  Pain Scale 1: Numeric (0 - 10)  Pain Intensity 1: 2  Post Treatment: 0, post treatment in bed     Visit Vitals  BP (!) 139/57 (BP 1 Location: Left upper arm, BP Patient Position: At rest;Sitting)   Pulse 66   Temp 98.1 °F (36.7 °C)   Resp 18   Ht 5' 5\" (1.651 m)   Wt 88 kg (194 lb 1.6 oz)   SpO2 94%   Breastfeeding No   BMI 32.30 kg/m²      O2 Device: None (Room air)     MOBILITY: I Mod I S SBA CGA Min Mod Max Total  NT x2 Comments:   Bed Mobility    Rolling [] [] [] [] [] [] [] [x] [] [] []    Supine to Sit [] [] [] [] [] [] [] [] [] [] []    Scooting [] [] [] [] [] [] [] [x] [] [] []    Sit to Supine [] [] [] [] [] [] [] [] [] [] []    Transfers    Sit to Stand [] [] [] [] [] [] [] [] [] [x] []    Bed to Chair [] [] [] [] [] [] [] [] [] [x] []    Stand to Sit [] [] [] [] [] [] [] [] [] [x] []    I=Independent, Mod I=Modified Independent, S=Supervision, SBA=Standby Assistance, CGA=Contact Guard Assistance,   Min=Minimal Assistance, Mod=Moderate Assistance, Max=Maximal Assistance, Total=Total Assistance, NT=Not Tested    BALANCE: Good Fair+ Fair Fair- Poor NT Comments   Sitting Static [] [] [] [] [] [] Prop sits and leans to the left to avoid RLE weight bearing   Sitting Dynamic [] [] [] [] [] [x]              Standing Static [] [] [] [] [] [x]    Standing Dynamic [] [] [] [] [] [x]      GAIT: I Mod I S SBA CGA Min Mod Max Total  NT x2 Comments:   Level of Assistance [] [] [] [] [] [] [] [] [] [x] []    Distance 0    DME N/A    Gait Quality N/A    Weightbearing  Status N/A     I=Independent, Mod I=Modified Independent, S=Supervision, SBA=Standby Assistance, CGA=Contact Guard Assistance,   Min=Minimal Assistance, Mod=Moderate Assistance, Max=Maximal Assistance, Total=Total Assistance, NT=Not Tested    PLAN:   FREQUENCY/DURATION: PT Plan of Care: BID for duration of hospital stay or until stated goals are met, whichever comes first.  TREATMENT: TREATMENT:   ($$ Therapeutic Activity: 23-37 mins    )  Therapeutic Activity (24 Minutes): Therapeutic activity included Rolling, Scooting, Sitting balance  and reaching, leaning, and weight shifting in bed during self-care to improve functional Mobility, ROM and Activity tolerance.     TREATMENT GRID:   Date:  7/15/21 Date:   Date:     Activity/Exercise Parameters Parameters Parameters   RLE ankle pumps 10 times AA     RLE heel slides thru 1/4 range 3 x 5 times AA     RLE hip abd/adduction thru 1/4 range 10 AA     Forward trunk flexion 10 times                         AFTER TREATMENT POSITION/PRECAUTIONS:  Bed, Needs within reach, RN notified and Visitors at bedside    INTERDISCIPLINARY COLLABORATION:  RN/PCT and PT/PTA    TOTAL TREATMENT DURATION:  PT Patient Time In/Time Out  Time In: 1338  Time Out: [facility] , PTA

## 2021-07-17 NOTE — PROGRESS NOTES
Warfarin dosing per pharmacist    Caitlin Johns is a 64 y.o. female. Height: 5' 5\" (165.1 cm) Weight: 88 kg (194 lb 1.6 oz)    Indication:  Afib    Goal INR:  2-3    Home dose:  1.25 mg Mon, 2.5 mg all other days    Risk factors or significant drug interactions:  Amiodarone (home med)    Other anticoagulants:  none    Daily Monitoring  Date  INR     Warfarin dose HGB              Notes  7/16  1.2  2.5 mg  7.7  7/17  1.2  2.5 mg  7.2        Pharmacy is consulted to assist in dosing Ms. Jones's warfarin this admission. Patients INR was therapeutic upon admission at 2.2. Prior to surgery 10 mg IV vitamin K was administered 7/12 and INR decreased to 1.7. INR remains subtherapeutic today at 1.2. Based on outpatient records patient is very sensitive to warfarin. Will give home dose of 2.5 mg again tonight and continue to closely monitor. Daily INR will continue. Pharmacy will continue to monitor and adjust dosing as clinically indicated. Please call with any questions.     Thank you,  Lindsey Faye, PharmD  Clinical Pharmacy Specialist  [phone]

## 2021-07-17 NOTE — PROGRESS NOTES
Orthopedic Progress Note    2021  Admit Date: 2021  Admit Diagnosis: Hip fracture ([address] [S72.009A]    Post Op day: 3 Days Post-Op    Subjective:     Sheila Royaadwoakamila Jones     Slow progress with PT      Objective:     Vital Signs:    Temp (24hrs), Av.7 °F (37.1 °C), Min:97.7 °F (36.5 °C), Max:99.2 °F (37.3 °C)      LAB:    [unfilled]  Lab Results   Component Value Date/Time    INR 1.2 2021 05:51 AM    INR 1.2 2021 11:26 AM     Lab Results   Component Value Date/Time    HGB 7.2 (L) 2021 05:51 AM    HGB 7.7 (L) 2021 05:43 AM       Physical Exam:    No apparent distress   No neurovascular issues reported  No gross problems noted     Plan:     Continue PT/OT rehab as indicated    Nursing and SS for disposition plans         Signed By: Rob Rivero MD

## 2021-07-17 NOTE — PROGRESS NOTES
4908 - Report received from night shift RN. Patient assessed. No distress at this time. Will continue to monitor.

## 2021-07-17 NOTE — PROGRESS NOTES
ACUTE PHYSICAL THERAPY GOALS:  (Developed with and agreed upon by patient and/or caregiver.)  Goals:  (1.)Ms. Austin Herrera will move from supine to sit and sit to supine , scoot up and down and roll side to side in bed with MODIFIED INDEPENDENCE within 7 treatment day(s). (2.)Ms. Austin Herrera will transfer from bed to chair and chair to bed with CONTACT GUARD ASSIST using the least restrictive device within 7 treatment day(s). (3.)Ms. Austin Herrera will ambulate with CONTACT GUARD ASSIST for 15--20 feet with the least restrictive device within 7 treatment day(s). (4.)Ms. Austin Herrera will participate in RLE AAROM exercises in supine and sitting to improve movement, strength and mobility within 7 treatment day(s)    PHYSICAL THERAPY: Daily Note and AM Treatment Day # 3    Sy Herrera is a 64 y.o. female   PRIMARY DIAGNOSIS: Fracture of femoral neck, right, closed ([facility] [address]  Hip fracture ([facility] [address] [S72.009A]  Procedure(s) (LRB):  RIGHT HIP HEMIARTHROPLASTY/TAO (Right)  3 Days Post-Op    ASSESSMENT:     REHAB RECOMMENDATIONS: CURRENT LEVEL OF FUNCTION:  (Most Recently Demonstrated)   Recommendation to date pending progress:  Setting:   Short-term Rehab  Equipment:    To Be Determined Bed Mobility:   Maximal Assistance x 2  Sit to Stand:   Maximal Assistance x 2   Transfers:   Unable to perform  Gait/Mobility:   Unable to perform     ASSESSMENT:  Ms. Austin Herrera presented with increased pain during mobility this session, but participated well. She required additional time, cueing, and assist for all mobility. Sitting EOB with CGA-mod A to maintain sitting balance with frequent cueing to sit forward and to the R.   A few LE exercises, unsafe to attempt standing due to no assist.      SUBJECTIVE:   Ms. Austin Herrera states, \"Give me a minute\"    SOCIAL HISTORY/ LIVING ENVIRONMENT:   Home Environment: Private residence  # Steps to Enter: 1  One/Two Story Residence: One story  Living Alone: No  Support Systems: Spouse/Significant Other/Partner  OBJECTIVE:     PAIN: VITAL SIGNS: LINES/DRAINS:   Pre Treatment: Pain Screen  Pain Scale 1: Numeric (0 - 10)  Pain Intensity 1: 3  Post Treatment: 0, post treatment in bed     Visit Vitals  /66 (BP 1 Location: Left upper arm, BP Patient Position: Supine)   Pulse 70   Temp 98.3 °F (36.8 °C)   Resp 17   Ht 5' 5\" (1.651 m)   Wt 88 kg (194 lb 1.6 oz)   SpO2 96%   Breastfeeding No   BMI 32.30 kg/m²      O2 Device: None (Room air)     MOBILITY: I Mod I S SBA CGA Min Mod Max Total  NT x2 Comments:   Bed Mobility    Rolling [] [] [] [] [] [] [] [x] [] [] []    Supine to Sit [] [] [] [] [] [] [] [x] [] [] []    Scooting [] [] [] [] [] [] [] [x] [] [] []    Sit to Supine [] [] [] [] [] [] [] [x] [] [] []    Transfers    Sit to Stand [] [] [] [] [] [] [] [] [] [x] []    Bed to Chair [] [] [] [] [] [] [] [] [] [x] []    Stand to Sit [] [] [] [] [] [] [] [] [] [x] []    I=Independent, Mod I=Modified Independent, S=Supervision, SBA=Standby Assistance, CGA=Contact Guard Assistance,   Min=Minimal Assistance, Mod=Moderate Assistance, Max=Maximal Assistance, Total=Total Assistance, NT=Not Tested    BALANCE: Good Fair+ Fair Fair- Poor NT Comments   Sitting Static [] [] [] [x] [] [] Prop sits and leans to the left to avoid RLE weight bearing   Sitting Dynamic [] [] [] [x] [] []              Standing Static [] [] [] [] [] [x]    Standing Dynamic [] [] [] [] [] [x]      GAIT: I Mod I S SBA CGA Min Mod Max Total  NT x2 Comments:   Level of Assistance [] [] [] [] [] [] [] [] [] [x] []    Distance 0    DME N/A    Gait Quality N/A    Weightbearing  Status N/A     I=Independent, Mod I=Modified Independent, S=Supervision, SBA=Standby Assistance, CGA=Contact Guard Assistance,   Min=Minimal Assistance, Mod=Moderate Assistance, Max=Maximal Assistance, Total=Total Assistance, NT=Not Tested    PLAN:   FREQUENCY/DURATION: PT Plan of Care: BID for duration of hospital stay or until stated goals are met, whichever comes first.  TREATMENT:     TREATMENT:   ($$ Therapeutic Activity: 38-52 mins    )  Therapeutic Activity (43 Minutes): Therapeutic activity included Rolling, Supine to Sit, Sit to Supine, Scooting, Lateral Scooting and Sitting balance  to improve functional Mobility, Strength, ROM and Activity tolerance.     TREATMENT GRID:   Date:  7/15/21 Date:   Date:     Activity/Exercise Parameters Parameters Parameters   RLE ankle pumps 10 times AA     RLE heel slides thru 1/4 range 3 x 5 times AA     RLE hip abd/adduction thru 1/4 range 10 AA     Forward trunk flexion 10 times                         AFTER TREATMENT POSITION/PRECAUTIONS:  Bed, Needs within reach, RN notified and Visitors at bedside    INTERDISCIPLINARY COLLABORATION:  RN/PCT and PT/PTA    TOTAL TREATMENT DURATION:  PT Patient Time In/Time Out  Time In: 1020  Time Out: [facility]

## 2021-07-17 NOTE — PROGRESS NOTES
Problem: Falls - Risk of  Goal: *Absence of Falls  Description: Document Seema Sylvester Fall Risk and appropriate interventions in the flowsheet. Outcome: Progressing Towards Goal  Note: Fall Risk Interventions:  Mobility Interventions: Communicate number of staff needed for ambulation/transfer, OT consult for ADLs, Patient to call before getting OOB, PT Consult for mobility concerns         Medication Interventions: Patient to call before getting OOB, Teach patient to arise slowly    Elimination Interventions: Call light in reach, Patient to call for help with toileting needs, Toileting schedule/hourly rounds    History of Falls Interventions: Door open when patient unattended, Investigate reason for fall, Room close to nurse's station         Problem: Patient Education: Go to Patient Education Activity  Goal: Patient/Family Education  Outcome: Progressing Towards Goal     Problem: Pressure Injury - Risk of  Goal: *Prevention of pressure injury  Description: Document Forrest Scale and appropriate interventions in the flowsheet.   Outcome: Progressing Towards Goal  Note: Pressure Injury Interventions:  Sensory Interventions: Assess changes in LOC    Moisture Interventions: Absorbent underpads, Limit adult briefs, Maintain skin hydration (lotion/cream), Minimize layers    Activity Interventions: Increase time out of bed, Pressure redistribution bed/mattress(bed type), PT/OT evaluation    Mobility Interventions: HOB 30 degrees or less, Pressure redistribution bed/mattress (bed type), PT/OT evaluation    Nutrition Interventions: Document food/fluid/supplement intake, Offer support with meals,snacks and hydration    Friction and Shear Interventions: HOB 30 degrees or less, Minimize layers                Problem: Patient Education: Go to Patient Education Activity  Goal: Patient/Family Education  Outcome: Progressing Towards Goal

## 2021-07-17 NOTE — PROGRESS NOTES
Hospitalist Progress Note     Name:  Chiara Hernandez  Age:76 y.o. Sex:female   :  1945    MRN:  [id number]   Admit Date:  2021    Presenting Complaint: Hip Pain    Initial Admission Diagnosis: Hip fracture [facility]) [address] Course/Interval history:     Ms. Austin Herrera is a 63 y/o AAF with past medical history of AF on warfarin, hypertension, vertigo, and anxiety. She was transported to the hospital after a fall. She landed on her right hip and was found to have a right femoral neck fracture. INR is 2.2 on admission. Orthopedic surgery performed right hip hemiarthroplasty on . Jul/15: POD1. No AEO. She denies CP/SOB. Patient may resume warfarin tomorrow per Ortho. Subjective (21): Patient seen at bedside, currently resting in bed currently on 2 L via NC. Patient has been at bedside patient reports feeling better, denies any headache, nausea vomiting, fever chills palpitations. Right hip pain is optimally controlled. No other overnight events. ROS: 10 point review of system is negative except for what mentioned above    Assessment & Plan     S/P fall   Right femoral neck fracture s/p R hip hemiarthroplasty  Pain control   INR 1.2 and was acceptable for surgery on      AF on warfarin prior to admission   Hold warfarin for now. Cont amiodarone 200 mg po q day. EKG on 2021 shows sinus rhythm. : Resume her home warfarin today  : INR 1.2 today     Hypertension  Monitor blood pressure and manage accordingly. Continue home medications, Amlodipine and Losartan.   BP is 160/70's ranges. Will monitor closely.      Anemia, chronic  Jul/15: Trend labs  : Check iron studies  : Serum iron 8, TIBC 275, ferritin 66. Patient will benefit from oral iron therapy however in view of acute post operative anemia from expected blood loss patient will be given 1 unit of PRBC.   Will recheck CBC in the morning.     GERD   On PPI Body mass index 32.30    Dispo/Discharge Planning:  Plan for short-term rehab placement on 7/19/2021. Diet:  ADULT DIET Regular  DVT PPx: warfarin  Code status: Full Code    Objective:     Patient Vitals for the past 24 hrs:   Temp Pulse Resp BP SpO2   07/17/21 0342 97.7 °F (36.5 °C) 67 18 122/72 94 %   07/16/21 2349 98.9 °F (37.2 °C) 65 18 114/71 92 %   07/16/21 2028 99.2 °F (37.3 °C) 74 18 (!) 145/72 94 %   07/16/21 1549 99.2 °F (37.3 °C) 71 18 134/60 94 %   07/16/21 1045 98.6 °F (37 °C) 74 17 116/67 93 %   07/16/21 0753 98.1 °F (36.7 °C) 68 18 (!) 112/59 91 %     Oxygen Therapy  O2 Sat (%): 94 % (07/17/21 0342)  Pulse via Oximetry: 67 beats per minute (07/14/21 1446)  O2 Device: None (Room air) (07/16/21 1541)  O2 Flow Rate (L/min): 3 l/min (07/16/21 0047)    Body mass index is 32.3 kg/m².     Physical Exam:   General:  No acute distress, speaking in full sentences, no use of accessory muscles   Lungs:  Clear to auscultation bilaterally   CV:  Regular rate and rhythm with normal S1 and S2   Abdomen:  Soft, nontender, nondistended  Extremities:  No cyanosis clubbing or edema   Neuro:              GCS 15, no motor or sensory deficits, cranial nerves II 12 grossly intact  Psych:  Normal affect     Data Review:  I have reviewed all labs, meds, and studies from the last 24 hours:    Labs:    Recent Results (from the past 24 hour(s))   PROTHROMBIN TIME + INR    Collection Time: 07/16/21 11:26 AM   Result Value Ref Range    Prothrombin time 15.3 (H) 12.5 - 14.7 sec    INR 1.2     CBC W/O DIFF    Collection Time: 07/17/21  5:51 AM   Result Value Ref Range    WBC 10.3 4.3 - 11.1 K/uL    RBC 3.36 (L) 4.05 - 5.2 M/uL    HGB 7.2 (L) 11.7 - 15.4 g/dL    HCT 24.8 (L) 35.8 - 46.3 %    MCV 73.8 (L) 79.6 - 97.8 FL    MCH 21.4 (L) 26.1 - 32.9 PG    MCHC 29.0 (L) 31.4 - 35.0 g/dL    RDW 17.4 (H) 11.9 - 14.6 %    PLATELET 926 985 - 656 K/uL    MPV 10.2 9.4 - 12.3 FL    ABSOLUTE NRBC 0.02 0.0 - 0.2 K/uL   METABOLIC PANEL, BASIC Collection Time: 07/17/21  5:51 AM   Result Value Ref Range    Sodium 137 136 - 145 mmol/L    Potassium 4.1 3.5 - 5.1 mmol/L    Chloride 107 98 - 107 mmol/L    CO2 22 21 - 32 mmol/L    Anion gap 8 7 - 16 mmol/L    Glucose 100 65 - 100 mg/dL    BUN 27 (H) 8 - 23 MG/DL    Creatinine 1.01 (H) 0.6 - 1.0 MG/DL    GFR est AA >60 >60 ml/min/1.73m2    GFR est non-AA 56 (L) >60 ml/min/1.73m2    Calcium 8.3 8.3 - 10.4 MG/DL   PROTHROMBIN TIME + INR    Collection Time: 07/17/21  5:51 AM   Result Value Ref Range    Prothrombin time 15.5 (H) 12.5 - 14.7 sec    INR 1.2     FERRITIN    Collection Time: 07/17/21  5:51 AM   Result Value Ref Range    Ferritin 66 8 - 388 NG/ML   TRANSFERRIN SATURATION    Collection Time: 07/17/21  5:51 AM   Result Value Ref Range    Iron 8 (L) 35 - 150 ug/dL    TIBC 275 250 - 450 ug/dL    Transferrin Saturation 3 (L) >20 %   CK    Collection Time: 07/17/21  5:51 AM   Result Value Ref Range     (H) 21 - 215 U/L       All Micro Results     None          EKG Results     Procedure 720 Value Units Date/Time    EKG 12 LEAD INITIAL [[id number]] Collected: 07/12/21 1728    Order Status: Completed Updated: 07/12/21 2049     Ventricular Rate 72 BPM      Atrial Rate 72 BPM      P-R Interval 168 ms      QRS Duration 102 ms      Q-T Interval 466 ms      QTC Calculation (Bezet) 510 ms      Calculated P Axis 74 degrees      Calculated R Axis 12 degrees      Calculated T Axis 59 degrees      Diagnosis --     !! AGE AND GENDER SPECIFIC ECG ANALYSIS !! Normal sinus rhythm  Incomplete right bundle branch block  Septal infarct (cited on or before 12-APR-2019)  Prolonged QT  Abnormal ECG  When compared with ECG of 14-MAR-2021 15:47,  Questionable change in initial forces of Septal leads  QT has lengthened  Confirmed by Rusty Cervantes ([id number]) on 7/12/2021 8:48:53 PM            Other Studies:  No results found.     Current Meds:   Current Facility-Administered Medications   Medication Dose Route Frequency    0.9% sodium chloride infusion 250 mL  250 mL IntraVENous PRN    Warfarin Dosing Placeholder   Other Rx Dosing/Monitoring    morphine injection 2 mg  2 mg IntraVENous Q1H PRN    HYDROcodone-acetaminophen (NORCO) 7.5-325 mg per tablet 2 Tablet  2 Tablet Oral Q4H PRN    sodium chloride (NS) flush 5-40 mL  5-40 mL IntraVENous Q8H    sodium chloride (NS) flush 5-40 mL  5-40 mL IntraVENous PRN    alum-mag hydroxide-simeth (MYLANTA) oral suspension 30 mL  30 mL Oral Q4H PRN    calcium-vitamin D (OS-DAGOBERTO +D3) 500 mg-200 unit per tablet 1 Tablet  1 Tablet Oral TID WITH MEALS    amiodarone (CORDARONE) tablet 200 mg  200 mg Oral DAILY    amLODIPine (NORVASC) tablet 5 mg  5 mg Oral DAILY    losartan (COZAAR) tablet 25 mg  25 mg Oral DAILY    venlafaxine (EFFEXOR) tablet 25 mg  25 mg Oral DAILY    acetaminophen (TYLENOL) tablet 650 mg  650 mg Oral Q6H PRN    Or    acetaminophen (TYLENOL) suppository 650 mg  650 mg Rectal Q6H PRN    senna (SENOKOT) tablet 17.2 mg  2 Tablet Oral BID PRN    ondansetron (ZOFRAN) injection 4 mg  4 mg IntraVENous Q6H PRN    cloNIDine HCL (CATAPRES) tablet 0.2 mg  0.2 mg Oral BID PRN    hydrALAZINE (APRESOLINE) 20 mg/mL injection 10 mg  10 mg IntraVENous Q6H PRN    pantoprazole (PROTONIX) tablet 40 mg  40 mg Oral ACB&D       Problem List:  Hospital Problems as of 7/17/2021 Date Reviewed: 12/18/2020        Codes Class Noted - Resolved POA    * (Principal) Fracture of femoral neck, right, closed ([facility] ICD-10-CM: S72.001A  ICD-9-CM: 820.8  7/16/2021 - Present Unknown        Hip fracture ([facility] ICD-10-CM: K73.142N  ICD-9-CM: 820.8  7/12/2021 - Present Unknown        Long term (current) use of anticoagulants ICD-10-CM: Z79.01  ICD-9-CM: V58.61  5/1/2018 - Present Yes        Hypertension ICD-10-CM: I10  ICD-9-CM: 401.9  1/23/2017 - Present Yes        Paroxysmal atrial fibrillation (HCC) ICD-10-CM: I48.0  ICD-9-CM: 427.31  1/23/2017 - Present Yes        GERD (gastroesophageal reflux disease) ICD-10-CM: K21.9  ICD-9-CM: 530.81  1/23/2017 - Present Yes        Hyperlipidemia ICD-10-CM: E78.5  ICD-9-CM: 272.4  1/23/2017 - Present Yes              Part of this note was written by using a voice dictation software and the note has been proof read but may still contain some grammatical/other typographical errors.     Signed By: Hale Phalen, MD   [facility] Hospitalist Service    July 17, 2021  5:15 PM

## 2021-07-18 LAB
ABO + RH BLD: NORMAL
BLD PROD TYP BPU: NORMAL
BLOOD GROUP ANTIBODIES SERPL: NORMAL
BPU ID: NORMAL
CROSSMATCH RESULT,%XM: NORMAL
HCT VFR BLD AUTO: 28.1 % (ref 35.8–46.3)
HGB BLD-MCNC: 8.3 G/DL (ref 11.7–15.4)
INR PPP: 1
PROTHROMBIN TIME: 13.9 SEC (ref 12.5–14.7)
SPECIMEN EXP DATE BLD: NORMAL
STATUS OF UNIT,%ST: NORMAL
UNIT DIVISION, %UDIV: 0

## 2021-07-18 PROCEDURE — 85018 HEMOGLOBIN: CPT

## 2021-07-18 PROCEDURE — 85610 PROTHROMBIN TIME: CPT

## 2021-07-18 PROCEDURE — 36415 COLL VENOUS BLD VENIPUNCTURE: CPT

## 2021-07-18 PROCEDURE — 65270000029 HC RM PRIVATE

## 2021-07-18 PROCEDURE — 74011250637 HC RX REV CODE- 250/637: Performed by: ORTHOPAEDIC SURGERY

## 2021-07-18 PROCEDURE — 97535 SELF CARE MNGMENT TRAINING: CPT

## 2021-07-18 PROCEDURE — 97530 THERAPEUTIC ACTIVITIES: CPT

## 2021-07-18 PROCEDURE — 74011250637 HC RX REV CODE- 250/637: Performed by: HOSPITALIST

## 2021-07-18 RX ORDER — WARFARIN 2.5 MG/1
5 TABLET ORAL EVERY EVENING
Status: DISCONTINUED | OUTPATIENT
Start: 2021-07-18 | End: 2021-07-19

## 2021-07-18 RX ADMIN — ACETAMINOPHEN 650 MG: 325 TABLET ORAL at 21:37

## 2021-07-18 RX ADMIN — VENLAFAXINE 25 MG: 25 TABLET ORAL at 08:06

## 2021-07-18 RX ADMIN — WARFARIN SODIUM 5 MG: 2.5 TABLET ORAL at 17:31

## 2021-07-18 RX ADMIN — ACETAMINOPHEN 650 MG: 325 TABLET ORAL at 15:04

## 2021-07-18 RX ADMIN — Medication 10 ML: at 21:37

## 2021-07-18 RX ADMIN — AMLODIPINE BESYLATE 5 MG: 5 TABLET ORAL at 08:06

## 2021-07-18 RX ADMIN — PANTOPRAZOLE SODIUM 40 MG: 40 TABLET, DELAYED RELEASE ORAL at 05:13

## 2021-07-18 RX ADMIN — LOSARTAN POTASSIUM 25 MG: 25 TABLET, FILM COATED ORAL at 08:06

## 2021-07-18 RX ADMIN — Medication 10 ML: at 05:13

## 2021-07-18 RX ADMIN — ACETAMINOPHEN 650 MG: 325 TABLET ORAL at 08:06

## 2021-07-18 RX ADMIN — Medication 1 TABLET: at 12:37

## 2021-07-18 RX ADMIN — AMIODARONE HYDROCHLORIDE 200 MG: 200 TABLET ORAL at 08:06

## 2021-07-18 RX ADMIN — Medication 10 ML: at 15:08

## 2021-07-18 RX ADMIN — PANTOPRAZOLE SODIUM 40 MG: 40 TABLET, DELAYED RELEASE ORAL at 16:30

## 2021-07-18 RX ADMIN — Medication 1 TABLET: at 16:30

## 2021-07-18 RX ADMIN — Medication 1 TABLET: at 08:06

## 2021-07-18 NOTE — PROGRESS NOTES
Hospitalist Progress Note     Name:  Serjio Alexis  Age:76 y.o. Sex:female   :  1945    MRN:  [id number]   Admit Date:  2021    Presenting Complaint: Hip Pain    Initial Admission Diagnosis: Hip fracture [facility]) [address] Course/Interval history:     Ms. Trev Bauer is a 63 y/o AAF with past medical history of AF on warfarin, hypertension, vertigo, and anxiety. She was transported to the hospital after a fall. She landed on her right hip and was found to have a right femoral neck fracture. INR is 2.2 on admission. Orthopedic surgery performed right hip hemiarthroplasty on . Jul/15: POD1. No AEO. She denies CP/SOB. Patient may resume warfarin tomorrow per Ortho. Subjective (21): Patien patient seen at bedside, comfortably resting in bed. She is currently on room air. She denies having any chest pain, nausea vomiting, shortness of breath or cough. Right hip pain is optimally controlled. ROS: 10 point review of system is negative except for what mentioned above    Assessment & Plan     S/P fall   Right femoral neck fracture s/p R hip hemiarthroplasty  Pain control   INR 1.2 and was acceptable for surgery on      AF on warfarin prior to admission   Hold warfarin for now. Cont amiodarone 200 mg po q day. EKG on 2021 shows sinus rhythm. : Resume her home warfarin today  : INR 1.0 today     Hypertension  Monitor blood pressure and manage accordingly. Continue home medications, Amlodipine and Losartan.   BP is 160/70's ranges. Will monitor closely.      Anemia, chronic  Jul/15: Trend labs  : Check iron studies  : Serum iron 8, TIBC 275, ferritin 66. Patient will benefit from oral iron therapy however in view of acute post operative anemia from expected blood loss patient will be given 1 unit of PRBC. Will recheck CBC in the morning. : Status post 1 unit PRBC with hemoglobin 8.3 this morning.   Patient is hemodynamically stable, monitor for any signs of bleeding.     GERD   On PPI     Body mass index 32.30    Dispo/Discharge Planning:  Plan for short-term rehab placement on 7/19/2021. Diet:  ADULT DIET Regular  DVT PPx: warfarin  Code status: Full Code    Objective:     Patient Vitals for the past 24 hrs:   Temp Pulse Resp BP SpO2   07/18/21 0804 98 °F (36.7 °C) (!) 57 19 128/72 96 %   07/18/21 0405 98.1 °F (36.7 °C) 61 20 133/67 94 %   07/18/21 0045 99.1 °F (37.3 °C) 65 20 129/64 92 %   07/17/21 2114 98.6 °F (37 °C) 72 18 (!) 154/72 90 %   07/17/21 1602 98.3 °F (36.8 °C) 67 18 (!) 132/53 94 %   07/17/21 1507 98.1 °F (36.7 °C) 66 18 (!) 139/57    07/17/21 1415 98.4 °F (36.9 °C) 71 18 (!) 133/58    07/17/21 1357 98.3 °F (36.8 °C) 70 18 138/68    07/17/21 1303 98.1 °F (36.7 °C) 61 18 (!) 145/73 94 %   07/17/21 0807 98.3 °F (36.8 °C) 70 17 138/66 96 %     Oxygen Therapy  O2 Sat (%): 96 % (07/18/21 0804)  Pulse via Oximetry: 67 beats per minute (07/14/21 1446)  O2 Device: None (Room air) (07/16/21 1541)  O2 Flow Rate (L/min): 3 l/min (07/16/21 0047)    Body mass index is 32.3 kg/m². Physical Exam:   General:  Alert, awake, NAD, on room air.   Lungs:  Clear to auscultation bilaterally   CV:  Regular rate and rhythm with normal S1 and S2   Abdomen:  Soft, nontender, nondistended  Extremities:  No cyanosis clubbing or edema   Neuro:              GCS 15, no motor or sensory deficits, cranial nerves II 12 grossly intact  Psych:  Normal affect     Data Review:  I have reviewed all labs, meds, and studies from the last 24 hours:    Labs:    Recent Results (from the past 24 hour(s))   TYPE & SCREEN    Collection Time: 07/17/21  9:17 AM   Result Value Ref Range    Crossmatch Expiration 07/20/2021,6634     ABO/Rh(D) Lujan Wildwood POSITIVE     Antibody screen NEG     Unit number [id number]     Blood component type  LR     Unit division 00     Status of unit TRANSFUSED     Crossmatch result Compatible    PROTHROMBIN TIME + INR Collection Time: 07/18/21  5:03 AM   Result Value Ref Range    Prothrombin time 13.9 12.5 - 14.7 sec    INR 1.0         All Micro Results     None          EKG Results     Procedure 720 Value Units Date/Time    EKG 12 LEAD INITIAL [[id number]] Collected: 07/12/21 1728    Order Status: Completed Updated: 07/12/21 2049     Ventricular Rate 72 BPM      Atrial Rate 72 BPM      P-R Interval 168 ms      QRS Duration 102 ms      Q-T Interval 466 ms      QTC Calculation (Bezet) 510 ms      Calculated P Axis 74 degrees      Calculated R Axis 12 degrees      Calculated T Axis 59 degrees      Diagnosis --     !! AGE AND GENDER SPECIFIC ECG ANALYSIS !! Normal sinus rhythm  Incomplete right bundle branch block  Septal infarct (cited on or before 12-APR-2019)  Prolonged QT  Abnormal ECG  When compared with ECG of 14-MAR-2021 15:47,  Questionable change in initial forces of Septal leads  QT has lengthened  Confirmed by Ana Paula Montelongo ([id number]) on 7/12/2021 8:48:53 PM            Other Studies:  No results found.     Current Meds:   Current Facility-Administered Medications   Medication Dose Route Frequency    0.9% sodium chloride infusion 250 mL  250 mL IntraVENous PRN    warfarin (COUMADIN) tablet 2.5 mg  2.5 mg Oral QPM    morphine injection 2 mg  2 mg IntraVENous Q1H PRN    HYDROcodone-acetaminophen (NORCO) 7.5-325 mg per tablet 2 Tablet  2 Tablet Oral Q4H PRN    sodium chloride (NS) flush 5-40 mL  5-40 mL IntraVENous Q8H    sodium chloride (NS) flush 5-40 mL  5-40 mL IntraVENous PRN    alum-mag hydroxide-simeth (MYLANTA) oral suspension 30 mL  30 mL Oral Q4H PRN    calcium-vitamin D (OS-DAGOBERTO +D3) 500 mg-200 unit per tablet 1 Tablet  1 Tablet Oral TID WITH MEALS    amiodarone (CORDARONE) tablet 200 mg  200 mg Oral DAILY    amLODIPine (NORVASC) tablet 5 mg  5 mg Oral DAILY    losartan (COZAAR) tablet 25 mg  25 mg Oral DAILY    venlafaxine (EFFEXOR) tablet 25 mg  25 mg Oral DAILY    acetaminophen (TYLENOL) tablet 650 mg 650 mg Oral Q6H PRN    Or    acetaminophen (TYLENOL) suppository 650 mg  650 mg Rectal Q6H PRN    senna (SENOKOT) tablet 17.2 mg  2 Tablet Oral BID PRN    ondansetron (ZOFRAN) injection 4 mg  4 mg IntraVENous Q6H PRN    cloNIDine HCL (CATAPRES) tablet 0.2 mg  0.2 mg Oral BID PRN    hydrALAZINE (APRESOLINE) 20 mg/mL injection 10 mg  10 mg IntraVENous Q6H PRN    pantoprazole (PROTONIX) tablet 40 mg  40 mg Oral ACB&D       Problem List:  Hospital Problems as of 7/18/2021 Date Reviewed: 12/18/2020        Codes Class Noted - Resolved POA    Acute postoperative anemia due to expected blood loss ICD-10-CM: D62  ICD-9-CM: 285.1  7/17/2021 - Present Unknown        * (Principal) Fracture of femoral neck, right, closed ([facility] ICD-10-CM: S72.001A  ICD-9-CM: 820.8  7/16/2021 - Present Unknown        Hip fracture ([facility] ICD-10-CM: N18.301V  ICD-9-CM: 820.8  7/12/2021 - Present Unknown        Long term (current) use of anticoagulants ICD-10-CM: Z79.01  ICD-9-CM: V58.61  5/1/2018 - Present Yes        Hypertension ICD-10-CM: I10  ICD-9-CM: 401.9  1/23/2017 - Present Yes        Paroxysmal atrial fibrillation (HCC) ICD-10-CM: I48.0  ICD-9-CM: 427.31  1/23/2017 - Present Yes        GERD (gastroesophageal reflux disease) ICD-10-CM: K21.9  ICD-9-CM: 530.81  1/23/2017 - Present Yes        Hyperlipidemia ICD-10-CM: E78.5  ICD-9-CM: 272.4  1/23/2017 - Present Yes              Part of this note was written by using a voice dictation software and the note has been proof read but may still contain some grammatical/other typographical errors.     Signed By: Gerald Stanley MD   [employer] [facility]    July 18, 2021  5:15 PM

## 2021-07-18 NOTE — PROGRESS NOTES
ACUTE PHYSICAL THERAPY GOALS:  (Developed with and agreed upon by patient and/or caregiver.)  Goals:  (1.)Ms. Richard Pollack will move from supine to sit and sit to supine , scoot up and down and roll side to side in bed with MODIFIED INDEPENDENCE within 7 treatment day(s). (2.)Ms. Richard Pollack will transfer from bed to chair and chair to bed with CONTACT GUARD ASSIST using the least restrictive device within 7 treatment day(s). (3.)Ms. Richard Pollack will ambulate with CONTACT GUARD ASSIST for 15--20 feet with the least restrictive device within 7 treatment day(s). (4.)Ms. Richard Pollack will participate in RLE AAROM exercises in supine and sitting to improve movement, strength and mobility within 7 treatment day(s)    PHYSICAL THERAPY: Daily Note and PM Treatment Day # 4    Sammy Pollack is a 64 y.o. female   PRIMARY DIAGNOSIS: Fracture of femoral neck, right, closed ([address]  Hip fracture ([facility] [S72.009A]  Procedure(s) (LRB):  RIGHT HIP HEMIARTHROPLASTY/TAO (Right)  4 Days Post-Op    ASSESSMENT:     REHAB RECOMMENDATIONS: CURRENT LEVEL OF FUNCTION:  (Most Recently Demonstrated)   Recommendation to date pending progress:  Setting:   Short-term Rehab  Equipment:    To Be Determined Bed Mobility:   Maximal Assistance x 2  Sit to Stand:   Maximal Assistance x 2   Transfers:   Maximal Assistance x 2  Gait/Mobility:   Unable to perform     ASSESSMENT:  Ms. Richard Pollack continues to make slow progress toward goals with additional time and cueing for all mobiltiy. She participated in STS x3 with the RW and SPTx2 with max Ax2. Presents with posterior and L lean in both sitting and standing despite verbal/manual/visual cueing. Additional assist to return to supine. C/o high pain with mobility.       SUBJECTIVE:   Ms. Richard Pollack states, \"wait wait wait, that foot won't move\"    SOCIAL HISTORY/ LIVING ENVIRONMENT:   Home Environment: Private residence  # Steps to Enter: 1  One/Two Story Residence: One story  Living Alone: No  Support Systems: Spouse/Significant Other/Partner  OBJECTIVE:     PAIN: VITAL SIGNS: LINES/DRAINS:   Pre Treatment: Pain Screen  Pain Scale 1: Visual  Pain Intensity 1:  (pain with mobiltiy)  Post Treatment: comfortable in bed     Visit Vitals  /66 (BP 1 Location: Left upper arm, BP Patient Position: Sitting)   Pulse (!) 55   Temp 97.9 °F (36.6 °C)   Resp 18   Ht 5' 5\" (1.651 m)   Wt 88 kg (194 lb 1.6 oz)   SpO2 96%   Breastfeeding No   BMI 32.30 kg/m²      O2 Device: None (Room air)     MOBILITY: I Mod I S SBA CGA Min Mod Max Total  NT x2 Comments:   Bed Mobility    Rolling [] [] [] [] [] [] [] [] [] [x] []    Supine to Sit [] [] [] [] [] [] [] [] [] [x] []    Scooting [] [] [] [] [] [] [] [x] [] [] [x]    Sit to Supine [] [] [] [] [] [] [] [x] [x] [] [x]    Transfers    Sit to Stand [] [] [] [] [] [] [] [x] [] [] [x]    Bed to Chair [] [] [] [] [] [] [] [] [] [x] []    Stand to Sit [] [] [] [] [] [] [] [x] [] [] [x]    I=Independent, Mod I=Modified Independent, S=Supervision, SBA=Standby Assistance, CGA=Contact Guard Assistance,   Min=Minimal Assistance, Mod=Moderate Assistance, Max=Maximal Assistance, Total=Total Assistance, NT=Not Tested    BALANCE: Good Fair+ Fair Fair- Poor NT Comments   Sitting Static [] [] [] [x] [] [] Prop sits and leans to the left to avoid RLE weight bearing   Sitting Dynamic [] [] [] [x] [] []              Standing Static [] [] [] [x] [x] []    Standing Dynamic [] [] [] [] [x] []      GAIT: I Mod I S SBA CGA Min Mod Max Total  NT x2 Comments:   Level of Assistance [] [] [] [] [] [] [] [] [] [x] []    Distance 0    DME N/A    Gait Quality N/A    Weightbearing  Status N/A     I=Independent, Mod I=Modified Independent, S=Supervision, SBA=Standby Assistance, CGA=Contact Guard Assistance,   Min=Minimal Assistance, Mod=Moderate Assistance, Max=Maximal Assistance, Total=Total Assistance, NT=Not Tested    PLAN:   FREQUENCY/DURATION: PT Plan of Care: BID for duration of hospital stay or until stated goals are met, whichever comes first.  TREATMENT:     TREATMENT:   ($$ Therapeutic Activity: 23-37 mins    )  Co-Treatment PT/OT necessary due to patient's decreased overall endurance/tolerance levels, as well as need for high level skilled assistance to complete functional transfers/mobility and functional tasks  Therapeutic Activity (23 Minutes): Therapeutic activity included Rolling, Supine to Sit, Sit to Supine, Scooting, Lateral Scooting, Transfer Training, Sitting balance  and Standing balance to improve functional Mobility, ROM and Activity tolerance.     TREATMENT GRID:   Date:  7/15/21 Date:   Date:     Activity/Exercise Parameters Parameters Parameters   RLE ankle pumps 10 times AA     RLE heel slides thru 1/4 range 3 x 5 times AA     RLE hip abd/adduction thru 1/4 range 10 AA     Forward trunk flexion 10 times                         AFTER TREATMENT POSITION/PRECAUTIONS:  Bed, Needs within reach and RN notified    INTERDISCIPLINARY COLLABORATION:  RN/PCT, PT/PTA and OT/COSTELLO    TOTAL TREATMENT DURATION:  PT Patient Time In/Time Out  Time In: 1336  Time Out: [address], PTA

## 2021-07-18 NOTE — PROGRESS NOTES
ACUTE OT GOALS:  (Developed with and agreed upon by patient and/or caregiver.)  1. Patient will complete lower body bathing and dressing with MIN A and adaptive equipment as needed. 2.Patient will complete upper body bathing and dressing with SETUP ASSIST and adaptive equipment as needed. 3. Patient will complete toileting with MOD A.   4. Patient will tolerate 25 minutes of OT treatment with 1-2 rest breaks to increase activity tolerance for ADLs. 5. Patient will complete functional transfers with MIN A and adaptive equipment as needed. 6. Patient will complete functional activity with MIN A and adaptive equipment as needed.     Timeframe: 7 visits   OCCUPATIONAL THERAPY: Daily Note OT Treatment Day # 3    Frida Groves is a 64 y.o. female   PRIMARY DIAGNOSIS: Fracture of femoral neck, right, closed (HCC)  Hip fracture ([facility] [S72.009A]  Procedure(s) (LRB):  RIGHT HIP HEMIARTHROPLASTY/TAO (Right)  4 Days Post-Op  Payor: [employer] MEDICARE / Plan: [address] [place] [address] HMO / Product Type: [employer] Medicare /   ASSESSMENT:     REHAB RECOMMENDATIONS: CURRENT LEVEL OF FUNCTION:  (Most Recently Demonstrated)   Recommendation to date pending progress:  Setting:   Short-term Rehab  Equipment:    To Be Determined Bathing:   Maximal Assistance  Dressing:   Maximal Assistance  Feeding/Grooming:   Set Up  Toileting:   Not tested  Functional Mobility:   Maximal Assistance x 2     ASSESSMENT:  AM Session:  Ms. Nano Mckeon is slowly progressing with OT services but continues to demonstrate decreased strength, coordination, balance, and activity tolerance for completion of ADLs/functional mobility. Pt was supine in bed upon arrival. Pt completed bed mobility with max A X2. Pt completed sponge bath, dressing and grooming with the assistance listed below. Pt transferred to chair with max A X2 using rolling walker. Continue POC. SUBJECTIVE:   Ms. Nano Mckeon states, \"I am tired. \"    SOCIAL HISTORY/LIVING ENVIRONMENT: pt lives with her spouse in a one level home with one-step to get in, tub shower with a shower chair, independent for ADLs/IADLs  Home Environment: Private residence  # Steps to Enter: 1  One/Two Story Residence: One story  Living Alone: No  Support Systems: Spouse/Significant Other/Partner    OBJECTIVE:     PAIN: VITAL SIGNS: LINES/DRAINS:   Pre Treatment: Pain Screen  Pain Scale 1: Numeric (0 - 10)  Pain Intensity 1: 3  Pain Location 1: Hip  Pain Intervention(s) 1: Repositioned  Post Treatment: no change    IV  O2 Device: None (Room air)     ACTIVITIES OF DAILY LIVING: I Mod I S SBA CGA Min Mod Max Total NT Comments   BASIC ADLs:              Bathing/ Showering [] [] [] [] [] [] [] [x] [] [] Washed UB   Toileting [] [] [] [] [] [] [] [] [] [x]    Dressing [] [] [] [] [] [] [] [x] [] [] Doffed and donned socks and gown    Feeding [] [] [] [] [] [] [] [] [] [x]    Grooming [] [] [x] [] [] [] [] [] [] [] Washed face sitting EOB   Personal Device Care [] [] [] [] [] [] [] [] [] [x]    Functional Mobility [] [] [] [] [] [] [] [x] [] [] x2   I=Independent, Mod I=Modified Independent, S=Supervision, SBA=Standby Assistance, CGA=Contact Guard Assistance,   Min=Minimal Assistance, Mod=Moderate Assistance, Max=Maximal Assistance, Total=Total Assistance, NT=Not Tested    MOBILITY: I Mod I S SBA CGA Min Mod Max Total  NT x2 Comments:   Supine to sit [] [] [] [] [] [] [] [x] [] [] [x]    Sit to supine [] [] [] [] [] [] [] [x] [] [] [x]    Sit to stand [] [] [] [] [] [] [] [x] [] [] [x] Posterior and L side lean    Bed to chair [] [] [] [] [] [] [] [x] [] [] [x]    I=Independent, Mod I=Modified Independent, S=Supervision, SBA=Standby Assistance, CGA=Contact Guard Assistance,   Min=Minimal Assistance, Mod=Moderate Assistance, Max=Maximal Assistance, Total=Total Assistance, NT=Not Tested    BALANCE: Good Fair+ Fair Fair- Poor NT Comments   Sitting Static [] [] [] [] [x] [] Posterior and L side lean    Sitting Dynamic [] [] [] [] [x] [] Posterior and L side lean             Standing Static [] [] [] [] [x] [] Posterior and L side lean   Standing Dynamic [] [] [] [] [x] [] Posterior and L side lean     PLAN:   FREQUENCY/DURATION: OT Plan of Care: 6 times/week for duration of hospital stay or until stated goals are met, whichever comes first.    TREATMENT:   TREATMENT:   ($$ Self Care/Home Management: 38-52 mins    )  Self Care (38 Minutes): Self care including Upper Body Bathing, Lower Body Bathing, Upper Body Dressing, Lower Body Dressing and Grooming to increase independence and decrease level of assistance required.     TREATMENT GRID:  N/A    AFTER TREATMENT POSITION/PRECAUTIONS:  Alarm Activated, Chair, Needs within reach and RN notified    INTERDISCIPLINARY COLLABORATION:  RN/PCT, PT/PTA and OT/COSTELLO    TOTAL TREATMENT DURATION:  OT Patient Time In/Time Out  Time In: 1010  Time Out: 2000 Raissa Ledesma

## 2021-07-18 NOTE — PROGRESS NOTES
Warfarin dosing per pharmacist    Elaina Sanders is a 64 y.o. female. Height: 5' 5\" (165.1 cm) Weight: 88 kg (194 lb 1.6 oz)    Indication:  Afib    Goal INR:  2-3    Home dose:  1.25 mg Mon, 2.5 mg all other days    Risk factors or significant drug interactions:  Amiodarone (home med)    Other anticoagulants:  none    Daily Monitoring  Date  INR     Warfarin dose HGB              Notes  7/16  1.2  2.5 mg  7.7  7/17  1.2  2.5 mg  7.2  7/18  1.0  5 mg   8.3        Pharmacy is consulted to assist in dosing Ms. Jones's warfarin this admission. Patients INR was therapeutic upon admission at 2.2. Prior to surgery 10 mg IV vitamin K was administered 7/12 and INR decreased to 1.7. INR remains subtherapeutic today at 1.0. Based on outpatient records patient is very sensitive to warfarin. Will give increased dose of 5 mg this evening and continue to closely monitor. Daily INR will continue. Pharmacy will continue to monitor and adjust dosing as clinically indicated. Please call with any questions.     Thank you,  Zahra Harper, PharmD  Clinical Pharmacy Specialist  [phone]

## 2021-07-18 NOTE — PROGRESS NOTES
Problem: Falls - Risk of  Goal: *Absence of Falls  Description: Document Star Jacobs Fall Risk and appropriate interventions in the flowsheet. 7/18/2021 0003 by Jigna Chun RN  Outcome: Progressing Towards Goal  Note: Fall Risk Interventions:  Mobility Interventions: Bed/chair exit alarm, Patient to call before getting OOB         Medication Interventions: Teach patient to arise slowly, Patient to call before getting OOB, Bed/chair exit alarm    Elimination Interventions: Bed/chair exit alarm, Call light in reach, Patient to call for help with toileting needs    History of Falls Interventions: Bed/chair exit alarm, Door open when patient unattended      7/18/2021 0002 by Jigna Chun RN  Outcome: Progressing Towards Goal  Note: Fall Risk Interventions:  Mobility Interventions: Bed/chair exit alarm, Patient to call before getting OOB         Medication Interventions: Teach patient to arise slowly, Patient to call before getting OOB, Bed/chair exit alarm    Elimination Interventions: Bed/chair exit alarm, Call light in reach, Patient to call for help with toileting needs    History of Falls Interventions: Bed/chair exit alarm, Door open when patient unattended         Problem: Patient Education: Go to Patient Education Activity  Goal: Patient/Family Education  7/18/2021 0003 by Jigna Chun RN  Outcome: Progressing Towards Goal  7/18/2021 0002 by Jigna Chun RN  Outcome: Progressing Towards Goal     Problem: Pressure Injury - Risk of  Goal: *Prevention of pressure injury  Description: Document Forrest Scale and appropriate interventions in the flowsheet.   7/18/2021 0003 by Jigna Chun RN  Outcome: Progressing Towards Goal  Note: Pressure Injury Interventions:  Sensory Interventions: Assess changes in LOC    Moisture Interventions: Absorbent underpads, Limit adult briefs, Maintain skin hydration (lotion/cream), Minimize layers    Activity Interventions: PT/OT evaluation, Pressure redistribution bed/mattress(bed type), Increase time out of bed    Mobility Interventions: PT/OT evaluation, Pressure redistribution bed/mattress (bed type)    Nutrition Interventions: Document food/fluid/supplement intake    Friction and Shear Interventions: Lift sheet, Minimize layers             7/18/2021 0002 by Yanet Schneider RN  Outcome: Progressing Towards Goal  Note: Pressure Injury Interventions:  Sensory Interventions: Assess changes in LOC    Moisture Interventions: Absorbent underpads, Limit adult briefs, Maintain skin hydration (lotion/cream), Minimize layers    Activity Interventions: PT/OT evaluation, Pressure redistribution bed/mattress(bed type), Increase time out of bed    Mobility Interventions: PT/OT evaluation, Pressure redistribution bed/mattress (bed type)    Nutrition Interventions: Document food/fluid/supplement intake    Friction and Shear Interventions: Lift sheet, Minimize layers                Problem: Patient Education: Go to Patient Education Activity  Goal: Patient/Family Education  7/18/2021 0003 by Yanet Schneider RN  Outcome: Progressing Towards Goal  7/18/2021 0002 by Yanet Schneider RN  Outcome: Progressing Towards Goal

## 2021-07-18 NOTE — PROGRESS NOTES
2021         Post Op day: 4 Days Post-Op     Admit Date: 2021  Admit Diagnosis: Hip fracture ([facility] [S72.009A]  POD4  Subjective: Doing well, No complaints, No SOB, No Chest Pain, No Nausea or Vomitting. Having some difficulty with getting around and progressing with PT.   PT/OT:         Activity Response: Poorly tolerated  Assistive Device: Fall prevention device                Weight Bearing Status: WBAT  BMP:  Recent Labs     21  0551 21  0543   CREA 1.01* 1.40*   BUN 27* 26*    138   K 4.1 4.2    106   CO2 22 24   AGAP 8 8    111*     Patient Vitals for the past 8 hrs:   BP Temp Pulse Resp SpO2   21 0804 128/72 98 °F (36.7 °C) (!) 57 19 96 %   21 0405 133/67 98.1 °F (36.7 °C) 61 20 94 %     Temp (24hrs), Av.3 °F (36.8 °C), Min:98 °F (36.7 °C), Max:99.1 °F (37.3 °C)    CBC:  Recent Labs     21  0818 21  0551 21  0543   WBC  --  10.3 13.6*   GRANS  --   --  80*   MONOS  --   --  9   EOS  --   --  1   ANEU  --   --  10.9*   ABL  --   --  1.3   HGB 8.3* 7.2* 7.7*   HCT 28.1* 24.8* 26.4*   PLT  --  176 202       Microbiology:     All Micro Results     None        Objective: Vital Signs are Stable, No Acute Distress, Alert and Oriented  Dressing is Dry,  Neurovascular exam is normal.    Assessment:  Patient Active Problem List   Diagnosis Code    Hypertension I10    Dyspnea R06.00    Paroxysmal atrial fibrillation ([facility]) I48.0    Migraine with vertigo G43. 80    GERD (gastroesophageal reflux disease) K21.9    Hyperlipidemia E78.5    Long term (current) use of anticoagulants Z79.01    SSS (sick sinus syndrome) ([facility]) I49.5    Dizziness R42    Hip fracture ([facility]) S72.009A    Fracture of femoral neck, right, closed ([facility]) S72.001A    Acute postoperative anemia due to expected blood loss D62       Plan:  Continue PT/OT rehab as indicated    Nursing and SS for disposition plans      Signed By: ENMA Cortes

## 2021-07-18 NOTE — PROGRESS NOTES
ACUTE PHYSICAL THERAPY GOALS:  (Developed with and agreed upon by patient and/or caregiver.)  Goals:  (1.)Ms. Austin Herrera will move from supine to sit and sit to supine , scoot up and down and roll side to side in bed with MODIFIED INDEPENDENCE within 7 treatment day(s). (2.)Ms. Austin Herrera will transfer from bed to chair and chair to bed with CONTACT GUARD ASSIST using the least restrictive device within 7 treatment day(s). (3.)Ms. Austin Herrera will ambulate with CONTACT GUARD ASSIST for 15--20 feet with the least restrictive device within 7 treatment day(s). (4.)Ms. Austin Herrera will participate in RLE AAROM exercises in supine and sitting to improve movement, strength and mobility within 7 treatment day(s)    PHYSICAL THERAPY: Daily Note and AM Treatment Day # Sylvester Herrera is a 64 y.o. female   PRIMARY DIAGNOSIS: Fracture of femoral neck, right, closed ([facility] [address]  Hip fracture ([facility] [address] [S72.009A]  Procedure(s) (LRB):  RIGHT HIP HEMIARTHROPLASTY/TAO (Right)  4 Days Post-Op    ASSESSMENT:     REHAB RECOMMENDATIONS: CURRENT LEVEL OF FUNCTION:  (Most Recently Demonstrated)   Recommendation to date pending progress:  Setting:   Short-term Rehab  Equipment:    To Be Determined Bed Mobility:   Maximal Assistance x 2  Sit to Stand:   Maximal Assistance x 2   Transfers:   Maximal Assistance x 2  Gait/Mobility:   Unable to perform     ASSESSMENT:  Ms. Austin Herrera is making slow progress toward goals. She continues to require additional time and cueing throughout. Sitting balance is improving, but still requires constant guard to ensure safety due to posterior and L Lean. STSx2 with max A and cueing for technique. SPT to recliner with cueing for mobilizing BLE and maintaining midline.       SUBJECTIVE:   Ms. Austin Herrera states, \"I'm gonna cry\"    SOCIAL HISTORY/ LIVING ENVIRONMENT:   Home Environment: Private residence  # Steps to Enter: 1  One/Two Story Residence: One story  Living Alone: No  Support Systems: Spouse/Significant Other/Partner  OBJECTIVE:     PAIN: VITAL SIGNS: LINES/DRAINS:   Pre Treatment: Pain Screen  Pain Scale 1: Visual  Post Treatment: comfortable in chair     Visit Vitals  /66 (BP 1 Location: Left upper arm, BP Patient Position: Sitting)   Pulse (!) 55   Temp 97.9 °F (36.6 °C)   Resp 18   Ht 5' 5\" (1.651 m)   Wt 88 kg (194 lb 1.6 oz)   SpO2 96%   Breastfeeding No   BMI 32.30 kg/m²      O2 Device: None (Room air)     MOBILITY: I Mod I S SBA CGA Min Mod Max Total  NT x2 Comments:   Bed Mobility    Rolling [] [] [] [] [] [] [] [x] [] [] [x]    Supine to Sit [] [] [] [] [] [] [] [x] [] [] [x]    Scooting [] [] [] [] [] [] [] [x] [] [] [x]    Sit to Supine [] [] [] [] [] [] [] [] [] [x] []    Transfers    Sit to Stand [] [] [] [] [] [] [] [x] [] [] [x]    Bed to Chair [] [] [] [] [] [] [] [x] [] [] [x]    Stand to Sit [] [] [] [] [] [] [] [x] [] [] [x]    I=Independent, Mod I=Modified Independent, S=Supervision, SBA=Standby Assistance, CGA=Contact Guard Assistance,   Min=Minimal Assistance, Mod=Moderate Assistance, Max=Maximal Assistance, Total=Total Assistance, NT=Not Tested    BALANCE: Good Fair+ Fair Fair- Poor NT Comments   Sitting Static [] [] [] [x] [] [] Prop sits and leans to the left to avoid RLE weight bearing   Sitting Dynamic [] [] [] [x] [] []              Standing Static [] [] [] [x] [x] []    Standing Dynamic [] [] [] [] [x] []      GAIT: I Mod I S SBA CGA Min Mod Max Total  NT x2 Comments:   Level of Assistance [] [] [] [] [] [] [] [] [] [x] []    Distance 0    DME N/A    Gait Quality N/A    Weightbearing  Status N/A     I=Independent, Mod I=Modified Independent, S=Supervision, SBA=Standby Assistance, CGA=Contact Guard Assistance,   Min=Minimal Assistance, Mod=Moderate Assistance, Max=Maximal Assistance, Total=Total Assistance, NT=Not Tested    PLAN:   FREQUENCY/DURATION: PT Plan of Care: BID for duration of hospital stay or until stated goals are met, whichever comes first.  TREATMENT:     TREATMENT:   ($$ Therapeutic Activity: 23-37 mins    )  Co-Treatment PT/OT necessary due to patient's decreased overall endurance/tolerance levels, as well as need for high level skilled assistance to complete functional transfers/mobility and functional tasks  Therapeutic Activity (27 Minutes): Therapeutic activity included Rolling, Supine to Sit, Sit to Supine, Scooting, Transfer Training, Sitting balance  and Standing balance to improve functional Mobility, ROM and Activity tolerance.     TREATMENT GRID:   Date:  7/15/21 Date:   Date:     Activity/Exercise Parameters Parameters Parameters   RLE ankle pumps 10 times AA     RLE heel slides thru 1/4 range 3 x 5 times AA     RLE hip abd/adduction thru 1/4 range 10 AA     Forward trunk flexion 10 times                         AFTER TREATMENT POSITION/PRECAUTIONS:  Chair, Needs within reach and RN notified    INTERDISCIPLINARY COLLABORATION:  RN/PCT, PT/PTA and OT/COSTELLO    TOTAL TREATMENT DURATION:  PT Patient Time In/Time Out  Time In: 1017  Time Out: 2830 [facility] [[email or web address] [facility]

## 2021-07-19 VITALS
RESPIRATION RATE: 17 BRPM | HEART RATE: 64 BPM | SYSTOLIC BLOOD PRESSURE: 111 MMHG | OXYGEN SATURATION: 96 % | TEMPERATURE: 98.1 F | BODY MASS INDEX: 32.34 KG/M2 | HEIGHT: 65 IN | WEIGHT: 194.1 LBS | DIASTOLIC BLOOD PRESSURE: 70 MMHG

## 2021-07-19 LAB
COVID-19 RAPID TEST, COVR: NOT DETECTED
INR PPP: 1.2
PROTHROMBIN TIME: 15 SEC (ref 12.5–14.7)
SOURCE, COVRS: NORMAL

## 2021-07-19 PROCEDURE — 74011250637 HC RX REV CODE- 250/637: Performed by: HOSPITALIST

## 2021-07-19 PROCEDURE — 36415 COLL VENOUS BLD VENIPUNCTURE: CPT

## 2021-07-19 PROCEDURE — 97116 GAIT TRAINING THERAPY: CPT

## 2021-07-19 PROCEDURE — 2709999900 HC NON-CHARGEABLE SUPPLY

## 2021-07-19 PROCEDURE — 97535 SELF CARE MNGMENT TRAINING: CPT

## 2021-07-19 PROCEDURE — 74011250637 HC RX REV CODE- 250/637: Performed by: ORTHOPAEDIC SURGERY

## 2021-07-19 PROCEDURE — 87635 SARS-COV-2 COVID-19 AMP PRB: CPT

## 2021-07-19 PROCEDURE — 97112 NEUROMUSCULAR REEDUCATION: CPT

## 2021-07-19 PROCEDURE — 85610 PROTHROMBIN TIME: CPT

## 2021-07-19 PROCEDURE — 97530 THERAPEUTIC ACTIVITIES: CPT

## 2021-07-19 RX ORDER — WARFARIN 2.5 MG/1
2.5 TABLET ORAL EVERY EVENING
Status: DISCONTINUED | OUTPATIENT
Start: 2021-07-19 | End: 2021-07-19 | Stop reason: HOSPADM

## 2021-07-19 RX ORDER — SENNOSIDES 8.6 MG/1
2 TABLET ORAL
Qty: 28 TABLET | Refills: 0 | Status: SHIPPED
Start: 2021-07-19 | End: 2021-08-02

## 2021-07-19 RX ORDER — HYDROCODONE BITARTRATE AND ACETAMINOPHEN 7.5; 325 MG/1; MG/1
1 TABLET ORAL
Qty: 20 TABLET | Refills: 0 | Status: SHIPPED | OUTPATIENT
Start: 2021-07-19 | End: 2021-07-24

## 2021-07-19 RX ORDER — FERROUS SULFATE, DRIED 160(50) MG
1 TABLET, EXTENDED RELEASE ORAL
Qty: 90 TABLET | Refills: 3 | Status: SHIPPED
Start: 2021-07-19 | End: 2021-08-18

## 2021-07-19 RX ORDER — PANTOPRAZOLE SODIUM 40 MG/1
40 TABLET, DELAYED RELEASE ORAL
Qty: 60 TABLET | Refills: 3 | Status: SHIPPED | OUTPATIENT
Start: 2021-07-19 | End: 2021-08-18

## 2021-07-19 RX ADMIN — Medication 10 ML: at 05:57

## 2021-07-19 RX ADMIN — PANTOPRAZOLE SODIUM 40 MG: 40 TABLET, DELAYED RELEASE ORAL at 05:57

## 2021-07-19 RX ADMIN — AMLODIPINE BESYLATE 5 MG: 5 TABLET ORAL at 08:37

## 2021-07-19 RX ADMIN — HYDROCODONE BITARTRATE AND ACETAMINOPHEN 2 TABLET: 7.5; 325 TABLET ORAL at 08:37

## 2021-07-19 RX ADMIN — Medication 1 TABLET: at 12:29

## 2021-07-19 RX ADMIN — HYDROCODONE BITARTRATE AND ACETAMINOPHEN 2 TABLET: 7.5; 325 TABLET ORAL at 16:59

## 2021-07-19 RX ADMIN — PANTOPRAZOLE SODIUM 40 MG: 40 TABLET, DELAYED RELEASE ORAL at 16:59

## 2021-07-19 RX ADMIN — Medication 1 TABLET: at 08:37

## 2021-07-19 RX ADMIN — Medication 10 ML: at 13:29

## 2021-07-19 RX ADMIN — AMIODARONE HYDROCHLORIDE 200 MG: 200 TABLET ORAL at 08:37

## 2021-07-19 RX ADMIN — WARFARIN SODIUM 2.5 MG: 2.5 TABLET ORAL at 17:00

## 2021-07-19 RX ADMIN — LOSARTAN POTASSIUM 25 MG: 25 TABLET, FILM COATED ORAL at 08:37

## 2021-07-19 RX ADMIN — VENLAFAXINE 25 MG: 25 TABLET ORAL at 08:37

## 2021-07-19 NOTE — PROGRESS NOTES
ACUTE PHYSICAL THERAPY GOALS:  (Developed with and agreed upon by patient and/or caregiver.)  Goals:  (1.)Ms. Cas Escobar will move from supine to sit and sit to supine , scoot up and down and roll side to side in bed with MODIFIED INDEPENDENCE within 7 treatment day(s). (2.)Ms. Cas Escobar will transfer from bed to chair and chair to bed with CONTACT GUARD ASSIST using the least restrictive device within 7 treatment day(s). (3.)Ms. Cas Escobar will ambulate with CONTACT GUARD ASSIST for 15--20 feet with the least restrictive device within 7 treatment day(s). (4.)Ms. Cas Escobar will participate in RLE AAROM exercises in supine and sitting to improve movement, strength and mobility within 7 treatment day(s)    PHYSICAL THERAPY: Daily Note and AM Treatment Day # 5    Sheila Escobar is a 64 y.o. female   PRIMARY DIAGNOSIS: Fracture of femoral neck, right, closed ([address]  Hip fracture ([address] [S72.009A]  Procedure(s) (LRB):  RIGHT HIP HEMIARTHROPLASTY/TAO (Right)  5 Days Post-Op    ASSESSMENT:     REHAB RECOMMENDATIONS: CURRENT LEVEL OF FUNCTION:  (Most Recently Demonstrated)   Recommendation to date pending progress:  Setting:   Short-term Rehab  Equipment:    To Be Determined Bed Mobility:   Maximal Assistance x 2- mod Ax2  Sit to Stand:   Moderate Assistance x 2   Transfers:   Moderate Assistance x 2  Gait/Mobility:   Moderate Assistance x 2     ASSESSMENT:  Ms. Cas Escobar demonstrated improved mobility with transfers and gait at mod Ax2. She was able to ambulate 3' with the RW today and max additional time and cueing. Positioned for comfort in the recliner and worked on dynamic sitting with finding midline and weight bearing through RLE.        SUBJECTIVE:   Ms. Cas Escobar states, \"I feel better today\"    SOCIAL HISTORY/ LIVING ENVIRONMENT:   Home Environment: Private residence  # Steps to Enter: 1  One/Two Story Residence: One story  Living Alone: No  Support Systems: Spouse/Significant Other/Partner  OBJECTIVE: PAIN: VITAL SIGNS: LINES/DRAINS:   Pre Treatment: Pain Screen  Pain Scale 1: Numeric (0 - 10)  Pain Intensity 1: 0  Post Treatment: 0     Visit Vitals  /70 (BP 1 Location: Left upper arm, BP Patient Position: At rest;Supine)   Pulse 64   Temp 98.1 °F (36.7 °C)   Resp 17   Ht 5' 5\" (1.651 m)   Wt 88 kg (194 lb 1.6 oz)   SpO2 96%   Breastfeeding No   BMI 32.30 kg/m²      O2 Device: None (Room air)     MOBILITY: I Mod I S SBA CGA Min Mod Max Total  NT x2 Comments:   Bed Mobility    Rolling [] [] [] [] [] [] [x] [x] [] [] [x]    Supine to Sit [] [] [] [] [] [] [x] [] [] [] [x]    Scooting [] [] [] [] [] [] [x] [x] [] [] [x]    Sit to Supine [] [] [] [] [] [] [] [] [] [x] []    Transfers    Sit to Stand [] [] [] [] [] [] [x] [] [] [] [x]    Bed to Chair [] [] [] [] [] [] [x] [] [] [] [x]    Stand to Sit [] [] [] [] [] [] [x] [] [] [] [x]    I=Independent, Mod I=Modified Independent, S=Supervision, SBA=Standby Assistance, CGA=Contact Guard Assistance,   Min=Minimal Assistance, Mod=Moderate Assistance, Max=Maximal Assistance, Total=Total Assistance, NT=Not Tested    BALANCE: Good Fair+ Fair Fair- Poor NT Comments   Sitting Static [] [] [] [x] [] [] Prop sits and leans to the left to avoid RLE weight bearing   Sitting Dynamic [] [] [] [x] [] []              Standing Static [] [] [] [x] [x] []    Standing Dynamic [] [] [] [] [x] []      GAIT: I Mod I S SBA CGA Min Mod Max Total  NT x2 Comments:   Level of Assistance [] [] [] [] [] [] [] [] [] [x] []    Distance 0    DME N/A    Gait Quality N/A    Weightbearing  Status N/A     I=Independent, Mod I=Modified Independent, S=Supervision, SBA=Standby Assistance, CGA=Contact Guard Assistance,   Min=Minimal Assistance, Mod=Moderate Assistance, Max=Maximal Assistance, Total=Total Assistance, NT=Not Tested    PLAN:   FREQUENCY/DURATION: PT Plan of Care: BID for duration of hospital stay or until stated goals are met, whichever comes first.  TREATMENT:     TREATMENT:   ($$ Gait Trainin-22 mins  $$ Therapeutic Activity: 23-37 mins    )  Co-Treatment PT/OT necessary due to patient's decreased overall endurance/tolerance levels, as well as need for high level skilled assistance to complete functional transfers/mobility and functional tasks  Therapeutic Activity (25 Minutes): Therapeutic activity included Rolling, Supine to Sit, Sit to Supine, Scooting, Lateral Scooting, Transfer Training, Ambulation on level ground, Sitting balance  and Standing balance to improve functional Mobility, ROM and Activity tolerance. Gait Training (15 Minutes): Gait training for 3 feet utilizing [address] [place] [address] and [employer]. Patient required Manual, Tactile and Verbal cueing to improve Activity Pacing, Assistive Device Utilization, Dynamic Standing Balance and Gait Mechanics.      TREATMENT GRID:   Date:  7/15/21 Date:   Date:     Activity/Exercise Parameters Parameters Parameters   RLE ankle pumps 10 times AA     RLE heel slides thru 1/4 range 3 x 5 times AA     RLE hip abd/adduction thru 1/4 range 10 AA     Forward trunk flexion 10 times                         AFTER TREATMENT POSITION/PRECAUTIONS:  Chair, Needs within reach, RN notified and Visitors at bedside    INTERDISCIPLINARY COLLABORATION:  RN/PCT, PT/PTA and OT/COSTELLO    TOTAL TREATMENT DURATION:  PT Patient Time In/Time Out  Time In: 952  Time Out: 3300 [facility] 1788, PTA

## 2021-07-19 NOTE — PROGRESS NOTES
Warfarin dosing per pharmacist    Keri Escamilla is a 64 y.o. female. Height: 5' 5\" (165.1 cm) Weight: 88 kg (194 lb 1.6 oz)    Indication:  Afib    Goal INR:  2-3    Home dose:  1.25 mg Mon, 2.5 mg all other days    Risk factors or significant drug interactions:  Amiodarone (home med)    Other anticoagulants:  none    Daily Monitoring  Date  INR     Warfarin dose HGB              Notes  7/16  1.2  2.5 mg  7.7  7/17  1.2  2.5 mg  7.2  7/18  1.0  5 mg   8.3    7/19  1.2  2.5 mg  ---      Pharmacy is consulted to assist in dosing Ms. Jones's warfarin this admission. Patients INR was therapeutic upon admission at 2.2. Prior to surgery 10 mg IV vitamin K was administered 7/12 and INR decreased to 1.7. INR remains subtherapeutic today at 1.2. Based on outpatient records patient is very sensitive to warfarin. Will give 2.5 mg this evening after increased dose of 5 mg last evening and continue to closely monitor. Daily INR will continue. Pharmacy will continue to monitor and adjust dosing as clinically indicated. Please call with any questions.     Thank you,  Amber Brantley, PharmD, [address]  Clinical Pharmacist  [phone]

## 2021-07-19 NOTE — DISCHARGE SUMMARY
Hospitalist Discharge Summary     Patient ID:  Tomás Turner  [id number]  44 y.o.  1945  Admit date: 7/12/2021  5:08 PM  Discharge date and time: 7/19/2021  Attending: Willem Vázquez MD  PCP:  Gaither Gottron, MD  Treatment Team: Attending Provider: Willem Vázquez MD; Consulting Provider: Sravan Barboza MD; Utilization Review: Astrid Antunez RN; Care Manager: Yuniel Haley LMSW; Utilization Review: Karla López RN; Hospitalist: Willem Vázquez MD; Primary Nurse: Cesar Boss RN; Charge Nurse: Jazmyn Mahoney; Staff Nurse: Micaela Hutchison, ANDRE; Physical Therapy Assistant: Jesika Giang PTA; Occupational Therapy Assistant: KARIN Frankel    Principal Diagnosis Fracture of femoral neck, right, closed [facility])   Principal Problem:    Fracture of femoral neck, right, closed ([facility] [address] (7/16/2021)    Active Problems:    Hypertension (1/23/2017)      Paroxysmal atrial fibrillation ([facility] [address] (1/23/2017)      GERD (gastroesophageal reflux disease) (1/23/2017)      Hyperlipidemia (1/23/2017)      Long term (current) use of anticoagulants (5/1/2018)      Hip fracture ([facility] [address] (7/12/2021)      Acute postoperative anemia due to expected blood loss (7/17/2021)       Ms. Duane Sharpe is a 65 y/o AAF with past medical history of AF on warfarin, hypertension, vertigo, and anxiety.  She was transported to the hospital after a fall. She landed on her right hip and was found to have a right femoral neck fracture. Peggyann Large is 2.2 on admission.  Orthopedic surgery performed right hip hemiarthroplasty on Jul/14. Jul/15: POD1.  No AEO.  She denies CP/SOB.  Patient may resume warfarin tomorrow per Ortho. Hospital Course:  S/P fall   Right femoral neck fracture s/p R hip hemiarthroplasty  Pain control   INR 1.2     AF on warfarin prior to admission   Hold warfarin for now.   Cont amiodarone 200 mg po q day.    EKG on 7/12/2021 shows sinus rhythm.   Jul/16: Resume her home warfarin today  7/19: INR 1.2 today  Continue warfarin on discharge     Hypertension  Monitor blood pressure and manage accordingly. Continue home medications, Amlodipine and Losartan.   BP is 160/70's ranges. Will monitor closely.      Anemia, chronic  Jul/15: Trend labs  Jul/16: Check iron studies  7/17: Serum iron 8, TIBC 275, ferritin 66. Patient will benefit from oral iron therapy however in view of acute post operative anemia from expected blood loss patient will be given 1 unit of PRBC. Will recheck CBC in the morning. 7/18: Status post 1 unit PRBC with hemoglobin 8.3 this morning. Patient is hemodynamically stable, monitor for any signs of bleeding. 7/19: Hb is stable at 8.3, no evidence of active bleed.     GERD   On PPI      Body mass index 32.30    Disposition: pt is medically cleared and hemodynamically stable for discharge today to [facility]. Significant Diagnostic Studies:   THREE-VIEW RIGHT HIP,  TWO-VIEW RIGHT FEMUR:     CLINICAL HISTORY:  Right hip pain after a fall.     COMPARISON:  None.     FINDINGS:  There is an oblique fracture through the right femoral neck. The  distal fragment is displaced approximately 2 cm superolaterally. The distal  femur appears intact moderate osteoarthritis at the knee. No persistent  radiopaque foreign body is seen.     IMPRESSION     Displaced, oblique transcervical fracture of the right hip. PORTABLE CHEST, July 12, 2021 at 1854 hours     CLINICAL HISTORY:  Preoperative evaluation for right hip fracture repair.     COMPARISON:  March 14, 2021.     FINDINGS:  AP supine image demonstrates no confluent infiltrate or significant  pleural fluid. The heart size is within normal limits without evidence of  congestive heart failure or pneumothorax. The bony thorax appears intact on  this view. There are overlying radiopaque support devices.     IMPRESSION  NO ACUTE CARDIOPULMONARY DISEASE OR BONY ABNORMALITY IDENTIFIED.     Labs: Results:       Chemistry Recent Labs 07/17/21  0551         K 4.1      CO2 22   BUN 27*   CREA 1.01*   CA 8.3   AGAP 8      CBC w/Diff Recent Labs     07/18/21  0818 07/17/21  0551   WBC  --  10.3   RBC  --  3.36*   HGB 8.3* 7.2*   HCT 28.1* 24.8*   PLT  --  176      Cardiac Enzymes Recent Labs     07/17/21  0551   *      Coagulation Recent Labs     07/19/21  0445 07/18/21  0503   PTP 15.0* 13.9   INR 1.2 1.0       Lipid Panel No results found for: CHOL, CHOLPOCT, CHOLX, CHLST, CHOLV, 789198, HDL, HDLP, LDL, LDLC, DLDLP, 584434, VLDLC, VLDL, TGLX, TRIGL, TRIGP, TGLPOCT, CHHD, CHHDX   BNP No results for input(s): BNPP in the last 72 hours. Liver Enzymes No results for input(s): TP, ALB, TBIL, AP in the last 72 hours. No lab exists for component: SGOT, GPT, DBIL   Thyroid Studies Lab Results   Component Value Date/Time    TSH 1.250 06/19/2020 11:06 AM            Discharge Exam:  Visit Vitals  BP (!) 161/63 (BP 1 Location: Left upper arm, BP Patient Position: At rest;Supine)   Pulse 67   Temp 98.6 °F (37 °C)   Resp 17   Ht 5' 5\" (1.651 m)   Wt 88 kg (194 lb 1.6 oz)   SpO2 92%   Breastfeeding No   BMI 32.30 kg/m²     General appearance: alert, cooperative, no distress, appears stated age  Lungs: clear to auscultation bilaterally  Heart: regular rate and rhythm, S1, S2 normal, no murmur, click, rub or gallop  Abdomen: soft, non-tender. Bowel sounds normal. No masses,  no organomegaly  Extremities: no cyanosis or edema, surgical dressing in right hip  Neurologic: Grossly normal    Disposition: SNF  Discharge Condition: stable  Patient Instructions:   Current Discharge Medication List      START taking these medications    Details   calcium-vitamin D (OS-DAGOBERTO +D3) 500 mg-200 unit per tablet Take 1 Tablet by mouth three (3) times daily (with meals) for 30 days.   Qty: 90 Tablet, Refills: 3  Start date: 7/19/2021, End date: 8/18/2021      HYDROcodone-acetaminophen (NORCO) 7.5-325 mg per tablet Take 1 Tablet by mouth every eight (8) hours as needed for Pain for up to 5 days. Max Daily Amount: 3 Tablets. Qty: 20 Tablet, Refills: 0  Start date: 7/19/2021, End date: 7/24/2021    Associated Diagnoses: Closed displaced fracture of right femoral neck (HCC)      senna (SENOKOT) 8.6 mg tablet Take 2 Tablets by mouth two (2) times daily as needed for Constipation for up to 14 days. Qty: 28 Tablet, Refills: 0  Start date: 7/19/2021, End date: 8/2/2021      pantoprazole (PROTONIX) 40 mg tablet Take 1 Tablet by mouth Before breakfast and dinner for 30 days. Qty: 60 Tablet, Refills: 3  Start date: 7/19/2021, End date: 8/18/2021         CONTINUE these medications which have NOT CHANGED    Details   losartan (COZAAR) 100 mg tablet TAKE 1 TABLET BY MOUTH DAILY  Qty: 90 Tablet, Refills: 3      multivits,Stress Formula-Zinc tablet Take 1 Tab by mouth daily. aspirin delayed-release 81 mg tablet Take  by mouth daily. acetaminophen (Tylenol Extra Strength) 500 mg tablet Take 1,000 mg by mouth daily. atorvastatin (LIPITOR) 20 mg tablet Take 1 Tab by mouth daily. Qty: 90 Tab, Refills: 1      amLODIPine (NORVASC) 5 mg tablet Take 1 Tab by mouth daily. Qty: 90 Tab, Refills: 3      amiodarone (CORDARONE) 200 mg tablet Take 1 Tab by mouth daily. Indications: 200mg bid for 2 weeks and then 200mg daily  Qty: 90 Tab, Refills: 3    Associated Diagnoses: Long term current use of antiarrhythmic drug; Paroxysmal atrial fibrillation (HCC)      cyanocobalamin 1,000 mcg tablet Take 1,000 mcg by mouth daily. famotidine (PEPCID) 40 mg tablet Take 40 mg by mouth daily. warfarin (COUMADIN) 2.5 mg tablet 1 to 1&1/2 tabs every day or as directed  Qty: 100 Tab, Refills: 3      venlafaxine (EFFEXOR) 75 mg tablet Take 25 mg by mouth daily. meclizine (ANTIVERT) 25 mg tablet Take 25 mg by mouth three (3) times daily as needed.              Activity: PT/OT Eval and Treat  Diet: Cardiac Diet  Wound Care: As directed    Follow-up  · Follow up with Orthopedics as scheduled  · Follow up with physician at [facility]    Time spent to discharge patient 35 minutes  Signed:  Andi Toledo MD  7/19/2021  8:11 AM

## 2021-07-19 NOTE — PROGRESS NOTES
Insurance approval received. Pamela Mead ID# [id number]. [facility] [id number]. Pt has initially been approved for 3 days with the next review date of 7/21/21. Pt is medically cleared for dc today and will transfer to [facility] for STR services. RN to call report to #[phone]. Transport scheduled for 1730 through [facility]. Pt/family that was at the bedside were notified of pt's dc and transport time. SW remains available to assist as needed. Care Management Interventions  PCP Verified by CM:  Yes  Last Visit to PCP: 06/22/21  Mode of Transport at Discharge: BLS ([facility])  [facility] Transport Time of Discharge: [address] (CM Consult): SNF, Discharge Planning  Partner SNF: No  Reason Why Partner SNF Not Chosen: Location  Discharge Durable Medical Equipment: No  Physical Therapy Consult: Yes  Occupational Therapy Consult: Yes  Speech Therapy Consult: No  Current Support Network: Own Home, Family Lives [place], Lives with Spouse  Confirm Follow Up Transport: Family  The Plan for Transition of Care is Related to the Following Treatment Goals : STR to improve pt's strength and functional abilities for a safe transition to home  The Patient and/or Patient Representative was Provided with a Choice of Provider and Agrees with the Discharge Plan?: Yes  Name of the Patient Representative Who was Provided with a Choice of Provider and Agrees with the Discharge Plan: lurdes  Freedom of Choice List was Provided with Basic Dialogue that Supports the Patient's Individualized Plan of Care/Goals, Treatment Preferences and Shares the Quality Data Associated with the Providers?: Yes  Discharge Location  Discharge Placement: Rehab Unit Subacute ([facility])

## 2021-07-19 NOTE — DISCHARGE INSTRUCTIONS
[facility]    IF YOU HAVE ANY PROBLEMS ONCE YOU ARE AT HOME CALL THE FOLLOWING NUMBERS:   Main office number: [phone] ask for Selina Reagan (medical assistant with Dr. Tip Pleitez)  Office Address: [facility] [address] [address], [address] [facility]      Patient Discharge Instructions    Cynthia Umanzor / [id number] : 1945    Admitted 2021 Discharged: 2021         To be given to [address] on Admission         Weight bearing status: As tolerated with walker and assistance    Activity  · Continue Physical Therapy and Occupational Therapy   · Fall precautions     Wound Care   Dry dressing changes using sterile technique every other day or more frequently if needed    Staples are to be left in and removed in our office 2 weeks postop    Diet  · Resume regular or diabetic diet      Medications    · Patient medications are listed on the medication reconciliation sheet. Follow up    Follow up in our office in 2 weeks postop    All patients are to be transported via stretcher unless they are able to independently get out of a chair and stand without assistance. Information obtained by :  I understand that if any problems occur once I am at home I am to contact my physician. I understand and acknowledge receipt of the instructions indicated above.                                                                                                                                            Physician's or R.N.'s Signature                                                                  Date/Time                                                                                                                                              Patient or Representative Signature                                                          Date/Time

## 2021-07-19 NOTE — PROGRESS NOTES
ACUTE OT GOALS:  (Developed with and agreed upon by patient and/or caregiver.)  1. Patient will complete lower body bathing and dressing with MIN A and adaptive equipment as needed. 2.Patient will complete upper body bathing and dressing with SETUP ASSIST and adaptive equipment as needed. 3. Patient will complete toileting with MOD A.   4. Patient will tolerate 25 minutes of OT treatment with 1-2 rest breaks to increase activity tolerance for ADLs. 5. Patient will complete functional transfers with MIN A and adaptive equipment as needed. 6. Patient will complete functional activity with MIN A and adaptive equipment as needed.     Timeframe: 7 visits   OCCUPATIONAL THERAPY: Daily Note OT Treatment Day # 4    Rita Chavez is a 64 y.o. female   PRIMARY DIAGNOSIS: Fracture of femoral neck, right, closed ([address]  Hip fracture ([facility] [address] [S72.009A]  Procedure(s) (LRB):  RIGHT HIP HEMIARTHROPLASTY/TAO (Right)  5 Days Post-Op  Payor: [employer] MEDICARE / Plan: [address] [place] [address] HMO / Product Type: [employer] /   ASSESSMENT:     REHAB RECOMMENDATIONS: CURRENT LEVEL OF FUNCTION:  (Most Recently Demonstrated)   Recommendation to date pending progress:  Setting:   Short-term Rehab  Equipment:    To Be Determined Bathing:   Not tested  Dressing:   Not tested  Feeding/Grooming:   Set Up  Toileting:   Not tested  Functional Mobility:   Moderate Assistance x 2     ASSESSMENT:  Ms. Nichole Bhatt is doing well today. Pt continue to require mod A x2 for most mobility. At edge of bed, patient demonstrates fair standing balance. Pt was able to stand and take steps over to the recliner. Placed at sink for grooming. Making some progress with mobility today. Pt is to be discharged to SNF today.      SUBJECTIVE:   Ms. Nichole Bhatt states, \"I have a electric comb\"    SOCIAL HISTORY/LIVING ENVIRONMENT: pt lives with her spouse in a one level home with one-step to get in, tub shower with a shower chair, independent for ADLs/IADLs  Home Environment: Private residence  # Steps to Enter: 1  One/Two Story Residence: One story  Living Alone: No  Support Systems: Spouse/Significant Other/Partner    OBJECTIVE:     PAIN: VITAL SIGNS: LINES/DRAINS:   Pre Treatment: Pain Screen  Pain Scale 1: Numeric (0 - 10)  Pain Intensity 1: 0  Post Treatment: no change    IV  O2 Device: None (Room air)     ACTIVITIES OF DAILY LIVING: I Mod I S SBA CGA Min Mod Max Total NT Comments   BASIC ADLs:              Bathing/ Showering [] [] [] [] [] [] [] [] [] [x]    Toileting [] [] [] [] [] [] [] [] [] [x]    Dressing [] [] [] [] [] [] [] [] [] [x]    Feeding [] [] [] [] [] [] [] [] [] [x]    Grooming [] [] [x] [] [] [] [] [] [] []    Personal Device Care [] [] [] [] [] [] [] [] [] [x]    Functional Mobility [] [] [] [] [] [] [x] [] [] [] x2   I=Independent, Mod I=Modified Independent, S=Supervision, SBA=Standby Assistance, CGA=Contact Guard Assistance,   Min=Minimal Assistance, Mod=Moderate Assistance, Max=Maximal Assistance, Total=Total Assistance, NT=Not Tested    MOBILITY: I Mod I S SBA CGA Min Mod Max Total  NT x2 Comments:   Supine to sit [] [] [] [] [] [] [x] [] [] [] [x]    Sit to supine [] [] [] [] [] [] [] [] [] [x] []    Sit to stand [] [] [] [] [] [] [x] [] [] [] [x]     Bed to chair [] [] [] [] [] [] [x] [] [] [] [x]    I=Independent, Mod I=Modified Independent, S=Supervision, SBA=Standby Assistance, CGA=Contact Guard Assistance,   Min=Minimal Assistance, Mod=Moderate Assistance, Max=Maximal Assistance, Total=Total Assistance, NT=Not Tested    BALANCE: Good Fair+ Fair Fair- Poor NT Comments   Sitting Static [] [] [x] [] [] []    Sitting Dynamic [] [] [x] [] [] []              Standing Static [] [] [x] [] [] []    Standing Dynamic [] [] [x] [] [] []      PLAN:   FREQUENCY/DURATION: OT Plan of Care: 6 times/week for duration of hospital stay or until stated goals are met, whichever comes first.    TREATMENT:   TREATMENT:   ($$ Self Care/Home Management: 8-22 mins$$ Neuromuscular Re-Education: 23-37 mins   )  Self Care (15 Minutes): Self care including Grooming to increase independence and decrease level of assistance required. Neuromuscular Re-education (25 Minutes): Neuromuscular Re-education included Balance Training, Coordination training, Postural training, Sitting balance training and Standing balance training to improve Balance, Coordination, Functional Mobility and Postural Control.     TREATMENT GRID:  N/A    AFTER TREATMENT POSITION/PRECAUTIONS:  Chair, Needs within reach and Visitors at bedside    INTERDISCIPLINARY COLLABORATION:  RN/PCT, PT/PTA and OT/COSTELLO    TOTAL TREATMENT DURATION:  OT Patient Time In/Time Out  Time In: 0950  Time Out: 1030    KARIN Loya

## 2021-07-19 NOTE — PROGRESS NOTES
Problem: Falls - Risk of  Goal: *Absence of Falls  Description: Document Huong Walsh Fall Risk and appropriate interventions in the flowsheet. Outcome: Progressing Towards Goal  Note: Fall Risk Interventions:  Mobility Interventions: Bed/chair exit alarm, Communicate number of staff needed for ambulation/transfer, Patient to call before getting OOB         Medication Interventions: Bed/chair exit alarm, Patient to call before getting OOB, Teach patient to arise slowly    Elimination Interventions: Patient to call for help with toileting needs    History of Falls Interventions: Bed/chair exit alarm, Evaluate medications/consider consulting pharmacy         Problem: Patient Education: Go to Patient Education Activity  Goal: Patient/Family Education  Outcome: Progressing Towards Goal     Problem: Pressure Injury - Risk of  Goal: *Prevention of pressure injury  Description: Document Forrest Scale and appropriate interventions in the flowsheet.   Outcome: Progressing Towards Goal  Note: Pressure Injury Interventions:  Sensory Interventions: Assess changes in LOC    Moisture Interventions: Absorbent underpads, Limit adult briefs, Maintain skin hydration (lotion/cream), Minimize layers    Activity Interventions: Increase time out of bed, Pressure redistribution bed/mattress(bed type), PT/OT evaluation    Mobility Interventions: Pressure redistribution bed/mattress (bed type), PT/OT evaluation    Nutrition Interventions: Document food/fluid/supplement intake    Friction and Shear Interventions: Lift sheet, Minimize layers                Problem: Patient Education: Go to Patient Education Activity  Goal: Patient/Family Education  Outcome: Progressing Towards Goal     Problem: Patient Education: Go to Patient Education Activity  Goal: Patient/Family Education  Outcome: Progressing Towards Goal     Problem: Patient Education: Go to Patient Education Activity  Goal: Patient/Family Education  Outcome: Progressing Towards Goal Problem: Patient Education: Go to Patient Education Activity  Goal: Patient/Family Education  Outcome: Progressing Towards Goal     Problem: Lower Extremity Fracture:Day of Admission  Goal: Off Pathway (Use only if patient is Off Pathway)  Outcome: Progressing Towards Goal  Goal: Activity/Safety  Outcome: Progressing Towards Goal  Goal: Consults, if ordered  Outcome: Progressing Towards Goal  Goal: Diagnostic Test/Procedures  Outcome: Progressing Towards Goal  Goal: Nutrition/Diet  Outcome: Progressing Towards Goal  Goal: Medications  Outcome: Progressing Towards Goal  Goal: Respiratory  Outcome: Progressing Towards Goal  Goal: Treatments/Interventions/Procedures  Outcome: Progressing Towards Goal  Goal: Psychosocial  Outcome: Progressing Towards Goal  Goal: *Optimal pain control at patient's stated goal  Outcome: Progressing Towards Goal  Goal: *Hemodynamically stable  Outcome: Progressing Towards Goal  Goal: *Adequate oxygenation  Outcome: Progressing Towards Goal     Problem: Lower Extremity Fracture:Day of Surgery ([facility] SCIP Measures for Post-op Care)  Goal: Off Pathway (Use only if patient is Off Pathway)  Outcome: Progressing Towards Goal  Goal: Activity/Safety  Outcome: Progressing Towards Goal  Goal: Consults, if ordered  Outcome: Progressing Towards Goal  Goal: Diagnostic Test/Procedures  Outcome: Progressing Towards Goal  Goal: Nutrition/Diet  Outcome: Progressing Towards Goal  Goal: Medications  Outcome: Progressing Towards Goal  Goal: Respiratory  Outcome: Progressing Towards Goal  Goal: Treatments/Interventions/Procedures  Outcome: Progressing Towards Goal  Goal: Psychosocial  Outcome: Progressing Towards Goal  Goal: *Optimal pain control at patient's stated goal  Outcome: Progressing Towards Goal  Goal: *Hemodynamically stable  Outcome: Progressing Towards Goal  Goal: *Adequate oxygenation  Outcome: Progressing Towards Goal     Problem: Lower Extremity Fracture:Post-op Day 1  Goal: Off Pathway (Use only if patient is Off Pathway)  Outcome: Progressing Towards Goal  Goal: Activity/Safety  Outcome: Progressing Towards Goal  Goal: Consults, if ordered  Outcome: Progressing Towards Goal  Goal: Diagnostic Test/Procedures  Outcome: Progressing Towards Goal  Goal: Nutrition/Diet  Outcome: Progressing Towards Goal  Goal: Discharge Planning  Outcome: Progressing Towards Goal  Goal: Medications  Outcome: Progressing Towards Goal  Goal: Respiratory  Outcome: Progressing Towards Goal  Goal: Treatments/Interventions/Procedures  Outcome: Progressing Towards Goal  Goal: Psychosocial  Outcome: Progressing Towards Goal  Goal: *Optimal pain control at patient's stated goal  Outcome: Progressing Towards Goal  Goal: *Hemodynamically stable  Outcome: Progressing Towards Goal  Goal: *Adequate oxygenation  Outcome: Progressing Towards Goal  Goal: *PT/INR within defined limits  Outcome: Progressing Towards Goal  Goal: *Demonstrates progressive activity  Outcome: Progressing Towards Goal     Problem: Lower Extremity Fracture:Post-op Day 2  Goal: Off Pathway (Use only if patient is Off Pathway)  Outcome: Progressing Towards Goal  Goal: Activity/Safety  Outcome: Progressing Towards Goal  Goal: Diagnostic Test/Procedures  Outcome: Progressing Towards Goal  Goal: Nutrition/Diet  Outcome: Progressing Towards Goal  Goal: Discharge Planning  Outcome: Progressing Towards Goal  Goal: Medications  Outcome: Progressing Towards Goal  Goal: Respiratory  Outcome: Progressing Towards Goal  Goal: Treatments/Interventions/Procedures  Outcome: Progressing Towards Goal  Goal: Psychosocial  Outcome: Progressing Towards Goal  Goal: *Optimal pain control at patient's stated goal  Outcome: Progressing Towards Goal  Goal: *Hemodynamically stable  Outcome: Progressing Towards Goal  Goal: *Adequate oxygenation  Outcome: Progressing Towards Goal  Goal: *PT/INR within defined limits  Outcome: Progressing Towards Goal  Goal: *Demonstrates progressive activity  Outcome: Progressing Towards Goal  Goal: *Voiding  Outcome: Progressing Towards Goal  Goal: *Bowel movement  Outcome: Progressing Towards Goal  Goal: *Tolerating diet  Outcome: Progressing Towards Goal     Problem: Lower Extremity Fracture:Post-op Day 3  Goal: Off Pathway (Use only if patient is Off Pathway)  Outcome: Progressing Towards Goal  Goal: Activity/Safety  Outcome: Progressing Towards Goal  Goal: Diagnostic Test/Procedures  Outcome: Progressing Towards Goal  Goal: Nutrition/Diet  Outcome: Progressing Towards Goal  Goal: Discharge Planning  Outcome: Progressing Towards Goal  Goal: Medications  Outcome: Progressing Towards Goal  Goal: Respiratory  Outcome: Progressing Towards Goal  Goal: Treatments/Interventions/Procedures  Outcome: Progressing Towards Goal  Goal: Psychosocial  Outcome: Progressing Towards Goal  Goal: *Optimal pain control at patient's stated goal  Outcome: Progressing Towards Goal  Goal: *Hemodynamically stable  Outcome: Progressing Towards Goal  Goal: *Adequate oxygenation  Outcome: Progressing Towards Goal  Goal: *PT/INR within defined limits  Outcome: Progressing Towards Goal  Goal: *Demonstrates progressive activity  Outcome: Progressing Towards Goal  Goal: *Voiding  Outcome: Progressing Towards Goal  Goal: *Bowel movement  Outcome: Progressing Towards Goal  Goal: *Tolerating diet  Outcome: Progressing Towards Goal     Problem: Lower Extremity Fracture:Post-op Day 4  Goal: Off Pathway (Use only if patient is Off Pathway)  Outcome: Progressing Towards Goal  Goal: Activity/Safety  Outcome: Progressing Towards Goal  Goal: Diagnostic Test/Procedures  Outcome: Progressing Towards Goal  Goal: Nutrition/Diet  Outcome: Progressing Towards Goal  Goal: Discharge Planning  Outcome: Progressing Towards Goal  Goal: Medications  Outcome: Progressing Towards Goal  Goal: Respiratory  Outcome: Progressing Towards Goal  Goal: Treatments/Interventions/Procedures  Outcome: Progressing Towards Goal  Goal: Psychosocial  Outcome: Progressing Towards Goal     Problem: Lower Extremity Fracture:Discharge Outcomes  Goal: *Hemodynamically stable  Outcome: Progressing Towards Goal  Goal: *Modified independence with transfers, ambulation on levels with assistance devices, stair climbing, ADL's  Outcome: Progressing Towards Goal  Goal: *Independent with active exercises  Outcome: Progressing Towards Goal  Goal: *Describes follow-up/return visits to physicians  Outcome: Progressing Towards Goal  Goal: *Tolerating diet  Outcome: Progressing Towards Goal  Goal: *Optimal pain control at patient's stated goal  Outcome: Progressing Towards Goal  Goal: *Adequate air exchange  Outcome: Progressing Towards Goal  Goal: *Lungs clear or at baseline  Outcome: Progressing Towards Goal  Goal: *Afebrile  Outcome: Progressing Towards Goal  Goal: *Incision intact without signs of infection, redness, warmth  Outcome: Progressing Towards Goal  Goal: *Absence of deep venous thrombosis signs and symptoms(Stroke Metric)  Outcome: Progressing Towards Goal  Goal: *Active bowel function  Outcome: Progressing Towards Goal  Goal: *Adequate urinary output  Outcome: Progressing Towards Goal  Goal: *Discharge anxiety minimal  Outcome: Progressing Towards Goal  Goal: *Describes available resources and support systems  Outcome: Progressing Towards Goal

## 2021-07-19 NOTE — PROGRESS NOTES
ACUTE PHYSICAL THERAPY GOALS:  (Developed with and agreed upon by patient and/or caregiver.)  Goals:  (1.)Ms. Erwin Augustin will move from supine to sit and sit to supine , scoot up and down and roll side to side in bed with MODIFIED INDEPENDENCE within 7 treatment day(s). (2.)Ms. Erwin Augustin will transfer from bed to chair and chair to bed with CONTACT GUARD ASSIST using the least restrictive device within 7 treatment day(s). (3.)Ms. Erwin Augustin will ambulate with CONTACT GUARD ASSIST for 15--20 feet with the least restrictive device within 7 treatment day(s). (4.)Ms. Erwin Augustin will participate in RLE AAROM exercises in supine and sitting to improve movement, strength and mobility within 7 treatment day(s)    PHYSICAL THERAPY: Daily Note and PM Treatment Day # 5    Sheila Augustin is a 64 y.o. female   PRIMARY DIAGNOSIS: Fracture of femoral neck, right, closed ([facility] [address]  Hip fracture ([facility] [address] [S72.009A]  Procedure(s) (LRB):  RIGHT HIP HEMIARTHROPLASTY/TAO (Right)  5 Days Post-Op    ASSESSMENT:     REHAB RECOMMENDATIONS: CURRENT LEVEL OF FUNCTION:  (Most Recently Demonstrated)   Recommendation to date pending progress:  Setting:   Short-term Rehab  Equipment:    To Be Determined Bed Mobility:   Moderate Assistance x 2  Sit to Stand:   Moderate Assistance x 2   Transfers:   Moderate Assistance x 2  Gait/Mobility:   Moderate Assistance x 2     ASSESSMENT:  Ms. Erwin Augustin continues to make slow progress toward goals with improving mobility and decreased assistance. All mobility at mod Ax2 this session. Able to ambulate 3' with RW and demonstrated extended static standing during self-care. Comfortable in bed post treatment.       SUBJECTIVE:   Ms. Erwin Augustin states, \"I've been ready to get back to bed\"    SOCIAL HISTORY/ LIVING ENVIRONMENT:   Home Environment: Private residence  # Steps to Enter: 1  One/Two Story Residence: One story  Living Alone: No  Support Systems: Spouse/Significant Other/Partner  OBJECTIVE:     PAIN: VITAL SIGNS: LINES/DRAINS:   Pre Treatment: Pain Screen  Pain Scale 1: Numeric (0 - 10)  Pain Intensity 1: 0  Post Treatment: 0     Visit Vitals  /70 (BP 1 Location: Left upper arm, BP Patient Position: At rest;Supine)   Pulse 64   Temp 98.1 °F (36.7 °C)   Resp 17   Ht 5' 5\" (1.651 m)   Wt 88 kg (194 lb 1.6 oz)   SpO2 96%   Breastfeeding No   BMI 32.30 kg/m²      O2 Device: None (Room air)     MOBILITY: I Mod I S SBA CGA Min Mod Max Total  NT x2 Comments:   Bed Mobility    Rolling [] [] [] [] [] [] [x] [x] [] [] [x]    Supine to Sit [] [] [] [] [] [] [] [] [] [x] []    Scooting [] [] [] [] [] [] [x] [] [] [] [x]    Sit to Supine [] [] [] [] [] [] [x] [] [] [] [x]    Transfers    Sit to Stand [] [] [] [] [] [] [x] [] [] [] [x]    Bed to Chair [] [] [] [] [] [] [] [] [] [] []    Stand to Sit [] [] [] [] [] [] [x] [] [] [] [x]    I=Independent, Mod I=Modified Independent, S=Supervision, SBA=Standby Assistance, CGA=Contact Guard Assistance,   Min=Minimal Assistance, Mod=Moderate Assistance, Max=Maximal Assistance, Total=Total Assistance, NT=Not Tested    BALANCE: Good Fair+ Fair Fair- Poor NT Comments   Sitting Static [] [] [x] [] [] [] Prop sits    Sitting Dynamic [] [] [x] [] [] []              Standing Static [] [] [x] [x] [] []    Standing Dynamic [] [] [] [x] [] []      GAIT: I Mod I S SBA CGA Min Mod Max Total  NT x2 Comments:   Level of Assistance [] [] [] [] [] [] [x] [] [] [] [x]    Distance 3    DME Rolling Walker    Gait Quality Shuffled, antalgic,     Weightbearing  Status WBAT     I=Independent, Mod I=Modified Independent, S=Supervision, SBA=Standby Assistance, CGA=Contact Guard Assistance,   Min=Minimal Assistance, Mod=Moderate Assistance, Max=Maximal Assistance, Total=Total Assistance, NT=Not Tested    PLAN:   FREQUENCY/DURATION: PT Plan of Care: BID for duration of hospital stay or until stated goals are met, whichever comes first.  TREATMENT:     TREATMENT:   ($$ Gait Trainin-22 mins  $$ Therapeutic Activity: 23-37 mins    )  Therapeutic Activity (26 Minutes): Therapeutic activity included Rolling, Sit to Supine, Scooting, Lateral Scooting, Transfer Training, Ambulation on level ground, Sitting balance  and Standing balance to improve functional Mobility, ROM and Activity tolerance.     TREATMENT GRID:   Date:  7/15/21 Date:   Date:     Activity/Exercise Parameters Parameters Parameters   RLE ankle pumps 10 times AA     RLE heel slides thru 1/4 range 3 x 5 times AA     RLE hip abd/adduction thru 1/4 range 10 AA     Forward trunk flexion 10 times                         AFTER TREATMENT POSITION/PRECAUTIONS:  Bed, Needs within reach and RN notified    INTERDISCIPLINARY COLLABORATION:  RN/PCT, PT/PTA and Rehab Attendant     TOTAL TREATMENT DURATION:  PT Patient Time In/Time Out  Time In: 1349  Time Out: [address], PTA

## 2021-08-26 PROBLEM — F11.99 OPIOID USE, UNSPECIFIED WITH UNSPECIFIED OPIOID-INDUCED DISORDER (HCC): Status: ACTIVE | Noted: 2021-08-26

## 2021-09-13 ENCOUNTER — TRANSCRIBE ORDER (OUTPATIENT)
Dept: SCHEDULING | Age: 76
End: 2021-09-13

## 2021-09-13 DIAGNOSIS — Z87.310 H/O HEALED FRAGILITY FRACTURE: Primary | ICD-10-CM

## 2021-09-28 ENCOUNTER — HOSPITAL ENCOUNTER (INPATIENT)
Age: 76
LOS: 6 days | Discharge: HOME HEALTH | DRG: 813 | End: 2021-10-04
Attending: EMERGENCY MEDICINE | Admitting: FAMILY MEDICINE
Payer: MEDICARE

## 2021-09-28 ENCOUNTER — APPOINTMENT (OUTPATIENT)
Dept: GENERAL RADIOLOGY | Age: 76
DRG: 813 | End: 2021-09-28
Attending: EMERGENCY MEDICINE
Payer: MEDICARE

## 2021-09-28 DIAGNOSIS — D50.0 IRON DEFICIENCY ANEMIA DUE TO CHRONIC BLOOD LOSS: ICD-10-CM

## 2021-09-28 DIAGNOSIS — T45.511A POISONING BY WARFARIN SODIUM, ACCIDENTAL OR UNINTENTIONAL, INITIAL ENCOUNTER: ICD-10-CM

## 2021-09-28 DIAGNOSIS — R55 SYNCOPE AND COLLAPSE: Primary | ICD-10-CM

## 2021-09-28 PROBLEM — R79.1 ELEVATED INR: Status: ACTIVE | Noted: 2021-09-28

## 2021-09-28 PROBLEM — D62 ACUTE BLOOD LOSS ANEMIA: Status: ACTIVE | Noted: 2021-09-28

## 2021-09-28 PROBLEM — D64.9 SYMPTOMATIC ANEMIA: Status: ACTIVE | Noted: 2021-09-28

## 2021-09-28 LAB
ALBUMIN SERPL-MCNC: 2.9 G/DL (ref 3.2–4.6)
ALBUMIN/GLOB SERPL: 0.8 {RATIO} (ref 1.2–3.5)
ALP SERPL-CCNC: 132 U/L (ref 50–136)
ALT SERPL-CCNC: 19 U/L (ref 12–65)
ANION GAP SERPL CALC-SCNC: 11 MMOL/L (ref 7–16)
AST SERPL-CCNC: 14 U/L (ref 15–37)
BASOPHILS # BLD: 0 K/UL (ref 0–0.2)
BASOPHILS NFR BLD: 0 % (ref 0–2)
BILIRUB SERPL-MCNC: 0.2 MG/DL (ref 0.2–1.1)
BNP SERPL-MCNC: 81 PG/ML
BUN SERPL-MCNC: 39 MG/DL (ref 8–23)
CALCIUM SERPL-MCNC: 8.8 MG/DL (ref 8.3–10.4)
CHLORIDE SERPL-SCNC: 112 MMOL/L (ref 98–107)
CO2 SERPL-SCNC: 20 MMOL/L (ref 21–32)
CREAT SERPL-MCNC: 1.45 MG/DL (ref 0.6–1)
DIFFERENTIAL METHOD BLD: ABNORMAL
EOSINOPHIL # BLD: 0 K/UL (ref 0–0.8)
EOSINOPHIL NFR BLD: 0 % (ref 0.5–7.8)
ERYTHROCYTE [DISTWIDTH] IN BLOOD BY AUTOMATED COUNT: 19.6 % (ref 11.9–14.6)
GLOBULIN SER CALC-MCNC: 3.8 G/DL (ref 2.3–3.5)
GLUCOSE SERPL-MCNC: 104 MG/DL (ref 65–100)
HCT VFR BLD AUTO: 21 % (ref 35.8–46.3)
HGB BLD-MCNC: 5.9 G/DL (ref 11.7–15.4)
HISTORY CHECKED?,CKHIST: NORMAL
HISTORY CHECKED?,CKHIST: NORMAL
IMM GRANULOCYTES # BLD AUTO: 0 K/UL (ref 0–0.5)
IMM GRANULOCYTES NFR BLD AUTO: 0 % (ref 0–5)
INR PPP: 7.9
LACTATE SERPL-SCNC: 2.9 MMOL/L (ref 0.4–2)
LYMPHOCYTES # BLD: 2.5 K/UL (ref 0.5–4.6)
LYMPHOCYTES NFR BLD: 34 % (ref 13–44)
MAGNESIUM SERPL-MCNC: 2 MG/DL (ref 1.8–2.4)
MCH RBC QN AUTO: 20.5 PG (ref 26.1–32.9)
MCHC RBC AUTO-ENTMCNC: 28.1 G/DL (ref 31.4–35)
MCV RBC AUTO: 72.9 FL (ref 79.6–97.8)
MONOCYTES # BLD: 0.6 K/UL (ref 0.1–1.3)
MONOCYTES NFR BLD: 8 % (ref 4–12)
NEUTS SEG # BLD: 4.2 K/UL (ref 1.7–8.2)
NEUTS SEG NFR BLD: 57 % (ref 43–78)
NRBC # BLD: 0 K/UL (ref 0–0.2)
PLATELET # BLD AUTO: 333 K/UL (ref 150–450)
PMV BLD AUTO: 9.4 FL (ref 9.4–12.3)
POTASSIUM SERPL-SCNC: 4.4 MMOL/L (ref 3.5–5.1)
PROT SERPL-MCNC: 6.7 G/DL (ref 6.3–8.2)
PROTHROMBIN TIME: 65.3 SEC (ref 12.6–14.5)
RBC # BLD AUTO: 2.88 M/UL (ref 4.05–5.2)
SODIUM SERPL-SCNC: 143 MMOL/L (ref 136–145)
TROPONIN-HIGH SENSITIVITY: 10.8 PG/ML (ref 0–14)
WBC # BLD AUTO: 7.4 K/UL (ref 4.3–11.1)

## 2021-09-28 PROCEDURE — 93005 ELECTROCARDIOGRAM TRACING: CPT | Performed by: EMERGENCY MEDICINE

## 2021-09-28 PROCEDURE — C9113 INJ PANTOPRAZOLE SODIUM, VIA: HCPCS | Performed by: FAMILY MEDICINE

## 2021-09-28 PROCEDURE — 85025 COMPLETE CBC W/AUTO DIFF WBC: CPT

## 2021-09-28 PROCEDURE — 80053 COMPREHEN METABOLIC PANEL: CPT

## 2021-09-28 PROCEDURE — 83735 ASSAY OF MAGNESIUM: CPT

## 2021-09-28 PROCEDURE — 83605 ASSAY OF LACTIC ACID: CPT

## 2021-09-28 PROCEDURE — 86923 COMPATIBILITY TEST ELECTRIC: CPT

## 2021-09-28 PROCEDURE — 86580 TB INTRADERMAL TEST: CPT | Performed by: FAMILY MEDICINE

## 2021-09-28 PROCEDURE — 99285 EMERGENCY DEPT VISIT HI MDM: CPT

## 2021-09-28 PROCEDURE — 74011000250 HC RX REV CODE- 250: Performed by: FAMILY MEDICINE

## 2021-09-28 PROCEDURE — 84484 ASSAY OF TROPONIN QUANT: CPT

## 2021-09-28 PROCEDURE — 71045 X-RAY EXAM CHEST 1 VIEW: CPT

## 2021-09-28 PROCEDURE — 83880 ASSAY OF NATRIURETIC PEPTIDE: CPT

## 2021-09-28 PROCEDURE — 74011250636 HC RX REV CODE- 250/636: Performed by: EMERGENCY MEDICINE

## 2021-09-28 PROCEDURE — 36430 TRANSFUSION BLD/BLD COMPNT: CPT

## 2021-09-28 PROCEDURE — 96374 THER/PROPH/DIAG INJ IV PUSH: CPT

## 2021-09-28 PROCEDURE — 74011000258 HC RX REV CODE- 258: Performed by: EMERGENCY MEDICINE

## 2021-09-28 PROCEDURE — P9016 RBC LEUKOCYTES REDUCED: HCPCS

## 2021-09-28 PROCEDURE — 74011250636 HC RX REV CODE- 250/636: Performed by: FAMILY MEDICINE

## 2021-09-28 PROCEDURE — 85610 PROTHROMBIN TIME: CPT

## 2021-09-28 PROCEDURE — 86901 BLOOD TYPING SEROLOGIC RH(D): CPT

## 2021-09-28 PROCEDURE — 65660000000 HC RM CCU STEPDOWN

## 2021-09-28 RX ORDER — ONDANSETRON 2 MG/ML
4 INJECTION INTRAMUSCULAR; INTRAVENOUS
Status: DISCONTINUED | OUTPATIENT
Start: 2021-09-28 | End: 2021-10-04 | Stop reason: HOSPADM

## 2021-09-28 RX ORDER — ACETAMINOPHEN 325 MG/1
650 TABLET ORAL
Status: DISCONTINUED | OUTPATIENT
Start: 2021-09-28 | End: 2021-10-04 | Stop reason: HOSPADM

## 2021-09-28 RX ORDER — ONDANSETRON 4 MG/1
4 TABLET, ORALLY DISINTEGRATING ORAL
Status: DISCONTINUED | OUTPATIENT
Start: 2021-09-28 | End: 2021-10-04 | Stop reason: HOSPADM

## 2021-09-28 RX ORDER — AMIODARONE HYDROCHLORIDE 200 MG/1
200 TABLET ORAL DAILY
Status: DISCONTINUED | OUTPATIENT
Start: 2021-09-29 | End: 2021-10-04 | Stop reason: HOSPADM

## 2021-09-28 RX ORDER — LANOLIN ALCOHOL/MO/W.PET/CERES
1000 CREAM (GRAM) TOPICAL DAILY
Status: DISCONTINUED | OUTPATIENT
Start: 2021-09-29 | End: 2021-10-04 | Stop reason: HOSPADM

## 2021-09-28 RX ORDER — ACETAMINOPHEN 650 MG/1
650 SUPPOSITORY RECTAL
Status: DISCONTINUED | OUTPATIENT
Start: 2021-09-28 | End: 2021-10-04 | Stop reason: HOSPADM

## 2021-09-28 RX ORDER — SODIUM CHLORIDE 0.9 % (FLUSH) 0.9 %
5-10 SYRINGE (ML) INJECTION AS NEEDED
Status: DISCONTINUED | OUTPATIENT
Start: 2021-09-28 | End: 2021-10-04 | Stop reason: HOSPADM

## 2021-09-28 RX ORDER — POLYETHYLENE GLYCOL 3350 17 G/17G
17 POWDER, FOR SOLUTION ORAL DAILY PRN
Status: DISCONTINUED | OUTPATIENT
Start: 2021-09-28 | End: 2021-10-04 | Stop reason: HOSPADM

## 2021-09-28 RX ORDER — VENLAFAXINE 25 MG/1
25 TABLET ORAL DAILY
Status: DISCONTINUED | OUTPATIENT
Start: 2021-09-29 | End: 2021-10-04 | Stop reason: HOSPADM

## 2021-09-28 RX ORDER — SODIUM CHLORIDE 0.9 % (FLUSH) 0.9 %
5-10 SYRINGE (ML) INJECTION EVERY 8 HOURS
Status: DISCONTINUED | OUTPATIENT
Start: 2021-09-28 | End: 2021-10-04 | Stop reason: HOSPADM

## 2021-09-28 RX ORDER — ATORVASTATIN CALCIUM 20 MG/1
20 TABLET, FILM COATED ORAL DAILY
Status: DISCONTINUED | OUTPATIENT
Start: 2021-09-29 | End: 2021-10-04 | Stop reason: HOSPADM

## 2021-09-28 RX ORDER — ONDANSETRON 2 MG/ML
4 INJECTION INTRAMUSCULAR; INTRAVENOUS
Status: COMPLETED | OUTPATIENT
Start: 2021-09-28 | End: 2021-09-28

## 2021-09-28 RX ORDER — SODIUM CHLORIDE 9 MG/ML
250 INJECTION, SOLUTION INTRAVENOUS AS NEEDED
Status: DISCONTINUED | OUTPATIENT
Start: 2021-09-28 | End: 2021-10-04 | Stop reason: HOSPADM

## 2021-09-28 RX ORDER — SODIUM CHLORIDE 0.9 % (FLUSH) 0.9 %
5-40 SYRINGE (ML) INJECTION EVERY 8 HOURS
Status: DISCONTINUED | OUTPATIENT
Start: 2021-09-28 | End: 2021-10-04 | Stop reason: HOSPADM

## 2021-09-28 RX ORDER — SODIUM CHLORIDE 0.9 % (FLUSH) 0.9 %
5-40 SYRINGE (ML) INJECTION AS NEEDED
Status: DISCONTINUED | OUTPATIENT
Start: 2021-09-28 | End: 2021-10-04 | Stop reason: HOSPADM

## 2021-09-28 RX ADMIN — TUBERCULIN PURIFIED PROTEIN DERIVATIVE 5 UNITS: 5 INJECTION, SOLUTION INTRADERMAL at 22:37

## 2021-09-28 RX ADMIN — PHYTONADIONE 5 MG: 10 INJECTION, EMULSION INTRAMUSCULAR; INTRAVENOUS; SUBCUTANEOUS at 20:55

## 2021-09-28 RX ADMIN — Medication 10 ML: at 22:41

## 2021-09-28 RX ADMIN — SODIUM CHLORIDE 40 MG: 9 INJECTION, SOLUTION INTRAMUSCULAR; INTRAVENOUS; SUBCUTANEOUS at 22:37

## 2021-09-28 RX ADMIN — ONDANSETRON 4 MG: 2 INJECTION INTRAMUSCULAR; INTRAVENOUS at 18:26

## 2021-09-28 NOTE — ED TRIAGE NOTES
Pt arrives via [facility] to triage. Pt states she feels dizzy and short of breath. Pt states she takes iron pills for anemia. Pt also has redness to left sclera of eye. Pt denies changes in vision. States woke up with eye like this. Denies blood in stool or dark stools. Reports chronic swelling to bilateral feet. NAD.  Masked

## 2021-09-28 NOTE — ED PROVIDER NOTES
76-year-old female arrives from home complaining of lightheadedness and shortness of breath that started upon awakening this morning. [place] fine when she went to bed last night. She has history of vertigo which is chronic. Coronary artery disease with MI in the past.  History of atrial fibrillation she takes Coumadin. Also history of iron deficiency anemia. Review of records also reveals right hip arthroplasty due to a fracture about 10 weeks ago. No history of DVT or PE. Patient states she felt fine yesterday. She does not have any chest pain or headache. No vomiting or diarrhea has not noticed any change in her bowels. Patient states that with her atrial fibrillation she has had to be shocked before. It was noted the patient became nauseated and passed out twice in the emergency department waiting room. O2 saturation was 60% at that time. The history is provided by the patient. Shortness of Breath  This is a new problem. The problem occurs continuously. The current episode started 6 to 12 hours ago. The problem has not changed since onset. Associated symptoms include syncope and vomiting. Pertinent negatives include no fever, no headaches, no neck pain, no cough, no sputum production, no hemoptysis, no wheezing, no chest pain, no abdominal pain, no rash, no leg pain and no leg swelling. She has tried nothing for the symptoms. Associated medical issues include CAD and recent surgery. Associated medical issues do not include asthma, COPD, PE or DVT.         Past Medical History:   Diagnosis Date    Arrhythmia     A-fib    Arthritis     Biceps tendonitis     CAD (coronary artery disease)     MI 1999    Dysphagia     Dysuria     Elevated glucose     GERD (gastroesophageal reflux disease)     managed with medication    Hypertension     controlled with medication    Other ill-defined conditions(669.89)     high cholesterol    Pain in limb     Postmenopausal     Psychiatric disorder anxiety    Sciatica     Tachycardia     Vaginitis     Vertigo        Past Surgical History:   Procedure Laterality Date    COLONOSCOPY N/A 5/19/2021    COLONOSCOPY / BMI 31 performed by Stephanie Torres MD at [facility] ENDOSCOPY    HX BREAST BIOPSY Right     HX GYN      hysterectomy    HX KNEE ARTHROSCOPY Bilateral     CT ABDOMEN SURGERY PROC UNLISTED      cyst removed from upper ABD. Family History:   Problem Relation Age of Onset    Coronary Artery Disease Neg Hx        Social History     Socioeconomic History    Marital status:      Spouse name: Not on file    Number of children: Not on file    Years of education: Not on file    Highest education level: Not on file   Occupational History    Not on file   Tobacco Use    Smoking status: Never Smoker    Smokeless tobacco: Never Used   Vaping Use    Vaping Use: Never used   Substance and Sexual Activity    Alcohol use: No    Drug use: No    Sexual activity: Not Currently   Other Topics Concern    Not on file   Social History Narrative    Not on file     Social Determinants of Health     Financial Resource Strain:     Difficulty of Paying Living Expenses:    Food Insecurity:     Worried About [address] in the Last Year:     [address] in the Last Year:    Transportation Needs:     Lack of Transportation (Medical):  Lack of Transportation (Non-Medical):    Physical Activity:     Days of Exercise per Week:     Minutes of Exercise per Session:    Stress:     Feeling of Stress :    Social Connections:     Frequency of Communication with Friends and Family:     Frequency of Social Gatherings with Friends and Family:     Attends [employer] Services:     Active Member of Clubs or Organizations:     Attends Club or Organization Meetings:     Marital Status:    Intimate Partner Violence:     Fear of Current or Ex-Partner:     Emotionally Abused:     Physically Abused:     Sexually Abused:           ALLERGIES: Patient has no known allergies. Review of Systems   Constitutional: Positive for fatigue. Negative for chills and fever. Respiratory: Positive for shortness of breath. Negative for cough, hemoptysis, sputum production and wheezing. Cardiovascular: Positive for syncope. Negative for chest pain, palpitations and leg swelling. Gastrointestinal: Positive for vomiting. Negative for abdominal pain and diarrhea. Genitourinary: Negative for dysuria and flank pain. Musculoskeletal: Negative for back pain and neck pain. Skin: Negative for color change and rash. Neurological: Positive for syncope, weakness (Generalized) and light-headedness. Negative for headaches. All other systems reviewed and are negative. Vitals:    09/28/21 1737   BP: 122/73   Pulse: 83   Resp: 16   Temp: 97.4 °F (36.3 °C)   SpO2: 99%   Weight: 88 kg (194 lb)   Height: 5' 5\" (1.651 m)            Physical Exam  Vitals and nursing note reviewed. Constitutional:       General: She is in acute distress. Appearance: She is well-developed. She is ill-appearing. Comments: Pale appearing   HENT:      Head: Normocephalic and atraumatic. Right Ear: External ear normal.      Left Ear: External ear normal.      Mouth/Throat:      Pharynx: No oropharyngeal exudate. Eyes:      Conjunctiva/sclera: Conjunctivae normal.      Pupils: Pupils are equal, round, and reactive to light. Cardiovascular:      Rate and Rhythm: Normal rate and regular rhythm. Heart sounds: No murmur heard. Pulmonary:      Effort: No respiratory distress. Breath sounds: Normal breath sounds. Abdominal:      General: Bowel sounds are normal.      Palpations: Abdomen is soft. There is no mass. Tenderness: There is no abdominal tenderness. There is no guarding or rebound. Hernia: No hernia is present. Genitourinary:     Rectum: Guaiac result positive. Comments: Rectal exam without masses or tenderness.   Moderately guaiac positive. No definite melena noted  Musculoskeletal:      Cervical back: Normal range of motion and neck supple. Right lower leg: No tenderness. No edema. Left lower leg: No tenderness. No edema. Skin:     General: Skin is warm and dry. Neurological:      General: No focal deficit present. Mental Status: She is alert and oriented to person, place, and time. Gait: Gait normal.      Comments: Nl speech   Psychiatric:         Speech: Speech normal.          MDM  Number of Diagnoses or Management Options  Diagnosis management comments: Syncopal episode and patient is very pale. Possible dysrhythmia given her history. Check for anemia, sepsis, dehydration, kidney injury, GI bleed. Amount and/or Complexity of Data Reviewed  Clinical lab tests: ordered and reviewed  Tests in the radiology section of CPT®: ordered and reviewed  Tests in the medicine section of CPT®: ordered and reviewed  Review and summarize past medical records: yes  Discuss the patient with other providers: yes  Independent visualization of images, tracings, or specimens: yes (My interpretation of EKG shows normal sinus rhythm at 85. No ST-T changes or ectopy. QT elevated at 570. Patient takes an amiodarone)    Risk of Complications, Morbidity, and/or Mortality  Presenting problems: moderate  Diagnostic procedures: low  Management options: low    Patient Progress  Patient progress: stable         Procedures      Patient with 2 syncopal episodes in the waiting room. O2 saturation was 60. At this point, O2 saturation not spontaneously into the 90s on room air. Regular rhythm. Still denies chest pain. Still appears pale. Hemoglobin is trending back and 5.9 which is much lower than previous. Positive rectal examination. Suspect shortness of breath most likely secondary to worsening anemia.   Type and screen is pending         Results Include:    Recent Results (from the past 24 hour(s))   CBC WITH AUTOMATED DIFF Collection Time: 09/28/21  5:39 PM   Result Value Ref Range    WBC 7.4 4.3 - 11.1 K/uL    RBC 2.88 (L) 4.05 - 5.2 M/uL    HGB 5.9 (LL) 11.7 - 15.4 g/dL    HCT 21.0 (L) 35.8 - 46.3 %    MCV 72.9 (L) 79.6 - 97.8 FL    MCH 20.5 (L) 26.1 - 32.9 PG    MCHC 28.1 (L) 31.4 - 35.0 g/dL    RDW 19.6 (H) 11.9 - 14.6 %    PLATELET 178 476 - 733 K/uL    MPV 9.4 9.4 - 12.3 FL    ABSOLUTE NRBC 0.00 0.0 - 0.2 K/uL    DF AUTOMATED      NEUTROPHILS 57 43 - 78 %    LYMPHOCYTES 34 13 - 44 %    MONOCYTES 8 4.0 - 12.0 %    EOSINOPHILS 0 (L) 0.5 - 7.8 %    BASOPHILS 0 0.0 - 2.0 %    IMMATURE GRANULOCYTES 0 0.0 - 5.0 %    ABS. NEUTROPHILS 4.2 1.7 - 8.2 K/UL    ABS. LYMPHOCYTES 2.5 0.5 - 4.6 K/UL    ABS. MONOCYTES 0.6 0.1 - 1.3 K/UL    ABS. EOSINOPHILS 0.0 0.0 - 0.8 K/UL    ABS. BASOPHILS 0.0 0.0 - 0.2 K/UL    ABS. IMM. GRANS. 0.0 0.0 - 0.5 K/UL   METABOLIC PANEL, COMPREHENSIVE    Collection Time: 09/28/21  5:39 PM   Result Value Ref Range    Sodium 143 136 - 145 mmol/L    Potassium 4.4 3.5 - 5.1 mmol/L    Chloride 112 (H) 98 - 107 mmol/L    CO2 20 (L) 21 - 32 mmol/L    Anion gap 11 7 - 16 mmol/L    Glucose 104 (H) 65 - 100 mg/dL    BUN 39 (H) 8 - 23 MG/DL    Creatinine 1.45 (H) 0.6 - 1.0 MG/DL    GFR est AA 45 (L) >60 ml/min/1.73m2    GFR est non-AA 37 (L) >60 ml/min/1.73m2    Calcium 8.8 8.3 - 10.4 MG/DL    Bilirubin, total 0.2 0.2 - 1.1 MG/DL    ALT (SGPT) 19 12 - 65 U/L    AST (SGOT) 14 (L) 15 - 37 U/L    Alk.  phosphatase 132 50 - 136 U/L    Protein, total 6.7 6.3 - 8.2 g/dL    Albumin 2.9 (L) 3.2 - 4.6 g/dL    Globulin 3.8 (H) 2.3 - 3.5 g/dL    A-G Ratio 0.8 (L) 1.2 - 3.5     MAGNESIUM    Collection Time: 09/28/21  5:39 PM   Result Value Ref Range    Magnesium 2.0 1.8 - 2.4 mg/dL   NT-PRO BNP    Collection Time: 09/28/21  5:39 PM   Result Value Ref Range    NT pro-BNP 81 <450 PG/ML   EKG, 12 LEAD, INITIAL    Collection Time: 09/28/21  5:41 PM   Result Value Ref Range    Ventricular Rate 87 BPM    Atrial Rate 87 BPM    P-R Interval 146 ms    QRS Duration 88 ms    Q-T Interval 400 ms    QTC Calculation (Bezet) 481 ms    Calculated P Axis 73 degrees    Calculated R Axis 3 degrees    Calculated T Axis 78 degrees    Diagnosis       Sinus rhythm with Premature atrial complexes  Minimal voltage criteria for LVH, may be normal variant  Borderline ECG  When compared with ECG of 12-JUL-2021 17:28,  Premature atrial complexes are now Present  Criteria for Septal infarct are no longer Present     TYPE & SCREEN    Collection Time: 09/28/21  6:18 PM   Result Value Ref Range    Crossmatch Expiration 10/01/2021,2359     ABO/Rh(D) Lorrie Cogan POSITIVE     Antibody screen NEG    PROTHROMBIN TIME + INR    Collection Time: 09/28/21  6:18 PM   Result Value Ref Range    Prothrombin time 65.3 (H) 12.6 - 14.5 sec    INR 7.9 (HH)     LACTIC ACID    Collection Time: 09/28/21  6:18 PM   Result Value Ref Range    Lactic acid 2.9 (H) 0.4 - 2.0 MMOL/L   TROPONIN-HIGH SENSITIVITY    Collection Time: 09/28/21  6:18 PM   Result Value Ref Range    Troponin-High Sensitivity 10.8 0 - 14 pg/mL     XR CHEST PORT    Result Date: 9/28/2021  EXAMINATION: CHEST RADIOGRAPH 9/28/2021 6:24 PM INDICATION: Dizziness and shortness of breath COMPARISON: 7/15/2021 TECHNIQUE: A single view of the chest was obtained. FINDINGS: Support Devices: None Osseous : No acute abnormality. Cardiomediastinal Silhouette: Normal in size. Lungs: Clear lungs. Normal pulmonary vascularity. Pleura: No pleural fluid or pneumothorax. Upper Abdomen: No abnormality. No acute abnormality. Is slightly elevated. I do not believe sepsis. I believe this was due to hypotension and syncope probably the anemia as well. Patient has crossmatch and receive a unit of blood. All good oxygenation. Blood pressure reasonable when supine. Discussed with GI for consultation. Will discuss with hospitalist regarding admission. No dysrhythmias noted. Vitamin K ordered for Coumadin toxicity.   Suspect most likely patient's had ongoing GI bleed due to over anticoagulation and this resulted in anemia with shortness of breath and syncope. Do not see any obvious primary pulmonary issues. Doubt pulmonary embolism with INR of 7. CRITICAL CARE Documentation: This patient is critically ill and there is a high probability of of imminent or life threatening deterioration in the patient's condition without immediate management. The nature of the patient's clinical problem is: Syncope, GI bleed, hemorrhagic anemia    I have spent 1 hour in direct patient care, documentation, review of labs/xrays/old records, discussion with Family, Colleague, Nursing . The time involved in the performance of separately reportable procedures was not counted toward critical care time.      Jesica Murray MD; 9/28/2021 @8:09 PM

## 2021-09-29 ENCOUNTER — ANESTHESIA EVENT (OUTPATIENT)
Dept: ENDOSCOPY | Age: 76
DRG: 813 | End: 2021-09-29
Payer: MEDICARE

## 2021-09-29 LAB
ANION GAP SERPL CALC-SCNC: 7 MMOL/L (ref 7–16)
ATRIAL RATE: 87 BPM
BUN SERPL-MCNC: 36 MG/DL (ref 8–23)
CALCIUM SERPL-MCNC: 8.4 MG/DL (ref 8.3–10.4)
CALCULATED P AXIS, ECG09: 73 DEGREES
CALCULATED R AXIS, ECG10: 3 DEGREES
CALCULATED T AXIS, ECG11: 78 DEGREES
CHLORIDE SERPL-SCNC: 113 MMOL/L (ref 98–107)
CO2 SERPL-SCNC: 23 MMOL/L (ref 21–32)
COVID-19 RAPID TEST, COVR: NOT DETECTED
CREAT SERPL-MCNC: 1.06 MG/DL (ref 0.6–1)
DIAGNOSIS, 93000: NORMAL
FERRITIN SERPL-MCNC: 15 NG/ML (ref 8–388)
GLUCOSE SERPL-MCNC: 113 MG/DL (ref 65–100)
HCT VFR BLD AUTO: 20.1 % (ref 35.8–46.3)
HCT VFR BLD AUTO: 20.2 % (ref 35.8–46.3)
HCT VFR BLD AUTO: 22.7 % (ref 35.8–46.3)
HCT VFR BLD AUTO: 23.1 % (ref 35.8–46.3)
HCT VFR BLD AUTO: 24 % (ref 35.8–46.3)
HGB BLD-MCNC: 5.8 G/DL (ref 11.7–15.4)
HGB BLD-MCNC: 5.9 G/DL (ref 11.7–15.4)
HGB BLD-MCNC: 6.6 G/DL (ref 11.7–15.4)
HGB BLD-MCNC: 7 G/DL (ref 11.7–15.4)
HGB BLD-MCNC: 7 G/DL (ref 11.7–15.4)
HISTORY CHECKED?,CKHIST: NORMAL
INR PPP: 3.2
IRON SATN MFR SERPL: 28 %
IRON SERPL-MCNC: 110 UG/DL (ref 35–150)
LACTATE SERPL-SCNC: 1.3 MMOL/L (ref 0.4–2)
MM INDURATION POC: 0 MM (ref 0–5)
P-R INTERVAL, ECG05: 146 MS
POTASSIUM SERPL-SCNC: 4 MMOL/L (ref 3.5–5.1)
PPD POC: NEGATIVE NEGATIVE
PROTHROMBIN TIME: 33.1 SEC (ref 12.6–14.5)
Q-T INTERVAL, ECG07: 400 MS
QRS DURATION, ECG06: 88 MS
QTC CALCULATION (BEZET), ECG08: 481 MS
SODIUM SERPL-SCNC: 143 MMOL/L (ref 136–145)
SOURCE, COVRS: NORMAL
TIBC SERPL-MCNC: 391 UG/DL (ref 250–450)
VENTRICULAR RATE, ECG03: 87 BPM

## 2021-09-29 PROCEDURE — 82728 ASSAY OF FERRITIN: CPT

## 2021-09-29 PROCEDURE — 74011250637 HC RX REV CODE- 250/637: Performed by: FAMILY MEDICINE

## 2021-09-29 PROCEDURE — 83605 ASSAY OF LACTIC ACID: CPT

## 2021-09-29 PROCEDURE — 85610 PROTHROMBIN TIME: CPT

## 2021-09-29 PROCEDURE — C9113 INJ PANTOPRAZOLE SODIUM, VIA: HCPCS | Performed by: FAMILY MEDICINE

## 2021-09-29 PROCEDURE — 83540 ASSAY OF IRON: CPT

## 2021-09-29 PROCEDURE — 36430 TRANSFUSION BLD/BLD COMPNT: CPT

## 2021-09-29 PROCEDURE — 65660000000 HC RM CCU STEPDOWN

## 2021-09-29 PROCEDURE — 87635 SARS-COV-2 COVID-19 AMP PRB: CPT

## 2021-09-29 PROCEDURE — 74011250636 HC RX REV CODE- 250/636: Performed by: FAMILY MEDICINE

## 2021-09-29 PROCEDURE — 80048 BASIC METABOLIC PNL TOTAL CA: CPT

## 2021-09-29 PROCEDURE — 74011000250 HC RX REV CODE- 250: Performed by: FAMILY MEDICINE

## 2021-09-29 PROCEDURE — 85014 HEMATOCRIT: CPT

## 2021-09-29 PROCEDURE — P9016 RBC LEUKOCYTES REDUCED: HCPCS

## 2021-09-29 PROCEDURE — 36415 COLL VENOUS BLD VENIPUNCTURE: CPT

## 2021-09-29 RX ORDER — SODIUM CHLORIDE, SODIUM LACTATE, POTASSIUM CHLORIDE, CALCIUM CHLORIDE 600; 310; 30; 20 MG/100ML; MG/100ML; MG/100ML; MG/100ML
100 INJECTION, SOLUTION INTRAVENOUS CONTINUOUS
Status: CANCELLED | OUTPATIENT
Start: 2021-09-29

## 2021-09-29 RX ORDER — SODIUM CHLORIDE 0.9 % (FLUSH) 0.9 %
5-40 SYRINGE (ML) INJECTION EVERY 8 HOURS
Status: CANCELLED | OUTPATIENT
Start: 2021-09-29

## 2021-09-29 RX ORDER — SODIUM CHLORIDE 0.9 % (FLUSH) 0.9 %
5-40 SYRINGE (ML) INJECTION AS NEEDED
Status: CANCELLED | OUTPATIENT
Start: 2021-09-29

## 2021-09-29 RX ORDER — SODIUM CHLORIDE 9 MG/ML
250 INJECTION, SOLUTION INTRAVENOUS AS NEEDED
Status: DISCONTINUED | OUTPATIENT
Start: 2021-09-29 | End: 2021-10-04 | Stop reason: HOSPADM

## 2021-09-29 RX ADMIN — Medication 10 ML: at 05:25

## 2021-09-29 RX ADMIN — SODIUM CHLORIDE 40 MG: 9 INJECTION, SOLUTION INTRAMUSCULAR; INTRAVENOUS; SUBCUTANEOUS at 21:20

## 2021-09-29 RX ADMIN — VENLAFAXINE 25 MG: 25 TABLET ORAL at 09:32

## 2021-09-29 RX ADMIN — Medication 10 ML: at 21:20

## 2021-09-29 RX ADMIN — Medication 10 ML: at 15:05

## 2021-09-29 RX ADMIN — AMIODARONE HYDROCHLORIDE 200 MG: 200 TABLET ORAL at 09:31

## 2021-09-29 RX ADMIN — SODIUM CHLORIDE 10 ML: 9 INJECTION, SOLUTION INTRAMUSCULAR; INTRAVENOUS; SUBCUTANEOUS at 09:25

## 2021-09-29 RX ADMIN — SODIUM CHLORIDE 40 MG: 9 INJECTION, SOLUTION INTRAMUSCULAR; INTRAVENOUS; SUBCUTANEOUS at 09:32

## 2021-09-29 RX ADMIN — ATORVASTATIN CALCIUM 20 MG: 20 TABLET, FILM COATED ORAL at 09:32

## 2021-09-29 RX ADMIN — Medication 10 ML: at 15:06

## 2021-09-29 NOTE — PROGRESS NOTES
Occupational therapy evaluation orders received and chart reviewed. Patient with critical hgb of 5.8. Will HOLD OT/ADL at this time.  Will continue to follow and re-attempt at a later time/date as schedule permits and patient is  available once medically appropriate.     Thank you,  Courtney Mathews MS OTR/L  9/29/2021

## 2021-09-29 NOTE — PROGRESS NOTES
Hospitalist Progress Note   Admit Date:  2021  6:07 PM   Name:  Floresita Olmedo   Age:  64 y.o. Sex:  female  :  1945   MRN:  [id number]   Room:  [place] [address]    Presenting Complaint: Shortness of Breath    Reason(s) for Admission: Symptomatic anemia [D64.9]  Acute blood loss anemia [D62]  Elevated INR [R79.1]     Hospital Course & Interval History:   49-year-old female presented with lightheadedness. Patient admitted to dark tarry stools for past few days. Does take home iron. Patient also has a history of atrial fibrillation on Coumadin and a recent hip fracture that she had repaired 2 months ago. Patient did state that she thinks she was given the wrong dose of her Coumadin. She normally takes 2.5 mg but had a prescription sent in for 5 mg. Patient was taking 5 mg daily and had an INR of 7.9 at admission. Patient's hemoglobin was also 5.8. Patient received vitamin K in the emergency department. Subjective (21):  Patient was seen and examined at the bedside. Patient had no overnight events. Patient states that she is feeling better this morning. Patient denied having any more dark tarry stools. Patient denied any cardiac chest pain, shortness of breath, nausea/vomiting. Assessment & Plan:   Acute blood loss anemia with symptomatic anemia (2021)  Trend H&H  This morning patient's hemoglobin was 5.8  Will transfuse with a unit of packed red blood cells  Iron studies  Patient has not had any stool in the last 10 hours  GI consulted  Coumadin on hold  INR initially 7.9, given vitamin D in ED, INR currently 3.2  Patient will undergo EGD tomorrow  Continue twice daily Protonix  Transfuse hemoglobin if less than 7  N. p.o. after midnight    Atrial fibrillation with supratherapeutic INR levels  Daily INR  Coumadin on hold  Vitamin K ordered by ED since INR 7.9, currently 3.2  Patient stated that she had just been given a new medication of Coumadin but 5 mg dose instead of the 2.5 mg dose she is supposed to take    Hypertension  Pressure medications on hold except for amiodarone in the setting of volume loss  We will need to continue to monitor for signs of fluid overload since history of diastolic dysfunction with ejection fraction of 60% noted on echo in 2019    Recent hip fracture repair  PT/OT consulted  PPD placed for possible rehab    Hyperlipidemia  Continue statin      Dispo/Discharge Planning:    Dispo pending clinical course. EGD tomorrow. Coumadin on hold. N.p.o. after midnight    Diet:  ADULT DIET Clear Liquid;  No Red Dye  DIET NPO  DVT PPx: SCDs  Code status: Full Code    Hospital Problems as of 9/29/2021 Date Reviewed: 8/26/2021        Codes Class Noted - Resolved POA    * (Principal) Acute blood loss anemia ICD-10-CM: D62  ICD-9-CM: 285.1  9/28/2021 - Present Unknown        Elevated INR ICD-10-CM: R79.1  ICD-9-CM: 790.92  9/28/2021 - Present Unknown        Symptomatic anemia ICD-10-CM: D64.9  ICD-9-CM: 285.9  9/28/2021 - Present Unknown        Hip fracture ([facility] [address] ICD-10-CM: S72.009A  ICD-9-CM: 820.8  7/12/2021 - Present Yes        Hypertension ICD-10-CM: I10  ICD-9-CM: 401.9  1/23/2017 - Present Yes        Paroxysmal atrial fibrillation (HCC) ICD-10-CM: I48.0  ICD-9-CM: 427.31  1/23/2017 - Present Yes        Migraine with vertigo ICD-10-CM: G43.109  ICD-9-CM: 346.80, 780.4  1/23/2017 - Present Yes              Objective:     Patient Vitals for the past 24 hrs:   Temp Pulse Resp BP SpO2   09/29/21 1610 99.5 °F (37.5 °C) 70 18 125/60 96 %   09/29/21 1510 98.8 °F (37.1 °C) 70 16 121/60 95 %   09/29/21 1500  70  125/61    09/29/21 1452 98.5 °F (36.9 °C) 70 16 (!) 118/54 94 %   09/29/21 1230 98.9 °F (37.2 °C) 95 18 (!) 112/57 100 %   09/29/21 0900 98.7 °F (37.1 °C) 71 18 127/82 95 %   09/29/21 0822 98.7 °F (37.1 °C) 69 16 (!) 101/53 96 %   09/29/21 0722 98.9 °F (37.2 °C) 92 16 117/63 96 %   09/29/21 0627 99.3 °F (37.4 °C)       09/29/21 0622 99.7 °F (37.6 °C) 74 16 110/63 95 %   09/29/21 0617 99.3 °F (37.4 °C) 76 16 104/77 94 %   09/29/21 0607 99.1 °F (37.3 °C) 73 18 (!) 107/53 95 %   09/29/21 0557 99.1 °F (37.3 °C) 76 16 128/81 94 %   09/29/21 0405 99.7 °F (37.6 °C) 78 18 115/65 94 %   09/28/21 2256 98.1 °F (36.7 °C) 78 19 135/70 94 %   09/28/21 2153 97.9 °F (36.6 °C) 88 16 (!) 154/68 97 %   09/28/21 2121  80 16 (!) 144/63 97 %   09/28/21 2119 98.5 °F (36.9 °C) 80 18 (!) 140/63 99 %   09/28/21 2114 98.3 °F (36.8 °C) 79 18 136/64 99 %   09/28/21 2106 98.3 °F (36.8 °C) 80 18 (!) 141/63 99 %   09/28/21 2100 98.2 °F (36.8 °C) 79 18 131/78 97 %   09/28/21 2044 98.2 °F (36.8 °C) 82 18 (!) 121/58 99 %   09/28/21 2043 98.2 °F (36.8 °C) 82  (!) 121/58 98 %   09/28/21 1737 97.4 °F (36.3 °C) 83 16 122/73 99 %     Oxygen Therapy  O2 Sat (%): 96 % (09/29/21 1610)  Pulse via Oximetry: 77 beats per minute (09/28/21 2121)  O2 Device: None (Room air) (09/29/21 1610)    Estimated body mass index is 32.28 kg/m² as calculated from the following:    Height as of this encounter: 5' 5\" (1.651 m). Weight as of this encounter: 88 kg (194 lb). Intake/Output Summary (Last 24 hours) at 9/29/2021 1636  Last data filed at 9/29/2021 0900  Gross per 24 hour   Intake 670 ml   Output 0 ml   Net 670 ml         Physical Exam:   General:    Well nourished. No overt distress  Head:  Normocephalic, atraumatic  Eyes:  Sclerae appear normal.  Pupils equally round. ENT:  Nares appear normal, no drainage. Moist oral mucosa  Neck:  No restricted ROM. Trachea midline   CV:   RRR. No m/r/g. No jugular venous distension. Lungs:   CTAB. No wheezing, rhonchi, or rales. Respirations even, unlabored  Abdomen: Bowel sounds present. Soft, nontender, nondistended. Extremities: No cyanosis or clubbing. No edema  Skin:     No rashes and normal coloration. Warm and dry. Neuro:  Cranial nerves II-XII grossly intact. Sensation intact  Psych:  Normal mood and affect.   Alert and oriented x3    I have reviewed ordered lab tests and independently visualized imaging below:    Last 24hr Labs:  Recent Results (from the past 24 hour(s))   CBC WITH AUTOMATED DIFF    Collection Time: 09/28/21  5:39 PM   Result Value Ref Range    WBC 7.4 4.3 - 11.1 K/uL    RBC 2.88 (L) 4.05 - 5.2 M/uL    HGB 5.9 (LL) 11.7 - 15.4 g/dL    HCT 21.0 (L) 35.8 - 46.3 %    MCV 72.9 (L) 79.6 - 97.8 FL    MCH 20.5 (L) 26.1 - 32.9 PG    MCHC 28.1 (L) 31.4 - 35.0 g/dL    RDW 19.6 (H) 11.9 - 14.6 %    PLATELET 204 987 - 852 K/uL    MPV 9.4 9.4 - 12.3 FL    ABSOLUTE NRBC 0.00 0.0 - 0.2 K/uL    DF AUTOMATED      NEUTROPHILS 57 43 - 78 %    LYMPHOCYTES 34 13 - 44 %    MONOCYTES 8 4.0 - 12.0 %    EOSINOPHILS 0 (L) 0.5 - 7.8 %    BASOPHILS 0 0.0 - 2.0 %    IMMATURE GRANULOCYTES 0 0.0 - 5.0 %    ABS. NEUTROPHILS 4.2 1.7 - 8.2 K/UL    ABS. LYMPHOCYTES 2.5 0.5 - 4.6 K/UL    ABS. MONOCYTES 0.6 0.1 - 1.3 K/UL    ABS. EOSINOPHILS 0.0 0.0 - 0.8 K/UL    ABS. BASOPHILS 0.0 0.0 - 0.2 K/UL    ABS. IMM. GRANS. 0.0 0.0 - 0.5 K/UL   METABOLIC PANEL, COMPREHENSIVE    Collection Time: 09/28/21  5:39 PM   Result Value Ref Range    Sodium 143 136 - 145 mmol/L    Potassium 4.4 3.5 - 5.1 mmol/L    Chloride 112 (H) 98 - 107 mmol/L    CO2 20 (L) 21 - 32 mmol/L    Anion gap 11 7 - 16 mmol/L    Glucose 104 (H) 65 - 100 mg/dL    BUN 39 (H) 8 - 23 MG/DL    Creatinine 1.45 (H) 0.6 - 1.0 MG/DL    GFR est AA 45 (L) >60 ml/min/1.73m2    GFR est non-AA 37 (L) >60 ml/min/1.73m2    Calcium 8.8 8.3 - 10.4 MG/DL    Bilirubin, total 0.2 0.2 - 1.1 MG/DL    ALT (SGPT) 19 12 - 65 U/L    AST (SGOT) 14 (L) 15 - 37 U/L    Alk.  phosphatase 132 50 - 136 U/L    Protein, total 6.7 6.3 - 8.2 g/dL    Albumin 2.9 (L) 3.2 - 4.6 g/dL    Globulin 3.8 (H) 2.3 - 3.5 g/dL    A-G Ratio 0.8 (L) 1.2 - 3.5     MAGNESIUM    Collection Time: 09/28/21  5:39 PM   Result Value Ref Range    Magnesium 2.0 1.8 - 2.4 mg/dL   NT-PRO BNP    Collection Time: 09/28/21  5:39 PM   Result Value Ref Range NT pro-BNP 81 <450 PG/ML   EKG, 12 LEAD, INITIAL    Collection Time: 09/28/21  5:41 PM   Result Value Ref Range    Ventricular Rate 87 BPM    Atrial Rate 87 BPM    P-R Interval 146 ms    QRS Duration 88 ms    Q-T Interval 400 ms    QTC Calculation (Bezet) 481 ms    Calculated P Axis 73 degrees    Calculated R Axis 3 degrees    Calculated T Axis 78 degrees    Diagnosis       Sinus rhythm with Premature atrial complexes  Minimal voltage criteria for LVH, may be normal variant  Nonspecific ST abnormality  Borderline ECG  When compared with ECG of 12-JUL-2021 17:28,  Premature atrial complexes are now Present  Criteria for Septal infarct are no longer Present  Confirmed by Vale Briones MD (), ELZA NEVES ([id number]) on 9/29/2021 9:03:00 AM     TYPE & SCREEN    Collection Time: 09/28/21  6:18 PM   Result Value Ref Range    Crossmatch Expiration 10/01/2021,2359     ABO/Rh(D) O POSITIVE     Antibody screen NEG     Comment KEEP AHEAD 1 UNIT     Unit number [id number]     Blood component type  LR     Unit division 00     Status of unit ISSUED     Crossmatch result Compatible     Unit number [id number]     Blood component type  LR     Unit division 00     Status of unit TRANSFUSED     Crossmatch result Compatible     Unit number [id number]     Blood component type  LR     Unit division 00     Status of unit ISSUED     Crossmatch result Compatible     Unit number [id number]     Blood component type  LR     Unit division 00     Status of unit ALLOCATED     Crossmatch result Compatible     Unit number [id number]     Blood component type  LR     Unit division 00     Status of unit ALLOCATED     Crossmatch result Compatible    PROTHROMBIN TIME + INR    Collection Time: 09/28/21  6:18 PM   Result Value Ref Range    Prothrombin time 65.3 (H) 12.6 - 14.5 sec    INR 7.9 (HH)     LACTIC ACID    Collection Time: 09/28/21  6:18 PM   Result Value Ref Range    Lactic acid 2.9 (H) 0.4 - 2.0 MMOL/L   TROPONIN-HIGH SENSITIVITY    Collection Time: 09/28/21  6:18 PM   Result Value Ref Range    Troponin-High Sensitivity 10.8 0 - 14 pg/mL   RBC, ALLOCATE    Collection Time: 09/28/21  8:15 PM   Result Value Ref Range    HISTORY CHECKED? Historical check performed    RBC, ALLOCATE    Collection Time: 09/28/21  9:15 PM   Result Value Ref Range    HISTORY CHECKED? Historical check performed    HGB & HCT    Collection Time: 09/29/21 12:08 AM   Result Value Ref Range    HGB 7.0 (L) 11.7 - 15.4 g/dL    HCT 24.0 (L) 35.8 - 46.3 %   FERRITIN    Collection Time: 09/29/21 12:08 AM   Result Value Ref Range    Ferritin 15 8 - 388 NG/ML   TRANSFERRIN SATURATION    Collection Time: 09/29/21 12:08 AM   Result Value Ref Range    Iron 110 35 - 150 ug/dL    TIBC 391 250 - 450 ug/dL    Transferrin Saturation 28 >73 %   METABOLIC PANEL, BASIC    Collection Time: 09/29/21  3:21 AM   Result Value Ref Range    Sodium 143 136 - 145 mmol/L    Potassium 4.0 3.5 - 5.1 mmol/L    Chloride 113 (H) 98 - 107 mmol/L    CO2 23 21 - 32 mmol/L    Anion gap 7 7 - 16 mmol/L    Glucose 113 (H) 65 - 100 mg/dL    BUN 36 (H) 8 - 23 MG/DL    Creatinine 1.06 (H) 0.6 - 1.0 MG/DL    GFR est AA >60 >60 ml/min/1.73m2    GFR est non-AA 53 (L) >60 ml/min/1.73m2    Calcium 8.4 8.3 - 10.4 MG/DL   PROTHROMBIN TIME + INR    Collection Time: 09/29/21  3:21 AM   Result Value Ref Range    Prothrombin time 33.1 (H) 12.6 - 14.5 sec    INR 3.2     HGB & HCT    Collection Time: 09/29/21  3:21 AM   Result Value Ref Range    HGB 5.9 (LL) 11.7 - 15.4 g/dL    HCT 20.1 (L) 35.8 - 46.3 %   LACTIC ACID    Collection Time: 09/29/21  3:21 AM   Result Value Ref Range    Lactic acid 1.3 0.4 - 2.0 MMOL/L   HGB & HCT    Collection Time: 09/29/21  4:18 AM   Result Value Ref Range    HGB 5.8 (LL) 11.7 - 15.4 g/dL    HCT 20.2 (L) 35.8 - 46.3 %   RBC, ALLOCATE    Collection Time: 09/29/21  5:30 AM   Result Value Ref Range    HISTORY CHECKED?  Historical check performed    HGB & HCT    Collection Time: 09/29/21 11:58 AM   Result Value Ref Range    HGB 6.6 (LL) 11.7 - 15.4 g/dL    HCT 22.7 (L) 35.8 - 46.3 %       All Micro Results     Procedure Component Value Units Date/Time    COVID-19 RAPID TEST [[id number]]     Order Status: Sent           Other Studies:  XR CHEST PORT    Result Date: 9/28/2021  EXAMINATION: CHEST RADIOGRAPH 9/28/2021 6:24 PM INDICATION: Dizziness and shortness of breath COMPARISON: 7/15/2021 TECHNIQUE: A single view of the chest was obtained. FINDINGS: Support Devices: None Osseous : No acute abnormality. Cardiomediastinal Silhouette: Normal in size. Lungs: Clear lungs. Normal pulmonary vascularity. Pleura: No pleural fluid or pneumothorax. Upper Abdomen: No abnormality. No acute abnormality.        Current Meds:  Current Facility-Administered Medications   Medication Dose Route Frequency    0.9% sodium chloride infusion 250 mL  250 mL IntraVENous PRN    sodium chloride (NS) flush 5-10 mL  5-10 mL IntraVENous Q8H    sodium chloride (NS) flush 5-10 mL  5-10 mL IntraVENous PRN    0.9% sodium chloride infusion 250 mL  250 mL IntraVENous PRN    amiodarone (CORDARONE) tablet 200 mg  200 mg Oral DAILY    atorvastatin (LIPITOR) tablet 20 mg  20 mg Oral DAILY    cyanocobalamin tablet 1,000 mcg  1,000 mcg Oral DAILY    venlafaxine (EFFEXOR) tablet 25 mg  25 mg Oral DAILY    sodium chloride (NS) flush 5-40 mL  5-40 mL IntraVENous Q8H    sodium chloride (NS) flush 5-40 mL  5-40 mL IntraVENous PRN    acetaminophen (TYLENOL) tablet 650 mg  650 mg Oral Q6H PRN    Or    acetaminophen (TYLENOL) suppository 650 mg  650 mg Rectal Q6H PRN    polyethylene glycol (MIRALAX) packet 17 g  17 g Oral DAILY PRN    ondansetron (ZOFRAN ODT) tablet 4 mg  4 mg Oral Q8H PRN    Or    ondansetron (ZOFRAN) injection 4 mg  4 mg IntraVENous Q6H PRN    pantoprazole (PROTONIX) 40 mg in 0.9% sodium chloride 10 mL injection  40 mg IntraVENous Q12H    tuberculin injection 5 Units  5 Units IntraDERMal ONCE    0.9% sodium chloride infusion 250 mL  250 mL IntraVENous PRN       Signed:  Eusebio Burris MD    Part of this note may have been written by using a voice dictation software. The note has been proof read but may still contain some grammatical/other typographical errors.

## 2021-09-29 NOTE — PROGRESS NOTES
Chart review complete, CM met with pt at bedside pt found sitting up in bed alert and oriented x4, pt states she lives with spouse in 1 level home with 1 step to enter, states independent with ADLS, states has walker and WC at home for use. States does not drive and affords medications with insurance. Demographics, insurance and PCP confirmed    Pt is current with [facility] care and is interested in resuming at time of DC, CM confirmed with [facility] home care staff that pt is current. Pt is pending therapy evaluations to be completed to assist with dc plans. PT/OT    CM will remain available to assist as needed with dc needs. Care Management Interventions  PCP Verified by CM: Yes (Dr Sunshine Byrd last visit was last friday 9/24/2021)  Transition of Care Consult (CM Consult): [address]: No  Reason Outside [facility]: Patient already serviced by other home care/hospice agency ([address] [place] [address])  Discharge Durable Medical Equipment: No  Physical Therapy Consult: Yes  Occupational Therapy Consult: Yes  Speech Therapy Consult: No  Support Systems: Spouse/Significant Other  Confirm Follow Up Transport: Family  Discharge Location  Discharge Placement: Unable to determine at this time      Care Management Interventions  PCP Verified by CM:  Yes (Dr Sunshine Byrd last visit was last friday 9/24/2021)  Discharge Durable Medical Equipment: No  Physical Therapy Consult: Yes  Occupational Therapy Consult: Yes  Speech Therapy Consult: No  Support Systems: Spouse/Significant Other  Confirm Follow Up Transport: Family  Discharge Location  Discharge Placement: Unable to determine at this time

## 2021-09-29 NOTE — PROGRESS NOTES
Physical Therapy Note:    Physical therapy evaluation orders received and chart reviewed. Patient with critical hgb of 5.8. Will HOLD mobility/PT at this time, continue to follow and re-attempt at a later time/date as schedule permits/patient available once medically appropriate.     Thank you,  Juan Carlos Jo, PT, DPT

## 2021-09-29 NOTE — H&P
Hospitalist Admission History and Physical     NAME:  Veronica Persaud   Age:  64 y.o.  :   1945   MRN:   [id number]  PCP: Janice De Guzman MD  Consulting MD:  Treatment Team: Attending Provider: Pia Nevarez DO; Primary Nurse: Viviana Keller; Primary Nurse: Elaina Hunter RN    Chief Complaint   Patient presents with    Shortness of Breath         HPI:   Patient is a 64 y.o. female who presented for the ED for cc lightheadedness. Admits to dark tarry stools for the past few days. Does take iron at home. Hx of a fib on coumadin, anxiety, vertigo, and recent right hip fracture repair two months ago. Vitals - stable    Labs- Hg 5.9 from baseline 8.4. INR 7.9. BUN 39    Past Medical History:   Diagnosis Date    Arrhythmia     A-fib    Arthritis     Biceps tendonitis     CAD (coronary artery disease)     MI     Dysphagia     Dysuria     Elevated glucose     GERD (gastroesophageal reflux disease)     managed with medication    Hypertension     controlled with medication    Other ill-defined conditions(799.89)     high cholesterol    Pain in limb     Postmenopausal     Psychiatric disorder     anxiety    Sciatica     Tachycardia     Vaginitis     Vertigo         Past Surgical History:   Procedure Laterality Date    COLONOSCOPY N/A 2021    COLONOSCOPY / BMI 31 performed by Manuel Zamora MD at [facility] ENDOSCOPY    HX BREAST BIOPSY Right     HX GYN      hysterectomy    HX KNEE ARTHROSCOPY Bilateral     RI ABDOMEN SURGERY PROC UNLISTED      cyst removed from upper ABD.         Family History   Problem Relation Age of Onset    Coronary Artery Disease Neg Hx        Social History     Social History Narrative    Not on file        Social History     Tobacco Use    Smoking status: Never Smoker    Smokeless tobacco: Never Used   Substance Use Topics    Alcohol use: No        Social History     Substance and Sexual Activity   Drug Use No         No Known Allergies    Prior to Admission medications    Medication Sig Start Date End Date Taking? Authorizing Provider   losartan (COZAAR) 100 mg tablet TAKE 1 TABLET BY MOUTH DAILY 6/24/21   Kyra Clay, MD   multivits,Stress Formula-Zinc tablet Take 1 Tab by mouth daily. Provider, Historical   aspirin delayed-release 81 mg tablet Take  by mouth daily. Provider, Historical   acetaminophen (Tylenol Extra Strength) 500 mg tablet Take 1,000 mg by mouth daily. Provider, Historical   atorvastatin (LIPITOR) 20 mg tablet Take 1 Tab by mouth daily. 8/16/20   Kyra Clay, MD   amLODIPine (NORVASC) 5 mg tablet Take 1 Tab by mouth daily. 6/25/20   Kyra Clay, MD   amiodarone (CORDARONE) 200 mg tablet Take 1 Tab by mouth daily. Indications: 200mg bid for 2 weeks and then 200mg daily 5/28/20   Kyra Clay, MD   cyanocobalamin 1,000 mcg tablet Take 1,000 mcg by mouth daily. Provider, Historical   famotidine (PEPCID) 40 mg tablet Take 40 mg by mouth daily. Provider, Historical   warfarin (COUMADIN) 2.5 mg tablet 1 to 1&1/2 tabs every day or as directed  Patient taking differently: 1 to 1&1/2 tabs every day or as directed take 1/2 tab on Mondays and 1 tab all other days 5/22/20   Kyra Clay MD   venlafaxine [facility]) 75 mg tablet Take 25 mg by mouth daily. Provider, Historical   meclizine (ANTIVERT) 25 mg tablet Take 25 mg by mouth three (3) times daily as needed. Other, MD Matteo           Review of Systems    Constitutional: generalized weakness when ambulating  Eyes:  no change in visual acuity, no photophobia  Ears, nose, mouth, throat, and face: no  Odynphagia, dysphagia, no thrush or exudate, negative for chronic sinus congestion, recurrent headaches  Respiratory: negative for SOB, hemoptysis or cough  Cardiovascular: negative for CP, palpitations, or PND  Gastrointestinal: black tarry stools.    Genitourinary: no urgency, frequency, or dysuria, no nocturia  Integument/breast: negative for skin rash or skin lesions  Hematologic/lymphatic: negative for known bleeding disorder  Musculoskeletal:negative for joint pain or joint tenderness  Neurological: generalized weakness  Behavioral/Psych: negative for depression or chronic anxiety,   Endocrine: negative for polydyspia, polyuria or intolerance to heat or cold  Allergic/Immunologic: negative for chronic allergic rhinitis, or known connective tissue disorder      Objective:     Patient Vitals for the past 24 hrs:   Temp Pulse Resp BP SpO2   09/28/21 2106 98.3 °F (36.8 °C) 80 18 (!) 141/63 99 %   09/28/21 2100 98.2 °F (36.8 °C) 79 18 131/78 97 %   09/28/21 2044 98.2 °F (36.8 °C) 82 18 (!) 121/58 99 %   09/28/21 2043 98.2 °F (36.8 °C) 82  (!) 121/58 98 %   09/28/21 1737 97.4 °F (36.3 °C) 83 16 122/73 99 %        No intake/output data recorded. No intake/output data recorded. Data Review:   Recent Results (from the past 24 hour(s))   CBC WITH AUTOMATED DIFF    Collection Time: 09/28/21  5:39 PM   Result Value Ref Range    WBC 7.4 4.3 - 11.1 K/uL    RBC 2.88 (L) 4.05 - 5.2 M/uL    HGB 5.9 (LL) 11.7 - 15.4 g/dL    HCT 21.0 (L) 35.8 - 46.3 %    MCV 72.9 (L) 79.6 - 97.8 FL    MCH 20.5 (L) 26.1 - 32.9 PG    MCHC 28.1 (L) 31.4 - 35.0 g/dL    RDW 19.6 (H) 11.9 - 14.6 %    PLATELET 191 899 - 243 K/uL    MPV 9.4 9.4 - 12.3 FL    ABSOLUTE NRBC 0.00 0.0 - 0.2 K/uL    DF AUTOMATED      NEUTROPHILS 57 43 - 78 %    LYMPHOCYTES 34 13 - 44 %    MONOCYTES 8 4.0 - 12.0 %    EOSINOPHILS 0 (L) 0.5 - 7.8 %    BASOPHILS 0 0.0 - 2.0 %    IMMATURE GRANULOCYTES 0 0.0 - 5.0 %    ABS. NEUTROPHILS 4.2 1.7 - 8.2 K/UL    ABS. LYMPHOCYTES 2.5 0.5 - 4.6 K/UL    ABS. MONOCYTES 0.6 0.1 - 1.3 K/UL    ABS. EOSINOPHILS 0.0 0.0 - 0.8 K/UL    ABS. BASOPHILS 0.0 0.0 - 0.2 K/UL    ABS. IMM.  GRANS. 0.0 0.0 - 0.5 K/UL   METABOLIC PANEL, COMPREHENSIVE    Collection Time: 09/28/21  5:39 PM   Result Value Ref Range    Sodium 143 136 - 145 mmol/L    Potassium 4.4 3.5 - 5.1 mmol/L    Chloride 112 (H) 98 - 107 mmol/L CO2 20 (L) 21 - 32 mmol/L    Anion gap 11 7 - 16 mmol/L    Glucose 104 (H) 65 - 100 mg/dL    BUN 39 (H) 8 - 23 MG/DL    Creatinine 1.45 (H) 0.6 - 1.0 MG/DL    GFR est AA 45 (L) >60 ml/min/1.73m2    GFR est non-AA 37 (L) >60 ml/min/1.73m2    Calcium 8.8 8.3 - 10.4 MG/DL    Bilirubin, total 0.2 0.2 - 1.1 MG/DL    ALT (SGPT) 19 12 - 65 U/L    AST (SGOT) 14 (L) 15 - 37 U/L    Alk.  phosphatase 132 50 - 136 U/L    Protein, total 6.7 6.3 - 8.2 g/dL    Albumin 2.9 (L) 3.2 - 4.6 g/dL    Globulin 3.8 (H) 2.3 - 3.5 g/dL    A-G Ratio 0.8 (L) 1.2 - 3.5     MAGNESIUM    Collection Time: 09/28/21  5:39 PM   Result Value Ref Range    Magnesium 2.0 1.8 - 2.4 mg/dL   NT-PRO BNP    Collection Time: 09/28/21  5:39 PM   Result Value Ref Range    NT pro-BNP 81 <450 PG/ML   EKG, 12 LEAD, INITIAL    Collection Time: 09/28/21  5:41 PM   Result Value Ref Range    Ventricular Rate 87 BPM    Atrial Rate 87 BPM    P-R Interval 146 ms    QRS Duration 88 ms    Q-T Interval 400 ms    QTC Calculation (Bezet) 481 ms    Calculated P Axis 73 degrees    Calculated R Axis 3 degrees    Calculated T Axis 78 degrees    Diagnosis       Sinus rhythm with Premature atrial complexes  Minimal voltage criteria for LVH, may be normal variant  Borderline ECG  When compared with ECG of 12-JUL-2021 17:28,  Premature atrial complexes are now Present  Criteria for Septal infarct are no longer Present     TYPE & SCREEN    Collection Time: 09/28/21  6:18 PM   Result Value Ref Range    Crossmatch Expiration 10/01/2021,2359     ABO/Rh(D) O POSITIVE     Antibody screen NEG     Comment KEEP AHEAD 1 UNIT     Unit number [id number]     Blood component type [facility]     Unit division 00     Status of unit ALLOCATED     Crossmatch result Compatible     Unit number [id number]     Blood component type [facility]     Unit division 00     Status of unit ISSUED     Crossmatch result Compatible    PROTHROMBIN TIME + INR    Collection Time: 09/28/21  6:18 PM   Result Value Ref Range Prothrombin time 65.3 (H) 12.6 - 14.5 sec    INR 7.9 (HH)     LACTIC ACID    Collection Time: 09/28/21  6:18 PM   Result Value Ref Range    Lactic acid 2.9 (H) 0.4 - 2.0 MMOL/L   TROPONIN-HIGH SENSITIVITY    Collection Time: 09/28/21  6:18 PM   Result Value Ref Range    Troponin-High Sensitivity 10.8 0 - 14 pg/mL   RBC, ALLOCATE    Collection Time: 09/28/21  8:15 PM   Result Value Ref Range    HISTORY CHECKED? Historical check performed        Physical Exam:     General:  Alert, cooperative, very pleasant   Eyes:  hemorrhaged blood vessel to left eye   Ears:  Normal TMs and external ear canals both ears. Nose: Nares normal.    Mouth/Throat: Lips, mucosa, and tongue normal.    Neck:  no JVD. Back:   deferred   Lungs:   Clear to auscultation bilaterally. Heart:  Regular rate and rhythm, S1, S2 normal   Abdomen:   Soft, non-tender. Bowel sounds normal. No masses,  No organomegaly. Extremities: Extremities normal, atraumatic, no cyanosis or edema. Pulses: 2+ and symmetric all extremities. Skin: Skin color, texture, turgor normal. No rashes or lesions   Lymph nodes: Cervical, supraclavicular, and axillary nodes normal.   Neurologic: CNII-XII intact. Normal strength, sensation and reflexes throughout. Assessment and Plan     Principal Problem:    Acute blood loss anemia (9/28/2021)    Active Problems:    Hypertension (1/23/2017)      Paroxysmal atrial fibrillation (HCC) (1/23/2017)      Migraine with vertigo (1/23/2017)      Hip fracture (HCC) (7/12/2021)      Elevated INR (9/28/2021)      Symptomatic anemia (9/28/2021)    Acute blood loss anemia with symptomatic anemia - Trend H&H. Cardiac monitor. 1 unit ordered in ER, will order for one more unit of PRBC. Iron studies. Consult GI though likely will not be able to perform endoscopy until INR improved. A fib with supra therapeutic INR levels - Admits to different diet since being discharged from rehab. Daily INR. Holding coumadin.  Vitamin K ordered by ER.     HTN - hold BP medications except for amiodarone in the setting of volume loss.      Recent hip fracture repair - Will consult PT/OT    Statin    Effexor    PPD    Monitor for signs of fluid overload since diastolic dysfunction with EF 60% noted on most recent ECHO in 2019    DVT prophylaxis -  SCDs  Signed By: Shelia Pennington DO   September 28, 2021

## 2021-09-29 NOTE — CONSULTS
[facility] Consult Note       Primary GI Physician:  Dr Luz Dietz    Referring Provider:  Dr Mark Calix    Consult Date:  9/29/2021    Admit Date:  9/28/2021    Chief Complaint:  Anemia, melena    Subjective:     History of Present Illness:  Patient is a 64 y.o. female with PMH atrial fibrillation on Coumadin, CAD, HTN, GERD, recent right hip fracture S/P hemiarthroplasty 7//14/21, seen in consultation at the request of Dr. Mark Calix for anemia and melena. Patient presented to the ED 9/28 with SOB and dizziness which began the same, having a syncopal episode while in the ED. Hgb on arrival was 5.9 and she was transfused two units PRBC cells. Hgb improved to 7.0 but is back down to 5.8 this morning. INR 7.9, now down to 3.2 after vit K, and Coumadin is on hold. Patient has had no stool since arrival to the floor per nursing. A 3rd unit PRBC has been ordered. Patient reports previous hx of black stools last spring at which time she underwent EGD and colonoscopy with Dr Luz Dietz. EUS was recommended for further evaluation of gastric subepithelial lesion but not scheduled. Patient reports she then had surgery for hip fracture in July and has continued to have intermittently black stools since that time. She denies use of nsaids for pain. She believes she has received the wrong dose of Coumadin from the pharmacy last week, reporting they exchanged her 5 mg pill for a 2.5 mg pill. She has had no nausea or vomiting but reports increased fatigue with extreme SOB yesterday which led her to the ED.         EGD 5/19/21 - Dr Luz Dietz for melena - small hiatal hernia, gastric subepithelial lesion  Colonoscopy 5/19/21 - Dr Luz Dietz for melena - diverticulosis, internal hemorrhoids      PMH:  Past Medical History:   Diagnosis Date    Arrhythmia     A-fib    Arthritis     Biceps tendonitis     CAD (coronary artery disease)     MI 1999    Dysphagia     Dysuria     Elevated glucose     GERD (gastroesophageal reflux disease)     managed with medication    Hypertension     controlled with medication    Other ill-defined conditions(799.89)     high cholesterol    Pain in limb     Postmenopausal     Psychiatric disorder     anxiety    Sciatica     Tachycardia     Vaginitis     Vertigo        PSH:  Past Surgical History:   Procedure Laterality Date    COLONOSCOPY N/A 5/19/2021    COLONOSCOPY / BMI 31 performed by Halina Billings [facility] ENDOSCOPY    HX BREAST BIOPSY Right     HX GYN      hysterectomy    HX KNEE ARTHROSCOPY Bilateral     MD ABDOMEN SURGERY PROC UNLISTED      cyst removed from upper ABD. Allergies:  No Known Allergies    Home Medications:  Prior to Admission medications    Medication Sig Start Date End Date Taking? Authorizing Provider   losartan (COZAAR) 100 mg tablet TAKE 1 TABLET BY MOUTH DAILY 6/24/21  Yes Claudetta Ready, MD   multivits,Stress Formula-Zinc tablet Take 1 Tab by mouth daily. Yes Provider, Historical   aspirin delayed-release 81 mg tablet Take  by mouth daily. Yes Provider, Historical   acetaminophen (Tylenol Extra Strength) 500 mg tablet Take 1,000 mg by mouth daily. Yes Provider, Historical   atorvastatin (LIPITOR) 20 mg tablet Take 1 Tab by mouth daily. 8/16/20  Yes Claudetta Ready, MD   amLODIPine (NORVASC) 5 mg tablet Take 1 Tab by mouth daily. 6/25/20  Yes Claudetta Ready, MD   amiodarone (CORDARONE) 200 mg tablet Take 1 Tab by mouth daily. Indications: 200mg bid for 2 weeks and then 200mg daily 5/28/20  Yes Claudetta Ready, MD   warfarin (COUMADIN) 2.5 mg tablet 1 to 1&1/2 tabs every day or as directed  Patient taking differently: 1 to 1&1/2 tabs every day or as directed take 1/2 tab on Mondays and 1 tab all other days 5/22/20  Yes Claudetta Ready, MD   meclizine (ANTIVERT) 25 mg tablet Take 25 mg by mouth three (3) times daily as needed. Yes Other, MD Matteo   cyanocobalamin 1,000 mcg tablet Take 1,000 mcg by mouth daily.   Patient not taking: Reported on 9/28/2021    Provider, Historical   famotidine (PEPCID) 40 mg tablet Take 40 mg by mouth daily. Patient not taking: Reported on 9/28/2021    Provider, Historical   venlafaxine [facility]) 75 mg tablet Take 25 mg by mouth daily.   Patient not taking: Reported on 9/28/2021    Provider, Historical       Hospital Medications:  Current Facility-Administered Medications   Medication Dose Route Frequency    0.9% sodium chloride infusion 250 mL  250 mL IntraVENous PRN    sodium chloride (NS) flush 5-10 mL  5-10 mL IntraVENous Q8H    sodium chloride (NS) flush 5-10 mL  5-10 mL IntraVENous PRN    0.9% sodium chloride infusion 250 mL  250 mL IntraVENous PRN    amiodarone (CORDARONE) tablet 200 mg  200 mg Oral DAILY    atorvastatin (LIPITOR) tablet 20 mg  20 mg Oral DAILY    cyanocobalamin tablet 1,000 mcg  1,000 mcg Oral DAILY    venlafaxine (EFFEXOR) tablet 25 mg  25 mg Oral DAILY    sodium chloride (NS) flush 5-40 mL  5-40 mL IntraVENous Q8H    sodium chloride (NS) flush 5-40 mL  5-40 mL IntraVENous PRN    acetaminophen (TYLENOL) tablet 650 mg  650 mg Oral Q6H PRN    Or    acetaminophen (TYLENOL) suppository 650 mg  650 mg Rectal Q6H PRN    polyethylene glycol (MIRALAX) packet 17 g  17 g Oral DAILY PRN    ondansetron (ZOFRAN ODT) tablet 4 mg  4 mg Oral Q8H PRN    Or    ondansetron (ZOFRAN) injection 4 mg  4 mg IntraVENous Q6H PRN    pantoprazole (PROTONIX) 40 mg in 0.9% sodium chloride 10 mL injection  40 mg IntraVENous Q12H    tuberculin injection 5 Units  5 Units IntraDERMal ONCE    0.9% sodium chloride infusion 250 mL  250 mL IntraVENous PRN       Social History:  Social History     Tobacco Use    Smoking status: Never Smoker    Smokeless tobacco: Never Used   Substance Use Topics    Alcohol use: No         Family History:  Family History   Problem Relation Age of Onset    Coronary Artery Disease Neg Hx        Review of Systems:  A detailed 10 system ROS is obtained, with pertinent positives as listed above. All others are negative. Diet:      Objective:     Physical Exam:  Vitals:  Visit Vitals  /82   Pulse 71   Temp 98.7 °F (37.1 °C)   Resp 18   Ht 5' 5\" (1.651 m)   Wt 88 kg (194 lb)   SpO2 95%   BMI 32.28 kg/m²     Gen:  Pt is alert, cooperative, no acute distress  Skin:  Extremities and face reveal no rashes. Pale. HEENT: Sclerae anicteric. Extra-occular muscles are intact. No oral ulcers. No abnormal pigmentation of the lips. The neck is supple. Cardiovascular: Regular rate and rhythm. No murmurs, gallops, or rubs. Respiratory:  Comfortable breathing with no accessory muscle use. Clear breath sounds anteriorly with no wheezes, rales, or rhonchi. GI:  Abdomen nondistended, soft, and nontender. Normal active bowel sounds. No enlargement of the liver or spleen. No masses palpable. Rectal:  Deferred  Musculoskeletal:  No pitting edema of the lower legs. Neurological:  Gross memory appears intact. Patient is alert and oriented. Psychiatric:  Mood appears appropriate with judgement intact. Lymphatic:  No cervical or supraclavicular adenopathy.     Laboratory:    Recent Labs     09/29/21  0418 09/29/21  0321 09/29/21  0008 09/28/21  1818 09/28/21  1739   WBC  --   --   --   --  7.4   HGB 5.8* 5.9* 7.0*  --  5.9*   HCT 20.2* 20.1* 24.0*  --  21.0*   PLT  --   --   --   --  333   MCV  --   --   --   --  72.9*   NA  --  143  --   --  143   K  --  4.0  --   --  4.4   CL  --  113*  --   --  112*   CO2  --  23  --   --  20*   BUN  --  36*  --   --  39*   CREA  --  1.06*  --   --  1.45*   CA  --  8.4  --   --  8.8   MG  --   --   --   --  2.0   GLU  --  113*  --   --  104*   AP  --   --   --   --  132   AST  --   --   --   --  14*   ALT  --   --   --   --  19   TBILI  --   --   --   --  0.2   ALB  --   --   --   --  2.9*   TP  --   --   --   --  6.7   PTP  --  33.1*  --  65.3*  --    INR  --  3.2  --  7.9*  --           Assessment:     Principal Problem:    Acute blood loss anemia (9/28/2021)    Active Problems:    Hypertension (1/23/2017)      Paroxysmal atrial fibrillation (HCC) (1/23/2017)      Migraine with vertigo (1/23/2017)      Hip fracture ([address] (7/12/2021)      Elevated INR (9/28/2021)      Symptomatic anemia (9/28/2021)        Plan:     65 yo female is seen in consultation for evaluation of severe anemia with coagulopathy and melena, presenting to the ED yesterday with SOB. She had syncopal episode in the waiting room and was found to have hgb 5.9 with INR 7.9. She has had intermittent melena for the last few months, evaluated with EGD and colonoscopy in May; EUS was recommended for further evaluation of a gastric subepithelial lesion but not performed. She had hip fracture with kelley-arthroplasty in July and notes her black stools have been more frequent since that time. Despite transfusion 2 units PRBC, hgb remains low at 5.9. INR improved at 3.2 after vitamin K and she has had no stool in the last 8 hours per nursing. 1.  Transfuse hgb to 7.0 or >  2. Agree with bid Protonix  3. Plan EGD tomorrow for further evaluation after hgb improved  4. Coumadin remains on hold    Patient is seen and examined in collaboration with Dr. Jeremy De León. Assessment and plan as per Dr. Jeremy De León.   Aj Barroso NP

## 2021-09-29 NOTE — ED NOTES
TRANSFER - OUT REPORT:    Verbal report given to [facility] on Marichuy Hughes  being transferred to [facility] bed 02 for routine progression of care       Report consisted of patients Situation, Background, Assessment and   Recommendations(SBAR). Information from the following report(s) SBAR and MAR was reviewed with the receiving nurse. Lines:   Peripheral IV 09/28/21 Left Hand (Active)   Site Assessment Clean, dry, & intact 09/28/21 1741   Phlebitis Assessment 0 09/28/21 1741   Infiltration Assessment 0 09/28/21 1741   Dressing Status Clean, dry, & intact 09/28/21 1741        Opportunity for questions and clarification was provided.       Patient transported with:   Registered Nurse

## 2021-09-29 NOTE — ROUTINE PROCESS
Verbal bedside report given to [facility], oncoming RN. Patient's situation, background, assessment and recommendations provided. Opportunity for questions provided. Oncoming RN assumed care of patient. Pt consent for EGD in chart.

## 2021-09-29 NOTE — ROUTINE PROCESS
TRANSFER - IN REPORT:    Verbal report received from [address] on [facility] AT [facility] being received from ER  for routine progression of care. Report consisted of patients Situation, Background, Assessment and Recommendations(SBAR). Information from the following report(s) SBAR, Kardex, [facility] and Recent Results was reviewed. Opportunity for questions and clarification was provided. Assessment completed upon patients arrival to unit and care assumed. Patient received to room 2. Patient connected to monitor and assessment completed. Plan of care reviewed. Patient oriented to room and call light. Patient aware to use call light to communicate any chest pain or needs. Admission skin assessment completed with second RN and reveals the following: Sacrum intact and blanchable. Heels are dry and cracked but otherwise intact. Skin fold are clear from breakdown and rashes. Scar present on right hip.

## 2021-09-29 NOTE — ACP (ADVANCE CARE PLANNING)
Advance Care Planning     Advance Care Planning Activator (Inpatient)  Conversation Note      Date of ACP Conversation: 09/29/21     Conversation Conducted with:   Patient with Decision Making Capacity    ACP Activator: Peter Townsend RN        Health Care Decision Maker:    Current Designated Health Care Decision Maker:     Click here to complete [employer] including selection of the [employer] Relationship (ie \"Primary\")  Today we documented Decision Maker(s) consistent with Legal Next of Kin hierarchy. Primary decision maker is spouse Mekhi Enriquez [phone]    Care Preferences    Ventilation: \"If you were in your present state of health and suddenly became very ill and were unable to breathe on your own, what would your preference be about the use of a ventilator (breathing machine) if it were available to you? \"      If patient would desire the use of a ventilator (breathing machine), answer \"yes\", if not \"no\":yes    \"If your health worsens and it becomes clear that your chance of recovery is unlikely, what would your preference be about the use of a ventilator (breathing machine) if it were available to you? \"     Would the patient desire the use of a ventilator (breathing machine)? YES      Resuscitation  \"CPR works best to restart the heart when there is a sudden event, like a heart attack, in someone who is otherwise healthy. Unfortunately, CPR does not typically restart the heart for people who have serious health conditions or who are very sick. \"    \"In the event your heart stopped as a result of an underlying serious health condition, would you want attempts to be made to restart your heart (answer \"yes\" for attempt to resuscitate) or would you prefer a natural death (answer \"no\" for do not attempt to resuscitate)? \" yes      [] Yes  [x] No   Educated Patient / Florene Billing regarding differences between Advance Directives and portable DNR orders.     Length of ACP Conversation in minutes:  15 min    Conversation Outcomes:  [x] ACP discussion completed  [] Existing advance directive reviewed with patient; no changes to patient's previously recorded wishes     [] New Advance Directive completed   [] Portable Do Not Resuscitate prepared for Provider review and signature  [] POLST/POST/MOLST/MOST prepared for Provider review and signature      Follow-up plan:    [] Schedule follow-up conversation to continue planning  [x] Referred individual to Provider for additional questions/concerns   [] Advised patient/agent/surrogate to review completed ACP document and update if needed with changes in condition, patient preferences or care setting     [] This note routed to one or more involved healthcare providers

## 2021-09-29 NOTE — H&P (VIEW-ONLY)
[facility] Consult Note       Primary GI Physician:  Dr Mark Rios    Referring Provider:  Dr Emily Garcia    Consult Date:  9/29/2021    Admit Date:  9/28/2021    Chief Complaint:  Anemia, melena    Subjective:     History of Present Illness:  Patient is a 64 y.o. female with PMH atrial fibrillation on Coumadin, CAD, HTN, GERD, recent right hip fracture S/P hemiarthroplasty 7//14/21, seen in consultation at the request of Dr. Emily Garcia for anemia and melena. Patient presented to the ED 9/28 with SOB and dizziness which began the same, having a syncopal episode while in the ED. Hgb on arrival was 5.9 and she was transfused two units PRBC cells. Hgb improved to 7.0 but is back down to 5.8 this morning. INR 7.9, now down to 3.2 after vit K, and Coumadin is on hold. Patient has had no stool since arrival to the floor per nursing. A 3rd unit PRBC has been ordered. Patient reports previous hx of black stools last spring at which time she underwent EGD and colonoscopy with Dr Mark Rios. EUS was recommended for further evaluation of gastric subepithelial lesion but not scheduled. Patient reports she then had surgery for hip fracture in July and has continued to have intermittently black stools since that time. She denies use of nsaids for pain. She believes she has received the wrong dose of Coumadin from the pharmacy last week, reporting they exchanged her 5 mg pill for a 2.5 mg pill. She has had no nausea or vomiting but reports increased fatigue with extreme SOB yesterday which led her to the ED.         EGD 5/19/21 - Dr Mark Rios for melena - small hiatal hernia, gastric subepithelial lesion  Colonoscopy 5/19/21 - Dr Mark Rios for melena - diverticulosis, internal hemorrhoids      PMH:  Past Medical History:   Diagnosis Date    Arrhythmia     A-fib    Arthritis     Biceps tendonitis     CAD (coronary artery disease)     MI 1999    Dysphagia     Dysuria     Elevated glucose     GERD (gastroesophageal reflux disease)     managed with medication    Hypertension     controlled with medication    Other ill-defined conditions(799.89)     high cholesterol    Pain in limb     Postmenopausal     Psychiatric disorder     anxiety    Sciatica     Tachycardia     Vaginitis     Vertigo        PSH:  Past Surgical History:   Procedure Laterality Date    COLONOSCOPY N/A 5/19/2021    COLONOSCOPY / BMI 31 performed by Beryle Barrow, [facility] at [facility]    HX BREAST BIOPSY Right     HX GYN      hysterectomy    HX KNEE ARTHROSCOPY Bilateral     DC ABDOMEN SURGERY PROC UNLISTED      cyst removed from upper ABD. Allergies:  No Known Allergies    Home Medications:  Prior to Admission medications    Medication Sig Start Date End Date Taking? Authorizing Provider   losartan (COZAAR) 100 mg tablet TAKE 1 TABLET BY MOUTH DAILY 6/24/21  Yes Jennifer Sanders MD   multivits,Stress Formula-Zinc tablet Take 1 Tab by mouth daily. Yes Provider, Historical   aspirin delayed-release 81 mg tablet Take  by mouth daily. Yes Provider, Historical   acetaminophen (Tylenol Extra Strength) 500 mg tablet Take 1,000 mg by mouth daily. Yes Provider, Historical   atorvastatin (LIPITOR) 20 mg tablet Take 1 Tab by mouth daily. 8/16/20  Yes Jennifer Sanders MD   amLODIPine (NORVASC) 5 mg tablet Take 1 Tab by mouth daily. 6/25/20  Yes Jennifer Sanders MD   amiodarone (CORDARONE) 200 mg tablet Take 1 Tab by mouth daily. Indications: 200mg bid for 2 weeks and then 200mg daily 5/28/20  Yes Jennifer Sanders MD   warfarin (COUMADIN) 2.5 mg tablet 1 to 1&1/2 tabs every day or as directed  Patient taking differently: 1 to 1&1/2 tabs every day or as directed take 1/2 tab on Mondays and 1 tab all other days 5/22/20  Yes Jennifer Sanders MD   meclizine (ANTIVERT) 25 mg tablet Take 25 mg by mouth three (3) times daily as needed. Yes Other, MD Matteo   cyanocobalamin 1,000 mcg tablet Take 1,000 mcg by mouth daily.   Patient not taking: Reported on 9/28/2021    Provider, Historical   famotidine (PEPCID) 40 mg tablet Take 40 mg by mouth daily. Patient not taking: Reported on 9/28/2021    Provider, Historical   venlafaxine [facility]) 75 mg tablet Take 25 mg by mouth daily.   Patient not taking: Reported on 9/28/2021    Provider, Historical       Hospital Medications:  Current Facility-Administered Medications   Medication Dose Route Frequency    0.9% sodium chloride infusion 250 mL  250 mL IntraVENous PRN    sodium chloride (NS) flush 5-10 mL  5-10 mL IntraVENous Q8H    sodium chloride (NS) flush 5-10 mL  5-10 mL IntraVENous PRN    0.9% sodium chloride infusion 250 mL  250 mL IntraVENous PRN    amiodarone (CORDARONE) tablet 200 mg  200 mg Oral DAILY    atorvastatin (LIPITOR) tablet 20 mg  20 mg Oral DAILY    cyanocobalamin tablet 1,000 mcg  1,000 mcg Oral DAILY    venlafaxine (EFFEXOR) tablet 25 mg  25 mg Oral DAILY    sodium chloride (NS) flush 5-40 mL  5-40 mL IntraVENous Q8H    sodium chloride (NS) flush 5-40 mL  5-40 mL IntraVENous PRN    acetaminophen (TYLENOL) tablet 650 mg  650 mg Oral Q6H PRN    Or    acetaminophen (TYLENOL) suppository 650 mg  650 mg Rectal Q6H PRN    polyethylene glycol (MIRALAX) packet 17 g  17 g Oral DAILY PRN    ondansetron (ZOFRAN ODT) tablet 4 mg  4 mg Oral Q8H PRN    Or    ondansetron (ZOFRAN) injection 4 mg  4 mg IntraVENous Q6H PRN    pantoprazole (PROTONIX) 40 mg in 0.9% sodium chloride 10 mL injection  40 mg IntraVENous Q12H    tuberculin injection 5 Units  5 Units IntraDERMal ONCE    0.9% sodium chloride infusion 250 mL  250 mL IntraVENous PRN       Social History:  Social History     Tobacco Use    Smoking status: Never Smoker    Smokeless tobacco: Never Used   Substance Use Topics    Alcohol use: No         Family History:  Family History   Problem Relation Age of Onset    Coronary Artery Disease Neg Hx        Review of Systems:  A detailed 10 system ROS is obtained, with pertinent positives as listed above. All others are negative. Diet:      Objective:     Physical Exam:  Vitals:  Visit Vitals  /82   Pulse 71   Temp 98.7 °F (37.1 °C)   Resp 18   Ht 5' 5\" (1.651 m)   Wt 88 kg (194 lb)   SpO2 95%   BMI 32.28 kg/m²     Gen:  Pt is alert, cooperative, no acute distress  Skin:  Extremities and face reveal no rashes. Pale. HEENT: Sclerae anicteric. Extra-occular muscles are intact. No oral ulcers. No abnormal pigmentation of the lips. The neck is supple. Cardiovascular: Regular rate and rhythm. No murmurs, gallops, or rubs. Respiratory:  Comfortable breathing with no accessory muscle use. Clear breath sounds anteriorly with no wheezes, rales, or rhonchi. GI:  Abdomen nondistended, soft, and nontender. Normal active bowel sounds. No enlargement of the liver or spleen. No masses palpable. Rectal:  Deferred  Musculoskeletal:  No pitting edema of the lower legs. Neurological:  Gross memory appears intact. Patient is alert and oriented. Psychiatric:  Mood appears appropriate with judgement intact. Lymphatic:  No cervical or supraclavicular adenopathy.     Laboratory:    Recent Labs     09/29/21  0418 09/29/21  0321 09/29/21  0008 09/28/21  1818 09/28/21  1739   WBC  --   --   --   --  7.4   HGB 5.8* 5.9* 7.0*  --  5.9*   HCT 20.2* 20.1* 24.0*  --  21.0*   PLT  --   --   --   --  333   MCV  --   --   --   --  72.9*   NA  --  143  --   --  143   K  --  4.0  --   --  4.4   CL  --  113*  --   --  112*   CO2  --  23  --   --  20*   BUN  --  36*  --   --  39*   CREA  --  1.06*  --   --  1.45*   CA  --  8.4  --   --  8.8   MG  --   --   --   --  2.0   GLU  --  113*  --   --  104*   AP  --   --   --   --  132   AST  --   --   --   --  14*   ALT  --   --   --   --  19   TBILI  --   --   --   --  0.2   ALB  --   --   --   --  2.9*   TP  --   --   --   --  6.7   PTP  --  33.1*  --  65.3*  --    INR  --  3.2  --  7.9*  --           Assessment:     Principal Problem:    Acute blood loss anemia (9/28/2021)    Active Problems:    Hypertension (1/23/2017)      Paroxysmal atrial fibrillation (HCC) (1/23/2017)      Migraine with vertigo (1/23/2017)      Hip fracture ([address] (7/12/2021)      Elevated INR (9/28/2021)      Symptomatic anemia (9/28/2021)        Plan:     65 yo female is seen in consultation for evaluation of severe anemia with coagulopathy and melena, presenting to the ED yesterday with SOB. She had syncopal episode in the waiting room and was found to have hgb 5.9 with INR 7.9. She has had intermittent melena for the last few months, evaluated with EGD and colonoscopy in May; EUS was recommended for further evaluation of a gastric subepithelial lesion but not performed. She had hip fracture with kelley-arthroplasty in July and notes her black stools have been more frequent since that time. Despite transfusion 2 units PRBC, hgb remains low at 5.9. INR improved at 3.2 after vitamin K and she has had no stool in the last 8 hours per nursing. 1.  Transfuse hgb to 7.0 or >  2. Agree with bid Protonix  3. Plan EGD tomorrow for further evaluation after hgb improved  4. Coumadin remains on hold    Patient is seen and examined in collaboration with Dr. Otoniel Leblanc. Assessment and plan as per Dr. Otoniel Leblanc.   Baldev Velasquez NP

## 2021-09-29 NOTE — ROUTINE PROCESS
Verbal bedside report received from Salvador Peralta RN. Assumed care of patient. Blood IV drip verified at bedside with outgoing RN.

## 2021-09-30 ENCOUNTER — APPOINTMENT (OUTPATIENT)
Dept: CT IMAGING | Age: 76
DRG: 813 | End: 2021-09-30
Attending: INTERNAL MEDICINE
Payer: MEDICARE

## 2021-09-30 ENCOUNTER — APPOINTMENT (OUTPATIENT)
Dept: NUCLEAR MEDICINE | Age: 76
DRG: 813 | End: 2021-09-30
Attending: INTERNAL MEDICINE
Payer: MEDICARE

## 2021-09-30 ENCOUNTER — ANESTHESIA (OUTPATIENT)
Dept: ENDOSCOPY | Age: 76
DRG: 813 | End: 2021-09-30
Payer: MEDICARE

## 2021-09-30 LAB
ALBUMIN SERPL-MCNC: 2.2 G/DL (ref 3.2–4.6)
ALBUMIN/GLOB SERPL: 0.8 {RATIO} (ref 1.2–3.5)
ALP SERPL-CCNC: 79 U/L (ref 50–136)
ALT SERPL-CCNC: 14 U/L (ref 12–65)
ANION GAP SERPL CALC-SCNC: 7 MMOL/L (ref 7–16)
AST SERPL-CCNC: 17 U/L (ref 15–37)
BILIRUB SERPL-MCNC: 0.7 MG/DL (ref 0.2–1.1)
BUN SERPL-MCNC: 41 MG/DL (ref 8–23)
CALCIUM SERPL-MCNC: 8 MG/DL (ref 8.3–10.4)
CHLORIDE SERPL-SCNC: 114 MMOL/L (ref 98–107)
CO2 SERPL-SCNC: 23 MMOL/L (ref 21–32)
CREAT SERPL-MCNC: 1.11 MG/DL (ref 0.6–1)
ERYTHROCYTE [DISTWIDTH] IN BLOOD BY AUTOMATED COUNT: 21.3 % (ref 11.9–14.6)
GLOBULIN SER CALC-MCNC: 2.8 G/DL (ref 2.3–3.5)
GLUCOSE SERPL-MCNC: 86 MG/DL (ref 65–100)
HCT VFR BLD AUTO: 21.5 % (ref 35.8–46.3)
HCT VFR BLD AUTO: 21.6 % (ref 35.8–46.3)
HCT VFR BLD AUTO: 24.7 % (ref 35.8–46.3)
HCT VFR BLD AUTO: 28.7 % (ref 35.8–46.3)
HGB BLD-MCNC: 6.3 G/DL (ref 11.7–15.4)
HGB BLD-MCNC: 6.4 G/DL (ref 11.7–15.4)
HGB BLD-MCNC: 7.5 G/DL (ref 11.7–15.4)
HGB BLD-MCNC: 8.2 G/DL (ref 11.7–15.4)
HISTORY CHECKED?,CKHIST: NORMAL
HISTORY CHECKED?,CKHIST: NORMAL
INR PPP: 2.2
MAGNESIUM SERPL-MCNC: 2 MG/DL (ref 1.8–2.4)
MCH RBC QN AUTO: 24.2 PG (ref 26.1–32.9)
MCHC RBC AUTO-ENTMCNC: 29.3 G/DL (ref 31.4–35)
MCV RBC AUTO: 82.7 FL (ref 79.6–97.8)
MM INDURATION POC: 0 MM (ref 0–5)
NRBC # BLD: 0 K/UL (ref 0–0.2)
PLATELET # BLD AUTO: 199 K/UL (ref 150–450)
PMV BLD AUTO: 9.9 FL (ref 9.4–12.3)
POTASSIUM SERPL-SCNC: 3.8 MMOL/L (ref 3.5–5.1)
PPD POC: NEGATIVE NEGATIVE
PROT SERPL-MCNC: 5 G/DL (ref 6.3–8.2)
PROTHROMBIN TIME: 24.6 SEC (ref 12.6–14.5)
RBC # BLD AUTO: 2.6 M/UL (ref 4.05–5.2)
SODIUM SERPL-SCNC: 144 MMOL/L (ref 136–145)
WBC # BLD AUTO: 9.1 K/UL (ref 4.3–11.1)

## 2021-09-30 PROCEDURE — 2709999900 HC NON-CHARGEABLE SUPPLY: Performed by: INTERNAL MEDICINE

## 2021-09-30 PROCEDURE — 74011000250 HC RX REV CODE- 250: Performed by: FAMILY MEDICINE

## 2021-09-30 PROCEDURE — P9016 RBC LEUKOCYTES REDUCED: HCPCS

## 2021-09-30 PROCEDURE — 74011250637 HC RX REV CODE- 250/637: Performed by: FAMILY MEDICINE

## 2021-09-30 PROCEDURE — 83735 ASSAY OF MAGNESIUM: CPT

## 2021-09-30 PROCEDURE — 74011250636 HC RX REV CODE- 250/636: Performed by: REGISTERED NURSE

## 2021-09-30 PROCEDURE — 36430 TRANSFUSION BLD/BLD COMPNT: CPT

## 2021-09-30 PROCEDURE — A9560 TC99M LABELED RBC: HCPCS

## 2021-09-30 PROCEDURE — 85610 PROTHROMBIN TIME: CPT

## 2021-09-30 PROCEDURE — 80053 COMPREHEN METABOLIC PANEL: CPT

## 2021-09-30 PROCEDURE — 74178 CT ABD&PLV WO CNTR FLWD CNTR: CPT

## 2021-09-30 PROCEDURE — 0DJ08ZZ INSPECTION OF UPPER INTESTINAL TRACT, VIA NATURAL OR ARTIFICIAL OPENING ENDOSCOPIC: ICD-10-PCS | Performed by: INTERNAL MEDICINE

## 2021-09-30 PROCEDURE — 74011000250 HC RX REV CODE- 250: Performed by: REGISTERED NURSE

## 2021-09-30 PROCEDURE — C9113 INJ PANTOPRAZOLE SODIUM, VIA: HCPCS | Performed by: FAMILY MEDICINE

## 2021-09-30 PROCEDURE — 76060000032 HC ANESTHESIA 0.5 TO 1 HR: Performed by: INTERNAL MEDICINE

## 2021-09-30 PROCEDURE — 85014 HEMATOCRIT: CPT

## 2021-09-30 PROCEDURE — 74011000258 HC RX REV CODE- 258: Performed by: STUDENT IN AN ORGANIZED HEALTH CARE EDUCATION/TRAINING PROGRAM

## 2021-09-30 PROCEDURE — 65660000000 HC RM CCU STEPDOWN

## 2021-09-30 PROCEDURE — 74011250636 HC RX REV CODE- 250/636: Performed by: FAMILY MEDICINE

## 2021-09-30 PROCEDURE — 74011250636 HC RX REV CODE- 250/636: Performed by: ANESTHESIOLOGY

## 2021-09-30 PROCEDURE — 74011000636 HC RX REV CODE- 636: Performed by: STUDENT IN AN ORGANIZED HEALTH CARE EDUCATION/TRAINING PROGRAM

## 2021-09-30 PROCEDURE — 36415 COLL VENOUS BLD VENIPUNCTURE: CPT

## 2021-09-30 PROCEDURE — 76040000025: Performed by: INTERNAL MEDICINE

## 2021-09-30 PROCEDURE — 85027 COMPLETE CBC AUTOMATED: CPT

## 2021-09-30 RX ORDER — SODIUM CHLORIDE 9 MG/ML
250 INJECTION, SOLUTION INTRAVENOUS AS NEEDED
Status: DISCONTINUED | OUTPATIENT
Start: 2021-09-30 | End: 2021-10-04 | Stop reason: HOSPADM

## 2021-09-30 RX ORDER — LIDOCAINE HYDROCHLORIDE 20 MG/ML
INJECTION, SOLUTION EPIDURAL; INFILTRATION; INTRACAUDAL; PERINEURAL AS NEEDED
Status: DISCONTINUED | OUTPATIENT
Start: 2021-09-30 | End: 2021-09-30 | Stop reason: HOSPADM

## 2021-09-30 RX ORDER — EPHEDRINE SULFATE/0.9% NACL/PF 50 MG/5 ML
SYRINGE (ML) INTRAVENOUS AS NEEDED
Status: DISCONTINUED | OUTPATIENT
Start: 2021-09-30 | End: 2021-09-30 | Stop reason: HOSPADM

## 2021-09-30 RX ORDER — SODIUM CHLORIDE 0.9 % (FLUSH) 0.9 %
10 SYRINGE (ML) INJECTION
Status: COMPLETED | OUTPATIENT
Start: 2021-09-30 | End: 2021-09-30

## 2021-09-30 RX ORDER — PROPOFOL 10 MG/ML
INJECTION, EMULSION INTRAVENOUS AS NEEDED
Status: DISCONTINUED | OUTPATIENT
Start: 2021-09-30 | End: 2021-09-30 | Stop reason: HOSPADM

## 2021-09-30 RX ORDER — PROPOFOL 10 MG/ML
INJECTION, EMULSION INTRAVENOUS
Status: DISCONTINUED | OUTPATIENT
Start: 2021-09-30 | End: 2021-09-30 | Stop reason: HOSPADM

## 2021-09-30 RX ORDER — SODIUM CHLORIDE 9 MG/ML
10 INJECTION, SOLUTION INTRAVENOUS CONTINUOUS
Status: DISCONTINUED | OUTPATIENT
Start: 2021-09-30 | End: 2021-10-04 | Stop reason: HOSPADM

## 2021-09-30 RX ORDER — SODIUM CHLORIDE, SODIUM LACTATE, POTASSIUM CHLORIDE, CALCIUM CHLORIDE 600; 310; 30; 20 MG/100ML; MG/100ML; MG/100ML; MG/100ML
100 INJECTION, SOLUTION INTRAVENOUS CONTINUOUS
Status: DISCONTINUED | OUTPATIENT
Start: 2021-09-30 | End: 2021-10-04 | Stop reason: HOSPADM

## 2021-09-30 RX ADMIN — LIDOCAINE HYDROCHLORIDE 100 MG: 20 INJECTION, SOLUTION EPIDURAL; INFILTRATION; INTRACAUDAL; PERINEURAL at 14:42

## 2021-09-30 RX ADMIN — Medication 10 ML: at 05:14

## 2021-09-30 RX ADMIN — ATORVASTATIN CALCIUM 20 MG: 20 TABLET, FILM COATED ORAL at 09:35

## 2021-09-30 RX ADMIN — PHENYLEPHRINE HYDROCHLORIDE 100 MCG: 10 INJECTION INTRAVENOUS at 14:52

## 2021-09-30 RX ADMIN — PROPOFOL 140 MCG/KG/MIN: 10 INJECTION, EMULSION INTRAVENOUS at 14:42

## 2021-09-30 RX ADMIN — PROPOFOL 40 MG: 10 INJECTION, EMULSION INTRAVENOUS at 14:42

## 2021-09-30 RX ADMIN — Medication 10 ML: at 20:31

## 2021-09-30 RX ADMIN — SODIUM CHLORIDE 40 MG: 9 INJECTION, SOLUTION INTRAMUSCULAR; INTRAVENOUS; SUBCUTANEOUS at 09:35

## 2021-09-30 RX ADMIN — Medication 10 ML: at 17:20

## 2021-09-30 RX ADMIN — VENLAFAXINE 25 MG: 25 TABLET ORAL at 09:35

## 2021-09-30 RX ADMIN — SODIUM CHLORIDE 100 ML: 900 INJECTION, SOLUTION INTRAVENOUS at 20:31

## 2021-09-30 RX ADMIN — CYANOCOBALAMIN TAB 1000 MCG 1000 MCG: 1000 TAB at 09:35

## 2021-09-30 RX ADMIN — PHENYLEPHRINE HYDROCHLORIDE 100 MCG: 10 INJECTION INTRAVENOUS at 15:07

## 2021-09-30 RX ADMIN — PHENYLEPHRINE HYDROCHLORIDE 100 MCG: 10 INJECTION INTRAVENOUS at 15:02

## 2021-09-30 RX ADMIN — SODIUM CHLORIDE 40 MG: 9 INJECTION, SOLUTION INTRAMUSCULAR; INTRAVENOUS; SUBCUTANEOUS at 21:45

## 2021-09-30 RX ADMIN — Medication 10 ML: at 21:45

## 2021-09-30 RX ADMIN — PHENYLEPHRINE HYDROCHLORIDE 100 MCG: 10 INJECTION INTRAVENOUS at 14:57

## 2021-09-30 RX ADMIN — SODIUM CHLORIDE, SODIUM LACTATE, POTASSIUM CHLORIDE, AND CALCIUM CHLORIDE 100 ML/HR: 600; 310; 30; 20 INJECTION, SOLUTION INTRAVENOUS at 13:30

## 2021-09-30 RX ADMIN — Medication 10 MG: at 15:04

## 2021-09-30 RX ADMIN — IOPAMIDOL 100 ML: 755 INJECTION, SOLUTION INTRAVENOUS at 20:31

## 2021-09-30 RX ADMIN — AMIODARONE HYDROCHLORIDE 200 MG: 200 TABLET ORAL at 09:35

## 2021-09-30 NOTE — PROGRESS NOTES
Physical Therapy Note:    Physical therapy evaluation orders received and chart reviewed. In AM patient with hgb 6.3, plan for redraw at noon. In PM pt off the floor for EGD. Will follow and re-attempt at a later time/date as schedule permits/patient available.     Thank you,  Joselin Rowan, PT, DPT

## 2021-09-30 NOTE — PROGRESS NOTES
Notified Dr. Baron Madrid of patients BP's. MD stated patient is ok to be discharged from [facility].

## 2021-09-30 NOTE — PROGRESS NOTES
Hospitalist Progress Note   Admit Date:  2021  6:07 PM   Name:  Janusz Garcia   Age:  64 y.o. Sex:  female  :  1945   MRN:  [id number]   Room:  ENDO/    Presenting Complaint: Shortness of Breath    Reason(s) for Admission: Symptomatic anemia [D64.9]  Acute blood loss anemia [D62]  Elevated INR [R79.1]     Hospital Course & Interval History:   68-year-old female presented with lightheadedness. Patient admitted to dark tarry stools for past few days. Does take home iron. Patient also has a history of atrial fibrillation on Coumadin and a recent hip fracture that she had repaired 2 months ago. Patient did state that she thinks she was given the wrong dose of her Coumadin. She normally takes 2.5 mg but had a prescription sent in for 5 mg. Patient was taking 5 mg daily and had an INR of 7.9 at admission. Patient's hemoglobin was also 5.8. Patient received vitamin K in the emergency department. Subjective (21):  Patient was seen and examined at the bedside. Patient's hemoglobin this morning at 6.3 and will receive another unit of blood. Patient expected to undergo EGD today with gastroenterology. Patient states that she is feeling better this morning. Patient stated that she had a small dark stool later in the day yesterday. Has not had any bowel movements this morning or any other episodes of bleeding. Patient stated she felt more fatigued this morning. Patient denied any cardiac chest pain, shortness of breath, nausea/vomiting.     Assessment & Plan:   Acute blood loss anemia with symptomatic anemia (2021)  Trend H&H   this morning patient's hemoglobin was 6.3  Will transfuse with a unit of packed red blood cells  Iron studies  Patient had small dark stool  in later afternoon  GI consulted  Coumadin on hold  Daniela Stephens initially 7.9, given vitamin D in ED, INR currently 2.2  Patient will undergo EGD   Continue twice daily Protonix  Transfuse hemoglobin if less than 7  N. p.o. currently due to EGD today  Awaiting recommendations from GI post EGD    Atrial fibrillation with supratherapeutic INR levels  Daily INR  Coumadin on hold  Vitamin K ordered by ED since INR 7.9,   Patient stated that she had just been given a new medication of Coumadin but 5 mg dose instead of the 2.5 mg dose she is supposed to take  Patient's INR 2.2 on 9/30    Hypertension/hypotension  Pressure medications on hold except for amiodarone in the setting of volume loss  We will need to continue to monitor for signs of fluid overload since history of diastolic dysfunction with ejection fraction of 60% noted on echo in 2019 9/30 patient had episode of hypotension while in endoscopy suite. Patient was given LR and blood pressure improved    Recent hip fracture repair  PT/OT consulted  PPD placed for possible rehab    Hyperlipidemia  Continue statin      Dispo/Discharge Planning:    Dispo pending clinical course. EGD 9/30, results pending and recommendations from GI. Coumadin on hold.       Diet:  DIET NPO Sips of Water with Meds  DVT PPx: SCDs  Code status: Full Code    Hospital Problems as of 9/30/2021 Date Reviewed: 9/30/2021        Codes Class Noted - Resolved POA    * (Principal) Acute blood loss anemia ICD-10-CM: D62  ICD-9-CM: 285.1  9/28/2021 - Present Unknown        Elevated INR ICD-10-CM: R79.1  ICD-9-CM: 790.92  9/28/2021 - Present Unknown        Symptomatic anemia ICD-10-CM: D64.9  ICD-9-CM: 285.9  9/28/2021 - Present Unknown        Hip fracture ([address] ICD-10-CM: S72.009A  ICD-9-CM: 820.8  7/12/2021 - Present Yes        Hypertension ICD-10-CM: I10  ICD-9-CM: 401.9  1/23/2017 - Present Yes        Paroxysmal atrial fibrillation (HCC) ICD-10-CM: I48.0  ICD-9-CM: 427.31  1/23/2017 - Present Yes        Migraine with vertigo ICD-10-CM: G43.109  ICD-9-CM: 346.80, 780.4  1/23/2017 - Present Yes              Objective:     Patient Vitals for the past 24 hrs:   Temp Pulse Resp BP SpO2   09/30/21 1546  83 14 135/61 98 %   09/30/21 1543  79 14 (!) 140/65 100 %   09/30/21 1536  78 14 (!) 122/58 98 %   09/30/21 1531  75 14 (!) 87/51 100 %   09/30/21 1528  73 14 (!) 81/47 97 %   09/30/21 1518 96.8 °F (36 °C) 75 14 (!) 109/52 95 %   09/30/21 1325  70 16 135/62 97 %   09/30/21 1224 98.1 °F (36.7 °C) 70 18 (!) 94/55 95 %   09/30/21 0833 98.3 °F (36.8 °C) 66 18 113/68 97 %   09/30/21 0742 98.3 °F (36.8 °C) 65 19 (!) 120/49 97 %   09/30/21 0643 98.3 °F (36.8 °C) 66 18 103/61 96 %   09/30/21 0620 98.4 °F (36.9 °C) 65 18 (!) 148/74 97 %   09/30/21 0541 98.5 °F (36.9 °C) 69 18 119/66 99 %   09/30/21 0247 98.7 °F (37.1 °C) 69 18 124/72 99 %   09/29/21 2324 99.2 °F (37.3 °C) 69 17 104/60 95 %   09/29/21 1849 99.2 °F (37.3 °C) 74 17 102/67 97 %   09/29/21 1715 98 °F (36.7 °C) 80 16 109/65 97 %   09/29/21 1710 98 °F (36.7 °C) 84 16 134/67 97 %   09/29/21 1610 99.5 °F (37.5 °C) 70 18 125/60 96 %     Oxygen Therapy  O2 Sat (%): 98 % (09/30/21 1546)  Pulse via Oximetry: 84 beats per minute (09/30/21 1546)  O2 Device: None (Room air) (09/30/21 1546)  O2 Flow Rate (L/min): 2 l/min (09/30/21 1543)    Estimated body mass index is 32.28 kg/m² as calculated from the following:    Height as of this encounter: 5' 5\" (1.651 m). Weight as of this encounter: 88 kg (194 lb). Intake/Output Summary (Last 24 hours) at 9/30/2021 1550  Last data filed at 9/30/2021 1513  Gross per 24 hour   Intake 1218.8 ml   Output 2 ml   Net 1216.8 ml         Physical Exam:   General:    Well nourished. Fatigued  Head:  Normocephalic, atraumatic  Eyes:  Sclerae appear normal.  Pupils equally round. ENT:  Nares appear normal, no drainage. Moist oral mucosa  Neck:  No restricted ROM. Trachea midline   CV:   RRR. No m/r/g. No jugular venous distension. Lungs:   CTAB. No wheezing, rhonchi, or rales. Respirations even, unlabored  Abdomen: Bowel sounds present. Soft, nontender, nondistended.   Extremities: No cyanosis or clubbing. No edema  Skin:     No rashes and normal coloration. Warm and dry. Neuro:  Cranial nerves II-XII grossly intact. Sensation intact  Psych:  Normal mood and affect. Alert and oriented x3    I have reviewed ordered lab tests and independently visualized imaging below:    Last 24hr Labs:  Recent Results (from the past 24 hour(s))   COVID-19 RAPID TEST    Collection Time: 09/29/21  5:12 PM   Result Value Ref Range    Specimen source NASAL      COVID-19 rapid test Not detected NOTD     HGB & HCT    Collection Time: 09/29/21  9:20 PM   Result Value Ref Range    HGB 7.0 (L) 11.7 - 15.4 g/dL    HCT 23.1 (L) 35.8 - 46.3 %   CBC W/O DIFF    Collection Time: 09/30/21  3:09 AM   Result Value Ref Range    WBC 9.1 4.3 - 11.1 K/uL    RBC 2.60 (L) 4.05 - 5.2 M/uL    HGB 6.3 (LL) 11.7 - 15.4 g/dL    HCT 21.5 (L) 35.8 - 46.3 %    MCV 82.7 79.6 - 97.8 FL    MCH 24.2 (L) 26.1 - 32.9 PG    MCHC 29.3 (L) 31.4 - 35.0 g/dL    RDW 21.3 (H) 11.9 - 14.6 %    PLATELET 403 230 - 866 K/uL    MPV 9.9 9.4 - 12.3 FL    ABSOLUTE NRBC 0.00 0.0 - 0.2 K/uL   MAGNESIUM    Collection Time: 09/30/21  3:09 AM   Result Value Ref Range    Magnesium 2.0 1.8 - 2.4 mg/dL   METABOLIC PANEL, COMPREHENSIVE    Collection Time: 09/30/21  3:09 AM   Result Value Ref Range    Sodium 144 136 - 145 mmol/L    Potassium 3.8 3.5 - 5.1 mmol/L    Chloride 114 (H) 98 - 107 mmol/L    CO2 23 21 - 32 mmol/L    Anion gap 7 7 - 16 mmol/L    Glucose 86 65 - 100 mg/dL    BUN 41 (H) 8 - 23 MG/DL    Creatinine 1.11 (H) 0.6 - 1.0 MG/DL    GFR est AA >60 >60 ml/min/1.73m2    GFR est non-AA 51 (L) >60 ml/min/1.73m2    Calcium 8.0 (L) 8.3 - 10.4 MG/DL    Bilirubin, total 0.7 0.2 - 1.1 MG/DL    ALT (SGPT) 14 12 - 65 U/L    AST (SGOT) 17 15 - 37 U/L    Alk.  phosphatase 79 50 - 136 U/L    Protein, total 5.0 (L) 6.3 - 8.2 g/dL    Albumin 2.2 (L) 3.2 - 4.6 g/dL    Globulin 2.8 2.3 - 3.5 g/dL    A-G Ratio 0.8 (L) 1.2 - 3.5     PROTHROMBIN TIME + INR    Collection Time: 09/30/21  3:09 AM   Result Value Ref Range    Prothrombin time 24.6 (H) 12.6 - 14.5 sec    INR 2.2     RBC, ALLOCATE    Collection Time: 09/30/21  4:45 AM   Result Value Ref Range    HISTORY CHECKED? Historical check performed    HGB & HCT    Collection Time: 09/30/21 11:49 AM   Result Value Ref Range    HGB 7.5 (L) 11.7 - 15.4 g/dL    HCT 24.7 (L) 35.8 - 46.3 %       All Micro Results     Procedure Component Value Units Date/Time    COVID-19 RAPID TEST [[id number]] Collected: 09/29/21 1712    Order Status: Completed Specimen: Nasopharyngeal Updated: 09/29/21 1800     Specimen source NASAL        COVID-19 rapid test Not detected        Comment:      The specimen is NEGATIVE for SARS-CoV-2, the novel coronavirus associated with COVID-19. A negative result does not rule out COVID-19. This test has been authorized by the FDA under an Emergency Use Authorization (EUA) for use by authorized laboratories. Fact sheet for Healthcare Providers: [employer]date.co.nz  Fact sheet for Patients: [employer]       Methodology: Isothermal Nucleic Acid Amplification               Other Studies:  No results found.     Current Meds:  Current Facility-Administered Medications   Medication Dose Route Frequency    lactated Ringers infusion  100 mL/hr IntraVENous CONTINUOUS    0.9% sodium chloride infusion  10 mL/hr IntraVENous CONTINUOUS    0.9% sodium chloride infusion 250 mL  250 mL IntraVENous PRN    0.9% sodium chloride infusion 250 mL  250 mL IntraVENous PRN    0.9% sodium chloride infusion 250 mL  250 mL IntraVENous PRN    sodium chloride (NS) flush 5-10 mL  5-10 mL IntraVENous Q8H    sodium chloride (NS) flush 5-10 mL  5-10 mL IntraVENous PRN    0.9% sodium chloride infusion 250 mL  250 mL IntraVENous PRN    amiodarone (CORDARONE) tablet 200 mg  200 mg Oral DAILY    atorvastatin (LIPITOR) tablet 20 mg  20 mg Oral DAILY    cyanocobalamin tablet 1,000 mcg 1,000 mcg Oral DAILY    venlafaxine (EFFEXOR) tablet 25 mg  25 mg Oral DAILY    sodium chloride (NS) flush 5-40 mL  5-40 mL IntraVENous Q8H    sodium chloride (NS) flush 5-40 mL  5-40 mL IntraVENous PRN    acetaminophen (TYLENOL) tablet 650 mg  650 mg Oral Q6H PRN    Or    acetaminophen (TYLENOL) suppository 650 mg  650 mg Rectal Q6H PRN    polyethylene glycol (MIRALAX) packet 17 g  17 g Oral DAILY PRN    ondansetron (ZOFRAN ODT) tablet 4 mg  4 mg Oral Q8H PRN    Or    ondansetron (ZOFRAN) injection 4 mg  4 mg IntraVENous Q6H PRN    pantoprazole (PROTONIX) 40 mg in 0.9% sodium chloride 10 mL injection  40 mg IntraVENous Q12H    0.9% sodium chloride infusion 250 mL  250 mL IntraVENous PRN       Signed:  Georgette Johansen MD    Part of this note may have been written by using a voice dictation software. The note has been proof read but may still contain some grammatical/other typographical errors.

## 2021-09-30 NOTE — PROCEDURES
EGD/ Small bowel enteroscopy Procedure Note    PreOp Diagnosis:   Acute blood loss anemia  Melena  Upper GI bleed    PostOp Diagnosis:  Probable small bowel bleeding    Medications:  Monitored Anesthesia    Procedure:  EGD   Instrument: /  AL  After informed consent was obtained, the patient was sedated and the endoscope was inserted  in the mouth and advanced into the duodenum without difficulty. The scope was slowly withdrawn while the mucosa was carefully inspected, including a retroflexed view of the cardia and fundus. Findings:   Esophagus: The proximal and mid esophagus appeared normal.  In the distal esophagus, Z-line normal  Stomach: Normal, without ulcers, erosions, or polyps. No blood present. Small bowel:   Fresh blood seen pooling in the distal duodenum. Difficult to reach w/ the EGD scope in the 3rd/4th portion. Switch to pediatric scope. Scope advanced 90cm beyond the pylorus. No bleeding source identified. Some fresh clots present, non-adherent. Careful inspection and irrigation without a clear source. It may be distal to the visible small bowel. Recommendations: Follow up Hgb results  A colon source is considered very unlikely, given the fresh blood in the duodenum and jejunum.    Tagged RBC scan  NPO for now    Deedee Dugan MD

## 2021-09-30 NOTE — ANESTHESIA PREPROCEDURE EVALUATION
Anesthetic History   No history of anesthetic complications            Review of Systems / Medical History  Patient summary reviewed and pertinent labs reviewed    Pulmonary  Within defined limits                 Neuro/Psych         Headaches and psychiatric history     Cardiovascular    Hypertension: well controlled        Dysrhythmias (sss) : atrial fibrillation  Past MI (1999) and CAD    Exercise tolerance: >4 METS  Comments: Off coumadin 2 dAYS   GI/Hepatic/Renal     GERD: well controlled           Endo/Other        Arthritis and anemia (received 2 units 9/28 and 1 unit this am; awaiting recheck)     Other Findings   Comments: Melena  Vertigo  RECENT HIP FX 7/21; S/P SARTHAK         Physical Exam    Airway  Mallampati: I  TM Distance: 4 - 6 cm  Neck ROM: normal range of motion   Mouth opening: Normal     Cardiovascular  Regular rate and rhythm,  S1 and S2 normal,  no murmur, click, rub, or gallop  Rhythm: regular  Rate: normal         Dental    Dentition: Full upper dentures and Full lower dentures     Pulmonary  Breath sounds clear to auscultation               Abdominal  GI exam deferred       Other Findings            Anesthetic Plan    ASA: 3  Anesthesia type: total IV anesthesia          Induction: Intravenous  Anesthetic plan and risks discussed with: Patient      Etomidate/propofol planned

## 2021-09-30 NOTE — INTERVAL H&P NOTE
Update History & Physical    The Patient's History and Physical attached below was reviewed with the patient and I examined the patient. There was no change in the plan, or pertinent findings. The surgical site was confirmed with the patient. Plan:  The risk, benefits, expected outcome, and alternative to the recommended procedure have been discussed with the patient. Patient understands and wants to proceed with the procedure.     Electronically signed by Ольга Martines MD

## 2021-09-30 NOTE — ROUTINE PROCESS
TRANSFER - OUT REPORT:    Verbal report given to Shara Bray RN(name) on [employer]  being transferred to [facility](unit) for routine post - op       Report consisted of patients Situation, Background, Assessment and   Recommendations(SBAR). Information from the following report(s) SBAR and Procedure Summary was reviewed with the receiving nurse. Lines:   Peripheral IV 09/28/21 Left Hand (Active)   Site Assessment Clean, dry, & intact 09/30/21 0916   Phlebitis Assessment 0 09/30/21 0916   Infiltration Assessment 0 09/30/21 0916   Dressing Status Clean, dry, & intact 09/30/21 0916   Dressing Type Transparent 09/30/21 0916   Hub Color/Line Status Flushed;Patent 09/30/21 0119       Peripheral IV 09/28/21 Anterior; Left Forearm (Active)   Site Assessment Clean, dry, & intact 09/30/21 0916   Phlebitis Assessment 0 09/30/21 0916   Infiltration Assessment 0 09/30/21 0916   Dressing Status Clean, dry, & intact 09/30/21 0916   Dressing Type Transparent 09/30/21 0916   Hub Color/Line Status Flushed;Patent 09/30/21 0119       Peripheral IV 09/30/21 Anterior;Proximal;Right Forearm (Active)        Opportunity for questions and clarification was provided.       Patient transported with:   [employer]

## 2021-09-30 NOTE — ANESTHESIA POSTPROCEDURE EVALUATION
Procedure(s):  ESOPHAGOGASTRODUODENOSCOPY (EGD) ROOM SE02  ENTEROSCOPY.    total IV anesthesia    Anesthesia Post Evaluation        Patient location during evaluation: PACU  Patient participation: complete - patient participated  Level of consciousness: awake and alert  Pain management: adequate  Airway patency: patent  Anesthetic complications: no  Cardiovascular status: acceptable  Respiratory status: acceptable  Hydration status: acceptable  Post anesthesia nausea and vomiting:  none  Final Post Anesthesia Temperature Assessment:  Normothermia (36.0-37.5 degrees C)      INITIAL Post-op Vital signs:   Vitals Value Taken Time   /61 09/30/21 1601   Temp 36 °C (96.8 °F) 09/30/21 1518   Pulse 78 09/30/21 1602   Resp 14 09/30/21 1556   SpO2 92 % 09/30/21 1603   Vitals shown include unvalidated device data.

## 2021-09-30 NOTE — PROGRESS NOTES
TRANSFER - IN REPORT:    Verbal report received from Mavis Casas RN(name) on [employer]  being received from recovery(unit) for routine progression of care      Report consisted of patients Situation, Background, Assessment and   Recommendations(SBAR). Information from the following report(s) Procedure Summary and Recent Results was reviewed with the receiving nurse. Opportunity for questions and clarification was provided. Assessment will be completed upon patients arrival to unit and care will be assumed.

## 2021-09-30 NOTE — PROGRESS NOTES
OT orders received and chart reviewed. Pt with Hgb of 6.3 in AM and off the floor for EGD in PM. Will follow up and attempt to see pt as schedule permits and as pt is medically appropriate to work with therapy.     Thank you,     Courtney Hill OTR/L

## 2021-09-30 NOTE — PROGRESS NOTES
Assisting primary recovery RN, Dacia Barroso with patient. Pt's BP low, Dr. Sheng Garcia called and notified. Orders received to collect H&H and give bolus of LR.

## 2021-10-01 LAB
ALBUMIN SERPL-MCNC: 2.4 G/DL (ref 3.2–4.6)
ALBUMIN/GLOB SERPL: 0.8 {RATIO} (ref 1.2–3.5)
ALP SERPL-CCNC: 82 U/L (ref 50–136)
ALT SERPL-CCNC: 20 U/L (ref 12–65)
ANION GAP SERPL CALC-SCNC: 9 MMOL/L (ref 7–16)
AST SERPL-CCNC: 20 U/L (ref 15–37)
BILIRUB SERPL-MCNC: 0.9 MG/DL (ref 0.2–1.1)
BUN SERPL-MCNC: 31 MG/DL (ref 8–23)
CALCIUM SERPL-MCNC: 8.5 MG/DL (ref 8.3–10.4)
CHLORIDE SERPL-SCNC: 115 MMOL/L (ref 98–107)
CO2 SERPL-SCNC: 21 MMOL/L (ref 21–32)
CREAT SERPL-MCNC: 1.08 MG/DL (ref 0.6–1)
ERYTHROCYTE [DISTWIDTH] IN BLOOD BY AUTOMATED COUNT: 19.2 % (ref 11.9–14.6)
GLOBULIN SER CALC-MCNC: 3 G/DL (ref 2.3–3.5)
GLUCOSE SERPL-MCNC: 106 MG/DL (ref 65–100)
HCT VFR BLD AUTO: 24.3 % (ref 35.8–46.3)
HCT VFR BLD AUTO: 24.3 % (ref 35.8–46.3)
HCT VFR BLD AUTO: 26.9 % (ref 35.8–46.3)
HGB BLD-MCNC: 7.4 G/DL (ref 11.7–15.4)
HGB BLD-MCNC: 7.5 G/DL (ref 11.7–15.4)
HGB BLD-MCNC: 8.1 G/DL (ref 11.7–15.4)
INR PPP: 2.7
MAGNESIUM SERPL-MCNC: 2.1 MG/DL (ref 1.8–2.4)
MCH RBC QN AUTO: 25.7 PG (ref 26.1–32.9)
MCHC RBC AUTO-ENTMCNC: 30.1 G/DL (ref 31.4–35)
MCV RBC AUTO: 85.4 FL (ref 79.6–97.8)
MM INDURATION POC: 0 MM (ref 0–5)
NRBC # BLD: 0.02 K/UL (ref 0–0.2)
PLATELET # BLD AUTO: 219 K/UL (ref 150–450)
PMV BLD AUTO: 10.3 FL (ref 9.4–12.3)
POTASSIUM SERPL-SCNC: 3.9 MMOL/L (ref 3.5–5.1)
PPD POC: NORMAL
PROT SERPL-MCNC: 5.4 G/DL (ref 6.3–8.2)
PROTHROMBIN TIME: 28.9 SEC (ref 12.6–14.5)
RBC # BLD AUTO: 3.15 M/UL (ref 4.05–5.2)
SODIUM SERPL-SCNC: 145 MMOL/L (ref 136–145)
WBC # BLD AUTO: 14.2 K/UL (ref 4.3–11.1)

## 2021-10-01 PROCEDURE — 36430 TRANSFUSION BLD/BLD COMPNT: CPT

## 2021-10-01 PROCEDURE — 97165 OT EVAL LOW COMPLEX 30 MIN: CPT

## 2021-10-01 PROCEDURE — 83735 ASSAY OF MAGNESIUM: CPT

## 2021-10-01 PROCEDURE — 74011250636 HC RX REV CODE- 250/636: Performed by: FAMILY MEDICINE

## 2021-10-01 PROCEDURE — 85014 HEMATOCRIT: CPT

## 2021-10-01 PROCEDURE — 85610 PROTHROMBIN TIME: CPT

## 2021-10-01 PROCEDURE — 85027 COMPLETE CBC AUTOMATED: CPT

## 2021-10-01 PROCEDURE — C9113 INJ PANTOPRAZOLE SODIUM, VIA: HCPCS | Performed by: FAMILY MEDICINE

## 2021-10-01 PROCEDURE — 80053 COMPREHEN METABOLIC PANEL: CPT

## 2021-10-01 PROCEDURE — 36415 COLL VENOUS BLD VENIPUNCTURE: CPT

## 2021-10-01 PROCEDURE — 65660000000 HC RM CCU STEPDOWN

## 2021-10-01 PROCEDURE — 74011000250 HC RX REV CODE- 250: Performed by: FAMILY MEDICINE

## 2021-10-01 PROCEDURE — P9016 RBC LEUKOCYTES REDUCED: HCPCS

## 2021-10-01 PROCEDURE — 97535 SELF CARE MNGMENT TRAINING: CPT

## 2021-10-01 PROCEDURE — 97530 THERAPEUTIC ACTIVITIES: CPT

## 2021-10-01 PROCEDURE — 74011250637 HC RX REV CODE- 250/637: Performed by: FAMILY MEDICINE

## 2021-10-01 PROCEDURE — 97161 PT EVAL LOW COMPLEX 20 MIN: CPT

## 2021-10-01 RX ADMIN — ATORVASTATIN CALCIUM 20 MG: 20 TABLET, FILM COATED ORAL at 09:00

## 2021-10-01 RX ADMIN — AMIODARONE HYDROCHLORIDE 200 MG: 200 TABLET ORAL at 09:00

## 2021-10-01 RX ADMIN — Medication 10 ML: at 21:10

## 2021-10-01 RX ADMIN — Medication 10 ML: at 05:38

## 2021-10-01 RX ADMIN — SODIUM CHLORIDE 40 MG: 9 INJECTION, SOLUTION INTRAMUSCULAR; INTRAVENOUS; SUBCUTANEOUS at 09:00

## 2021-10-01 RX ADMIN — VENLAFAXINE 25 MG: 25 TABLET ORAL at 09:00

## 2021-10-01 RX ADMIN — SODIUM CHLORIDE 40 MG: 9 INJECTION, SOLUTION INTRAMUSCULAR; INTRAVENOUS; SUBCUTANEOUS at 20:23

## 2021-10-01 RX ADMIN — Medication 5 ML: at 13:29

## 2021-10-01 RX ADMIN — CYANOCOBALAMIN TAB 1000 MCG 1000 MCG: 1000 TAB at 09:00

## 2021-10-01 NOTE — PROGRESS NOTES
Physician Progress Note      John Campoverde  CSN #:                  [id number]  :                       1945  ADMIT DATE:       2021 6:07 PM  [address] [place] DATE:  RESPONDING  PROVIDER #:        Mike Chilel MD          QUERY TEXT:    Pt admitted with ABLA. Pt noted to be on chronic anticoagulation. If possible, please document in progress notes and discharge summary the relationship, if any, between ABLA and chronic anticoagulation. The medical record reflects the following:  Risk Factors: Daily coumadin  Clinical Indicators: Melena, INR 7.9 on admission  Treatment: Vitamin K, serial INR labs, GI consult  Options provided:  -- ABLA due to chronic anticoagulation  -- ABLA unrelated to chronic anticoagulation  -- Other - I will add my own diagnosis  -- Disagree - Not applicable / Not valid  -- Disagree - Clinically unable to determine / Unknown  -- Refer to Clinical Documentation Reviewer    PROVIDER RESPONSE TEXT:    This patient has ABLA due to chronic anticoagulation.     Query created by: Lc Hurtado on 2021 12:54 PM      Electronically signed by:  Mike Chilel MD 10/1/2021 10:51 AM

## 2021-10-01 NOTE — PROGRESS NOTES
ACUTE PHYSICAL THERAPY GOALS:  (Developed with and agreed upon by patient and/or caregiver. )  LTG:  (1.)Ms. Kevin Alegria will move from supine to sit and sit to supine , scoot up and down and roll side to side in bed with INDEPENDENCE within 7 treatment day(s). (2.)Ms. Kevin Alegria will transfer from bed to chair and chair to bed with MODIFIED INDEPENDENCE using the least restrictive device within 7 treatment day(s). (3.)Ms. Kevin Alegria will ambulate with SUPERVISION for 250 feet with the least restrictive device within 7 treatment day(s). (4.)Ms. Kevin Alegria will perform exercises per HEP independently to improve strength and mobility within 7 days.   ________________________________________________________________________________________________      PHYSICAL THERAPY ASSESSMENT: Initial Assessment and AM PT Treatment Day # 1      Shaq Summers is a 64 y.o. female   PRIMARY DIAGNOSIS: Acute blood loss anemia  Symptomatic anemia [D64.9]  Acute blood loss anemia [D62]  Elevated INR [R79.1]  Procedure(s) (LRB):  ESOPHAGOGASTRODUODENOSCOPY (EGD) ROOM SE02 (N/A)  ENTEROSCOPY (N/A)  1 Day Post-Op  Reason for Referral: weakness, anemia  ICD-10: Treatment Diagnosis: Generalized Muscle Weakness (M62.81)  Difficulty in walking, Not elsewhere classified (R26.2)  Other abnormalities of gait and mobility (R26.89)  INPATIENT: Payor: [employer] MEDICARE / Plan: [facility] [employer] MEDICARE HMO / Product Type: Managed Care Medicare /     ASSESSMENT:     REHAB RECOMMENDATIONS:   Recommendation to date pending progress:  [address] [place] [address]  Equipment:    Rolling Walker     PRIOR LEVEL OF FUNCTION:  (Prior to Hospitalization) INITIAL/CURRENT LEVEL OF FUNCTION:  (Most Recently Demonstrated)   Bed Mobility:   Independent  Sit to Stand:   Modified Independent  Transfers:   Modified Independent  Gait/Mobility:   Modified Independent Bed Mobility:   Standby Assistance  Sit to Stand:  [facility] [employer] Assistance  Transfers:   Contact Guard Assistance-stand by assistance   Gait/Mobility:   Contact Guard Assistance-stand by assistance     ASSESSMENT:  Ms. Mere Thorne is admitted from home with anemia, weakness. She lives with spouse and has been using rolling walker since recent left hip hemiarthroplasty. Went to rehab after surgery then more recently has been home with [facility]. Presents today with mild generalized weakness and decreased activity tolerance. Performs bed mobility and sit-stand transfer with SBA. CGA-SBA for ambulation in room and hallway with RW x 2 trials. Slow, steady pace noted. Min cueing for transfer technique and gait safety including hand/foot placement and RW management/positioning. No major loss of balance noted or assistance needed. Returned to room and back to supine with SBA. Positioned comfortably with needs in reach. Anticipate dc home with continued [facility]. Pt reports needing new walker as hers is quite old and falling apart. SUBJECTIVE:   Ms. Mere Thorne states, \"my walker is all [place]. \"    SOCIAL HISTORY/LIVING ENVIRONMENT: Lives with spouse. Mod I with RW for household distances. Some assist with ADLs. Denies falls after prior fall resulting in left hip fx.  Went to rehab after left hip kelley then has been home with [facility] since then  Home Environment: Private residence  # Steps to Enter: 1  One/Two Story Residence: One story  Living Alone: No  Support Systems: Spouse/Significant Other  OBJECTIVE:     PAIN: VITAL SIGNS: LINES/DRAINS:   Pre Treatment:    Post Treatment: 0/10 Vital Signs  O2 Device: None (Room air) none  O2 Device: None (Room air)     GROSS EVALUATION:   Within Functional Limits Abnormal/ Functional Abnormal/ Non-Functional (see comments) Not Tested Comments:   AROM [x] [] [] []    PROM [] [] [] []    Strength [x] [] [] []    Balance [] [x] [] []    Posture [] [x] [] []    Sensation [] [] [] []    Coordination [] [] [] []    Tone [] [] [] []    Edema [] [] [] []    Activity Tolerance [] [x] [] []     [] [] [] [] COGNITION/  PERCEPTION: Intact Impaired   (see comments) Comments:   Orientation [x] []    Vision [x] []    Hearing [x] []    Command Following [x] []    Safety Awareness [x] []     [] []      MOBILITY: I Mod I S SBA CGA Min Mod Max Total  NT x2 Comments:   Bed Mobility    Rolling [] [] [] [x] [] [] [] [] [] [] []    Supine to Sit [] [] [] [x] [] [] [] [] [] [] []    Scooting [] [] [] [x] [] [] [] [] [] [] []    Sit to Supine [] [] [] [x] [] [] [] [] [] [] []    Transfers    Sit to Stand [] [] [] [x] [] [] [] [] [] [] []    Bed to Chair [] [] [] [x] [x] [] [] [] [] [] []    Stand to Sit [] [] [] [x] [] [] [] [] [] [] []    I=Independent, Mod I=Modified Independent, S=Supervision, SBA=Standby Assistance, CGA=Contact Guard Assistance,   Min=Minimal Assistance, Mod=Moderate Assistance, Max=Maximal Assistance, Total=Total Assistance, NT=Not Tested  GAIT: I Mod I S SBA CGA Min Mod Max Total  NT x2 Comments:   Level of Assistance [] [] [] [x] [x] [] [] [] [] [] []    Distance 100 ft x2    DME Rolling Walker    Gait Quality Slow, steady. Forward flexed posture    Weightbearing Status N/A     I=Independent, Mod I=Modified Independent, S=Supervision, SBA=Standby Assistance, CGA=Contact Guard Assistance,   Min=Minimal Assistance, Mod=Moderate Assistance, Max=Maximal Assistance, Total=Total Assistance, NT=Not [address] Form       How much difficulty does the patient currently have. .. Unable A Lot A Little None   1. Turning over in bed (including adjusting bedclothes, sheets and blankets)? [] 1   [] 2   [] 3   [x] 4   2. Sitting down on and standing up from a chair with arms ( e.g., wheelchair, bedside commode, etc.)   [] 1   [] 2   [] 3   [x] 4   3. Moving from lying on back to sitting on the side of the bed? [] 1   [] 2   [] 3   [x] 4   How much help from another person does the patient currently need. .. Total A Lot A Little None   4.   Moving to and from a bed to a chair (including a wheelchair)? [] 1   [] 2   [x] 3   [] 4   5. Need to walk in hospital room? [] 1   [] 2   [x] 3   [] 4   6. Climbing 3-5 steps with a railing? [] 1   [] 2   [x] 3   [] 4   © 2007, Trustees of [facility] [employer], under license to [employer]. All rights reserved     Score:  Initial: 21 Most Recent: X (Date: -- )    Interpretation of Tool:  Represents activities that are increasingly more difficult (i.e. Bed mobility, Transfers, Gait). PLAN:   FREQUENCY/DURATION: PT Plan of Care: 3 times/week for duration of hospital stay or until stated goals are met, whichever comes first.    PROBLEM LIST:   (Skilled intervention is medically necessary to address:)  1. Decreased Activity Tolerance  2. Decreased Balance  3. Decreased Gait Ability  4. Decreased Strength  5. Decreased Transfer Abilities   INTERVENTIONS PLANNED:   (Benefits and precautions of physical therapy have been discussed with the patient.)  1. Therapeutic Activity  2. Therapeutic Exercise/HEP  3. Neuromuscular Re-education  4. Gait Training  5. Manual Therapy  6. Education     TREATMENT:     EVALUATION: Low Complexity : (Untimed Charge)    TREATMENT:   ($$ Therapeutic Activity: 8-22 mins    )  Co-Treatment PT/OT necessary due to patient's decreased overall endurance/tolerance levels, as well as need for high level skilled assistance to complete functional transfers/mobility and functional tasks  Therapeutic Activity (9 Minutes): Therapeutic activity included Rolling, Supine to Sit, Sit to Supine, Scooting, Transfer Training, Ambulation on level ground, Sitting balance  and Standing balance to improve functional Mobility, Strength, Activity tolerance and balance.     TREATMENT GRID:  N/A    AFTER TREATMENT POSITION/PRECAUTIONS:  Bed, Needs within reach and RN notified    INTERDISCIPLINARY COLLABORATION:  RN/PCT, PT/PTA and OT/COSTELLO    TOTAL TREATMENT DURATION:  PT Patient Time In/Time Out  Time In: 1010  Time Out: 1756 Mychal Giron, YINGT

## 2021-10-01 NOTE — PROGRESS NOTES
GI Brief Note    This is late entry. Nurse called me earlier when CTA had resulted. I reviewed the results:  IMPRESSION  1. This study is for assessment of a potential gastric bleed suggested by the nuclear medicine GI bleed scan. The stomach contains oral contrast prior to the administration of contrast. Therefore, a source of bleeding in the stomach cannot be confirmed or excluded. No potential source of GI bleed is otherwise suggested although some oral contrast is seen within additional segments of bowel. I asked the nurse to monitor for recurrent GI bleeding overnight. Monitor H/H and transfuse as needed. If has recurrent bleeding, will need to consult IR. Dr. Arnaud Chao with IR already aware of this patient.     Ermelinda Son MD   [facility]

## 2021-10-01 NOTE — PROGRESS NOTES
Sonia Carr MD via [employer] serve at 1116 about patients hemoglobin of 6.4. Verbal orders via phone to order one unit of PRBCs.

## 2021-10-01 NOTE — PROGRESS NOTES
ACUTE OT GOALS:  (Developed with and agreed upon by patient and/or caregiver.)  1. Patient will complete lower body bathing and dressing with SBA and adaptive equipment as needed. 2. Patient will complete toileting with SBA and adaptive equipment as needed. 3. Patient will tolerate 23 minutes of OT treatment with 1-2 rest breaks to increase activity tolerance for ADLs. 4. Patient will complete functional transfers with Mod I and adaptive equipment as needed. 5. Patient will complete standing grooming ADL with Mod I and adaptive equipment as needed. Timeframe: 7 visits       OCCUPATIONAL THERAPY ASSESSMENT: Initial Assessment, Daily Note and AM OT Treatment Day # 1    Jaguar Olsen is a 64 y.o. female   PRIMARY DIAGNOSIS: Acute blood loss anemia  Symptomatic anemia [D64.9]  Acute blood loss anemia [D62]  Elevated INR [R79.1]  Procedure(s) (LRB):  ESOPHAGOGASTRODUODENOSCOPY (EGD) ROOM SE02 (N/A)  ENTEROSCOPY (N/A)  1 Day Post-Op  Reason for Referral:  Generalized Weakness  ICD-10: Treatment Diagnosis: Generalized Muscle Weakness (M62.81)  Difficulty in walking, Not elsewhere classified (R26.2)  INPATIENT: Payor: [employer] MEDICARE / Plan: [facility] [employer] MEDICARE HMO / Product Type: Managed Care Medicare /   ASSESSMENT:     REHAB RECOMMENDATIONS:   Recommendation to date pending progress:  [address] [place] [address] Therapy  Equipment:    Rolling Walker     PRIOR LEVEL OF FUNCTION:  (Prior to Hospitalization)  INITIAL/CURRENT LEVEL OF FUNCTION:  (Based on today's evaluation)   Bathing:   Minimal Assistance for transfer into/out of tub. Dressing:   Minimal Assistance for donning/doffing  compression socks. Feeding/Grooming:   Modified Independent  Toileting:   Modified Independent  Functional Mobility:   Modified Independent with use of RW. Bathing:   Minimal Assistance  Dressing:   Minimal Assistance for sock management.   Feeding/Grooming:   SBA/intermittent CGA for standing balance. Toileting:   Minimal Assistance  Functional Mobility:   CGA/SBA x RW     ASSESSMENT:  Ms. Mere Thorne was admitted for acute blood loss anemia. Presents with deficits in overall strength, balance, and activity tolerance for performance of ADLs and functional mobility. Requires CGA to transfer to EOB. Requires CGA x RW to complete initial sit <> stand transfer and CGA to initiate ambulation. Progresses to SBA for ambulation as session progress. Requires SBA x RW to walk from bed to sink and SBA with intermittent CGA (due to minimal loss of balance) to brush gums and dentures. Requires SBA x RW to return to bed and SBA to complete sit to supine. Pt would benefit from continued skilled OT to increase independence and safety for performance of ADLs and functional mobility. SUBJECTIVE:   Ms. Mere Thorne states, \"My  helps me with my compression socks. \" Pt response to level of assist needed for ADLs. SOCIAL HISTORY/LIVING ENVIRONMENT: Lives with spouse in one story home with 1 KATIE. Min A for lower body dressing and bathing with assist needed to transfer into and out of tub. Of not pt with recent hip repair and receiving HH services prior to admission.   Support Systems: Spouse/Significant Other    OBJECTIVE:     PAIN: VITAL SIGNS: LINES/DRAINS:   Pre Treatment: Pain Screen  Pain Scale 1: Numeric (0 - 10)  Pain Intensity 1: 0  Post Treatment: Unchanged   None  O2 Device: None (Room air)     GROSS EVALUATION:  BUE Within Functional Limits Abnormal/ Functional Abnormal/ Non-Functional (see comments) Not Tested Comments:   AROM [x] [] [] []    PROM [x] [] [] []    Strength [x] [] [] []    Balance [] [x] [] [] Minimally decreased   Posture [] [x] [] []    Sensation [x] [] [] []    Coordination [x] [] [] []    Tone [] [] [] [x]    Edema [x] [] [] []    Activity Tolerance [] [x] [] [] Requires rest breaks.    [] [] [] []      COGNITION/  PERCEPTION: Intact Impaired   (see comments) Comments:   Orientation [x] [] Vision [x] []    Hearing [x] []    Judgment/ Insight [x] []    Attention [x] []    Memory [x] []    Command Following [x] []    Emotional Regulation [x] []     [] []      ACTIVITIES OF DAILY LIVING: I Mod I S SBA CGA Min Mod Max Total NT Comments   BASIC ADLs:              Bathing/ Showering [] [] [] [] [] [] [] [] [] [x]    Toileting [] [] [] [] [] [] [] [] [] [x]    Dressing [] [] [] [] [] [x] [] [] [] [] Sock management   Feeding [] [] [] [] [] [] [] [] [] [x]    Grooming [] [] [] [x] [x] [] [] [] [] [] SBA with intermittent CGA to brush teeth. Personal Device Care [] [] [] [] [] [] [] [] [] [x]    Functional Mobility [] [] [] [x] [x] [] [] [] [] [] CGA initially; progresses to SBA x RW   I=Independent, Mod I=Modified Independent, S=Supervision, SBA=Standby Assistance, CGA=Contact Guard Assistance,   Min=Minimal Assistance, Mod=Moderate Assistance, Max=Maximal Assistance, Total=Total Assistance, NT=Not Tested    MOBILITY: I Mod I S SBA CGA Min Mod Max Total  NT x2 Comments:   Supine to sit [] [] [] [] [x] [] [] [] [] [] []    Sit to supine [] [] [] [x] [] [] [] [] [] [] []    Sit to stand [] [] [] [] [x] [] [] [] [] [] []    Bed to chair [] [] [] [] [] [] [] [] [] [x] [] CGA initially; progresses to SBA x RW. I=Independent, Mod I=Modified Independent, S=Supervision, SBA=Standby Assistance, CGA=Contact Guard Assistance,   Min=Minimal Assistance, Mod=Moderate Assistance, Max=Maximal Assistance, Total=Total Assistance, NT=Not [place] 6   Daily Activity Inpatient Short Form        How much help from another person does the patient currently need. .. Total A Lot A Little None   1. Putting on and taking off regular lower body clothing? [] 1   [] 2   [x] 3   [] 4   2. Bathing (including washing, rinsing, drying)? [] 1   [] 2   [x] 3   [] 4   3. Toileting, which includes using toilet, bedpan or urinal?   [] 1   [] 2   [x] 3   [] 4   4.   Putting on and taking off regular upper body clothing? [] 1   [] 2   [x] 3   [] 4   5. Taking care of personal grooming such as brushing teeth? [] 1   [] 2   [x] 3   [] 4   6. Eating meals? [] 1   [] 2   [] 3   [x] 4   © 2007, Trustees of [facility] [employer], under license to [employer]. All rights reserved     Score:  Initial: 19, completed, 10/1/2021 Most Recent: X (Date: -- )   Interpretation of Tool:  Represents activities that are increasingly more difficult (i.e. Bed mobility, Transfers, Gait). PLAN:   FREQUENCY/DURATION: OT Plan of Care: 3 times/week for duration of hospital stay or until stated goals are met, whichever comes first.    PROBLEM LIST:   (Skilled intervention is medically necessary to address:)  1. Decreased ADL/Functional Activities  2. Decreased Activity Tolerance  3. Decreased Balance  4. Decreased Gait Ability  5. Decreased Strength  6. Decreased Transfer Abilities   INTERVENTIONS PLANNED:   (Benefits and precautions of occupational therapy have been discussed with the patient.)  1. Self Care Training  2. Therapeutic Activity  3. Therapeutic Exercise/HEP  4. Neuromuscular Re-education  5. Education     TREATMENT:     EVALUATION: Low Complexity : (Untimed Charge)    TREATMENT:   ($$ Self Care/Home Management: 8-22 mins    )  Co-Treatment PT/OT necessary due to patient's decreased overall endurance/tolerance levels, as well as need for high level skilled assistance to complete functional transfers/mobility and functional tasks  Self Care (10 Minutes): Self care including Lower Body Dressing and Grooming to increase independence, decrease level of assistance required and increase activity tolerance. .    TREATMENT GRID:  N/A    AFTER TREATMENT POSITION/PRECAUTIONS:  Bed, Needs within reach, RN notified and table tray adjacent to pt.     INTERDISCIPLINARY COLLABORATION:  RN/PCT, PT/PTA and OT/COSTELLO    TOTAL TREATMENT DURATION:  OT Patient Time In/Time Out  Time In: 1010  Time Out: [id number] [place]/L

## 2021-10-01 NOTE — PROGRESS NOTES
GI Brief Progress Note    Received message from nursing that patient's Tagged RBC scan is positive for GI bleed. I did call Dr. Bc Pulido with IR and we spoke about the patient's case. We both agreed that we will order a CTA and if that is positive, Dr. Bc Pulido will have to consider embolization. STAT CTA order placed.     Clyde Grant MD   [facility]

## 2021-10-01 NOTE — PROGRESS NOTES
Chart review complete, per MD noted following pts H/H for continued bleeding, possible IR consult if needed. CM will remain available to assist as needed.

## 2021-10-01 NOTE — ROUTINE PROCESS
A diet was ordered for the pt at 1604. Pt did not receive a tray when cart was delivered. Pt was fairly upset and wanted to eat. I called dietary and was told a ticket did not print for her and that a tray would be brought down for her shortly. At 1750 I spoke with dietary again who said she would bring the pt a tray. 1808, pt received tray w/ full liquid diet. Will continue to monitor.

## 2021-10-01 NOTE — PROGRESS NOTES
Updated Mariaa Mendoza MD via phone at 2115 about patients abdominal CT results. Also asked about patients diet order,verbal orders for clear liquid diet.

## 2021-10-01 NOTE — PROGRESS NOTES
Hospitalist Progress Note   Admit Date:  2021  6:07 PM   Name:  Janusz Garcia   Age:  64 y.o. Sex:  female  :  1945   MRN:  [id number]   [place]  [facility] [place]    Presenting Complaint: Shortness of Breath    Reason(s) for Admission: Symptomatic anemia [D64.9]  Acute blood loss anemia [D62]  Elevated INR [R79.1]     Hospital Course & Interval History:   61-year-old female presented with lightheadedness. Patient admitted to dark tarry stools for past few days. Does take home iron. Patient also has a history of atrial fibrillation on Coumadin and a recent hip fracture that she had repaired 2 months ago. Patient did state that she thinks she was given the wrong dose of her Coumadin. She normally takes 2.5 mg but had a prescription sent in for 5 mg. Patient was taking 5 mg daily and had an INR of 7.9 at admission. Patient's hemoglobin was also 5.8. Patient received vitamin K in the emergency department. Subjective (10/01/21): Patient was seen and examined at the bedside. Patient's hemoglobin this morning at 8.1. Patient states that she still feeling fatigued this morning. Patient did have a small black stool this morning. Patient denied any cardiac chest pain, shortness of breath, nausea/vomiting.     Assessment & Plan:   Acute blood loss anemia with symptomatic anemia (2021)  Trend H&H   patient's hemoglobin was 6.3  Will transfuse with a unit of packed red blood cells  Iron studies  Patient had small dark stool  in later afternoon  GI consulted  Coumadin on hold  Daniela Stephens initially 7.9, given vitamin D in ED, INR currently 2.2   EGD   Continue twice daily Protonix  Transfuse hemoglobin if less than 7  EGD showed fresh blood in duodenum and jejunum  Tagged red blood cell scan suggested active GI bleed and stomach  CT abdomen pelvis  with oral contrast in stomach with inability to ascertain bleeding in the stomach  Awaiting recommendations from GI  Serial 6-hour H&H  Last hemoglobin 7.4 from 8.1  Patient will likely require more transfusions  Dr. Jess Calixto with IR aware of patient, low threshold to consult IR for intervention    Atrial fibrillation with supratherapeutic INR levels  Daily INR  Coumadin on hold  Vitamin K ordered by ED since INR 7.9 on admission,   Patient stated that she had just been given a new medication of Coumadin but 5 mg dose instead of the 2.5 mg dose she is supposed to take  Patient's INR 2.7 on 10/1    Hypertension/hypotension  Pressure medications on hold except for amiodarone in the setting of volume loss  We will need to continue to monitor for signs of fluid overload since history of diastolic dysfunction with ejection fraction of 60% noted on echo in 2019 9/30 patient had episode of hypotension while in endoscopy suite. Patient was given LR and blood pressure improved  10/1 blood pressure currently stable    Recent hip fracture repair  PT/OT consulted  PPD placed for possible rehab    Hyperlipidemia  Continue statin      Dispo/Discharge Planning:    Dispo pending clinical course. Patient still with evidence of bleeding with decreasing hemoglobin levels. Low threshold to transfuse again. IR aware of patient. Coumadin on hold.       Diet:  DIET NPO Sips of Water with Meds  DVT PPx: SCDs  Code status: Full Code    Hospital Problems as of 10/1/2021 Date Reviewed: 9/30/2021        Codes Class Noted - Resolved POA    * (Principal) Acute blood loss anemia ICD-10-CM: D62  ICD-9-CM: 285.1  9/28/2021 - Present Unknown        Elevated INR ICD-10-CM: R79.1  ICD-9-CM: 790.92  9/28/2021 - Present Unknown        Symptomatic anemia ICD-10-CM: D64.9  ICD-9-CM: 285.9  9/28/2021 - Present Unknown        Hip fracture ([facility] [address] ICD-10-CM: S72.009A  ICD-9-CM: 820.8  7/12/2021 - Present Yes        Hypertension ICD-10-CM: I10  ICD-9-CM: 401.9  1/23/2017 - Present Yes        Paroxysmal atrial fibrillation ([facility] [address] ICD-10-CM: I48.0  ICD-9-CM: 427.31  1/23/2017 - Present Yes        Migraine with vertigo ICD-10-CM: G43.109  ICD-9-CM: 346.80, 780.4  1/23/2017 - Present Yes              Objective:     Patient Vitals for the past 24 hrs:   Temp Pulse Resp BP SpO2   10/01/21 1148 97.7 °F (36.5 °C) 67 18 116/67 95 %   10/01/21 0900 98.2 °F (36.8 °C) 73 16 112/63 94 %   10/01/21 0314 98 °F (36.7 °C) 72 17 (!) 114/56 95 %   10/01/21 0215 98.4 °F (36.9 °C) 73 16 (!) 123/54 93 %   10/01/21 0115 98.6 °F (37 °C) 73 16 (!) 102/57 93 %   10/01/21 0100 98.3 °F (36.8 °C) 70 16 116/64 95 %   10/01/21 0042 98.4 °F (36.9 °C) 74 16 (!) 113/58 96 %   09/30/21 2251 97.7 °F (36.5 °C) 75 17 122/64    09/30/21 1952 97.8 °F (36.6 °C) 77 17 138/77 99 %   09/30/21 1556  78 14 (!) 152/69 94 %   09/30/21 1546  83 14 135/61 98 %   09/30/21 1543  79 14 (!) 140/65 100 %   09/30/21 1536  78 14 (!) 122/58 98 %   09/30/21 1531  75 14 (!) 87/51 100 %   09/30/21 1528  73 14 (!) 81/47 97 %   09/30/21 1518 96.8 °F (36 °C) 75 14 (!) 109/52 95 %     Oxygen Therapy  O2 Sat (%): 95 % (10/01/21 1148)  Pulse via Oximetry: 79 beats per minute (09/30/21 1556)  O2 Device: None (Room air) (10/01/21 1148)  O2 Flow Rate (L/min): 2 l/min (09/30/21 1543)    Estimated body mass index is 32.28 kg/m² as calculated from the following:    Height as of this encounter: 5' 5\" (1.651 m). Weight as of this encounter: 88 kg (194 lb). Intake/Output Summary (Last 24 hours) at 10/1/2021 1403  Last data filed at 10/1/2021 0314  Gross per 24 hour   Intake 883.3 ml   Output 2 ml   Net 881.3 ml         Physical Exam:   General:    Well nourished. Fatigued  Head:  Normocephalic, atraumatic  Eyes:  Sclerae appear normal.  Pupils equally round. ENT:  Nares appear normal, no drainage. Moist oral mucosa  Neck:  No restricted ROM. Trachea midline   CV:   RRR. No m/r/g. No jugular venous distension. Lungs:   CTAB. No wheezing, rhonchi, or rales. Respirations even, unlabored  Abdomen:    Bowel sounds present. Soft, nontender, nondistended. Extremities: No cyanosis or clubbing. No edema  Skin:     No rashes and normal coloration. Warm and dry. Neuro:  Cranial nerves II-XII grossly intact. Sensation intact  Psych:  Normal mood and affect. Alert and oriented x3    I have reviewed ordered lab tests and independently visualized imaging below:    Last 24hr Labs:  Recent Results (from the past 24 hour(s))   HGB & HCT    Collection Time: 09/30/21  3:45 PM   Result Value Ref Range    HGB 8.2 (L) 11.7 - 15.4 g/dL    HCT 28.7 (L) 35.8 - 46.3 %   HGB & HCT    Collection Time: 09/30/21  9:04 PM   Result Value Ref Range    HGB 6.4 (LL) 11.7 - 15.4 g/dL    HCT 21.6 (L) 35.8 - 46.3 %   PLEASE READ & DOCUMENT PPD TEST IN 48 HRS    Collection Time: 09/30/21  9:56 PM   Result Value Ref Range    PPD Negative Negative    mm Induration 0 0 - 5 mm   RBC, ALLOCATE    Collection Time: 09/30/21 10:30 PM   Result Value Ref Range    HISTORY CHECKED?  Historical check performed    PROTHROMBIN TIME + INR    Collection Time: 10/01/21  5:30 AM   Result Value Ref Range    Prothrombin time 28.9 (H) 12.6 - 14.5 sec    INR 2.7     CBC W/O DIFF    Collection Time: 10/01/21  5:30 AM   Result Value Ref Range    WBC 14.2 (H) 4.3 - 11.1 K/uL    RBC 3.15 (L) 4.05 - 5.2 M/uL    HGB 8.1 (L) 11.7 - 15.4 g/dL    HCT 26.9 (L) 35.8 - 46.3 %    MCV 85.4 79.6 - 97.8 FL    MCH 25.7 (L) 26.1 - 32.9 PG    MCHC 30.1 (L) 31.4 - 35.0 g/dL    RDW 19.2 (H) 11.9 - 14.6 %    PLATELET 586 027 - 089 K/uL    MPV 10.3 9.4 - 12.3 FL    ABSOLUTE NRBC 0.02 0.0 - 0.2 K/uL   METABOLIC PANEL, COMPREHENSIVE    Collection Time: 10/01/21  5:30 AM   Result Value Ref Range    Sodium 145 136 - 145 mmol/L    Potassium 3.9 3.5 - 5.1 mmol/L    Chloride 115 (H) 98 - 107 mmol/L    CO2 21 21 - 32 mmol/L    Anion gap 9 7 - 16 mmol/L    Glucose 106 (H) 65 - 100 mg/dL    BUN 31 (H) 8 - 23 MG/DL    Creatinine 1.08 (H) 0.6 - 1.0 MG/DL    GFR est AA >60 >60 ml/min/1.73m2    GFR est non-AA 52 (L) >60 ml/min/1.73m2    Calcium 8.5 8.3 - 10.4 MG/DL    Bilirubin, total 0.9 0.2 - 1.1 MG/DL    ALT (SGPT) 20 12 - 65 U/L    AST (SGOT) 20 15 - 37 U/L    Alk. phosphatase 82 50 - 136 U/L    Protein, total 5.4 (L) 6.3 - 8.2 g/dL    Albumin 2.4 (L) 3.2 - 4.6 g/dL    Globulin 3.0 2.3 - 3.5 g/dL    A-G Ratio 0.8 (L) 1.2 - 3.5     MAGNESIUM    Collection Time: 10/01/21  5:30 AM   Result Value Ref Range    Magnesium 2.1 1.8 - 2.4 mg/dL   HGB & HCT    Collection Time: 10/01/21 12:10 PM   Result Value Ref Range    HGB 7.4 (L) 11.7 - 15.4 g/dL    HCT 24.3 (L) 35.8 - 46.3 %       All Micro Results     Procedure Component Value Units Date/Time    COVID-19 RAPID TEST [[id number]] Collected: 09/29/21 1712    Order Status: Completed Specimen: Nasopharyngeal Updated: 09/29/21 1800     Specimen source NASAL        COVID-19 rapid test Not detected        Comment:      The specimen is NEGATIVE for SARS-CoV-2, the novel coronavirus associated with COVID-19. A negative result does not rule out COVID-19. This test has been authorized by the FDA under an Emergency Use Authorization (EUA) for use by authorized laboratories. Fact sheet for Healthcare Providers: ConventionUpdate.co.nz  Fact sheet for Patients: ConventionUpdate.co.nz       Methodology: Isothermal Nucleic Acid Amplification               Other Studies:  NM ACUTE GI BLEED SCAN    Result Date: 9/30/2021  Nuclear medicine GI bleed scan 9/30/2021 INDICATION:  Acute blood loss. Large blood in small bowel without clear source. COMPARISON: None. TECHNIQUE: Planar imaging of the abdomen was performed following the uneventful intravenous demonstration of 25.2 mCi of Tc-99m UltraTag labeled red blood cells through 60 minutes. FINDINGS: Today's study is limited given that the patient is obliqued to the right. A technologist note states that the patient was repositioned several times yet kept moving into an oblique position. Immediately following radiotracer administration, normal blood pool activity is seen. Very early in the examination, abnormal activity is seen over the left upper quadrant which subsequently extends in the small bowel pattern into the left abdomen. The appearance suggests an active gastric bleed with progression of radiotracer into proximal small bowel loops in the left abdomen. 1.  Findings suggesting an active GI bleed within the stomach. Attempts were made to call these findings to numbers currently provided to myself which are [phone], and [phone]. There was no answer at either phone and no option to leave a voicemail. A message was sent through [employer] to the requesting physician. The physician was listed as off-line. CT ABD/PELV GI BLEED    Result Date: 9/30/2021  CT abdomen and pelvis for GI bleed 9/30/2021 CLINICAL HISTORY: Positive GI bleed scan. TECHNIQUE: Multiple contiguous helical CT images were obtained prior to and following the uneventful intravenous demonstration of 100 mL Isovue-370. Coronal reconstructed images were made. All CT scans performed at this facility use one or all of the following: Automated exposure control, adjustment of the mA and/or kVp according to patient's size, iterative reconstruction. Findings:  CT ABDOMEN: Limited evaluation of the lung bases and base of the mediastinum demonstrates no significant abnormalities. Assessment of the solid organs is limited by the lack of administered intravenous contrast.  A subtle abnormality could be missed. The Liver demonstrates scattered benign calcifications and a benign left lobe cyst.  The spleen is homogeneous in attenuation. No contour deforming mass lesions are seen of the pancreas or adrenal glands. The gallbladder has an unremarkable CT appearance without radiopaque stones or pericholecystic fluid/inflammatory changes. No radiopaque stones or hydronephrosis are seen of the kidneys.  A 2.8 cm cyst is seen in the anterior mid pole cortex of the left kidney. The visualized loops of small bowel and colon are normal in caliber. Today's study is for assessment of GI bleeding. By nuclear medicine GI bleed scan, a gastric source was suggested. Unfortunately, the stomach contains oral contrast on the precontrast images as seen on precontrast image 19. Therefore, assessment for GI bleed at the stomach could not be performed. Oral contrast is also present at additional levels most evident in the distal colon. No additional source of acute bleed is suggested on this examination. No free fluid, free air, or focal inflammatory changes are seen in the abdomen. No adenopathy is seen. The abdominal aorta demonstrates moderate atherosclerotic ossification and mild ectasia. CT PELVIS: A right total hip replacement is present obscured portions of the right pelvis. No abnormal pelvic fluid collections or inflammatory changes are present in the visualized pelvis. No pelvic adenopathy is seen. The partially visualized urinary bladder is unremarkable. 1.  This study is for assessment of a potential gastric bleed suggested by the nuclear medicine GI bleed scan. The stomach contains oral contrast prior to the administration of contrast. Therefore, a source of bleeding in the stomach cannot be confirmed or excluded. No potential source of GI bleed is otherwise suggested although some oral contrast is seen within additional segments of bowel. This report was made using voice transcription. Despite my best efforts to avoid any, transcription errors may persist. If there is any question about the accuracy of the report or need for clarification, then please call [phone], or text me through [email or web address] for clarification or correction.        Current Meds:  Current Facility-Administered Medications   Medication Dose Route Frequency    lactated Ringers infusion  100 mL/hr IntraVENous CONTINUOUS    0.9% sodium chloride infusion  10 mL/hr IntraVENous CONTINUOUS    0.9% sodium chloride infusion 250 mL  250 mL IntraVENous PRN    0.9% sodium chloride infusion 250 mL  250 mL IntraVENous PRN    0.9% sodium chloride infusion 250 mL  250 mL IntraVENous PRN    0.9% sodium chloride infusion 250 mL  250 mL IntraVENous PRN    sodium chloride (NS) flush 5-10 mL  5-10 mL IntraVENous Q8H    sodium chloride (NS) flush 5-10 mL  5-10 mL IntraVENous PRN    0.9% sodium chloride infusion 250 mL  250 mL IntraVENous PRN    amiodarone (CORDARONE) tablet 200 mg  200 mg Oral DAILY    atorvastatin (LIPITOR) tablet 20 mg  20 mg Oral DAILY    cyanocobalamin tablet 1,000 mcg  1,000 mcg Oral DAILY    venlafaxine (EFFEXOR) tablet 25 mg  25 mg Oral DAILY    sodium chloride (NS) flush 5-40 mL  5-40 mL IntraVENous Q8H    sodium chloride (NS) flush 5-40 mL  5-40 mL IntraVENous PRN    acetaminophen (TYLENOL) tablet 650 mg  650 mg Oral Q6H PRN    Or    acetaminophen (TYLENOL) suppository 650 mg  650 mg Rectal Q6H PRN    polyethylene glycol (MIRALAX) packet 17 g  17 g Oral DAILY PRN    ondansetron (ZOFRAN ODT) tablet 4 mg  4 mg Oral Q8H PRN    Or    ondansetron (ZOFRAN) injection 4 mg  4 mg IntraVENous Q6H PRN    pantoprazole (PROTONIX) 40 mg in 0.9% sodium chloride 10 mL injection  40 mg IntraVENous Q12H    0.9% sodium chloride infusion 250 mL  250 mL IntraVENous PRN       Signed:  Marcelino Morfin MD    Part of this note may have been written by using a voice dictation software. The note has been proof read but may still contain some grammatical/other typographical errors.

## 2021-10-02 ENCOUNTER — APPOINTMENT (OUTPATIENT)
Dept: ULTRASOUND IMAGING | Age: 76
DRG: 813 | End: 2021-10-02
Attending: INTERNAL MEDICINE
Payer: MEDICARE

## 2021-10-02 LAB
ABO + RH BLD: NORMAL
ALBUMIN SERPL-MCNC: 2.5 G/DL (ref 3.2–4.6)
ALBUMIN/GLOB SERPL: 0.9 {RATIO} (ref 1.2–3.5)
ALP SERPL-CCNC: 77 U/L (ref 50–136)
ALT SERPL-CCNC: 18 U/L (ref 12–65)
ANION GAP SERPL CALC-SCNC: 8 MMOL/L (ref 7–16)
AST SERPL-CCNC: 21 U/L (ref 15–37)
BILIRUB SERPL-MCNC: 0.8 MG/DL (ref 0.2–1.1)
BLD PROD TYP BPU: NORMAL
BLOOD BANK CMNT PATIENT-IMP: NORMAL
BLOOD GROUP ANTIBODIES SERPL: NORMAL
BPU ID: NORMAL
BUN SERPL-MCNC: 17 MG/DL (ref 8–23)
CALCIUM SERPL-MCNC: 8.2 MG/DL (ref 8.3–10.4)
CHLORIDE SERPL-SCNC: 115 MMOL/L (ref 98–107)
CO2 SERPL-SCNC: 22 MMOL/L (ref 21–32)
CREAT SERPL-MCNC: 0.92 MG/DL (ref 0.6–1)
CROSSMATCH RESULT,%XM: NORMAL
ERYTHROCYTE [DISTWIDTH] IN BLOOD BY AUTOMATED COUNT: 19.6 % (ref 11.9–14.6)
GLOBULIN SER CALC-MCNC: 2.9 G/DL (ref 2.3–3.5)
GLUCOSE SERPL-MCNC: 98 MG/DL (ref 65–100)
HCT VFR BLD AUTO: 23.2 % (ref 35.8–46.3)
HCT VFR BLD AUTO: 24.5 % (ref 35.8–46.3)
HCT VFR BLD AUTO: 24.6 % (ref 35.8–46.3)
HGB BLD-MCNC: 7 G/DL (ref 11.7–15.4)
HGB BLD-MCNC: 7.4 G/DL (ref 11.7–15.4)
HGB BLD-MCNC: 7.5 G/DL (ref 11.7–15.4)
INR PPP: 3.2
MAGNESIUM SERPL-MCNC: 1.9 MG/DL (ref 1.8–2.4)
MCH RBC QN AUTO: 25.1 PG (ref 26.1–32.9)
MCHC RBC AUTO-ENTMCNC: 30.5 G/DL (ref 31.4–35)
MCV RBC AUTO: 82.3 FL (ref 79.6–97.8)
NRBC # BLD: 0 K/UL (ref 0–0.2)
PLATELET # BLD AUTO: 222 K/UL (ref 150–450)
PMV BLD AUTO: 9.9 FL (ref 9.4–12.3)
POTASSIUM SERPL-SCNC: 3.6 MMOL/L (ref 3.5–5.1)
PROT SERPL-MCNC: 5.4 G/DL (ref 6.3–8.2)
PROTHROMBIN TIME: 32.7 SEC (ref 12.6–14.5)
RBC # BLD AUTO: 2.99 M/UL (ref 4.05–5.2)
SODIUM SERPL-SCNC: 145 MMOL/L (ref 136–145)
SPECIMEN EXP DATE BLD: NORMAL
STATUS OF UNIT,%ST: NORMAL
UNIT DIVISION, %UDIV: 0
WBC # BLD AUTO: 8.8 K/UL (ref 4.3–11.1)

## 2021-10-02 PROCEDURE — C9113 INJ PANTOPRAZOLE SODIUM, VIA: HCPCS | Performed by: FAMILY MEDICINE

## 2021-10-02 PROCEDURE — 65660000000 HC RM CCU STEPDOWN

## 2021-10-02 PROCEDURE — 85027 COMPLETE CBC AUTOMATED: CPT

## 2021-10-02 PROCEDURE — 85014 HEMATOCRIT: CPT

## 2021-10-02 PROCEDURE — 83735 ASSAY OF MAGNESIUM: CPT

## 2021-10-02 PROCEDURE — 80053 COMPREHEN METABOLIC PANEL: CPT

## 2021-10-02 PROCEDURE — 74011000250 HC RX REV CODE- 250: Performed by: FAMILY MEDICINE

## 2021-10-02 PROCEDURE — 74011250637 HC RX REV CODE- 250/637: Performed by: FAMILY MEDICINE

## 2021-10-02 PROCEDURE — 97530 THERAPEUTIC ACTIVITIES: CPT

## 2021-10-02 PROCEDURE — 76700 US EXAM ABDOM COMPLETE: CPT

## 2021-10-02 PROCEDURE — 85610 PROTHROMBIN TIME: CPT

## 2021-10-02 PROCEDURE — 36415 COLL VENOUS BLD VENIPUNCTURE: CPT

## 2021-10-02 PROCEDURE — 74011250636 HC RX REV CODE- 250/636: Performed by: FAMILY MEDICINE

## 2021-10-02 RX ORDER — PANTOPRAZOLE SODIUM 40 MG/1
40 TABLET, DELAYED RELEASE ORAL
Status: DISCONTINUED | OUTPATIENT
Start: 2021-10-03 | End: 2021-10-04 | Stop reason: HOSPADM

## 2021-10-02 RX ADMIN — VENLAFAXINE 25 MG: 25 TABLET ORAL at 08:53

## 2021-10-02 RX ADMIN — CYANOCOBALAMIN TAB 1000 MCG 1000 MCG: 1000 TAB at 08:53

## 2021-10-02 RX ADMIN — SODIUM CHLORIDE 40 MG: 9 INJECTION, SOLUTION INTRAMUSCULAR; INTRAVENOUS; SUBCUTANEOUS at 08:56

## 2021-10-02 RX ADMIN — Medication 10 ML: at 05:04

## 2021-10-02 RX ADMIN — Medication 10 ML: at 21:41

## 2021-10-02 RX ADMIN — ATORVASTATIN CALCIUM 20 MG: 20 TABLET, FILM COATED ORAL at 08:53

## 2021-10-02 RX ADMIN — AMIODARONE HYDROCHLORIDE 200 MG: 200 TABLET ORAL at 08:53

## 2021-10-02 NOTE — PROGRESS NOTES
ACUTE PHYSICAL THERAPY GOALS:  (Developed with and agreed upon by patient and/or caregiver. )  LTG:  (1.)Ms. Ajay Valles will move from supine to sit and sit to supine , scoot up and down and roll side to side in bed with INDEPENDENCE within 7 treatment day(s). (2.)Ms. Ajay Valles will transfer from bed to chair and chair to bed with MODIFIED INDEPENDENCE using the least restrictive device within 7 treatment day(s). (3.)Ms. Ajay Valles will ambulate with SUPERVISION for 250 feet with the least restrictive device within 7 treatment day(s). (4.)Ms. Ajay Valles will perform exercises per HEP independently to improve strength and mobility within 7 days. PHYSICAL THERAPY: Daily Note and AM Treatment Day # 2    Veronica Persaud is a 64 y.o. female   PRIMARY DIAGNOSIS: Acute blood loss anemia  Symptomatic anemia [D64.9]  Acute blood loss anemia [D62]  Elevated INR [R79.1]  Procedure(s) (LRB):  ESOPHAGOGASTRODUODENOSCOPY (EGD) ROOM SE02 (N/A)  ENTEROSCOPY (N/A)  2 Days Post-Op    ASSESSMENT:     REHAB RECOMMENDATIONS: CURRENT LEVEL OF FUNCTION:  (Most Recently Demonstrated)   Recommendation to date pending progress:  [address] [place] [address]  Equipment:    Rolling Walker Bed Mobility:   Modified Independent  Sit to Stand:   Modified Independent  Transfers:   Supervision  Gait/Mobility:   Standby Assistance-supervision     ASSESSMENT:  Ms. Ajay Valles demonstrates improved strength, mobility, and gait distance today. Performs bed mobility/transfer to sitting with modified independence. Mod I-supervision for transfers to RW. Ambulatory around [facility] endo unit with SBA-supervision and occasional cues for walker positioning and safety due to forward flexed posture and tendency to keep walker too far in front of her. Pt returned to room and back to supine after activity with needs in reach. Anticipate dc home with continued HH PT.     SUBJECTIVE:   Ms. Ajay Valles states, \"It feels good to be up walking. \"    SOCIAL HISTORY/ LIVING ENVIRONMENT: Lives with spouse. Mod I with RW for household distances. Some assist with ADLs. Denies falls after prior fall resulting in left hip fx.  Went to rehab after left hip kelley then has been home with [facility] PT since then  Home Environment: Private residence  # Steps to Enter: 1  One/Two Story Residence: One story  Living Alone: No  Support Systems: Spouse/Significant Other  OBJECTIVE:     PAIN: VITAL SIGNS: LINES/DRAINS:   Pre Treatment: Pain Screen  Pain Scale 1: Numeric (0 - 10)  Pain Intensity 1: 0  Post Treatment: 0/10 Vital Signs  O2 Device: None (Room air) none  O2 Device: None (Room air)     MOBILITY: I Mod I S SBA CGA Min Mod Max Total  NT x2 Comments:   Bed Mobility    Rolling [] [x] [] [] [] [] [] [] [] [] []    Supine to Sit [] [x] [] [] [] [] [] [] [] [] []    Scooting [] [x] [] [] [] [] [] [] [] [] []    Sit to Supine [] [x] [] [] [] [] [] [] [] [] []    Transfers    Sit to Stand [] [x] [] [] [] [] [] [] [] [] []    Bed to Chair [] [x] [x] [] [] [] [] [] [] [] []    Stand to Sit [] [x] [] [] [] [] [] [] [] [] []    I=Independent, Mod I=Modified Independent, S=Supervision, SBA=Standby Assistance, CGA=Contact Guard Assistance,   Min=Minimal Assistance, Mod=Moderate Assistance, Max=Maximal Assistance, Total=Total Assistance, NT=Not Tested    BALANCE: Good Fair+ Fair Fair- Poor NT Comments   Sitting Static [x] [] [] [] [] []    Sitting Dynamic [x] [] [] [] [] []              Standing Static [x] [] [] [] [] []    Standing Dynamic [] [x] [] [] [] []      GAIT: I Mod I S SBA CGA Min Mod Max Total  NT x2 Comments:   Level of Assistance [] [] [x] [x] [] [] [] [] [] [] []    Distance 250'    DME Rolling Walker    Gait Quality Forward flexed posture, stands too far from walker    Weightbearing  Status N/A     I=Independent, Mod I=Modified Independent, S=Supervision, SBA=Standby Assistance, CGA=Contact Guard Assistance,   Min=Minimal Assistance, Mod=Moderate Assistance, Max=Maximal Assistance, Total=Total Assistance, NT=Not Tested    PLAN:   FREQUENCY/DURATION: PT Plan of Care: 3 times/week for duration of hospital stay or until stated goals are met, whichever comes first.  TREATMENT:     TREATMENT:   ($$ Therapeutic Activity: 8-22 mins    )  Therapeutic Activity (11 Minutes): Therapeutic activity included Rolling, Supine to Sit, Sit to Supine, Scooting, Transfer Training, Ambulation on level ground, Sitting balance  and Standing balance to improve functional Mobility, Strength, Activity tolerance and balance, gait safety.     TREATMENT GRID:  N/A    AFTER TREATMENT POSITION/PRECAUTIONS:  Bed, Needs within reach and RN notified    INTERDISCIPLINARY COLLABORATION:  RN/PCT and PT/PTA    TOTAL TREATMENT DURATION:  PT Patient Time In/Time Out  Time In: 1150  Time Out: 300 [facility]

## 2021-10-02 NOTE — PROGRESS NOTES
Problem: Falls - Risk of  Goal: *Absence of Falls  Description: Document Bard Stevens Fall Risk and appropriate interventions in the flowsheet.   Outcome: Progressing Towards Goal  Note: Fall Risk Interventions:  Mobility Interventions: Bed/chair exit alarm, Communicate number of staff needed for ambulation/transfer, Patient to call before getting OOB         Medication Interventions: Patient to call before getting OOB, Teach patient to arise slowly    Elimination Interventions: Bed/chair exit alarm, Call light in reach, Toileting schedule/hourly rounds

## 2021-10-02 NOTE — PROGRESS NOTES
[facility] Progress Note         Admit Date:  9/28/2021    Today's Date:  10/2/2021    CC: Anemia, gib    Subjective:     Patient feeling well. No BM today. Only 1 yesterday AM.  No N/V. Tolerating full liq. Hgb stable. No personal or family H/o liver disease. EGD/ Small bowel enteroscopy Procedure Note 9/30/21   PreOp Diagnosis:   Acute blood loss anemia  Melena  Upper GI bleed   PostOp Diagnosis:  Probable small bowel bleeding   Findings:   Esophagus: The proximal and mid esophagus appeared normal.  In the distal esophagus, Z-line normal  Stomach: Normal, without ulcers, erosions, or polyps. No blood present. Small bowel:   Fresh blood seen pooling in the distal duodenum. Difficult to reach w/ the EGD scope in the 3rd/4th portion. Switch to pediatric scope. Scope advanced 90cm beyond the pylorus. No bleeding source identified. Some fresh clots present, non-adherent. Careful inspection and irrigation without a clear source. It may be distal to the visible small bowel.    Recommendations: Follow up Hgb results  A colon source is considered very unlikely, given the fresh blood in the duodenum and jejunum.    Tagged RBC scan  NPO for now   Jose Leonard MD    Medications:   Current Facility-Administered Medications   Medication Dose Route Frequency    lactated Ringers infusion  100 mL/hr IntraVENous CONTINUOUS    0.9% sodium chloride infusion  10 mL/hr IntraVENous CONTINUOUS    0.9% sodium chloride infusion 250 mL  250 mL IntraVENous PRN    0.9% sodium chloride infusion 250 mL  250 mL IntraVENous PRN    0.9% sodium chloride infusion 250 mL  250 mL IntraVENous PRN    0.9% sodium chloride infusion 250 mL  250 mL IntraVENous PRN    sodium chloride (NS) flush 5-10 mL  5-10 mL IntraVENous Q8H    sodium chloride (NS) flush 5-10 mL  5-10 mL IntraVENous PRN    0.9% sodium chloride infusion 250 mL  250 mL IntraVENous PRN    amiodarone (CORDARONE) tablet 200 mg  200 mg Oral DAILY    atorvastatin (LIPITOR) tablet 20 mg  20 mg Oral DAILY    cyanocobalamin tablet 1,000 mcg  1,000 mcg Oral DAILY    venlafaxine (EFFEXOR) tablet 25 mg  25 mg Oral DAILY    sodium chloride (NS) flush 5-40 mL  5-40 mL IntraVENous Q8H    sodium chloride (NS) flush 5-40 mL  5-40 mL IntraVENous PRN    acetaminophen (TYLENOL) tablet 650 mg  650 mg Oral Q6H PRN    Or    acetaminophen (TYLENOL) suppository 650 mg  650 mg Rectal Q6H PRN    polyethylene glycol (MIRALAX) packet 17 g  17 g Oral DAILY PRN    ondansetron (ZOFRAN ODT) tablet 4 mg  4 mg Oral Q8H PRN    Or    ondansetron (ZOFRAN) injection 4 mg  4 mg IntraVENous Q6H PRN    pantoprazole (PROTONIX) 40 mg in 0.9% sodium chloride 10 mL injection  40 mg IntraVENous Q12H    0.9% sodium chloride infusion 250 mL  250 mL IntraVENous PRN       Review of Systems:  ROS was obtained, with pertinent positives as listed above. No chest pain or SOB. Diet:      Objective:   Vitals:  Visit Vitals  /63   Pulse 66   Temp 98 °F (36.7 °C)   Resp 16   Ht 5' 5\" (1.651 m)   Wt 88 kg (194 lb)   SpO2 96%   BMI 32.28 kg/m²     Intake/Output:  No intake/output data recorded. 09/30 1901 - 10/02 0700  In: 433.3 [P.O.:150]  Out: 0   Exam:  General appearance: alert, cooperative, no distress  Lungs: clear to auscultation bilaterally anteriorly  Heart: regular rate and rhythm  Abdomen: soft, non-tender.  Bowel sounds normal. No masses, no organomegaly  Extremities: extremities normal, atraumatic, no cyanosis or edema  Neuro:  alert and oriented    Data Review (Labs):    Recent Labs     10/02/21  0639 10/02/21  0011 10/01/21  1759 10/01/21  1210 10/01/21  0530 09/30/21  2104 09/30/21  1545 09/30/21  1149 09/30/21  0309 09/29/21  2120 09/29/21  1158   WBC 8.8  --   --   --  14.2*  --   --   --  9.1  --   --    HGB 7.5* 7.0* 7.5* 7.4* 8.1* 6.4* 8.2* 7.5* 6.3* 7.0* 6.6*   HCT 24.6* 23.2* 24.3* 24.3* 26.9* 21.6* 28.7* 24.7* 21.5* 23.1* 22.7*     --   --   --  219  -- --   --  199  --   --    MCV 82.3  --   --   --  85.4  --   --   --  82.7  --   --      --   --   --  145  --   --   --  144  --   --    K 3.6  --   --   --  3.9  --   --   --  3.8  --   --    *  --   --   --  115*  --   --   --  114*  --   --    CO2 22  --   --   --  21  --   --   --  23  --   --    BUN 17  --   --   --  31*  --   --   --  41*  --   --    CREA 0.92  --   --   --  1.08*  --   --   --  1.11*  --   --    CA 8.2*  --   --   --  8.5  --   --   --  8.0*  --   --    MG 1.9  --   --   --  2.1  --   --   --  2.0  --   --    GLU 98  --   --   --  106*  --   --   --  86  --   --    AP 77  --   --   --  82  --   --   --  79  --   --    AST 21  --   --   --  20  --   --   --  17  --   --    ALT 18  --   --   --  20  --   --   --  14  --   --    TBILI 0.8  --   --   --  0.9  --   --   --  0.7  --   --    ALB 2.5*  --   --   --  2.4*  --   --   --  2.2*  --   --    TP 5.4*  --   --   --  5.4*  --   --   --  5.0*  --   --    PTP 32.7*  --   --   --  28.9*  --   --   --  24.6*  --   --    INR 3.2  --   --   --  2.7  --   --   --  2.2  --   --      Nuclear medicine GI bleed scan 9/30/2021   INDICATION:  Acute blood loss. Large blood in small bowel without clear source.   COMPARISON: None.   TECHNIQUE: Planar imaging of the abdomen was performed following the uneventful  intravenous demonstration of 25.2 mCi of Tc-99m UltraTag labeled red blood cells  through 60 minutes   FINDINGS:   Today's study is limited given that the patient is obliqued to the right. A  technologist note states that the patient was repositioned several times yet  kept moving into an oblique position.   Immediately following radiotracer administration, normal blood pool activity is  seen. Very early in the examination, abnormal activity is seen over the left  upper quadrant which subsequently extends in the small bowel pattern into the  left abdomen.  The appearance suggests an active gastric bleed with progression  of radiotracer into proximal small bowel loops in the left abdomen.   IMPRESSION  1. Findings suggesting an active GI bleed within the stomach. CT abdomen and pelvis for GI bleed 9/30/2021   CLINICAL HISTORY: Positive GI bleed scan.   Findings:    CT ABDOMEN:  Limited evaluation of the lung bases and base of the mediastinum demonstrates no  significant abnormalities.    Assessment of the solid organs is limited by the lack of administered  intravenous contrast.  A subtle abnormality could be missed. The Liver  demonstrates scattered benign calcifications and a benign left lobe cyst.  The  spleen is homogeneous in attenuation. No contour deforming mass lesions are  seen of the pancreas or adrenal glands. The gallbladder has an unremarkable CT  appearance without radiopaque stones or pericholecystic fluid/inflammatory  changes. No radiopaque stones or hydronephrosis are seen of the kidneys. A 2.8  cm cyst is seen in the anterior mid pole cortex of the left kidney.   The visualized loops of small bowel and colon are normal in caliber. Today's  study is for assessment of GI bleeding. By nuclear medicine GI bleed scan, a  gastric source was suggested. Unfortunately, the stomach contains oral contrast  on the precontrast images as seen on precontrast image 19. Therefore, assessment  for GI bleed at the stomach could not be performed. Oral contrast is also  present at additional levels most evident in the distal colon. No additional  source of acute bleed is suggested on this examination. No free fluid, free air,  or focal inflammatory changes are seen in the abdomen. No adenopathy is seen. The abdominal aorta demonstrates moderate atherosclerotic ossification and mild  ectasia.   CT PELVIS:  A right total hip replacement is present obscured portions of the right pelvis. No abnormal pelvic fluid collections or inflammatory changes are present in the  visualized pelvis. No pelvic adenopathy is seen.   The partially visualized  urinary bladder is unremarkable.   IMPRESSION  1. This study is for assessment of a potential gastric bleed suggested by the  nuclear medicine GI bleed scan. The stomach contains oral contrast prior to the  administration of contrast. Therefore, a source of bleeding in the stomach  cannot be confirmed or excluded. No potential source of GI bleed is otherwise  suggested although some oral contrast is seen within additional segments of  bowel. Assessment:     Principal Problem:    Acute blood loss anemia (9/28/2021)    Active Problems:    Hypertension (1/23/2017)      Paroxysmal atrial fibrillation (HCC) (1/23/2017)      Migraine with vertigo (1/23/2017)      Hip fracture ([facility] [address] (7/12/2021)      Elevated INR (9/28/2021)      Symptomatic anemia (9/28/2021)    63 yo female is seen in consultation for evaluation of severe anemia with coagulopathy and melena, presenting to the ED with SOB. She had syncopal episode in the waiting room and was found to have hgb 5.9 with INR 7.9. She has had intermittent melena for the last few months, evaluated with EGD and colonoscopy in May; EUS was recommended for further evaluation of a gastric subepithelial lesion but not performed. She had hip fracture with kelley-arthroplasty in July and notes her black stools have been more frequent since that time. Given Vit K on admission with improvement in INR. Despite transfusion PRBC, hgb has vacillated with morning hgb pending. She has now received 3 unit PRBC. EGD 9/30 with no visible gastric blood but fresh blood/clots seen in distal duodenum. NM tagged RBC scan 9/30 suggests active gi bleed in the LUQ, possibly the stomach. CT A/P gib 9/30 with oral contrast in the stomach with inability to ascertain bleeding in the stomach. Plan:     1. Monitor H/H, transfuse as needed. Hgb now appears stable  2. No signs of recurrent GI bleeding. Monitor stool output  3. INR now increased to 3.2.   Concern for underlying coagulopathy. Check abdominal US to R/O cirrhosis/ steatosis. 4. Suspect blood loss anemia due to bleeding in the small bowel. 5. If stable overnight, consider DC home tomorrow and plan for outpt capsule endoscopy   6.  Advance diet after US is complete    Sonia Avalos MD

## 2021-10-02 NOTE — ROUTINE PROCESS
Bedside and Verbal shift change report given to [facility], RN (oncoming nurse) by self Peyton moon. Report included the following information SBAR, Kardex, Intake/Output, MAR, and Recent Results.

## 2021-10-02 NOTE — PROGRESS NOTES
Hospitalist Progress Note   Admit Date:  2021  6:07 PM   Name:  Floresita Olmedo   Age:  64 y.o. Sex:  female  :  1945   MRN:  [id number]   Room:      Presenting Complaint: Shortness of Breath    Reason(s) for Admission: Symptomatic anemia [D64.9]  Acute blood loss anemia [D62]  Elevated INR [R79.1]     Hospital Course & Interval History:   64-year-old female presented with lightheadedness. Patient admitted to dark tarry stools for past few days. Does take home iron. Patient also has a history of atrial fibrillation on Coumadin and a recent hip fracture that she had repaired 2 months ago. Patient did state that she thinks she was given the wrong dose of her Coumadin. She normally takes 2.5 mg but had a prescription sent in for 5 mg. Patient was taking 5 mg daily and had an INR of 7.9 at admission. Patient's hemoglobin was also 5.8. Patient received vitamin K in the emergency department. Subjective (10/02/21): Patient was seen and examined at the bedside. Patient's hemoglobin stable. Patient tolerating her diet well. Patient denied having any bloody stools this morning. Patient denied any cardiac chest pain, shortness of breath, nausea/vomiting.     Assessment & Plan:   Acute blood loss anemia with symptomatic anemia (2021)  Trend H&H   patient's hemoglobin was 6.3  Will transfuse with a unit of packed red blood cells  Iron studies  Patient had small dark stool  in later afternoon  GI consulted  Coumadin on hold  Pervis Tulio initially 7.9, given vitamin D in ED, INR currently 2.2   EGD   Continue twice daily Protonix  Transfuse hemoglobin if less than 7  EGD showed fresh blood in duodenum and jejunum  Tagged red blood cell scan suggested active GI bleed and stomach  CT abdomen pelvis  with oral contrast in stomach with inability to ascertain bleeding in the stomach  Awaiting recommendations from GI  Serial 6-hour H&H  Dr. Bk Jain with IR aware of patient  10/2 hemoglobin 7.5 and stable. Patient has not required any blood transfusion over the last 24 hours. INR 3.2, abdominal ultrasound pending to rule out cirrhosis and fatty liver  GI suspects bleeding small bowel. Possible discharge tomorrow and plan for outpatient capsule endoscopy if no more bleeding events next 24 hours. Atrial fibrillation with supratherapeutic INR levels  Daily INR  Coumadin on hold  Vitamin K ordered by ED since INR 7.9 on admission,   Patient stated that she had just been given a new medication of Coumadin but 5 mg dose instead of the 2.5 mg dose she is supposed to take  Patient's INR 3.2 on 10/2  Abdominal ultrasound pending to rule out cirrhosis/fatty liver    Hypertension/hypotension  Pressure medications on hold except for amiodarone in the setting of volume loss  We will need to continue to monitor for signs of fluid overload since history of diastolic dysfunction with ejection fraction of 60% noted on echo in 2019 9/30 patient had episode of hypotension while in endoscopy suite. Patient was given LR and blood pressure improved  10/2 blood pressure currently stable    Recent hip fracture repair  PT/OT consulted  PPD placed for possible rehab    Hyperlipidemia  Continue statin      Dispo/Discharge Planning:    Dispo pending clinical course. Patient still with evidence of bleeding with decreasing hemoglobin levels. Low threshold to transfuse again. IR aware of patient. Coumadin on hold.       Diet:  ADULT DIET Full Liquid  DVT PPx: SCDs  Code status: Full Code    Hospital Problems as of 10/2/2021 Date Reviewed: 9/30/2021        Codes Class Noted - Resolved POA    * (Principal) Acute blood loss anemia ICD-10-CM: D62  ICD-9-CM: 285.1  9/28/2021 - Present Unknown        Elevated INR ICD-10-CM: R79.1  ICD-9-CM: 790.92  9/28/2021 - Present Unknown        Symptomatic anemia ICD-10-CM: D64.9  ICD-9-CM: 285.9  9/28/2021 - Present Unknown        Hip [facility]) ICD-10-CM: S72.009A  ICD-9-CM: 820.8  7/12/2021 - Present Yes        Hypertension ICD-10-CM: I10  ICD-9-CM: 401.9  1/23/2017 - Present Yes        Paroxysmal atrial fibrillation (HCC) ICD-10-CM: I48.0  ICD-9-CM: 427.31  1/23/2017 - Present Yes        Migraine with vertigo ICD-10-CM: G43.109  ICD-9-CM: 346.80, 780.4  1/23/2017 - Present Yes              Objective:     Patient Vitals for the past 24 hrs:   Temp Pulse Resp BP SpO2   10/02/21 1528 98.2 °F (36.8 °C) 66 16 130/64 94 %   10/02/21 1206 98.2 °F (36.8 °C) 71 16 (!) 145/65 98 %   10/02/21 0853  66  110/63    10/02/21 0700 98 °F (36.7 °C) 65 16 120/65 96 %   10/02/21 0408 98.1 °F (36.7 °C) 66 16 (!) 111/55 94 %   10/01/21 2340 98.3 °F (36.8 °C) 73 16 (!) 129/56 95 %   10/01/21 1941 98.2 °F (36.8 °C) 75 18 119/62 97 %   10/01/21 1912  77        Oxygen Therapy  O2 Sat (%): 94 % (10/02/21 1528)  Pulse via Oximetry: 79 beats per minute (09/30/21 1556)  O2 Device: None (Room air) (10/02/21 1150)  O2 Flow Rate (L/min): 2 l/min (09/30/21 1543)    Estimated body mass index is 32.28 kg/m² as calculated from the following:    Height as of this encounter: 5' 5\" (1.651 m). Weight as of this encounter: 88 kg (194 lb). Intake/Output Summary (Last 24 hours) at 10/2/2021 1545  Last data filed at 10/2/2021 0859  Gross per 24 hour   Intake 150 ml   Output 0 ml   Net 150 ml         Physical Exam:   General:    Well nourished. No distress  Head:  Normocephalic, atraumatic  Eyes:  Sclerae appear normal.  Pupils equally round. ENT:  Nares appear normal, no drainage. Moist oral mucosa  Neck:  No restricted ROM. Trachea midline   CV:   RRR. No m/r/g. No jugular venous distension. Lungs:   CTAB. No wheezing, rhonchi, or rales. Respirations even, unlabored  Abdomen: Bowel sounds present. Soft, nontender, nondistended. Extremities: No cyanosis or clubbing. No edema  Skin:     No rashes and normal coloration. Warm and dry.     Neuro:  Cranial nerves II-XII grossly intact. Sensation intact  Psych:  Normal mood and affect. Alert and oriented x3    I have reviewed ordered lab tests and independently visualized imaging below:    Last 24hr Labs:  Recent Results (from the past 24 hour(s))   HGB & HCT    Collection Time: 10/01/21  5:59 PM   Result Value Ref Range    HGB 7.5 (L) 11.7 - 15.4 g/dL    HCT 24.3 (L) 35.8 - 46.3 %   PLEASE READ & DOCUMENT PPD TEST IN 72 HRS    Collection Time: 10/01/21 10:40 PM   Result Value Ref Range    PPD      mm Induration 0 0 - 5 mm   HGB & HCT    Collection Time: 10/02/21 12:11 AM   Result Value Ref Range    HGB 7.0 (L) 11.7 - 15.4 g/dL    HCT 23.2 (L) 35.8 - 46.3 %   CBC W/O DIFF    Collection Time: 10/02/21  6:39 AM   Result Value Ref Range    WBC 8.8 4.3 - 11.1 K/uL    RBC 2.99 (L) 4.05 - 5.2 M/uL    HGB 7.5 (L) 11.7 - 15.4 g/dL    HCT 24.6 (L) 35.8 - 46.3 %    MCV 82.3 79.6 - 97.8 FL    MCH 25.1 (L) 26.1 - 32.9 PG    MCHC 30.5 (L) 31.4 - 35.0 g/dL    RDW 19.6 (H) 11.9 - 14.6 %    PLATELET 754 961 - 058 K/uL    MPV 9.9 9.4 - 12.3 FL    ABSOLUTE NRBC 0.00 0.0 - 0.2 K/uL   MAGNESIUM    Collection Time: 10/02/21  6:39 AM   Result Value Ref Range    Magnesium 1.9 1.8 - 2.4 mg/dL   METABOLIC PANEL, COMPREHENSIVE    Collection Time: 10/02/21  6:39 AM   Result Value Ref Range    Sodium 145 136 - 145 mmol/L    Potassium 3.6 3.5 - 5.1 mmol/L    Chloride 115 (H) 98 - 107 mmol/L    CO2 22 21 - 32 mmol/L    Anion gap 8 7 - 16 mmol/L    Glucose 98 65 - 100 mg/dL    BUN 17 8 - 23 MG/DL    Creatinine 0.92 0.6 - 1.0 MG/DL    GFR est AA >60 >60 ml/min/1.73m2    GFR est non-AA >60 >60 ml/min/1.73m2    Calcium 8.2 (L) 8.3 - 10.4 MG/DL    Bilirubin, total 0.8 0.2 - 1.1 MG/DL    ALT (SGPT) 18 12 - 65 U/L    AST (SGOT) 21 15 - 37 U/L    Alk.  phosphatase 77 50 - 136 U/L    Protein, total 5.4 (L) 6.3 - 8.2 g/dL    Albumin 2.5 (L) 3.2 - 4.6 g/dL    Globulin 2.9 2.3 - 3.5 g/dL    A-G Ratio 0.9 (L) 1.2 - 3.5     PROTHROMBIN TIME + INR    Collection Time: 10/02/21  6:39 AM   Result Value Ref Range    Prothrombin time 32.7 (H) 12.6 - 14.5 sec    INR 3.2         All Micro Results     Procedure Component Value Units Date/Time    COVID-19 RAPID TEST [[id number]] Collected: 09/29/21 1712    Order Status: Completed Specimen: Nasopharyngeal Updated: 09/29/21 1800     Specimen source NASAL        COVID-19 rapid test Not detected        Comment:      The specimen is NEGATIVE for SARS-CoV-2, the novel coronavirus associated with COVID-19. A negative result does not rule out COVID-19. This test has been authorized by the FDA under an Emergency Use Authorization (EUA) for use by authorized laboratories. Fact sheet for Healthcare Providers: ConventionMyVRdate.co.nz  Fact sheet for Patients: NodePrime.co.nz       Methodology: Isothermal Nucleic Acid Amplification               Other Studies:  No results found.     Current Meds:  Current Facility-Administered Medications   Medication Dose Route Frequency    [START ON 10/3/2021] pantoprazole (PROTONIX) tablet 40 mg  40 mg Oral ACB    lactated Ringers infusion  100 mL/hr IntraVENous CONTINUOUS    0.9% sodium chloride infusion  10 mL/hr IntraVENous CONTINUOUS    0.9% sodium chloride infusion 250 mL  250 mL IntraVENous PRN    0.9% sodium chloride infusion 250 mL  250 mL IntraVENous PRN    0.9% sodium chloride infusion 250 mL  250 mL IntraVENous PRN    0.9% sodium chloride infusion 250 mL  250 mL IntraVENous PRN    sodium chloride (NS) flush 5-10 mL  5-10 mL IntraVENous Q8H    sodium chloride (NS) flush 5-10 mL  5-10 mL IntraVENous PRN    0.9% sodium chloride infusion 250 mL  250 mL IntraVENous PRN    amiodarone (CORDARONE) tablet 200 mg  200 mg Oral DAILY    atorvastatin (LIPITOR) tablet 20 mg  20 mg Oral DAILY    cyanocobalamin tablet 1,000 mcg  1,000 mcg Oral DAILY    venlafaxine (EFFEXOR) tablet 25 mg  25 mg Oral DAILY    sodium chloride (NS) flush 5-40 mL 5-40 mL IntraVENous Q8H    sodium chloride (NS) flush 5-40 mL  5-40 mL IntraVENous PRN    acetaminophen (TYLENOL) tablet 650 mg  650 mg Oral Q6H PRN    Or    acetaminophen (TYLENOL) suppository 650 mg  650 mg Rectal Q6H PRN    polyethylene glycol (MIRALAX) packet 17 g  17 g Oral DAILY PRN    ondansetron (ZOFRAN ODT) tablet 4 mg  4 mg Oral Q8H PRN    Or    ondansetron (ZOFRAN) injection 4 mg  4 mg IntraVENous Q6H PRN    0.9% sodium chloride infusion 250 mL  250 mL IntraVENous PRN       Signed:  Tiffany Hagan MD    Part of this note may have been written by using a voice dictation software. The note has been proof read but may still contain some grammatical/other typographical errors.

## 2021-10-02 NOTE — ROUTINE PROCESS
Bedside and Verbal shift change report given to self (oncoming nurse) by  Manjeet Bower RN (offgoing nurse). Report included the following information SBAR, Kardex, Intake/Output, MAR, and Recent Results.

## 2021-10-02 NOTE — PROGRESS NOTES
END OF SHIFT NOTE:    INTAKE/OUTPUT  10/01 0701 - 10/02 0700  In: 150 [P.O.:150]  Out: 0   Voiding: YES  Catheter: NO  Drain:              Flatus: Patient does have flatus present. Stool:  0 occurrences. Characteristics:  Stool Assessment  Stool Color: Black  Stool Appearance: Other (comment) (tarry)  Stool Amount: Large  Stool Source/Status: Rectum    Emesis: 0 occurrences. Characteristics:        VITAL SIGNS  Patient Vitals for the past 12 hrs:   Temp Pulse Resp BP SpO2   10/02/21 0408 98.1 °F (36.7 °C) 66 16 (!) 111/55 94 %   10/01/21 2340 98.3 °F (36.8 °C) 73 16 (!) 129/56 95 %   10/01/21 1941 98.2 °F (36.8 °C) 75 18 119/62 97 %       Pain Assessment  Pain Intensity 1: 0 (10/02/21 0030)        Patient Stated Pain Goal: 0    Ambulating  Yes    Shift report given to oncoming nurse at the bedside.     Neftali Dugan RN

## 2021-10-03 LAB
ALBUMIN SERPL-MCNC: 2.4 G/DL (ref 3.2–4.6)
ALBUMIN/GLOB SERPL: 0.9 {RATIO} (ref 1.2–3.5)
ALP SERPL-CCNC: 89 U/L (ref 50–136)
ALT SERPL-CCNC: 19 U/L (ref 12–65)
ANION GAP SERPL CALC-SCNC: 8 MMOL/L (ref 7–16)
AST SERPL-CCNC: 17 U/L (ref 15–37)
BILIRUB SERPL-MCNC: 0.7 MG/DL (ref 0.2–1.1)
BUN SERPL-MCNC: 12 MG/DL (ref 8–23)
CALCIUM SERPL-MCNC: 8.1 MG/DL (ref 8.3–10.4)
CHLORIDE SERPL-SCNC: 113 MMOL/L (ref 98–107)
CO2 SERPL-SCNC: 24 MMOL/L (ref 21–32)
CREAT SERPL-MCNC: 0.97 MG/DL (ref 0.6–1)
ERYTHROCYTE [DISTWIDTH] IN BLOOD BY AUTOMATED COUNT: 19.4 % (ref 11.9–14.6)
GLOBULIN SER CALC-MCNC: 2.8 G/DL (ref 2.3–3.5)
GLUCOSE SERPL-MCNC: 103 MG/DL (ref 65–100)
HCT VFR BLD AUTO: 22.7 % (ref 35.8–46.3)
HCT VFR BLD AUTO: 23.5 % (ref 35.8–46.3)
HCT VFR BLD AUTO: 23.7 % (ref 35.8–46.3)
HGB BLD-MCNC: 7 G/DL (ref 11.7–15.4)
HGB BLD-MCNC: 7.1 G/DL (ref 11.7–15.4)
HGB BLD-MCNC: 7.2 G/DL (ref 11.7–15.4)
INR PPP: 3.2
MAGNESIUM SERPL-MCNC: 2 MG/DL (ref 1.8–2.4)
MCH RBC QN AUTO: 25.8 PG (ref 26.1–32.9)
MCHC RBC AUTO-ENTMCNC: 30.8 G/DL (ref 31.4–35)
MCV RBC AUTO: 83.8 FL (ref 79.6–97.8)
NRBC # BLD: 0 K/UL (ref 0–0.2)
PLATELET # BLD AUTO: 260 K/UL (ref 150–450)
PMV BLD AUTO: 10.3 FL (ref 9.4–12.3)
POTASSIUM SERPL-SCNC: 3.2 MMOL/L (ref 3.5–5.1)
PROT SERPL-MCNC: 5.2 G/DL (ref 6.3–8.2)
PROTHROMBIN TIME: 32.9 SEC (ref 12.6–14.5)
RBC # BLD AUTO: 2.71 M/UL (ref 4.05–5.2)
SODIUM SERPL-SCNC: 145 MMOL/L (ref 136–145)
WBC # BLD AUTO: 8.4 K/UL (ref 4.3–11.1)

## 2021-10-03 PROCEDURE — 74011250637 HC RX REV CODE- 250/637: Performed by: FAMILY MEDICINE

## 2021-10-03 PROCEDURE — 83735 ASSAY OF MAGNESIUM: CPT

## 2021-10-03 PROCEDURE — 85014 HEMATOCRIT: CPT

## 2021-10-03 PROCEDURE — 36415 COLL VENOUS BLD VENIPUNCTURE: CPT

## 2021-10-03 PROCEDURE — 80053 COMPREHEN METABOLIC PANEL: CPT

## 2021-10-03 PROCEDURE — 85610 PROTHROMBIN TIME: CPT

## 2021-10-03 PROCEDURE — 65660000000 HC RM CCU STEPDOWN

## 2021-10-03 PROCEDURE — 74011250637 HC RX REV CODE- 250/637: Performed by: STUDENT IN AN ORGANIZED HEALTH CARE EDUCATION/TRAINING PROGRAM

## 2021-10-03 PROCEDURE — 74011250637 HC RX REV CODE- 250/637: Performed by: INTERNAL MEDICINE

## 2021-10-03 PROCEDURE — 85027 COMPLETE CBC AUTOMATED: CPT

## 2021-10-03 RX ORDER — PHYTONADIONE 5 MG/1
10 TABLET ORAL
Status: COMPLETED | OUTPATIENT
Start: 2021-10-03 | End: 2021-10-03

## 2021-10-03 RX ORDER — HALOPERIDOL 2 MG/1
2 TABLET ORAL
Status: DISCONTINUED | OUTPATIENT
Start: 2021-10-03 | End: 2021-10-04 | Stop reason: HOSPADM

## 2021-10-03 RX ORDER — POTASSIUM CHLORIDE 20 MEQ/1
40 TABLET, EXTENDED RELEASE ORAL
Status: COMPLETED | OUTPATIENT
Start: 2021-10-03 | End: 2021-10-03

## 2021-10-03 RX ORDER — DIPHENHYDRAMINE HCL 25 MG
25 CAPSULE ORAL
Status: DISCONTINUED | OUTPATIENT
Start: 2021-10-03 | End: 2021-10-04 | Stop reason: HOSPADM

## 2021-10-03 RX ADMIN — ATORVASTATIN CALCIUM 20 MG: 20 TABLET, FILM COATED ORAL at 08:19

## 2021-10-03 RX ADMIN — CYANOCOBALAMIN TAB 1000 MCG 1000 MCG: 1000 TAB at 08:19

## 2021-10-03 RX ADMIN — PANTOPRAZOLE SODIUM 40 MG: 40 TABLET, DELAYED RELEASE ORAL at 06:11

## 2021-10-03 RX ADMIN — Medication 10 ML: at 13:37

## 2021-10-03 RX ADMIN — VENLAFAXINE 25 MG: 25 TABLET ORAL at 08:19

## 2021-10-03 RX ADMIN — Medication 10 ML: at 06:02

## 2021-10-03 RX ADMIN — POTASSIUM CHLORIDE 40 MEQ: 20 TABLET, EXTENDED RELEASE ORAL at 09:09

## 2021-10-03 RX ADMIN — Medication 10 ML: at 06:03

## 2021-10-03 RX ADMIN — AMIODARONE HYDROCHLORIDE 200 MG: 200 TABLET ORAL at 08:19

## 2021-10-03 RX ADMIN — PHYTONADIONE 10 MG: 5 TABLET ORAL at 10:15

## 2021-10-03 RX ADMIN — Medication 10 ML: at 21:27

## 2021-10-03 NOTE — PROGRESS NOTES
Reviewed notes for concerns      [address]        Will continue to assess how to best serve this family

## 2021-10-03 NOTE — PROGRESS NOTES
[facility] Progress Note         Admit Date:  9/28/2021    Today's Date:  10/3/2021    CC: Anemia, gib    Subjective:     Patient feeling well. Only 1 BM this AM, still dark. No longer w/ red blood present. No N/V. Tolerating full liq. Hgb stable. No personal or family H/o liver disease. EGD/ Small bowel enteroscopy Procedure Note 9/30/21   PreOp Diagnosis:   Acute blood loss anemia  Melena  Upper GI bleed   PostOp Diagnosis:  Probable small bowel bleeding   Findings:   Esophagus: The proximal and mid esophagus appeared normal.  In the distal esophagus, Z-line normal  Stomach: Normal, without ulcers, erosions, or polyps. No blood present. Small bowel:   Fresh blood seen pooling in the distal duodenum. Difficult to reach w/ the EGD scope in the 3rd/4th portion. Switch to pediatric scope. Scope advanced 90cm beyond the pylorus. No bleeding source identified. Some fresh clots present, non-adherent. Careful inspection and irrigation without a clear source. It may be distal to the visible small bowel.    Recommendations: Follow up Hgb results  A colon source is considered very unlikely, given the fresh blood in the duodenum and jejunum.    Tagged RBC scan  NPO for now   Gino Cabrales MD    Medications:   Current Facility-Administered Medications   Medication Dose Route Frequency    haloperidoL (HALDOL) tablet 2 mg  2 mg Oral Q4H PRN    diphenhydrAMINE (BENADRYL) capsule 25 mg  25 mg Oral Q6H PRN    phytonadione (vitamin K1) (MEPHYTON) tablet 10 mg  10 mg Oral NOW    pantoprazole (PROTONIX) tablet 40 mg  40 mg Oral ACB    lactated Ringers infusion  100 mL/hr IntraVENous CONTINUOUS    0.9% sodium chloride infusion  10 mL/hr IntraVENous CONTINUOUS    0.9% sodium chloride infusion 250 mL  250 mL IntraVENous PRN    0.9% sodium chloride infusion 250 mL  250 mL IntraVENous PRN    0.9% sodium chloride infusion 250 mL  250 mL IntraVENous PRN    0.9% sodium chloride infusion 250 mL  250 mL IntraVENous PRN    sodium chloride (NS) flush 5-10 mL  5-10 mL IntraVENous Q8H    sodium chloride (NS) flush 5-10 mL  5-10 mL IntraVENous PRN    0.9% sodium chloride infusion 250 mL  250 mL IntraVENous PRN    amiodarone (CORDARONE) tablet 200 mg  200 mg Oral DAILY    atorvastatin (LIPITOR) tablet 20 mg  20 mg Oral DAILY    cyanocobalamin tablet 1,000 mcg  1,000 mcg Oral DAILY    venlafaxine (EFFEXOR) tablet 25 mg  25 mg Oral DAILY    sodium chloride (NS) flush 5-40 mL  5-40 mL IntraVENous Q8H    sodium chloride (NS) flush 5-40 mL  5-40 mL IntraVENous PRN    acetaminophen (TYLENOL) tablet 650 mg  650 mg Oral Q6H PRN    Or    acetaminophen (TYLENOL) suppository 650 mg  650 mg Rectal Q6H PRN    polyethylene glycol (MIRALAX) packet 17 g  17 g Oral DAILY PRN    ondansetron (ZOFRAN ODT) tablet 4 mg  4 mg Oral Q8H PRN    Or    ondansetron (ZOFRAN) injection 4 mg  4 mg IntraVENous Q6H PRN    0.9% sodium chloride infusion 250 mL  250 mL IntraVENous PRN       Review of Systems:  ROS was obtained, with pertinent positives as listed above. No chest pain or SOB. Diet:  Full liq    Objective:   Vitals:  Visit Vitals  BP (!) 122/58   Pulse 72   Temp 98.1 °F (36.7 °C)   Resp 18   Ht 5' 5\" (1.651 m)   Wt 88 kg (194 lb)   SpO2 98%   BMI 32.28 kg/m²     Intake/Output:  No intake/output data recorded. 10/01 1901 - 10/03 0700  In: 65 [P.O.:620]  Out: 100 [Urine:100]  Exam:  General appearance: alert, cooperative, no distress  Lungs: clear to auscultation bilaterally anteriorly  Heart: irregular, no M  Abdomen: soft, non-tender.  Bowel sounds normal. No masses, no organomegaly  Extremities: extremities normal, atraumatic, no cyanosis or edema  Neuro:  alert and oriented    Data Review (Labs):    Recent Labs     10/03/21  0348 10/02/21  1801 10/02/21  0639 10/02/21  0011 10/01/21  1759 10/01/21  1210 10/01/21  0530 09/30/21  2104 09/30/21  1545 09/30/21  1149   WBC 8.4  --  8.8  --   --   --  14.2*  --   -- --    HGB 7.0* 7.4* 7.5* 7.0* 7.5* 7.4* 8.1* 6.4* 8.2* 7.5*   HCT 22.7* 24.5* 24.6* 23.2* 24.3* 24.3* 26.9* 21.6* 28.7* 24.7*     --  222  --   --   --  219  --   --   --    MCV 83.8  --  82.3  --   --   --  85.4  --   --   --      --  145  --   --   --  145  --   --   --    K 3.2*  --  3.6  --   --   --  3.9  --   --   --    *  --  115*  --   --   --  115*  --   --   --    CO2 24  --  22  --   --   --  21  --   --   --    BUN 12  --  17  --   --   --  31*  --   --   --    CREA 0.97  --  0.92  --   --   --  1.08*  --   --   --    CA 8.1*  --  8.2*  --   --   --  8.5  --   --   --    MG 2.0  --  1.9  --   --   --  2.1  --   --   --    *  --  98  --   --   --  106*  --   --   --    AP 89  --  77  --   --   --  82  --   --   --    AST 17  --  21  --   --   --  20  --   --   --    ALT 19  --  18  --   --   --  20  --   --   --    TBILI 0.7  --  0.8  --   --   --  0.9  --   --   --    ALB 2.4*  --  2.5*  --   --   --  2.4*  --   --   --    TP 5.2*  --  5.4*  --   --   --  5.4*  --   --   --    PTP 32.9*  --  32.7*  --   --   --  28.9*  --   --   --    INR 3.2  --  3.2  --   --   --  2.7  --   --   --      Nuclear medicine GI bleed scan 9/30/2021   INDICATION:  Acute blood loss. Large blood in small bowel without clear source.   COMPARISON: None.   TECHNIQUE: Planar imaging of the abdomen was performed following the uneventful  intravenous demonstration of 25.2 mCi of Tc-99m UltraTag labeled red blood cells  through 60 minutes   FINDINGS:   Today's study is limited given that the patient is obliqued to the right. A  technologist note states that the patient was repositioned several times yet  kept moving into an oblique position.   Immediately following radiotracer administration, normal blood pool activity is  seen. Very early in the examination, abnormal activity is seen over the left  upper quadrant which subsequently extends in the small bowel pattern into the  left abdomen.  The appearance suggests an active gastric bleed with progression  of radiotracer into proximal small bowel loops in the left abdomen.   IMPRESSION  1. Findings suggesting an active GI bleed within the stomach. CT abdomen and pelvis for GI bleed 9/30/2021   CLINICAL HISTORY: Positive GI bleed scan.   Findings:    CT ABDOMEN:  Limited evaluation of the lung bases and base of the mediastinum demonstrates no  significant abnormalities.    Assessment of the solid organs is limited by the lack of administered  intravenous contrast.  A subtle abnormality could be missed. The Liver  demonstrates scattered benign calcifications and a benign left lobe cyst.  The  spleen is homogeneous in attenuation. No contour deforming mass lesions are  seen of the pancreas or adrenal glands. The gallbladder has an unremarkable CT  appearance without radiopaque stones or pericholecystic fluid/inflammatory  changes. No radiopaque stones or hydronephrosis are seen of the kidneys. A 2.8  cm cyst is seen in the anterior mid pole cortex of the left kidney.   The visualized loops of small bowel and colon are normal in caliber. Today's  study is for assessment of GI bleeding. By nuclear medicine GI bleed scan, a  gastric source was suggested. Unfortunately, the stomach contains oral contrast  on the precontrast images as seen on precontrast image 19. Therefore, assessment  for GI bleed at the stomach could not be performed. Oral contrast is also  present at additional levels most evident in the distal colon. No additional  source of acute bleed is suggested on this examination. No free fluid, free air,  or focal inflammatory changes are seen in the abdomen. No adenopathy is seen. The abdominal aorta demonstrates moderate atherosclerotic ossification and mild  ectasia.   CT PELVIS:  A right total hip replacement is present obscured portions of the right pelvis.   No abnormal pelvic fluid collections or inflammatory changes are present in the  visualized pelvis. No pelvic adenopathy is seen. The partially visualized  urinary bladder is unremarkable.   IMPRESSION  1. This study is for assessment of a potential gastric bleed suggested by the  nuclear medicine GI bleed scan. The stomach contains oral contrast prior to the  administration of contrast. Therefore, a source of bleeding in the stomach  cannot be confirmed or excluded. No potential source of GI bleed is otherwise  suggested although some oral contrast is seen within additional segments of  bowel. US 10/2: NL liver. No evidence of portal HTN or cirrhosis. Assessment:     Principal Problem:    Acute blood loss anemia (9/28/2021)    Active Problems:    Hypertension (1/23/2017)      Paroxysmal atrial fibrillation (HCC) (1/23/2017)      Migraine with vertigo (1/23/2017)      Hip fracture ([address] (7/12/2021)      Elevated INR (9/28/2021)      Symptomatic anemia (9/28/2021)    63 yo female is seen in consultation for evaluation of severe anemia with coagulopathy and melena, presenting to the ED with SOB. She had syncopal episode in the waiting room and was found to have hgb 5.9 with INR 7.9. She has had intermittent melena for the last few months, evaluated with EGD and colonoscopy in May; EUS was recommended for further evaluation of a gastric subepithelial lesion but not performed. She had hip fracture with kelley-arthroplasty in July and notes her black stools have been more frequent since that time. Given Vit K on admission with partial improvement in INR. EGD 9/30 with no visible gastric blood but fresh blood/clots seen in distal duodenum. NM tagged RBC scan 9/30 suggests active gi bleed in the LUQ, possibly the stomach. CT A/P gib 9/30 with oral contrast in the stomach with inability to ascertain bleeding in the stomach. Plan:     1. Monitor H/H,  Hgb now appears stable  2. No signs of recurrent GI bleeding. Monitor stool output  3. INR now stable at 3.2 off coumadin for a week. Concern for underlying coagulopathy. No evidence of cirrhosis on imaging, and LFTs completely normal.  -Vit K repeated today x 10mg. If INR fails to correct, then may need Heme eval  4. Suspect blood loss anemia due to recent bleeding in the small bowel. 5. If stable overnight, consider DC home tomorrow and plan for outpt capsule endoscopy   6. Will sign off and office will call to schedule capsule within 1-2 wks    Please call if further issues arise.     Kierra Aguilera MD

## 2021-10-03 NOTE — ROUTINE PROCESS
Shift change report given to myself by Shoshana Logan. Report included the following information SBAR, Kardex, Intake/Output, MAR, and Recent Results.

## 2021-10-03 NOTE — PROGRESS NOTES
Hospitalist Progress Note   Admit Date:  2021  6:07 PM   Name:  Leatha Conn   Age:  64 y.o. Sex:  female  :  1945   MRN:  [id number]   Room:  [facility] [place]    Presenting Complaint: Shortness of Breath    Reason(s) for Admission: Symptomatic anemia [D64.9]  Acute blood loss anemia [D62]  Elevated INR [R79.1]     Hospital Course & Interval History:   49-year-old female presented with lightheadedness. Patient admitted to dark tarry stools for past few days. Does take home iron. Patient also has a history of atrial fibrillation on Coumadin and a recent hip fracture that she had repaired 2 months ago. Patient did state that she thinks she was given the wrong dose of her Coumadin. She normally takes 2.5 mg but had a prescription sent in for 5 mg. Patient was taking 5 mg daily and had an INR of 7.9 at admission. Patient's hemoglobin was also 5.8. Patient received vitamin K in the emergency department. Subjective (10/03/21): Patient was seen and examined at the bedside. Patient's hemoglobin stable. Patient tolerating her diet well. She had a small bowel movement this morning which was still melanotic. Patient denied any red blood. Patient able to tolerate her diet. Patient denied any cardiac chest pain, shortness of breath, nausea/vomiting.     Assessment & Plan:   Acute blood loss anemia with symptomatic anemia (2021)  Trend H&H   patient's hemoglobin was 6.3  Will transfuse with a unit of packed red blood cells  Iron studies  Patient had small dark stool  in later afternoon  GI consulted  Coumadin on hold  [facility] initially 7.9, given vitamin D in ED, INR currently 2.2   EGD   Continue twice daily Protonix  Transfuse hemoglobin if less than 7  EGD showed fresh blood in duodenum and jejunum  Tagged red blood cell scan suggested active GI bleed and stomach  CT abdomen pelvis  with oral contrast in stomach with inability to ascertain bleeding in the stomach  Awaiting recommendations from GI  Serial 6-hour H&H  Dr. Johnnie Montenegro with IR aware of patient  INR 3.2, abdominal ultrasound negative to rule out cirrhosis and fatty liver  10/3 no signs of recurrent GI bleed. Dylan Holder still stable at 3.2 while being off Coumadin. Patient was given vitamin K x10 mg. Per GI consider heme evaluation if INR fails   If patient remains stable will be DC'd tomorrow and will need outpatient capsule endoscopy with GI in 1 to 2 weeks    Atrial fibrillation with supratherapeutic INR levels  Daily INR  Coumadin on hold  Vitamin K ordered by ED since INR 7.9 on admission,   Patient stated that she had just been given a new medication of Coumadin but 5 mg dose instead of the 2.5 mg dose she is supposed to take  Patient's INR 3.2 on 10/2  Abdominal ultrasound pending to rule out cirrhosis/fatty liver    Hypertension/hypotension  Pressure medications on hold except for amiodarone in the setting of volume loss  We will need to continue to monitor for signs of fluid overload since history of diastolic dysfunction with ejection fraction of 60% noted on echo in 2019 9/30 patient had episode of hypotension while in endoscopy suite. Patient was given LR and blood pressure improved  10/2 blood pressure currently stable    Recent hip fracture repair  PT/OT consulted  PPD placed for possible rehab    Hyperlipidemia  Continue statin      Dispo/Discharge Planning:    Dispo pending clinical course. Coumadin on hold.   Possible DC next 24 hours    Diet:  ADULT DIET Regular; Low Fat/Low Chol/High Fiber/2 gm Na  DVT PPx: SCDs  Code status: Full Code    Hospital Problems as of 10/3/2021 Date Reviewed: 9/30/2021        Codes Class Noted - Resolved POA    * (Principal) Acute blood loss anemia ICD-10-CM: D62  ICD-9-CM: 285.1  9/28/2021 - Present Unknown        Elevated INR ICD-10-CM: R79.1  ICD-9-CM: 790.92  9/28/2021 - Present Unknown        Symptomatic anemia ICD-10-CM: D64.9  ICD-9-CM: 285.9  9/28/2021 - Present Unknown        Hip fracture (HCC) ICD-10-CM: S72.009A  ICD-9-CM: 820.8  7/12/2021 - Present Yes        Hypertension ICD-10-CM: I10  ICD-9-CM: 401.9  1/23/2017 - Present Yes        Paroxysmal atrial fibrillation (HCC) ICD-10-CM: I48.0  ICD-9-CM: 427.31  1/23/2017 - Present Yes        Migraine with vertigo ICD-10-CM: G43.109  ICD-9-CM: 346.80, 780.4  1/23/2017 - Present Yes              Objective:     Patient Vitals for the past 24 hrs:   Temp Pulse Resp BP SpO2   10/03/21 0836 98.1 °F (36.7 °C) 72 18 (!) 122/58 98 %   10/03/21 0406 98.1 °F (36.7 °C) 67 17 134/62 96 %   10/02/21 2331 98.4 °F (36.9 °C) 75 20 115/66 94 %   10/02/21 1959 98.4 °F (36.9 °C) 74 16 (!) 145/65 98 %     Oxygen Therapy  O2 Sat (%): 98 % (10/03/21 0836)  Pulse via Oximetry: 79 beats per minute (09/30/21 1556)  O2 Device: None (Room air) (10/03/21 0836)  O2 Flow Rate (L/min): 2 l/min (09/30/21 1543)    Estimated body mass index is 32.28 kg/m² as calculated from the following:    Height as of this encounter: 5' 5\" (1.651 m). Weight as of this encounter: 88 kg (194 lb). Intake/Output Summary (Last 24 hours) at 10/3/2021 1635  Last data filed at 10/3/2021 0836  Gross per 24 hour   Intake 320 ml   Output 100 ml   Net 220 ml         Physical Exam:   General:    Well nourished. No distress  Head:  Normocephalic, atraumatic  Eyes:  Sclerae appear normal.  Pupils equally round. ENT:  Nares appear normal, no drainage. Moist oral mucosa  Neck:  No restricted ROM. Trachea midline   CV:   RRR. No m/r/g. No jugular venous distension. Lungs:   CTAB. No wheezing, rhonchi, or rales. Respirations even, unlabored  Abdomen: Bowel sounds present. Soft, nontender, nondistended. Extremities: No cyanosis or clubbing. No edema  Skin:     No rashes and normal coloration. Warm and dry. Neuro:  Cranial nerves II-XII grossly intact. Sensation intact  Psych:  Normal mood and affect.   Alert and oriented x3    I have reviewed ordered lab tests and independently visualized imaging below:    Last 24hr Labs:  Recent Results (from the past 24 hour(s))   HGB & HCT    Collection Time: 10/02/21  6:01 PM   Result Value Ref Range    HGB 7.4 (L) 11.7 - 15.4 g/dL    HCT 24.5 (L) 35.8 - 46.3 %   PROTHROMBIN TIME + INR    Collection Time: 10/03/21  3:48 AM   Result Value Ref Range    Prothrombin time 32.9 (H) 12.6 - 14.5 sec    INR 3.2     CBC W/O DIFF    Collection Time: 10/03/21  3:48 AM   Result Value Ref Range    WBC 8.4 4.3 - 11.1 K/uL    RBC 2.71 (L) 4.05 - 5.2 M/uL    HGB 7.0 (L) 11.7 - 15.4 g/dL    HCT 22.7 (L) 35.8 - 46.3 %    MCV 83.8 79.6 - 97.8 FL    MCH 25.8 (L) 26.1 - 32.9 PG    MCHC 30.8 (L) 31.4 - 35.0 g/dL    RDW 19.4 (H) 11.9 - 14.6 %    PLATELET 805 938 - 202 K/uL    MPV 10.3 9.4 - 12.3 FL    ABSOLUTE NRBC 0.00 0.0 - 0.2 K/uL   METABOLIC PANEL, COMPREHENSIVE    Collection Time: 10/03/21  3:48 AM   Result Value Ref Range    Sodium 145 136 - 145 mmol/L    Potassium 3.2 (L) 3.5 - 5.1 mmol/L    Chloride 113 (H) 98 - 107 mmol/L    CO2 24 21 - 32 mmol/L    Anion gap 8 7 - 16 mmol/L    Glucose 103 (H) 65 - 100 mg/dL    BUN 12 8 - 23 MG/DL    Creatinine 0.97 0.6 - 1.0 MG/DL    GFR est AA >60 >60 ml/min/1.73m2    GFR est non-AA 59 (L) >60 ml/min/1.73m2    Calcium 8.1 (L) 8.3 - 10.4 MG/DL    Bilirubin, total 0.7 0.2 - 1.1 MG/DL    ALT (SGPT) 19 12 - 65 U/L    AST (SGOT) 17 15 - 37 U/L    Alk.  phosphatase 89 50 - 136 U/L    Protein, total 5.2 (L) 6.3 - 8.2 g/dL    Albumin 2.4 (L) 3.2 - 4.6 g/dL    Globulin 2.8 2.3 - 3.5 g/dL    A-G Ratio 0.9 (L) 1.2 - 3.5     MAGNESIUM    Collection Time: 10/03/21  3:48 AM   Result Value Ref Range    Magnesium 2.0 1.8 - 2.4 mg/dL   HGB & HCT    Collection Time: 10/03/21  3:47 PM   Result Value Ref Range    HGB 7.2 (L) 11.7 - 15.4 g/dL    HCT 23.7 (L) 35.8 - 46.3 %       All Micro Results     Procedure Component Value Units Date/Time    COVID-19 RAPID TEST [[id number]] Collected: 09/29/21 1712    Order Status: Completed Specimen: Nasopharyngeal Updated: 09/29/21 1800     Specimen source NASAL        COVID-19 rapid test Not detected        Comment:      The specimen is NEGATIVE for SARS-CoV-2, the novel coronavirus associated with COVID-19. A negative result does not rule out COVID-19. This test has been authorized by the FDA under an Emergency Use Authorization (EUA) for use by authorized laboratories. Fact sheet for Healthcare Providers: femeninas.co.nz  Fact sheet for Patients: femeninas.co.nz       Methodology: Isothermal Nucleic Acid Amplification               Other Studies:  US ABD COMP    Result Date: 10/2/2021  COMPLETE ABDOMINAL SONOGRAPHY, October 2, 2021 CLINICAL HISTORY: Coagulopathy and hypoalbuminemic with clinical concern for cirrhosis and/or portal venous hypertension. FINDINGS: Multiple images from real time ultrasound evaluation of the abdomen show that the gallbladder is normal in size and configuration, without evidence of stone, wall thickening, or pericholecystic fluid. No tenderness was elicited while scanning over the gallbladder. The common bile duct is normal in caliber at 4 mm, and no significant intrahepatic bilary ductal dilatation is evident. There is mild dilatation of the pancreatic duct measuring 3 mm at the neck of the pancreas. No peripancreatic fluid collection is identified, but the tail of the pancreas was not well evaluated because of shadowing from overlying bowel gas. There are bilateral renal cysts. A 2.8 cm septated cyst is again noted in the left hepatic lobe. No specific evidence of cirrhosis is identified, and there is hepatopedal flow in the portal vein. The spleen, kidneys, aorta, and IVC appear unremarkable as imaged. 1.  No specific ultrasound findings confirm cirrhosis or portal venous hypertension. 2.  Mildly dilated pancreatic duct is of uncertain etiology and clinical significance. Current Meds:  Current Facility-Administered Medications   Medication Dose Route Frequency    haloperidoL (HALDOL) tablet 2 mg  2 mg Oral Q4H PRN    diphenhydrAMINE (BENADRYL) capsule 25 mg  25 mg Oral Q6H PRN    pantoprazole (PROTONIX) tablet 40 mg  40 mg Oral ACB    lactated Ringers infusion  100 mL/hr IntraVENous CONTINUOUS    0.9% sodium chloride infusion  10 mL/hr IntraVENous CONTINUOUS    0.9% sodium chloride infusion 250 mL  250 mL IntraVENous PRN    0.9% sodium chloride infusion 250 mL  250 mL IntraVENous PRN    0.9% sodium chloride infusion 250 mL  250 mL IntraVENous PRN    0.9% sodium chloride infusion 250 mL  250 mL IntraVENous PRN    sodium chloride (NS) flush 5-10 mL  5-10 mL IntraVENous Q8H    sodium chloride (NS) flush 5-10 mL  5-10 mL IntraVENous PRN    0.9% sodium chloride infusion 250 mL  250 mL IntraVENous PRN    amiodarone (CORDARONE) tablet 200 mg  200 mg Oral DAILY    atorvastatin (LIPITOR) tablet 20 mg  20 mg Oral DAILY    cyanocobalamin tablet 1,000 mcg  1,000 mcg Oral DAILY    venlafaxine (EFFEXOR) tablet 25 mg  25 mg Oral DAILY    sodium chloride (NS) flush 5-40 mL  5-40 mL IntraVENous Q8H    sodium chloride (NS) flush 5-40 mL  5-40 mL IntraVENous PRN    acetaminophen (TYLENOL) tablet 650 mg  650 mg Oral Q6H PRN    Or    acetaminophen (TYLENOL) suppository 650 mg  650 mg Rectal Q6H PRN    polyethylene glycol (MIRALAX) packet 17 g  17 g Oral DAILY PRN    ondansetron (ZOFRAN ODT) tablet 4 mg  4 mg Oral Q8H PRN    Or    ondansetron (ZOFRAN) injection 4 mg  4 mg IntraVENous Q6H PRN    0.9% sodium chloride infusion 250 mL  250 mL IntraVENous PRN       Signed:  Georgette Johansen MD    Part of this note may have been written by using a voice dictation software. The note has been proof read but may still contain some grammatical/other typographical errors.

## 2021-10-03 NOTE — ROUTINE PROCESS
Notified Dr. Luciana Landaverde regarding this patient's Hgb of 7.0. Orders in the system state to transfuse if Hgb is less than 7.0. I informed Dr. Luciana Landaverde just as a FYI. No orders placed at this time. I will continue to monitor patient.

## 2021-10-04 VITALS
WEIGHT: 194 LBS | TEMPERATURE: 98 F | DIASTOLIC BLOOD PRESSURE: 73 MMHG | HEIGHT: 65 IN | OXYGEN SATURATION: 98 % | RESPIRATION RATE: 16 BRPM | SYSTOLIC BLOOD PRESSURE: 155 MMHG | BODY MASS INDEX: 32.32 KG/M2 | HEART RATE: 77 BPM

## 2021-10-04 LAB
ALBUMIN SERPL-MCNC: 2.4 G/DL (ref 3.2–4.6)
ALBUMIN/GLOB SERPL: 0.8 {RATIO} (ref 1.2–3.5)
ALP SERPL-CCNC: 94 U/L (ref 50–136)
ALT SERPL-CCNC: 21 U/L (ref 12–65)
ANION GAP SERPL CALC-SCNC: 8 MMOL/L (ref 7–16)
AST SERPL-CCNC: 20 U/L (ref 15–37)
BASOPHILS # BLD: 0 K/UL (ref 0–0.2)
BASOPHILS NFR BLD: 0 % (ref 0–2)
BILIRUB SERPL-MCNC: 0.7 MG/DL (ref 0.2–1.1)
BUN SERPL-MCNC: 9 MG/DL (ref 8–23)
CALCIUM SERPL-MCNC: 8.3 MG/DL (ref 8.3–10.4)
CHLORIDE SERPL-SCNC: 112 MMOL/L (ref 98–107)
CO2 SERPL-SCNC: 24 MMOL/L (ref 21–32)
CREAT SERPL-MCNC: 1.03 MG/DL (ref 0.6–1)
DIFFERENTIAL METHOD BLD: ABNORMAL
EOSINOPHIL # BLD: 0.1 K/UL (ref 0–0.8)
EOSINOPHIL NFR BLD: 1 % (ref 0.5–7.8)
ERYTHROCYTE [DISTWIDTH] IN BLOOD BY AUTOMATED COUNT: 18.8 % (ref 11.9–14.6)
ERYTHROCYTE [DISTWIDTH] IN BLOOD BY AUTOMATED COUNT: 18.9 % (ref 11.9–14.6)
GLOBULIN SER CALC-MCNC: 3 G/DL (ref 2.3–3.5)
GLUCOSE SERPL-MCNC: 93 MG/DL (ref 65–100)
HCT VFR BLD AUTO: 23 % (ref 35.8–46.3)
HCT VFR BLD AUTO: 27.3 % (ref 35.8–46.3)
HGB BLD-MCNC: 6.9 G/DL (ref 11.7–15.4)
HGB BLD-MCNC: 8.4 G/DL (ref 11.7–15.4)
HISTORY CHECKED?,CKHIST: NORMAL
IMM GRANULOCYTES # BLD AUTO: 0 K/UL (ref 0–0.5)
IMM GRANULOCYTES NFR BLD AUTO: 0 % (ref 0–5)
INR PPP: 2.7
LYMPHOCYTES # BLD: 1.3 K/UL (ref 0.5–4.6)
LYMPHOCYTES NFR BLD: 17 % (ref 13–44)
MAGNESIUM SERPL-MCNC: 2.1 MG/DL (ref 1.8–2.4)
MCH RBC QN AUTO: 25.1 PG (ref 26.1–32.9)
MCH RBC QN AUTO: 25.7 PG (ref 26.1–32.9)
MCHC RBC AUTO-ENTMCNC: 30 G/DL (ref 31.4–35)
MCHC RBC AUTO-ENTMCNC: 30.8 G/DL (ref 31.4–35)
MCV RBC AUTO: 83.5 FL (ref 79.6–97.8)
MCV RBC AUTO: 83.6 FL (ref 79.6–97.8)
MONOCYTES # BLD: 0.7 K/UL (ref 0.1–1.3)
MONOCYTES NFR BLD: 8 % (ref 4–12)
NEUTS SEG # BLD: 5.8 K/UL (ref 1.7–8.2)
NEUTS SEG NFR BLD: 73 % (ref 43–78)
NRBC # BLD: 0 K/UL (ref 0–0.2)
NRBC # BLD: 0 K/UL (ref 0–0.2)
PLATELET # BLD AUTO: 261 K/UL (ref 150–450)
PLATELET # BLD AUTO: 361 K/UL (ref 150–450)
PMV BLD AUTO: 10.1 FL (ref 9.4–12.3)
PMV BLD AUTO: 9.9 FL (ref 9.4–12.3)
POTASSIUM SERPL-SCNC: 3.2 MMOL/L (ref 3.5–5.1)
PROT SERPL-MCNC: 5.4 G/DL (ref 6.3–8.2)
PROTHROMBIN TIME: 28.7 SEC (ref 12.6–14.5)
RBC # BLD AUTO: 2.75 M/UL (ref 4.05–5.2)
RBC # BLD AUTO: 3.27 M/UL (ref 4.05–5.2)
SODIUM SERPL-SCNC: 144 MMOL/L (ref 136–145)
WBC # BLD AUTO: 7.8 K/UL (ref 4.3–11.1)
WBC # BLD AUTO: 7.9 K/UL (ref 4.3–11.1)

## 2021-10-04 PROCEDURE — 97530 THERAPEUTIC ACTIVITIES: CPT

## 2021-10-04 PROCEDURE — 74011250637 HC RX REV CODE- 250/637: Performed by: INTERNAL MEDICINE

## 2021-10-04 PROCEDURE — 85027 COMPLETE CBC AUTOMATED: CPT

## 2021-10-04 PROCEDURE — 97110 THERAPEUTIC EXERCISES: CPT

## 2021-10-04 PROCEDURE — 36415 COLL VENOUS BLD VENIPUNCTURE: CPT

## 2021-10-04 PROCEDURE — 74011250637 HC RX REV CODE- 250/637: Performed by: FAMILY MEDICINE

## 2021-10-04 PROCEDURE — 85610 PROTHROMBIN TIME: CPT

## 2021-10-04 PROCEDURE — 80053 COMPREHEN METABOLIC PANEL: CPT

## 2021-10-04 PROCEDURE — 85025 COMPLETE CBC W/AUTO DIFF WBC: CPT

## 2021-10-04 PROCEDURE — 83735 ASSAY OF MAGNESIUM: CPT

## 2021-10-04 PROCEDURE — 86901 BLOOD TYPING SEROLOGIC RH(D): CPT

## 2021-10-04 PROCEDURE — 86923 COMPATIBILITY TEST ELECTRIC: CPT

## 2021-10-04 RX ORDER — SODIUM CHLORIDE 9 MG/ML
250 INJECTION, SOLUTION INTRAVENOUS AS NEEDED
Status: DISCONTINUED | OUTPATIENT
Start: 2021-10-04 | End: 2021-10-04 | Stop reason: HOSPADM

## 2021-10-04 RX ADMIN — CYANOCOBALAMIN TAB 1000 MCG 1000 MCG: 1000 TAB at 08:22

## 2021-10-04 RX ADMIN — PANTOPRAZOLE SODIUM 40 MG: 40 TABLET, DELAYED RELEASE ORAL at 08:22

## 2021-10-04 RX ADMIN — ATORVASTATIN CALCIUM 20 MG: 20 TABLET, FILM COATED ORAL at 08:22

## 2021-10-04 RX ADMIN — Medication 10 ML: at 05:46

## 2021-10-04 RX ADMIN — Medication 10 ML: at 13:09

## 2021-10-04 RX ADMIN — VENLAFAXINE 25 MG: 25 TABLET ORAL at 08:22

## 2021-10-04 NOTE — PROGRESS NOTES
ACUTE PHYSICAL THERAPY GOALS:  (Developed with and agreed upon by patient and/or caregiver. )  LTG:  (1.)Ms. Elizabeth Vance will move from supine to sit and sit to supine , scoot up and down and roll side to side in bed with INDEPENDENCE within 7 treatment day(s). (2.)Ms. Elizabeth Vance will transfer from bed to chair and chair to bed with MODIFIED INDEPENDENCE using the least restrictive device within 7 treatment day(s). (3.)Ms. Elizabeth Vance will ambulate with SUPERVISION for 250 feet with the least restrictive device within 7 treatment day(s). (4.)Ms. Elizabeth Vance will perform exercises per HEP independently to improve strength and mobility within 7 days. PHYSICAL THERAPY: Daily Note and AM Treatment Day # 3    Ashly Weinberg is a 64 y.o. female   PRIMARY DIAGNOSIS: Acute blood loss anemia  Symptomatic anemia [D64.9]  Acute blood loss anemia [D62]  Elevated INR [R79.1]  Procedure(s) (LRB):  ESOPHAGOGASTRODUODENOSCOPY (EGD) ROOM SE02 (N/A)  ENTEROSCOPY (N/A)  4 Days Post-Op    ASSESSMENT:     REHAB RECOMMENDATIONS: CURRENT LEVEL OF FUNCTION:  (Most Recently Demonstrated)   Recommendation to date pending progress:  [address] [place] [address]  Equipment:    Rolling Walker Bed Mobility:   Modified Independent  Sit to Stand:   Modified Independent  Transfers:   Modified Independent  Gait/Mobility:   Standby Assistance-supervision     ASSESSMENT:  Ms. Elizabeth Vance continues to move well and is making steady progress with gait safety, independence, and distance. Increased gait distance today compared to prior session. Min verbal cues for gait safety, posture, walker management. Performs seated exercises for strengthening. Dc home with [facility] PT when medically stable. SUBJECTIVE:   Ms. Elizabeth Vance states, \"I'm glad to see you today\"    SOCIAL HISTORY/ LIVING ENVIRONMENT: Lives with spouse. Mod I with RW for household distances. Some assist with ADLs. Denies falls after prior fall resulting in left hip fx.  Went to rehab after left hip kelley then has been home with [facility] PT since then  Home Environment: Private residence  # Steps to Enter: 1  One/Two Story Residence: One story  Living Alone: No  Support Systems: Spouse/Significant Other  OBJECTIVE:     PAIN: VITAL SIGNS: LINES/DRAINS:   Pre Treatment: Pain Screen  Pain Scale 1: Numeric (0 - 10)  Pain Intensity 1: 0  Post Treatment: 0/10 Vital Signs  O2 Device: None (Room air) none  O2 Device: None (Room air)     MOBILITY: I Mod I S SBA CGA Min Mod Max Total  NT x2 Comments:   Bed Mobility    Rolling [] [x] [] [] [] [] [] [] [] [] []    Supine to Sit [] [x] [] [] [] [] [] [] [] [] []    Scooting [] [x] [] [] [] [] [] [] [] [] []    Sit to Supine [] [x] [] [] [] [] [] [] [] [] []    Transfers    Sit to Stand [] [x] [] [] [] [] [] [] [] [] []    Bed to Chair [] [x] [x] [] [] [] [] [] [] [] []    Stand to Sit [] [x] [] [] [] [] [] [] [] [] []    I=Independent, Mod I=Modified Independent, S=Supervision, SBA=Standby Assistance, CGA=Contact Guard Assistance,   Min=Minimal Assistance, Mod=Moderate Assistance, Max=Maximal Assistance, Total=Total Assistance, NT=Not Tested    BALANCE: Good Fair+ Fair Fair- Poor NT Comments   Sitting Static [x] [] [] [] [] []    Sitting Dynamic [x] [] [] [] [] []              Standing Static [x] [] [] [] [] []    Standing Dynamic [] [x] [] [] [] []      GAIT: I Mod I S SBA CGA Min Mod Max Total  NT x2 Comments:   Level of Assistance [] [] [x] [x] [] [] [] [] [] [] []    Distance 325'    DME Rolling Walker    Gait Quality No major deficits    Weightbearing  Status N/A     I=Independent, Mod I=Modified Independent, S=Supervision, SBA=Standby Assistance, CGA=Contact Guard Assistance,   Min=Minimal Assistance, Mod=Moderate Assistance, Max=Maximal Assistance, Total=Total Assistance, NT=Not Tested    PLAN:   FREQUENCY/DURATION: PT Plan of Care: 3 times/week for duration of hospital stay or until stated goals are met, whichever comes first.  TREATMENT:     TREATMENT:   ($$ Therapeutic Activity: 8-22 mins  $$ Therapeutic Exercises: 8-22 mins    )  Therapeutic Activity (15 Minutes): Therapeutic activity included Rolling, Supine to Sit, Sit to Supine, Scooting, Transfer Training, Ambulation on level ground, Sitting balance  and Standing balance to improve functional Mobility, Strength, Activity tolerance and balance, gait safety. Therapeutic Exercise (9 Minutes): Therapeutic exercises noted below to improve functional AROM and strength.      TREATMENT GRID:   Date:  10/4/21 Date:   Date:     Activity/Exercise Parameters Parameters Parameters   LAQ 10x AB     Seated marching 10x AB     Ankle pumps 10x AB     Seated hip aBd 10x AB     Shoulder scaption 10x AB     Biceps/triceps 10x AB                 AFTER TREATMENT POSITION/PRECAUTIONS:  Bed, Needs within reach and RN notified    INTERDISCIPLINARY COLLABORATION:  RN/PCT and PT/PTA    TOTAL TREATMENT DURATION:  PT Patient Time In/Time Out  Time In: 0922  Time Out: Shin 86, DPT

## 2021-10-04 NOTE — DISCHARGE INSTRUCTIONS
Please follow your primary care provider over the next 3 to 5 days discuss recent hospitalization and diagnosis of acute blood loss anemia secondary to possible GI bleed. Please discuss with him that you will be holding your Coumadin. Please discuss this medication change with your primary care provider and they can make that decision on whether or not you should be restarted. You should also follow-up with your cardiologist who has prescribed this medication for you to understand the risk versus benefits of staying on versus coming off of the medication. You will need follow-up with a gastroenterologist within 1 week for capsule endoscopy to search for area of bleed. If you have any worsening symptoms such as fatigue, weakness, dark tarry stools, bright red blood stools, vomiting of any blood please go to the nearest emergency room to be evaluated. Patient Education        Anemia From Heavy Bleeding: Care Instructions  Your Care Instructions     Anemia means that your body does not have enough red blood cells. Red blood cells carry oxygen around the body. When you have anemia, you may feel dizzy, tired, and weak. You may also feel your heart pounding. For some people, it's hard to focus and think clearly. One common cause of anemia is bleeding. Bleeding from ulcers, hemorrhoids, cancer, or other problems can cause anemia. It may also be caused by heavy menstrual periods. Your treatment may include iron pills. Iron helps your body make hemoglobin. Hemoglobin is the part of the red blood cell that carries oxygen. If you have severe anemia, you may need a blood transfusion to give you red blood cells as quickly as possible. Sometimes it takes several months to get iron levels back to normal.  Follow-up care is a key part of your treatment and safety. Be sure to make and go to all appointments, and call your doctor if you are having problems.  It's also a good idea to know your test results and keep a list of the medicines you take. How can you care for yourself at home? · Be safe with medicines. Take your medicines exactly as prescribed. Call your doctor if you think you are having a problem with your medicine. · Follow your doctor's advice about eating foods that have a lot of iron in them. These include red meat, shellfish, poultry, and eggs. They also include beans, raisins, whole-grain bread, and leafy green vegetables. · Steam your vegetables. This is the best way to prepare them if you want to get as much iron as possible. · Iron pills can cause constipation. If you take them, there are things you can do to avoid constipation. Drink plenty of fluids, eat foods with a lot of fiber, and exercise every day. When should you call for help? Call 911 anytime you think you may need emergency care. For example, call if:    · You passed out (lost consciousness).     · Your stools are maroon or very bloody. Call your doctor now or seek immediate medical care if:    · You are short of breath.     · You have new or worse bleeding.     · You are dizzy or light-headed, or you feel like you may faint. Watch closely for changes in your health, and be sure to contact your doctor if:    · You feel weaker or more tired than usual.     · You do not get better as expected. Where can you learn more? Go to http://www.gray.com/  Enter I435 in the search box to learn more about \"Anemia From Heavy Bleeding: Care Instructions. \"  Current as of: April 29, 2021               Content Version: 13.0  © 2006-2021 [employer]. Care instructions adapted under license by [employer] (which disclaims liability or warranty for this information). If you have questions about a medical condition or this instruction, always ask your healthcare professional. [address] any warranty or liability for your use of this information.

## 2021-10-04 NOTE — PROGRESS NOTES
Chart review complete, pt per staff is potentially to be dc home today, pt is current with [address] care and agreeable to have resumed at dc, order/referral faxed to [facility] for RN, PT/OT. Care Management Interventions  PCP Verified by CM:  Yes  Transition of Care Consult (CM Consult): [address]: No  Reason Outside [place]: Patient already serviced by other home care/hospice agency ([address] care)  Discharge Durable Medical Equipment: No  Physical Therapy Consult: Yes  Occupational Therapy Consult: Yes  Speech Therapy Consult: No  Support Systems: Spouse/Significant Other  Confirm Follow Up Transport: Family  The Plan for Transition of Care is Related to the Following Treatment Goals : home with home care to continue therapy  The Patient and/or Patient Representative was Provided with a Choice of Provider and Agrees with the Discharge Plan?: Yes  Name of the Patient Representative Who was Provided with a Choice of Provider and Agrees with the Discharge Plan: pt  Freedom of Choice List was Provided with Basic Dialogue that Supports the Patient's Individualized Plan of Care/Goals, Treatment Preferences and Shares the Quality Data Associated with the Providers?: Yes  Discharge Location  Discharge Placement: Home with home health

## 2021-10-04 NOTE — PROGRESS NOTES
Problem: Falls - Risk of  Goal: *Absence of Falls  Description: Document Walt Morales Fall Risk and appropriate interventions in the flowsheet.   Outcome: Progressing Towards Goal  Note: Fall Risk Interventions:  Mobility Interventions: Patient to call before getting OOB         Medication Interventions: Patient to call before getting OOB    Elimination Interventions: Call light in reach              Problem: Patient Education: Go to Patient Education Activity  Goal: Patient/Family Education  Outcome: Progressing Towards Goal     Problem: Patient Education: Go to Patient Education Activity  Goal: Patient/Family Education  Outcome: Progressing Towards Goal     Problem: Patient Education: Go to Patient Education Activity  Goal: Patient/Family Education  Outcome: Progressing Towards Goal

## 2021-10-04 NOTE — DISCHARGE SUMMARY
Hospitalist Discharge Summary   Admit Date:  2021  6:07 PM   DC Note date: 10/4/2021  Name:  Viktor Tavarez   Age:  64 y.o. Sex:  female  :  1945   MRN:  [id number]   Room:  [place] [address]  PCP:  Paige Spatz, MD    Presenting Complaint: Shortness of Breath    Initial Admission Diagnosis: Symptomatic anemia [D64.9]  Acute blood loss anemia [D62]  Elevated INR [R79.1]     Problem List for this Hospitalization:  Hospital Problems as of 10/4/2021 Date Reviewed: 2021        Codes Class Noted - Resolved POA    * (Principal) Acute blood loss anemia ICD-10-CM: D62  ICD-9-CM: 285.1  2021 - Present Unknown        Elevated INR ICD-10-CM: R79.1  ICD-9-CM: 790.92  2021 - Present Unknown        Symptomatic anemia ICD-10-CM: D64.9  ICD-9-CM: 285.9  2021 - Present Unknown        Hip fracture ([address] ICD-10-CM: S72.009A  ICD-9-CM: 820.8  2021 - Present Yes        Hypertension ICD-10-CM: I10  ICD-9-CM: 401.9  2017 - Present Yes        Paroxysmal atrial fibrillation (HCC) ICD-10-CM: I48.0  ICD-9-CM: 427.31  2017 - Present Yes        Migraine with vertigo ICD-10-CM: G43.109  ICD-9-CM: 346.80, 780.4  2017 - Present Yes            Did Patient have Sepsis (YES OR NO): No      Hospital Course:  68-year-old female presented with lightheadedness. Patient admitted to dark tarry stools for past few days. Does take home iron. Patient also has a history of atrial fibrillation on Coumadin and a recent hip fracture that she had repaired 2 months ago. Patient did state that she thinks she was given the wrong dose of her Coumadin. She normally takes 2.5 mg but had a prescription sent in for 5 mg. Patient was taking 5 mg daily and had an INR of 7.9 at admission. Patient's hemoglobin was also 5.8. Patient received vitamin K in the emergency department. Patient's hemoglobin was trended daily and transfused when hemoglobin less than 7. GI was consulted. Coumadin was on hold.   EGD was done on 9/30 which showed fresh blood in duodenum and jejunum. Tagged red blood cell scan suggested active GI bleed in the stomach. CT abdomen pelvis 9/30 with oral contrast in stomach with inability to ascertain bleeding in the stomach. IR at that time was made aware. Patient did have an INR after Coumadin was on hold of 3.2. Abdominal ultrasound negative to rule out cirrhosis and fatty liver. Patient's has been without recurrent GI bleed greater than 48 hours. Patient will need to follow-up with gastroenterologist for outpatient capsule endoscopy within 1 week. For patient's atrial fibrillation with supratherapeutic INR levels patient was given vitamin K in the emergency department. Patient stated that the time she had been given a new medication of Coumadin but dose was 5 mg instead of the 2.5 mg dose that she used to take. Patient may have been taking more of her medication than she needed. Patient had episodes of hypertension/hypotension during her admission. Medications were adjusted appropriately to maintain adequate blood pressures. For patient's other comorbid conditions patient was restarted back on her home medications. Patient worked with PT/OT recommended home health. Day of discharge patient was feeling better and was without any episodes of GI bleed. Patient was instructed to follow-up with primary care provider and gastroenterologist within the week. Patient will need outpatient capsule endoscopy to evaluate for where she is bleeding from. Patient was also advised that if she had any worsening symptoms such as fatigue, weakness, melena or hematochezia, hematemesis she should to the nearest emergency room to be evaluated. At this time patient's hemoglobin 8.4. Patient stable for discharge and for close follow-up within the week. Questions answered and patient agreed. Also spoke with daughter and gave instruction of what she needs to do.   Patient needs to hold her Coumadin at discharge until she sees her primary care provider/GI/cardiology and they can make the decision to restart her medication when they see fit. Disposition: Home Health Care Svc  Diet: ADULT DIET Regular; Low Fat/Low Chol/High Fiber/2 gm Na  Code Status: Full Code    Follow Up Orders: Follow-up Appointments   Procedures    FOLLOW UP VISIT Appointment in: 3 - 5 Days Please follow-up with your primary care provider in the next 3-5 discussed recent hospitalization and changes to your medication. Please follow-up with the gastroenterologist within 1 week for capsule en. .. Please follow-up with your primary care provider in the next 3-5 discussed recent hospitalization and changes to your medication. Please follow-up with the gastroenterologist within 1 week for capsule endoscopy. Please stop taking your Coumadin until advised so by either your primary care provider or cardiologist.     Standing Status:   Standing     Number of Occurrences:   1     Order Specific Question:   Appointment in     Answer:   3 - 5 Days       Follow-up Information     Follow up With Specialties Details Why [address] [place], [place] [address]Hazel MD Family Medicine   [address] [place] [address]  [place] [address]  [phone]            Suggested initial follow up labs/diagnostics (ultimately defer to outpatient provider):  Patient is to follow-up with primary care provider next 3 to 5 days. Patient is also to follow-up with a gastroenterologist for an outpatient capsule endoscopy within 1 week. Patient should also follow-up with her cardiologist to discuss Coumadin. Time spent in patient discharge and coordination 48 minutes. Plan was discussed with patient and patient's daughter Shaquille Clay. All questions answered. Patient was stable at time of discharge. Instructions given to call a physician or return if any concerns.     Discharge Info:   Current Discharge Medication List      CONTINUE these medications which have NOT CHANGED    Details losartan (COZAAR) 100 mg tablet TAKE 1 TABLET BY MOUTH DAILY  Qty: 90 Tablet, Refills: 3      multivits,Stress Formula-Zinc tablet Take 1 Tab by mouth daily. acetaminophen (Tylenol Extra Strength) 500 mg tablet Take 1,000 mg by mouth daily. atorvastatin (LIPITOR) 20 mg tablet Take 1 Tab by mouth daily. Qty: 90 Tab, Refills: 1      amLODIPine (NORVASC) 5 mg tablet Take 1 Tab by mouth daily. Qty: 90 Tab, Refills: 3      amiodarone (CORDARONE) 200 mg tablet Take 1 Tab by mouth daily. Indications: 200mg bid for 2 weeks and then 200mg daily  Qty: 90 Tab, Refills: 3    Associated Diagnoses: Long term current use of antiarrhythmic drug; Paroxysmal atrial fibrillation (HCC)      meclizine (ANTIVERT) 25 mg tablet Take 25 mg by mouth three (3) times daily as needed. cyanocobalamin 1,000 mcg tablet Take 1,000 mcg by mouth daily. famotidine (PEPCID) 40 mg tablet Take 40 mg by mouth daily. venlafaxine (EFFEXOR) 75 mg tablet Take 25 mg by mouth daily. STOP taking these medications       aspirin delayed-release 81 mg tablet Comments:   Reason for Stopping:         warfarin (COUMADIN) 2.5 mg tablet Comments:   Reason for Stopping:               Procedures done this admission:  Procedure(s):  ESOPHAGOGASTRODUODENOSCOPY (EGD) ROOM SE02  ENTEROSCOPY    Consults this admission:  IP CONSULT TO GASTROENTEROLOGY    Echocardiogram/EKG results:  No results found for this or any previous visit.        EKG Results     Procedure 720 Value Units Date/Time    EKG [[id number]] Collected: 09/28/21 1741    Order Status: Completed Updated: 09/29/21 0903     Ventricular Rate 87 BPM      Atrial Rate 87 BPM      P-R Interval 146 ms      QRS Duration 88 ms      Q-T Interval 400 ms      QTC Calculation (Bezet) 481 ms      Calculated P Axis 73 degrees      Calculated R Axis 3 degrees      Calculated T Axis 78 degrees      Diagnosis --     Sinus rhythm with Premature atrial complexes  Minimal voltage criteria for LVH, may be normal variant  Nonspecific ST abnormality  Borderline ECG  When compared with ECG of 12-JUL-2021 17:28,  Premature atrial complexes are now Present  Criteria for Septal infarct are no longer Present  Confirmed by Joanne Velez MD (), ELZA NEVES ([id number]) on 9/29/2021 9:03:00 AM            Diagnostic Imaging/Tests:   NM ACUTE GI BLEED SCAN    Result Date: 9/30/2021  Nuclear medicine GI bleed scan 9/30/2021 INDICATION:  Acute blood loss. Large blood in small bowel without clear source. COMPARISON: None. TECHNIQUE: Planar imaging of the abdomen was performed following the uneventful intravenous demonstration of 25.2 mCi of Tc-99m UltraTag labeled red blood cells through 60 minutes. FINDINGS: Today's study is limited given that the patient is obliqued to the right. A technologist note states that the patient was repositioned several times yet kept moving into an oblique position. Immediately following radiotracer administration, normal blood pool activity is seen. Very early in the examination, abnormal activity is seen over the left upper quadrant which subsequently extends in the small bowel pattern into the left abdomen. The appearance suggests an active gastric bleed with progression of radiotracer into proximal small bowel loops in the left abdomen. 1.  Findings suggesting an active GI bleed within the stomach. Attempts were made to call these findings to numbers currently provided to myself which are [phone], and [phone]. There was no answer at either phone and no option to leave a voicemail. A message was sent through [employer] to the requesting physician. The physician was listed as off-line. XR CHEST SNGL V    Result Date: 7/15/2021   Portable view of the chest COMPARISON: July 12, 2021. CLINICAL HISTORY: Hypoxia. FINDINGS: Heart is mildly enlarged. Mediastinal contour is within normal limits. There is no focal pulmonary consolidation, pleural effusion or pneumothorax. No pulmonary edema. Surrounding bones are intact. 1. No evidence of pneumonia or pulmonary edema. 2. No significant change compared to prior exam.    XR HIP RT W OR WO PELV 2-3 VWS    Result Date: 8/26/2021  AUTOMATIC ADMINISTRATIVE RESULT The result for this exam can be found in the Progress note in the chart.     : See Progress note in the chart. XR HIP RT W OR WO PELV 2-3 VWS    Result Date: 7/14/2021  Exam: XR HIP RT W OR WO PELV 2-3 VWS on 7/14/2021 3:57 PM Clinical History: The Female patient is 64years old  presenting for hip replacement. Comparison:  none Findings: 3 views of the pelvis and right hip were obtained. No fracture or dislocation is identified. A bipolar right hip prosthesis has been placed with anatomic alignment and positioning demonstrated. There are overlying surgical staples. 1. Bipolar right hip prosthesis in place. XR HIP RT W OR WO PELV 2-3 VWS    Result Date: 7/12/2021  THREE-VIEW RIGHT HIP, TWO-VIEW RIGHT FEMUR: CLINICAL HISTORY:  Right hip pain after a fall. COMPARISON:  None. FINDINGS:  There is an oblique fracture through the right femoral neck. The distal fragment is displaced approximately 2 cm superolaterally. The distal femur appears intact moderate osteoarthritis at the knee. No persistent radiopaque foreign body is seen. Displaced, oblique transcervical fracture of the right hip. XR FEMUR RT 2 VS    Result Date: 7/12/2021  THREE-VIEW RIGHT HIP, TWO-VIEW RIGHT FEMUR: CLINICAL HISTORY:  Right hip pain after a fall. COMPARISON:  None. FINDINGS:  There is an oblique fracture through the right femoral neck. The distal fragment is displaced approximately 2 cm superolaterally. The distal femur appears intact moderate osteoarthritis at the knee. No persistent radiopaque foreign body is seen. Displaced, oblique transcervical fracture of the right hip.      XR KNEE RT MAX 2 VWS    Result Date: 8/26/2021  AUTOMATIC ADMINISTRATIVE RESULT The result for this exam can be found in the Progress note in the chart.     : See Progress note in the chart. US ABD COMP    Result Date: 10/2/2021  COMPLETE ABDOMINAL SONOGRAPHY, October 2, 2021 CLINICAL HISTORY: Coagulopathy and hypoalbuminemic with clinical concern for cirrhosis and/or portal venous hypertension. FINDINGS: Multiple images from real time ultrasound evaluation of the abdomen show that the gallbladder is normal in size and configuration, without evidence of stone, wall thickening, or pericholecystic fluid. No tenderness was elicited while scanning over the gallbladder. The common bile duct is normal in caliber at 4 mm, and no significant intrahepatic bilary ductal dilatation is evident. There is mild dilatation of the pancreatic duct measuring 3 mm at the neck of the pancreas. No peripancreatic fluid collection is identified, but the tail of the pancreas was not well evaluated because of shadowing from overlying bowel gas. There are bilateral renal cysts. A 2.8 cm septated cyst is again noted in the left hepatic lobe. No specific evidence of cirrhosis is identified, and there is hepatopedal flow in the portal vein. The spleen, kidneys, aorta, and IVC appear unremarkable as imaged. 1.  No specific ultrasound findings confirm cirrhosis or portal venous hypertension. 2.  Mildly dilated pancreatic duct is of uncertain etiology and clinical significance. XR CHEST PORT    Result Date: 9/28/2021  EXAMINATION: CHEST RADIOGRAPH 9/28/2021 6:24 PM INDICATION: Dizziness and shortness of breath COMPARISON: 7/15/2021 TECHNIQUE: A single view of the chest was obtained. FINDINGS: Support Devices: None Osseous : No acute abnormality. Cardiomediastinal Silhouette: Normal in size. Lungs: Clear lungs. Normal pulmonary vascularity. Pleura: No pleural fluid or pneumothorax. Upper Abdomen: No abnormality. No acute abnormality.      XR CHEST PORT    Result Date: 7/12/2021  PORTABLE CHEST, July 12, 2021 at 1854 hours CLINICAL HISTORY: Preoperative evaluation for right hip fracture repair. COMPARISON:  March 14, 2021. FINDINGS:  AP supine image demonstrates no confluent infiltrate or significant pleural fluid. The heart size is within normal limits without evidence of congestive heart failure or pneumothorax. The bony thorax appears intact on this view. There are overlying radiopaque support devices. NO ACUTE CARDIOPULMONARY DISEASE OR BONY ABNORMALITY IDENTIFIED. CT ABD/PELV GI BLEED    Result Date: 9/30/2021  CT abdomen and pelvis for GI bleed 9/30/2021 CLINICAL HISTORY: Positive GI bleed scan. TECHNIQUE: Multiple contiguous helical CT images were obtained prior to and following the uneventful intravenous demonstration of 100 mL Isovue-370. Coronal reconstructed images were made. All CT scans performed at this facility use one or all of the following: Automated exposure control, adjustment of the mA and/or kVp according to patient's size, iterative reconstruction. Findings:  CT ABDOMEN: Limited evaluation of the lung bases and base of the mediastinum demonstrates no significant abnormalities. Assessment of the solid organs is limited by the lack of administered intravenous contrast.  A subtle abnormality could be missed. The Liver demonstrates scattered benign calcifications and a benign left lobe cyst.  The spleen is homogeneous in attenuation. No contour deforming mass lesions are seen of the pancreas or adrenal glands. The gallbladder has an unremarkable CT appearance without radiopaque stones or pericholecystic fluid/inflammatory changes. No radiopaque stones or hydronephrosis are seen of the kidneys. A 2.8 cm cyst is seen in the anterior mid pole cortex of the left kidney. The visualized loops of small bowel and colon are normal in caliber. Today's study is for assessment of GI bleeding. By nuclear medicine GI bleed scan, a gastric source was suggested.  Unfortunately, the stomach contains oral contrast on the precontrast images as seen on precontrast image 19. Therefore, assessment for GI bleed at the stomach could not be performed. Oral contrast is also present at additional levels most evident in the distal colon. No additional source of acute bleed is suggested on this examination. No free fluid, free air, or focal inflammatory changes are seen in the abdomen. No adenopathy is seen. The abdominal aorta demonstrates moderate atherosclerotic ossification and mild ectasia. CT PELVIS: A right total hip replacement is present obscured portions of the right pelvis. No abnormal pelvic fluid collections or inflammatory changes are present in the visualized pelvis. No pelvic adenopathy is seen. The partially visualized urinary bladder is unremarkable. 1.  This study is for assessment of a potential gastric bleed suggested by the nuclear medicine GI bleed scan. The stomach contains oral contrast prior to the administration of contrast. Therefore, a source of bleeding in the stomach cannot be confirmed or excluded. No potential source of GI bleed is otherwise suggested although some oral contrast is seen within additional segments of bowel. This report was made using voice transcription. Despite my best efforts to avoid any, transcription errors may persist. If there is any question about the accuracy of the report or need for clarification, then please call [phone], or text me through perfectserv for clarification or correction. All Micro Results     Procedure Component Value Units Date/Time    COVID-19 RAPID TEST [[id number]] Collected: 09/29/21 1712    Order Status: Completed Specimen: Nasopharyngeal Updated: 09/29/21 1800     Specimen source NASAL        COVID-19 rapid test Not detected        Comment:      The specimen is NEGATIVE for SARS-CoV-2, the novel coronavirus associated with COVID-19. A negative result does not rule out COVID-19.        This test has been authorized by the FDA under an Emergency Use Authorization (EUA) for use by authorized laboratories. Fact sheet for Healthcare Providers: ConventionUpdate.co.nz  Fact sheet for Patients: ConventionUpdate.co.nz       Methodology: Isothermal Nucleic Acid Amplification               Labs: Results:       BMP, Mg, Phos Recent Labs     10/04/21  0431 10/03/21  0348 10/02/21  0639    145 145   K 3.2* 3.2* 3.6   * 113* 115*   CO2 24 24 22   AGAP 8 8 8   BUN 9 12 17   CREA 1.03* 0.97 0.92   CA 8.3 8.1* 8.2*   GLU 93 103* 98   MG 2.1 2.0 1.9      CBC Recent Labs     10/04/21  1008 10/04/21  0431 10/03/21  1750 10/03/21  1547 10/03/21  0348   WBC 7.9 7.8  --   --  8.4   RBC 3.27* 2.75*  --   --  2.71*   HGB 8.4* 6.9* 7.1*   < > 7.0*   HCT 27.3* 23.0* 23.5*   < > 22.7*    261  --   --  260   GRANS 73  --   --   --   --    LYMPH 17  --   --   --   --    EOS 1  --   --   --   --    MONOS 8  --   --   --   --    BASOS 0  --   --   --   --    IG 0  --   --   --   --    ANEU 5.8  --   --   --   --    ABL 1.3  --   --   --   --    ABELARDO 0.1  --   --   --   --    ABM 0.7  --   --   --   --    ABB 0.0  --   --   --   --    AIG 0.0  --   --   --   --     < > = values in this interval not displayed.       LFT Recent Labs     10/04/21  0431 10/03/21  0348 10/02/21  0639   ALT 21 19 18   AP 94 89 77   TP 5.4* 5.2* 5.4*   ALB 2.4* 2.4* 2.5*   GLOB 3.0 2.8 2.9   AGRAT 0.8* 0.9* 0.9*      Cardiac Testing Lab Results   Component Value Date/Time     02/16/2018 03:50 PM     (H) 07/17/2021 05:51 AM    Troponin-I, Qt. <0.04 02/16/2018 03:50 PM    Troponin-I, Qt. <0.02 (L) 12/26/2014 08:10 PM      Coagulation Tests Lab Results   Component Value Date/Time    Prothrombin time 28.7 (H) 10/04/2021 04:31 AM    Prothrombin time 32.9 (H) 10/03/2021 03:48 AM    Prothrombin time 32.7 (H) 10/02/2021 06:39 AM    INR 2.7 10/04/2021 04:31 AM    INR 3.2 10/03/2021 03:48 AM    INR 3.2 10/02/2021 06:39 AM      A1c No results found for: HBA1C, PTC0VSWI   Lipid Panel No results found for: CHOL, CHOLPOCT, CHOLX, CHLST, CHOLV, 902637, HDL, HDLP, LDL, LDLC, DLDLP, 662645, VLDLC, VLDL, TGLX, TRIGL, TRIGP, TGLPOCT, [facility], [facility]   Thyroid Panel Lab Results   Component Value Date/Time    TSH 1.250 06/19/2020 11:06 AM    TSH 1.940 05/14/2019 10:22 AM    T4, Free 1.2 06/19/2020 11:06 AM    T4, Free 1.3 02/11/2019 03:24 PM        Most Recent UA Lab Results   Component Value Date/Time    Color YELLOW 07/12/2021 08:52 PM    Appearance CLEAR 07/12/2021 08:52 PM    pH (UA) 7.0 07/12/2021 08:52 PM    Protein Negative 07/12/2021 08:52 PM    Glucose Negative 07/12/2021 08:52 PM    Ketone Negative 07/12/2021 08:52 PM    Bilirubin Negative 07/12/2021 08:52 PM    Blood Negative 07/12/2021 08:52 PM    Urobilinogen 0.2 07/12/2021 08:52 PM    Nitrites Negative 07/12/2021 08:52 PM    Leukocyte Esterase Negative 07/12/2021 08:52 PM    WBC 20-50 01/23/2019 11:35 AM    RBC 0 01/23/2019 11:35 AM    Epithelial cells 0-3 01/23/2019 11:35 AM    Bacteria 0 01/23/2019 11:35 AM    Casts 0 01/23/2019 11:35 AM    Crystals, urine 0 01/23/2019 11:35 AM    Mucus TRACE 01/23/2019 11:35 AM    Other observations RESULTS VERIFIED MANUALLY 12/26/2014 07:48 PM          All Labs from Last 24 Hrs:  Recent Results (from the past 24 hour(s))   HGB & HCT    Collection Time: 10/03/21  3:47 PM   Result Value Ref Range    HGB 7.2 (L) 11.7 - 15.4 g/dL    HCT 23.7 (L) 35.8 - 46.3 %   HGB & HCT    Collection Time: 10/03/21  5:50 PM   Result Value Ref Range    HGB 7.1 (L) 11.7 - 15.4 g/dL    HCT 23.5 (L) 35.8 - 46.3 %   PROTHROMBIN TIME + INR    Collection Time: 10/04/21  4:31 AM   Result Value Ref Range    Prothrombin time 28.7 (H) 12.6 - 14.5 sec    INR 2.7     CBC W/O DIFF    Collection Time: 10/04/21  4:31 AM   Result Value Ref Range    WBC 7.8 4.3 - 11.1 K/uL    RBC 2.75 (L) 4.05 - 5.2 M/uL    HGB 6.9 (LL) 11.7 - 15.4 g/dL    HCT 23.0 (L) 35.8 - 46.3 %    MCV 83.6 79.6 - 97.8 FL    MCH 25.1 (L) 26.1 - 32.9 PG    MCHC 30.0 (L) 31.4 - 35.0 g/dL    RDW 18.9 (H) 11.9 - 14.6 %    PLATELET 367 282 - 353 K/uL    MPV 9.9 9.4 - 12.3 FL    ABSOLUTE NRBC 0.00 0.0 - 0.2 K/uL   METABOLIC PANEL, COMPREHENSIVE    Collection Time: 10/04/21  4:31 AM   Result Value Ref Range    Sodium 144 136 - 145 mmol/L    Potassium 3.2 (L) 3.5 - 5.1 mmol/L    Chloride 112 (H) 98 - 107 mmol/L    CO2 24 21 - 32 mmol/L    Anion gap 8 7 - 16 mmol/L    Glucose 93 65 - 100 mg/dL    BUN 9 8 - 23 MG/DL    Creatinine 1.03 (H) 0.6 - 1.0 MG/DL    GFR est AA >60 >60 ml/min/1.73m2    GFR est non-AA 55 (L) >60 ml/min/1.73m2    Calcium 8.3 8.3 - 10.4 MG/DL    Bilirubin, total 0.7 0.2 - 1.1 MG/DL    ALT (SGPT) 21 12 - 65 U/L    AST (SGOT) 20 15 - 37 U/L    Alk. phosphatase 94 50 - 136 U/L    Protein, total 5.4 (L) 6.3 - 8.2 g/dL    Albumin 2.4 (L) 3.2 - 4.6 g/dL    Globulin 3.0 2.3 - 3.5 g/dL    A-G Ratio 0.8 (L) 1.2 - 3.5     MAGNESIUM    Collection Time: 10/04/21  4:31 AM   Result Value Ref Range    Magnesium 2.1 1.8 - 2.4 mg/dL   RBC, ALLOCATE    Collection Time: 10/04/21  7:30 AM   Result Value Ref Range    HISTORY CHECKED?  Historical check performed    TYPE & SCREEN    Collection Time: 10/04/21 10:05 AM   Result Value Ref Range    Crossmatch Expiration 10/07/2021,2351     ABO/Rh(D) O POSITIVE     Antibody screen NEG     Unit number [id number]     Blood component type [facility]     Unit division 00     Status of unit ALLOCATED     Crossmatch result Compatible    CBC WITH AUTOMATED DIFF    Collection Time: 10/04/21 10:08 AM   Result Value Ref Range    WBC 7.9 4.3 - 11.1 K/uL    RBC 3.27 (L) 4.05 - 5.2 M/uL    HGB 8.4 (L) 11.7 - 15.4 g/dL    HCT 27.3 (L) 35.8 - 46.3 %    MCV 83.5 79.6 - 97.8 FL    MCH 25.7 (L) 26.1 - 32.9 PG    MCHC 30.8 (L) 31.4 - 35.0 g/dL    RDW 18.8 (H) 11.9 - 14.6 %    PLATELET 357 233 - 645 K/uL    MPV 10.1 9.4 - 12.3 FL    ABSOLUTE NRBC 0.00 0.0 - 0.2 K/uL    DF AUTOMATED      NEUTROPHILS 73 43 - 78 %    LYMPHOCYTES 17 13 - 44 % MONOCYTES 8 4.0 - 12.0 %    EOSINOPHILS 1 0.5 - 7.8 %    BASOPHILS 0 0.0 - 2.0 %    IMMATURE GRANULOCYTES 0 0.0 - 5.0 %    ABS. NEUTROPHILS 5.8 1.7 - 8.2 K/UL    ABS. LYMPHOCYTES 1.3 0.5 - 4.6 K/UL    ABS. MONOCYTES 0.7 0.1 - 1.3 K/UL    ABS. EOSINOPHILS 0.1 0.0 - 0.8 K/UL    ABS. BASOPHILS 0.0 0.0 - 0.2 K/UL    ABS. IMM.  GRANS. 0.0 0.0 - 0.5 K/UL       Current Med List in Hospital:   Current Facility-Administered Medications   Medication Dose Route Frequency    0.9% sodium chloride infusion 250 mL  250 mL IntraVENous PRN    haloperidoL (HALDOL) tablet 2 mg  2 mg Oral Q4H PRN    diphenhydrAMINE (BENADRYL) capsule 25 mg  25 mg Oral Q6H PRN    pantoprazole (PROTONIX) tablet 40 mg  40 mg Oral ACB    lactated Ringers infusion  100 mL/hr IntraVENous CONTINUOUS    0.9% sodium chloride infusion  10 mL/hr IntraVENous CONTINUOUS    0.9% sodium chloride infusion 250 mL  250 mL IntraVENous PRN    0.9% sodium chloride infusion 250 mL  250 mL IntraVENous PRN    0.9% sodium chloride infusion 250 mL  250 mL IntraVENous PRN    0.9% sodium chloride infusion 250 mL  250 mL IntraVENous PRN    sodium chloride (NS) flush 5-10 mL  5-10 mL IntraVENous Q8H    sodium chloride (NS) flush 5-10 mL  5-10 mL IntraVENous PRN    0.9% sodium chloride infusion 250 mL  250 mL IntraVENous PRN    amiodarone (CORDARONE) tablet 200 mg  200 mg Oral DAILY    atorvastatin (LIPITOR) tablet 20 mg  20 mg Oral DAILY    cyanocobalamin tablet 1,000 mcg  1,000 mcg Oral DAILY    venlafaxine (EFFEXOR) tablet 25 mg  25 mg Oral DAILY    sodium chloride (NS) flush 5-40 mL  5-40 mL IntraVENous Q8H    sodium chloride (NS) flush 5-40 mL  5-40 mL IntraVENous PRN    acetaminophen (TYLENOL) tablet 650 mg  650 mg Oral Q6H PRN    Or    acetaminophen (TYLENOL) suppository 650 mg  650 mg Rectal Q6H PRN    polyethylene glycol (MIRALAX) packet 17 g  17 g Oral DAILY PRN    ondansetron (ZOFRAN ODT) tablet 4 mg  4 mg Oral Q8H PRN    Or    ondansetron (ZOFRAN) injection 4 mg  4 mg IntraVENous Q6H PRN    0.9% sodium chloride infusion 250 mL  250 mL IntraVENous PRN       No Known Allergies  Immunization History   Administered Date(s) Administered    COVID-19, PFIZER, MRNA, LNP-S, PF, 30MCG/0.3ML DOSE 02/05/2021, 03/01/2021    TB Skin Test (PPD) Intradermal 07/12/2021, 09/28/2021       Recent Vital Data:  Patient Vitals for the past 24 hrs:   Temp Pulse Resp BP SpO2   10/04/21 1232 98 °F (36.7 °C) 77 16 (!) 155/73 98 %   10/04/21 0853 98.3 °F (36.8 °C) 68 14 (!) 102/56 97 %   10/04/21 0317 98.2 °F (36.8 °C) 68 19 (!) 116/50 96 %   10/03/21 2309 98.3 °F (36.8 °C) 74 20 (!) 125/54 97 %   10/03/21 1906 98.3 °F (36.8 °C) 69 18 (!) 118/52 96 %   10/03/21 1640 98.7 °F (37.1 °C) 66 18 118/63 96 %     Oxygen Therapy  O2 Sat (%): 98 % (10/04/21 1232)  Pulse via Oximetry: 79 beats per minute (09/30/21 1556)  O2 Device: None (Room air) (10/04/21 0922)  O2 Flow Rate (L/min): 2 l/min (09/30/21 1543)  ETCO2 (mmHg): 3 mmHg (10/04/21 1232)    Estimated body mass index is 32.28 kg/m² as calculated from the following:    Height as of this encounter: 5' 5\" (1.651 m). Weight as of this encounter: 88 kg (194 lb). Intake/Output Summary (Last 24 hours) at 10/4/2021 1312  Last data filed at 10/4/2021 1237  Gross per 24 hour   Intake 755 ml   Output    Net 755 ml         Physical Exam:  General:    Well nourished. No overt distress  Head:  Normocephalic, atraumatic  Eyes:  Sclerae appear normal.  Pupils equally round. HENT:  Nares appear normal, no drainage. Moist mucous membranes  Neck:  No restricted ROM. Trachea midline  CV:   RRR. No m/r/g. No JVD  Lungs:   CTAB. No wheezing, rhonchi, or rales. Even, unlabored  Abdomen:   Soft, nontender, nondistended. Extremities: Warm and dry. No cyanosis or clubbing. No edema. Skin:     No rashes. Normal coloration  Neuro:  Cranial nerves II-XII grossly intact. Psych:  Normal mood and affect.       Signed:  Mckayla Torres MD    Part of this note may have been written by using a voice dictation software. The note has been proof read but may still contain some grammatical/other typographical errors.

## 2021-10-04 NOTE — PROGRESS NOTES
Critical result Hgb 6.9 received from lab at 0525. Patient last type and crossed 9/30 and received one unit PRBC's. Patient has standing order to transfuse if Hgb <6.9. Provider, Dr. Ady Rose was notified at 0336. For consideration of repeating type and cross. No orders received at this time. Will cont. To monitor patient.

## 2021-10-04 NOTE — PROGRESS NOTES
[facility]                                                                                                 PATIENT INFORMATION   Patient Name: Estephanie Burris, [address]: [de-identified] Patient MRN: [id number]   Address: [address] [place] [address] [address] [address] Patient CSN: [id number]      Oriental orthodox: Sikhism   Sex: Female Marital Status:    : 1945 Age:   64 yrs   Home Phone: [phone] 1.00 Mobile Phone:   [phone]     2.00   Race: BLACK/ Employer:   Retired   Language: ENGLISH Admitted/Arrived From:   Home [347]   ADMISSION INFORMATION   Admit Date: 2021 Admit Time:  6:07 PM   Patient Class: Inpatient Service: Medicine   Admit Source: Non-health care facility* Admit Type: EMERGENCY   Admitting Provider: Tala Saavedra Attending Provider: Louie Caceres   Unit: dayday [facility] End[facility] Room/Bed: [place] [address]    Admission Diagnosis: Symptomatic anemia, Acute blood loss anemia, Elevated INR and codes: 285.1, 790.92     Emergency Complaint: sob,dizzy                Discharge Date:   Discharge Time:     GUARANTOR INFORMATION   Name:  Beka Ashby Address: [address] [place] [address]  Rel:  Self   Phone: [employer] [address], [place]ar [place] 1952 : 1945   EMERGENCY CONTACTS   Name: Marry Deem:  Mobile: [phone] Rel: Spouse   COVERAGE INFORMATION   Primary Insurance:   [facility] MEDICARE Subscriber: Ireland Hane   Plan Name: [address] Medicare Hmo Pt Rel to Subscriber: Self [01]   Claim Address: [address] [place] [address] Sex: Female      Policy #:  [id number]    Group #: [id number] Group Name:   David Fairsonya #: [id number] Ins Phone:         Secondary Insurance:   Subscriber:     Plan Name:   Pt Rel to Subscriber:     Claim Address: NA Sex:       Policy #: N/A    Group #: N/A Group Name: N/A   Auth #: N/A Ins Phone:         Accident Date:    Accident Type:     PROVIDER INFORMATION   PCP: Maren Blount MD PCP Phone:  [phone]   Referring Prov:   No ref.  provider found Referring Phone:  Referring Fax:  N/A      Advanced Directive:  Not Received Research:     Lab Client:   Enrollment Status:          Printed on 10/4/21  1:10 PM Page      Sindy CUNNINGHAM/ [facility] Ay[facility] Dan ([id number])  Ancillary Consult  Date and Time: 10/4/2021  1:11 PM Department: [facility] SURGE ENDO Ordering/Authorizing: Vikash Slade MD   Collection Information    Order Providers    Authorizing   Vikash Slade          Order Information    Order Date/Time Release Date/Time Start Date/Time End Date/Time   10/04/21 01:11 PM None 10/04/21 01:15 PM 10/04/21 01:15 PM   CSN:   [id number]   Order Details    Frequency Duration Priority Order Class   ONE TIME 1  occurrence Routine Hospital Performed   Reprint OrderRequisition-Outpatient Only    [address] (Order #[id number]) on 10/4/21   Standing Order Information    Remaining Occurrences Interval Last Released     0/1 ONE TIME 10/4/2021            Released Orders      Released On Scheduled For Released By    1. 10/4/2021  1:11 PM 10/4/2021  1:15 PM Toi Finn RN (auto-released)           Order Questions    Question Answer Comment   Reason for Consult: [facility] with [address] [place] [address], PT/OT, RN           Verbal Order Info    Action Created on Order Mode Entered by Comment Responsible Provider Signed by Signed on   Ordering 10/04/21 1311 Verbal with readback ANDRE Moreno MD     Additional Information    Associated Reports   View Encounter   Priority and Order Details   NPI Information       Electronically signed by Authorizing Provider: Vikash Slade [id number]     Order start date/time: 10/4/2021 1:15 PM  Electronically signed by Co-signing Provider (if required):                 Order Report    Order Details  Tracking Links  Cosign Tracking Order Transmittal Tracking     Clinical faxed to [address] [place] [address] for PT/OT and RN

## 2021-10-05 LAB
ABO + RH BLD: NORMAL
BLD PROD TYP BPU: NORMAL
BLOOD GROUP ANTIBODIES SERPL: NORMAL
BPU ID: NORMAL
CROSSMATCH RESULT,%XM: NORMAL
SPECIMEN EXP DATE BLD: NORMAL
STATUS OF UNIT,%ST: NORMAL
UNIT DIVISION, %UDIV: 0

## 2021-11-18 ENCOUNTER — HOSPITAL ENCOUNTER (OUTPATIENT)
Dept: LAB | Age: 76
Discharge: HOME | End: 2021-11-18
Payer: MEDICARE

## 2021-11-18 DIAGNOSIS — R06.09 DYSPNEA ON EXERTION: ICD-10-CM

## 2021-11-18 DIAGNOSIS — I48.0 PAROXYSMAL ATRIAL FIBRILLATION (HCC): ICD-10-CM

## 2021-11-18 LAB
ERYTHROCYTE [DISTWIDTH] IN BLOOD BY AUTOMATED COUNT: 17.7 % (ref 11.9–14.6)
HCT VFR BLD AUTO: 30 % (ref 35.8–46.3)
HGB BLD-MCNC: 7.9 G/DL (ref 11.7–15.4)
MCH RBC QN AUTO: 19.3 PG (ref 26.1–32.9)
MCHC RBC AUTO-ENTMCNC: 26.3 G/DL (ref 31.4–35)
MCV RBC AUTO: 73.3 FL (ref 79.6–97.8)
NRBC # BLD: 0 K/UL (ref 0–0.2)
PLATELET # BLD AUTO: 344 K/UL (ref 150–450)
PMV BLD AUTO: 9.9 FL (ref 9.4–12.3)
RBC # BLD AUTO: 4.09 M/UL (ref 4.05–5.2)
WBC # BLD AUTO: 4.9 K/UL (ref 4.3–11.1)

## 2021-11-18 PROCEDURE — 85027 COMPLETE CBC AUTOMATED: CPT

## 2021-11-18 PROCEDURE — 36415 COLL VENOUS BLD VENIPUNCTURE: CPT

## 2021-12-30 ENCOUNTER — HOSPITAL ENCOUNTER (OUTPATIENT)
Dept: LAB | Age: 76
Discharge: HOME | End: 2021-12-30
Payer: MEDICARE

## 2021-12-30 PROBLEM — D50.8 OTHER IRON DEFICIENCY ANEMIAS: Status: ACTIVE | Noted: 2021-12-30

## 2021-12-30 LAB
25(OH)D3 SERPL-MCNC: 18.6 NG/ML (ref 30–100)
ALBUMIN SERPL-MCNC: 3.9 G/DL (ref 3.2–4.6)
ALBUMIN/GLOB SERPL: 0.8 {RATIO} (ref 1.2–3.5)
ALP SERPL-CCNC: 143 U/L (ref 50–136)
ALT SERPL-CCNC: 25 U/L (ref 12–65)
ANION GAP SERPL CALC-SCNC: 7 MMOL/L (ref 7–16)
AST SERPL-CCNC: 23 U/L (ref 15–37)
BASOPHILS # BLD: 0 K/UL (ref 0–0.2)
BASOPHILS NFR BLD: 0 % (ref 0–2)
BILIRUB SERPL-MCNC: 0.5 MG/DL (ref 0.2–1.1)
BUN SERPL-MCNC: 11 MG/DL (ref 8–23)
CALCIUM SERPL-MCNC: 9.4 MG/DL (ref 8.3–10.4)
CHLORIDE SERPL-SCNC: 110 MMOL/L (ref 98–107)
CO2 SERPL-SCNC: 23 MMOL/L (ref 21–32)
CREAT SERPL-MCNC: 1.2 MG/DL (ref 0.6–1)
DIFFERENTIAL METHOD BLD: ABNORMAL
EOSINOPHIL # BLD: 0.1 K/UL (ref 0–0.8)
EOSINOPHIL NFR BLD: 3 % (ref 0.5–7.8)
ERYTHROCYTE [DISTWIDTH] IN BLOOD BY AUTOMATED COUNT: 18.9 % (ref 11.9–14.6)
FERRITIN SERPL-MCNC: 9 NG/ML (ref 8–388)
GLOBULIN SER CALC-MCNC: 4.9 G/DL (ref 2.3–3.5)
GLUCOSE SERPL-MCNC: 76 MG/DL (ref 65–100)
HCT VFR BLD AUTO: 32.1 % (ref 35.8–46.3)
HGB BLD-MCNC: 8.8 G/DL (ref 11.7–15.4)
IMM GRANULOCYTES # BLD AUTO: 0 K/UL (ref 0–0.5)
IMM GRANULOCYTES NFR BLD AUTO: 0 % (ref 0–5)
LYMPHOCYTES # BLD: 1.9 K/UL (ref 0.5–4.6)
LYMPHOCYTES NFR BLD: 37 % (ref 13–44)
MCH RBC QN AUTO: 18.8 PG (ref 26.1–32.9)
MCHC RBC AUTO-ENTMCNC: 27.4 G/DL (ref 31.4–35)
MCV RBC AUTO: 68.6 FL (ref 79.6–97.8)
MONOCYTES # BLD: 0.6 K/UL (ref 0.1–1.3)
MONOCYTES NFR BLD: 11 % (ref 4–12)
NEUTS SEG # BLD: 2.6 K/UL (ref 1.7–8.2)
NEUTS SEG NFR BLD: 49 % (ref 43–78)
NRBC # BLD: 0 K/UL (ref 0–0.2)
PLATELET # BLD AUTO: 338 K/UL (ref 150–450)
PMV BLD AUTO: 9.5 FL (ref 9.4–12.3)
POTASSIUM SERPL-SCNC: 3.9 MMOL/L (ref 3.5–5.1)
PROT SERPL-MCNC: 8.8 G/DL (ref 6.3–8.2)
RBC # BLD AUTO: 4.68 M/UL (ref 4.05–5.2)
SODIUM SERPL-SCNC: 140 MMOL/L (ref 136–145)
VIT B12 SERPL-MCNC: 1293 PG/ML (ref 193–986)
WBC # BLD AUTO: 5.3 K/UL (ref 4.3–11.1)

## 2021-12-30 PROCEDURE — 82607 VITAMIN B-12: CPT

## 2021-12-30 PROCEDURE — 84165 PROTEIN E-PHORESIS SERUM: CPT

## 2021-12-30 PROCEDURE — 36415 COLL VENOUS BLD VENIPUNCTURE: CPT

## 2021-12-30 PROCEDURE — 86334 IMMUNOFIX E-PHORESIS SERUM: CPT

## 2021-12-30 PROCEDURE — 82306 VITAMIN D 25 HYDROXY: CPT

## 2021-12-30 PROCEDURE — 82728 ASSAY OF FERRITIN: CPT

## 2021-12-30 PROCEDURE — 85025 COMPLETE CBC W/AUTO DIFF WBC: CPT

## 2021-12-30 PROCEDURE — 80053 COMPREHEN METABOLIC PANEL: CPT

## 2022-01-04 LAB
ALBUMIN SERPL ELPH-MCNC: 4.11 G/DL (ref 3.2–5.6)
ALBUMIN/GLOB SERPL: 1 {RATIO}
ALPHA1 GLOB SERPL ELPH-MCNC: 0.4 G/DL (ref 0.1–0.4)
ALPHA2 GLOB SERPL ELPH-MCNC: 1.16 G/DL (ref 0.4–1.2)
B-GLOBULIN SERPL QL ELPH: 1.3 G/DL (ref 0.6–1.3)
GAMMA GLOB MFR SERPL ELPH: 1.43 G/DL (ref 0.5–1.6)
IGA SERPL-MCNC: 79 MG/DL (ref 85–499)
IGG SERPL-MCNC: 1504 MG/DL (ref 610–1616)
IGM SERPL-MCNC: 124 MG/DL (ref 35–242)
M PROTEIN SERPL ELPH-MCNC: ABNORMAL G/DL
PROT PATTERN SERPL ELPH-IMP: ABNORMAL
PROT PATTERN SPEC IFE-IMP: ABNORMAL
PROT SERPL-MCNC: 8.4 G/DL (ref 6.3–8.2)

## 2022-01-07 ENCOUNTER — HOSPITAL ENCOUNTER (OUTPATIENT)
Dept: INFUSION THERAPY | Age: 77
Discharge: HOME | End: 2022-01-07
Payer: MEDICARE

## 2022-01-07 VITALS
HEART RATE: 68 BPM | RESPIRATION RATE: 18 BRPM | OXYGEN SATURATION: 97 % | SYSTOLIC BLOOD PRESSURE: 135 MMHG | TEMPERATURE: 98 F | DIASTOLIC BLOOD PRESSURE: 58 MMHG

## 2022-01-07 DIAGNOSIS — D50.8 OTHER IRON DEFICIENCY ANEMIAS: Primary | ICD-10-CM

## 2022-01-07 LAB
IRON SATN MFR SERPL: 4 %
IRON SERPL-MCNC: 18 UG/DL (ref 35–150)
TIBC SERPL-MCNC: 500 UG/DL (ref 250–450)

## 2022-01-07 PROCEDURE — 36415 COLL VENOUS BLD VENIPUNCTURE: CPT

## 2022-01-07 PROCEDURE — 74011250636 HC RX REV CODE- 250/636: Performed by: INTERNAL MEDICINE

## 2022-01-07 PROCEDURE — 96365 THER/PROPH/DIAG IV INF INIT: CPT

## 2022-01-07 PROCEDURE — 83540 ASSAY OF IRON: CPT

## 2022-01-07 RX ORDER — SODIUM CHLORIDE 9 MG/ML
25 INJECTION, SOLUTION INTRAVENOUS CONTINUOUS
Status: ACTIVE | OUTPATIENT
Start: 2022-01-07 | End: 2022-01-07

## 2022-01-07 RX ORDER — HYDROCORTISONE SODIUM SUCCINATE 100 MG/2ML
100 INJECTION, POWDER, FOR SOLUTION INTRAMUSCULAR; INTRAVENOUS AS NEEDED
Status: DISPENSED | OUTPATIENT
Start: 2022-01-07 | End: 2022-01-07

## 2022-01-07 RX ORDER — DIPHENHYDRAMINE HYDROCHLORIDE 50 MG/ML
25 INJECTION, SOLUTION INTRAMUSCULAR; INTRAVENOUS AS NEEDED
Status: DISPENSED | OUTPATIENT
Start: 2022-01-07 | End: 2022-01-07

## 2022-01-07 RX ORDER — ONDANSETRON 2 MG/ML
8 INJECTION INTRAMUSCULAR; INTRAVENOUS AS NEEDED
Status: DISPENSED | OUTPATIENT
Start: 2022-01-07 | End: 2022-01-07

## 2022-01-07 RX ADMIN — FERRIC CARBOXYMALTOSE INJECTION 750 MG: 50 INJECTION, SOLUTION INTRAVENOUS at 08:55

## 2022-01-07 RX ADMIN — SODIUM CHLORIDE 25 ML/HR: 9 INJECTION, SOLUTION INTRAVENOUS at 08:38

## 2022-01-07 NOTE — PROGRESS NOTES
Arrived to infusion center .  Plan of care reviewed for [facility] [place] today

## 2022-01-07 NOTE — PROGRESS NOTES
Arrived to the [facility]. Assessment completed, labs reviewed. Injectafer completed. Patient tolerated without problems. Patient stayed for 30 min observation post infusion with no problems noted  Any issues or concerns during appointment: None  Instructed to call Dr Nohemi Olvera with any side effects or concerns  Patient aware of next infusion appointment on 1/14/22(date) at 8 30 AM (time).   Discharged ambulatory with walker and family

## 2022-01-14 ENCOUNTER — HOSPITAL ENCOUNTER (OUTPATIENT)
Dept: INFUSION THERAPY | Age: 77
Discharge: HOME | End: 2022-01-14
Payer: MEDICARE

## 2022-01-14 VITALS
TEMPERATURE: 98 F | OXYGEN SATURATION: 98 % | SYSTOLIC BLOOD PRESSURE: 133 MMHG | RESPIRATION RATE: 16 BRPM | BODY MASS INDEX: 31.07 KG/M2 | WEIGHT: 181 LBS | DIASTOLIC BLOOD PRESSURE: 65 MMHG | HEART RATE: 67 BPM

## 2022-01-14 DIAGNOSIS — D50.8 OTHER IRON DEFICIENCY ANEMIAS: Primary | ICD-10-CM

## 2022-01-14 PROCEDURE — 74011000250 HC RX REV CODE- 250: Performed by: INTERNAL MEDICINE

## 2022-01-14 PROCEDURE — 74011250636 HC RX REV CODE- 250/636: Performed by: INTERNAL MEDICINE

## 2022-01-14 PROCEDURE — 96365 THER/PROPH/DIAG IV INF INIT: CPT

## 2022-01-14 RX ORDER — SODIUM CHLORIDE 9 MG/ML
25 INJECTION, SOLUTION INTRAVENOUS CONTINUOUS
Status: ACTIVE | OUTPATIENT
Start: 2022-01-14 | End: 2022-01-14

## 2022-01-14 RX ORDER — SODIUM CHLORIDE 0.9 % (FLUSH) 0.9 %
10 SYRINGE (ML) INJECTION AS NEEDED
Status: ACTIVE | OUTPATIENT
Start: 2022-01-14 | End: 2022-01-14

## 2022-01-14 RX ADMIN — SODIUM CHLORIDE 25 ML/HR: 9 INJECTION, SOLUTION INTRAVENOUS at 09:08

## 2022-01-14 RX ADMIN — SODIUM CHLORIDE, PRESERVATIVE FREE 10 ML: 5 INJECTION INTRAVENOUS at 09:54

## 2022-01-14 RX ADMIN — SODIUM CHLORIDE, PRESERVATIVE FREE 10 ML: 5 INJECTION INTRAVENOUS at 09:08

## 2022-01-14 RX ADMIN — FERRIC CARBOXYMALTOSE INJECTION 750 MG: 50 INJECTION, SOLUTION INTRAVENOUS at 09:17

## 2022-01-14 NOTE — PROGRESS NOTES
Patient arrived at [facility]. Assessment completed. No needs voiced at this time. Patient tolerated infusion well and is aware of next appointment on 1/28/2022 @4346. Patient discharged via wheelchair.

## 2022-01-18 ENCOUNTER — HOSPITAL ENCOUNTER (OUTPATIENT)
Dept: LAB | Age: 77
Discharge: HOME | End: 2022-01-18
Payer: MEDICARE

## 2022-01-18 DIAGNOSIS — R06.09 DYSPNEA ON EXERTION: ICD-10-CM

## 2022-01-18 LAB
ERYTHROCYTE [DISTWIDTH] IN BLOOD BY AUTOMATED COUNT: 27.4 % (ref 11.9–14.6)
HCT VFR BLD AUTO: 36 % (ref 35.8–46.3)
HGB BLD-MCNC: 9.8 G/DL (ref 11.7–15.4)
MCH RBC QN AUTO: 20.2 PG (ref 26.1–32.9)
MCHC RBC AUTO-ENTMCNC: 27.2 G/DL (ref 31.4–35)
MCV RBC AUTO: 74.1 FL (ref 79.6–97.8)
NRBC # BLD: 0 K/UL (ref 0–0.2)
PLATELET # BLD AUTO: 266 K/UL (ref 150–450)
PMV BLD AUTO: 10.4 FL (ref 9.4–12.3)
RBC # BLD AUTO: 4.86 M/UL (ref 4.05–5.2)
WBC # BLD AUTO: 4.9 K/UL (ref 4.3–11.1)

## 2022-01-18 PROCEDURE — 85027 COMPLETE CBC AUTOMATED: CPT

## 2022-01-18 PROCEDURE — 36415 COLL VENOUS BLD VENIPUNCTURE: CPT

## 2022-02-24 ENCOUNTER — HOSPITAL ENCOUNTER (OUTPATIENT)
Dept: LAB | Age: 77
Discharge: HOME | End: 2022-02-24
Payer: MEDICARE

## 2022-02-24 DIAGNOSIS — D50.8 OTHER IRON DEFICIENCY ANEMIA: ICD-10-CM

## 2022-02-24 LAB
ALBUMIN SERPL-MCNC: 3.6 G/DL (ref 3.2–4.6)
ALBUMIN/GLOB SERPL: 0.9 {RATIO} (ref 1.2–3.5)
ALP SERPL-CCNC: 174 U/L (ref 50–136)
ALT SERPL-CCNC: 21 U/L (ref 12–65)
ANION GAP SERPL CALC-SCNC: 6 MMOL/L (ref 7–16)
AST SERPL-CCNC: 17 U/L (ref 15–37)
BASOPHILS # BLD: 0 K/UL (ref 0–0.2)
BASOPHILS NFR BLD: 1 % (ref 0–2)
BILIRUB SERPL-MCNC: 0.5 MG/DL (ref 0.2–1.1)
BUN SERPL-MCNC: 13 MG/DL (ref 8–23)
CALCIUM SERPL-MCNC: 8.9 MG/DL (ref 8.3–10.4)
CHLORIDE SERPL-SCNC: 114 MMOL/L (ref 98–107)
CO2 SERPL-SCNC: 23 MMOL/L (ref 21–32)
CREAT SERPL-MCNC: 1.1 MG/DL (ref 0.6–1)
DIFFERENTIAL METHOD BLD: ABNORMAL
EOSINOPHIL # BLD: 0.1 K/UL (ref 0–0.8)
EOSINOPHIL NFR BLD: 3 % (ref 0.5–7.8)
GLOBULIN SER CALC-MCNC: 4 G/DL (ref 2.3–3.5)
GLUCOSE SERPL-MCNC: 92 MG/DL (ref 65–100)
HCT VFR BLD AUTO: 39.5 % (ref 35.8–46.3)
HGB BLD-MCNC: 11.5 G/DL (ref 11.7–15.4)
IMM GRANULOCYTES # BLD AUTO: 0 K/UL (ref 0–0.5)
IMM GRANULOCYTES NFR BLD AUTO: 0 % (ref 0–5)
LYMPHOCYTES # BLD: 1.6 K/UL (ref 0.5–4.6)
LYMPHOCYTES NFR BLD: 37 % (ref 13–44)
MCH RBC QN AUTO: 22.3 PG (ref 26.1–32.9)
MCHC RBC AUTO-ENTMCNC: 29.1 G/DL (ref 31.4–35)
MCV RBC AUTO: 76.7 FL (ref 79.6–97.8)
MONOCYTES # BLD: 0.4 K/UL (ref 0.1–1.3)
MONOCYTES NFR BLD: 9 % (ref 4–12)
NEUTS SEG # BLD: 2.1 K/UL (ref 1.7–8.2)
NEUTS SEG NFR BLD: 50 % (ref 43–78)
NRBC # BLD: 0 K/UL (ref 0–0.2)
PLATELET # BLD AUTO: 218 K/UL (ref 150–450)
PLATELET COMMENTS,PCOM: ADEQUATE
PMV BLD AUTO: 10.4 FL (ref 9.4–12.3)
POTASSIUM SERPL-SCNC: 3.4 MMOL/L (ref 3.5–5.1)
PROT SERPL-MCNC: 7.6 G/DL (ref 6.3–8.2)
RBC # BLD AUTO: 5.15 M/UL (ref 4.05–5.2)
RBC MORPH BLD: ABNORMAL
SODIUM SERPL-SCNC: 143 MMOL/L (ref 136–145)
WBC # BLD AUTO: 4.2 K/UL (ref 4.3–11.1)
WBC MORPH BLD: ABNORMAL

## 2022-02-24 PROCEDURE — 85025 COMPLETE CBC W/AUTO DIFF WBC: CPT

## 2022-02-24 PROCEDURE — 80053 COMPREHEN METABOLIC PANEL: CPT

## 2022-02-24 PROCEDURE — 36415 COLL VENOUS BLD VENIPUNCTURE: CPT

## 2022-03-18 PROBLEM — E78.5 HYPERLIPIDEMIA: Status: ACTIVE | Noted: 2017-01-23

## 2022-03-18 PROBLEM — I10 HYPERTENSION: Status: ACTIVE | Noted: 2017-01-23

## 2022-03-18 PROBLEM — G43.109 MIGRAINE WITH VERTIGO: Status: ACTIVE | Noted: 2017-01-23

## 2022-03-18 PROBLEM — S72.009A HIP FRACTURE (HCC): Status: ACTIVE | Noted: 2021-07-12

## 2022-03-18 PROBLEM — K21.9 GERD (GASTROESOPHAGEAL REFLUX DISEASE): Status: ACTIVE | Noted: 2017-01-23

## 2022-03-18 PROBLEM — Z79.01 LONG TERM (CURRENT) USE OF ANTICOAGULANTS: Status: ACTIVE | Noted: 2018-05-01

## 2022-03-19 PROBLEM — R42 DIZZINESS: Status: ACTIVE | Noted: 2019-05-14

## 2022-03-19 PROBLEM — D50.8 OTHER IRON DEFICIENCY ANEMIAS: Status: ACTIVE | Noted: 2021-12-30

## 2022-03-19 PROBLEM — S72.001A FRACTURE OF FEMORAL NECK, RIGHT, CLOSED (HCC): Status: ACTIVE | Noted: 2021-07-16

## 2022-03-19 PROBLEM — F11.99 OPIOID USE, UNSPECIFIED WITH UNSPECIFIED OPIOID-INDUCED DISORDER (HCC): Status: ACTIVE | Noted: 2021-08-26

## 2022-03-19 PROBLEM — R79.1 ELEVATED INR: Status: ACTIVE | Noted: 2021-09-28

## 2022-03-19 PROBLEM — I48.0 PAROXYSMAL ATRIAL FIBRILLATION (HCC): Status: ACTIVE | Noted: 2017-01-23

## 2022-03-19 PROBLEM — D62 ACUTE POSTOPERATIVE ANEMIA DUE TO EXPECTED BLOOD LOSS: Status: ACTIVE | Noted: 2021-07-17

## 2022-03-19 PROBLEM — D62 ACUTE BLOOD LOSS ANEMIA: Status: ACTIVE | Noted: 2021-09-28

## 2022-03-19 PROBLEM — D64.9 SYMPTOMATIC ANEMIA: Status: ACTIVE | Noted: 2021-09-28

## 2022-03-20 PROBLEM — R06.00 DYSPNEA: Status: ACTIVE | Noted: 2017-01-23

## 2022-03-20 PROBLEM — I49.5 SSS (SICK SINUS SYNDROME) (HCC): Status: ACTIVE | Noted: 2019-05-14

## 2022-04-21 ENCOUNTER — HOSPITAL ENCOUNTER (OUTPATIENT)
Dept: LAB | Age: 77
Discharge: HOME | End: 2022-04-21
Payer: MEDICARE

## 2022-04-21 DIAGNOSIS — E55.9 VITAMIN D DEFICIENCY, UNSPECIFIED: ICD-10-CM

## 2022-04-21 DIAGNOSIS — D50.8 OTHER IRON DEFICIENCY ANEMIA: ICD-10-CM

## 2022-04-21 LAB
25(OH)D3 SERPL-MCNC: 45.2 NG/ML (ref 30–100)
ALBUMIN SERPL-MCNC: 3.6 G/DL (ref 3.2–4.6)
ALBUMIN/GLOB SERPL: 0.9 {RATIO} (ref 1.2–3.5)
ALP SERPL-CCNC: 148 U/L (ref 50–136)
ALT SERPL-CCNC: 28 U/L (ref 12–65)
ANION GAP SERPL CALC-SCNC: 8 MMOL/L (ref 7–16)
AST SERPL-CCNC: 20 U/L (ref 15–37)
BASOPHILS # BLD: 0 K/UL (ref 0–0.2)
BASOPHILS NFR BLD: 1 % (ref 0–2)
BILIRUB SERPL-MCNC: 0.5 MG/DL (ref 0.2–1.1)
BUN SERPL-MCNC: 19 MG/DL (ref 8–23)
CALCIUM SERPL-MCNC: 9.2 MG/DL (ref 8.3–10.4)
CHLORIDE SERPL-SCNC: 110 MMOL/L (ref 98–107)
CO2 SERPL-SCNC: 23 MMOL/L (ref 21–32)
CREAT SERPL-MCNC: 1.3 MG/DL (ref 0.6–1)
DIFFERENTIAL METHOD BLD: ABNORMAL
EOSINOPHIL # BLD: 0.1 K/UL (ref 0–0.8)
EOSINOPHIL NFR BLD: 2 % (ref 0.5–7.8)
ERYTHROCYTE [DISTWIDTH] IN BLOOD BY AUTOMATED COUNT: 17.3 % (ref 11.9–14.6)
FERRITIN SERPL-MCNC: 125 NG/ML (ref 8–388)
GLOBULIN SER CALC-MCNC: 3.8 G/DL (ref 2.3–3.5)
GLUCOSE SERPL-MCNC: 107 MG/DL (ref 65–100)
HCT VFR BLD AUTO: 41.4 % (ref 35.8–46.3)
HGB BLD-MCNC: 12.5 G/DL (ref 11.7–15.4)
IMM GRANULOCYTES # BLD AUTO: 0 K/UL (ref 0–0.5)
IMM GRANULOCYTES NFR BLD AUTO: 0 % (ref 0–5)
LYMPHOCYTES # BLD: 1.7 K/UL (ref 0.5–4.6)
LYMPHOCYTES NFR BLD: 38 % (ref 13–44)
MCH RBC QN AUTO: 25.3 PG (ref 26.1–32.9)
MCHC RBC AUTO-ENTMCNC: 30.2 G/DL (ref 31.4–35)
MCV RBC AUTO: 83.8 FL (ref 79.6–97.8)
MONOCYTES # BLD: 0.4 K/UL (ref 0.1–1.3)
MONOCYTES NFR BLD: 10 % (ref 4–12)
NEUTS SEG # BLD: 2.2 K/UL (ref 1.7–8.2)
NEUTS SEG NFR BLD: 49 % (ref 43–78)
NRBC # BLD: 0 K/UL (ref 0–0.2)
PLATELET # BLD AUTO: 214 K/UL (ref 150–450)
PMV BLD AUTO: 10.2 FL (ref 9.4–12.3)
POTASSIUM SERPL-SCNC: 3.7 MMOL/L (ref 3.5–5.1)
PROT SERPL-MCNC: 7.4 G/DL (ref 6.3–8.2)
RBC # BLD AUTO: 4.94 M/UL (ref 4.05–5.2)
SODIUM SERPL-SCNC: 141 MMOL/L (ref 136–145)
WBC # BLD AUTO: 4.4 K/UL (ref 4.3–11.1)

## 2022-04-21 PROCEDURE — 82728 ASSAY OF FERRITIN: CPT

## 2022-04-21 PROCEDURE — 85025 COMPLETE CBC W/AUTO DIFF WBC: CPT

## 2022-04-21 PROCEDURE — 82306 VITAMIN D 25 HYDROXY: CPT

## 2022-04-21 PROCEDURE — 80053 COMPREHEN METABOLIC PANEL: CPT

## 2022-04-21 PROCEDURE — 36415 COLL VENOUS BLD VENIPUNCTURE: CPT

## 2022-05-11 ENCOUNTER — TRANSCRIBE ORDER (OUTPATIENT)
Dept: SCHEDULING | Age: 77
End: 2022-05-11

## 2022-05-11 DIAGNOSIS — Z12.31 BREAST CANCER SCREENING BY MAMMOGRAM: Primary | ICD-10-CM

## 2022-07-12 ENCOUNTER — HOSPITAL ENCOUNTER (EMERGENCY)
Age: 77
Discharge: HOME | End: 2022-07-12
Attending: EMERGENCY MEDICINE
Payer: MEDICARE

## 2022-07-12 ENCOUNTER — HOSPITAL ENCOUNTER (EMERGENCY)
Dept: CT IMAGING | Age: 77
Discharge: HOME | End: 2022-07-15
Payer: MEDICARE

## 2022-07-12 VITALS
WEIGHT: 178 LBS | OXYGEN SATURATION: 99 % | TEMPERATURE: 98 F | HEART RATE: 56 BPM | HEIGHT: 64 IN | DIASTOLIC BLOOD PRESSURE: 97 MMHG | SYSTOLIC BLOOD PRESSURE: 149 MMHG | RESPIRATION RATE: 16 BRPM | BODY MASS INDEX: 30.39 KG/M2

## 2022-07-12 DIAGNOSIS — R42 VERTIGO: Primary | ICD-10-CM

## 2022-07-12 LAB
ANION GAP SERPL CALC-SCNC: 2 MMOL/L (ref 7–16)
BASOPHILS # BLD: 0.1 K/UL (ref 0–0.2)
BASOPHILS NFR BLD: 1 % (ref 0–2)
BUN SERPL-MCNC: 15 MG/DL (ref 8–23)
CALCIUM SERPL-MCNC: 9.1 MG/DL (ref 8.3–10.4)
CHLORIDE SERPL-SCNC: 112 MMOL/L (ref 98–107)
CO2 SERPL-SCNC: 26 MMOL/L (ref 21–32)
CREAT SERPL-MCNC: 1.1 MG/DL (ref 0.6–1)
DIFFERENTIAL METHOD BLD: ABNORMAL
EOSINOPHIL # BLD: 0.1 K/UL (ref 0–0.8)
EOSINOPHIL NFR BLD: 2 % (ref 0.5–7.8)
ERYTHROCYTE [DISTWIDTH] IN BLOOD BY AUTOMATED COUNT: 14.6 % (ref 11.9–14.6)
GLUCOSE SERPL-MCNC: 81 MG/DL (ref 65–100)
HCT VFR BLD AUTO: 40.9 % (ref 35.8–46.3)
HGB BLD-MCNC: 12.6 G/DL (ref 11.7–15.4)
IMM GRANULOCYTES # BLD AUTO: 0 K/UL (ref 0–0.5)
IMM GRANULOCYTES NFR BLD AUTO: 0 % (ref 0–5)
LYMPHOCYTES # BLD: 1.9 K/UL (ref 0.5–4.6)
LYMPHOCYTES NFR BLD: 41 % (ref 13–44)
MCH RBC QN AUTO: 25.6 PG (ref 26.1–32.9)
MCHC RBC AUTO-ENTMCNC: 30.8 G/DL (ref 31.4–35)
MCV RBC AUTO: 83.1 FL (ref 79.6–97.8)
MONOCYTES # BLD: 0.5 K/UL (ref 0.1–1.3)
MONOCYTES NFR BLD: 12 % (ref 4–12)
NEUTS SEG # BLD: 2 K/UL (ref 1.7–8.2)
NEUTS SEG NFR BLD: 44 % (ref 43–78)
NRBC # BLD: 0 K/UL (ref 0–0.2)
PLATELET # BLD AUTO: 266 K/UL (ref 150–450)
PMV BLD AUTO: 11.9 FL (ref 9.4–12.3)
POTASSIUM SERPL-SCNC: 4.9 MMOL/L (ref 3.5–5.1)
RBC # BLD AUTO: 4.92 M/UL (ref 4.05–5.2)
SODIUM SERPL-SCNC: 140 MMOL/L (ref 136–145)
TROPONIN I SERPL HS-MCNC: 12.5 PG/ML (ref 0–14)
WBC # BLD AUTO: 4.5 K/UL (ref 4.3–11.1)

## 2022-07-12 PROCEDURE — 6370000000 HC RX 637 (ALT 250 FOR IP): Performed by: EMERGENCY MEDICINE

## 2022-07-12 PROCEDURE — 93005 ELECTROCARDIOGRAM TRACING: CPT | Performed by: EMERGENCY MEDICINE

## 2022-07-12 PROCEDURE — 80048 BASIC METABOLIC PNL TOTAL CA: CPT

## 2022-07-12 PROCEDURE — 84484 ASSAY OF TROPONIN QUANT: CPT

## 2022-07-12 PROCEDURE — 70450 CT HEAD/BRAIN W/O DYE: CPT

## 2022-07-12 PROCEDURE — 99284 EMERGENCY DEPT VISIT MOD MDM: CPT

## 2022-07-12 PROCEDURE — 85025 COMPLETE CBC W/AUTO DIFF WBC: CPT

## 2022-07-12 RX ORDER — MECLIZINE HYDROCHLORIDE 25 MG/1
25 TABLET ORAL
Status: COMPLETED | OUTPATIENT
Start: 2022-07-12 | End: 2022-07-12

## 2022-07-12 RX ADMIN — MECLIZINE HYDROCHLORIDE 25 MG: 25 TABLET ORAL at 18:03

## 2022-07-12 ASSESSMENT — ENCOUNTER SYMPTOMS
BLOOD IN STOOL: 0
VOMITING: 0
VISUAL CHANGE: 0
NAUSEA: 0

## 2022-07-12 ASSESSMENT — PAIN SCALES - GENERAL: PAINLEVEL_OUTOF10: 0

## 2022-07-12 NOTE — ED TRIAGE NOTES
Patient arrives in wheelchair to triage with mask in place. Reports dizziness that began last Tuesday. Reports spinning sensation and \"feeling drunk. \"  Patient reports she has been taking meclizine without relief. Reports feeling worse this am.  Denies unilateral weakness, denies numbness/tingling. Patient reports she is currently taking abx for uti.

## 2022-07-12 NOTE — ED PROVIDER NOTES
[facility] Provider Note                   PCP:                Queen Branden MD               Age: 76 y.o. Sex: female     No diagnosis found. DISPOSITION         MDM  Number of Diagnoses or Management Options     Amount and/or Complexity of Data Reviewed  Clinical lab tests: ordered and reviewed  Tests in the radiology section of CPT®: ordered and reviewed  Review and summarize past medical records: yes  Independent visualization of images, tracings, or specimens: yes    Risk of Complications, Morbidity, and/or Mortality  Presenting problems: moderate  Management options: moderate    Patient Progress  Patient progress: improved       Orders Placed This Encounter   Procedures    CT HEAD WO CONTRAST    Basic Metabolic Panel    CBC with Auto Differential    Protime-INR    Troponin    Diet NPO    Cardiac Monitor - ED Only    Continuous Pulse Oximetry    Neuro checks    NIH STROKE SCALE ASSESSMENT    Weigh patient    EKG 12 Lead    Saline lock IV        Randy Huynh is a 76 y.o. female who presents to the Emergency Department with chief complaint of    Chief Complaint   Patient presents with    Dizziness      Patient is a 77-year-old female with a history of vertigo, MI, tachycardia, A. fib who presents with vertigo at 8 AM when she woke up. Patient states the room was spinning. Patient has a long history of vertigo in which she takes meclizine 25 mg 3 times daily. Patient did take her meclizine at 10 AM but her vertigo continued and she decided to come to the emergency department for evaluation. Patient denies any associated chest pain chest pressure shortness of breath nausea vomiting or palpitations. Patient denies a headache.       Dizziness  Quality:  Head spinning  Onset quality:  Gradual  Duration:  1 day  Progression:  Partially resolved  Chronicity:  Chronic  Context: bending over, head movement and physical activity    Relieved by:  Being still  Worsened by:  Movement  Ineffective treatments:  None tried  Associated symptoms: no blood in stool, no chest pain, no nausea, no palpitations, no tinnitus, no vision changes and no vomiting        All other systems reviewed and are negative. Review of Systems   HENT: Negative for tinnitus. Cardiovascular: Negative for chest pain and palpitations. Gastrointestinal: Negative for blood in stool, nausea and vomiting. Neurological: Positive for dizziness. All other systems reviewed and are negative. Past Medical History:   Diagnosis Date    Arrhythmia     A-fib    Arthritis     Biceps tendonitis     CAD (coronary artery disease)     MI 1999    Dysphagia     Dysuria     Elevated glucose     GERD (gastroesophageal reflux disease)     managed with medication    Hypertension     controlled with medication    Other ill-defined conditions(799.89)     high cholesterol    Pain in limb     Postmenopausal     Psychiatric disorder     anxiety    Sciatica     Tachycardia     Vaginitis     Vertigo         Past Surgical History:   Procedure Laterality Date    BREAST BIOPSY Right     COLONOSCOPY N/A 5/19/2021    COLONOSCOPY / BMI 31 performed by [facility]ost [facility] ENDOSCOPY    GYN      hysterectomy    KNEE ARTHROSCOPY Bilateral     NM ABDOMEN SURGERY PROC UNLISTED      cyst removed from upper ABD. Family History   Problem Relation Age of Onset    Coronary Art Dis Neg Hx            Social Connections:     Frequency of Communication with Friends and Family: Not on file    Frequency of Social Gatherings with Friends and Family: Not on file    Attends Scientologist Services: Not on file    Active Member of Clubs or Organizations: Not on file    Attends Club or Organization Meetings: Not on file    Marital Status: Not on file        No Known Allergies     Vitals signs and nursing note reviewed.    Patient Vitals for the past 4 hrs:   Temp Pulse Resp BP SpO2   07/12/22 1415 98 °F (36.7 °C) 56 16 Jennifer Dust ) 142/62 98 %          Physical Exam  Vitals and nursing note reviewed. Constitutional:       Appearance: Normal appearance. HENT:      Head: Normocephalic and atraumatic. Nose: Nose normal.      Mouth/Throat:      Mouth: Mucous membranes are moist.      Pharynx: Oropharynx is clear. Eyes:      Extraocular Movements: Extraocular movements intact. Conjunctiva/sclera: Conjunctivae normal.      Pupils: Pupils are equal, round, and reactive to light. Cardiovascular:      Rate and Rhythm: Normal rate. Pulses: Normal pulses. Pulmonary:      Effort: Pulmonary effort is normal.   Abdominal:      General: Abdomen is flat. Bowel sounds are normal.      Palpations: Abdomen is soft. Musculoskeletal:         General: Normal range of motion. Cervical back: Normal range of motion and neck supple. Skin:     General: Skin is warm and dry. Capillary Refill: Capillary refill takes less than 2 seconds. Neurological:      General: No focal deficit present. Mental Status: She is alert and oriented to person, place, and time. Mental status is at baseline. Psychiatric:         Mood and Affect: Mood normal.         Behavior: Behavior normal.         Thought Content:  Thought content normal.         Judgment: Judgment normal.          Procedures    ED EKG Interpretation  EKG was interpreted in the absence of a cardiologist.    Rate: Sinus bradycardia rate of 58  EKG Interpretation: EKG Interpretation: sinus rhythm  ST Segments: Nonspecific ST segments - NO STEMI    Labs Reviewed   BASIC METABOLIC PANEL - Abnormal; Notable for the following components:       Result Value    Chloride 112 (*)     Anion Gap 2 (*)     CREATININE 1.10 (*)     GFR Non- 51 (*)     All other components within normal limits   CBC WITH AUTO DIFFERENTIAL - Abnormal; Notable for the following components:    MCH 25.6 (*)     MCHC 30.8 (*)     All other components within normal limits   PROTIME-INR   TROPONIN CT HEAD WO CONTRAST   Final Result   White matter findings compatible with moderate chronic small vessel   ischemic disease. NIH Stroke Scale  Interval: Baseline  Level of Consciousness (1a): Alert  LOC Questions (1b): Answers both correctly  LOC Commands (1c): Performs both tasks correctly  Best Gaze (2): Normal  Visual (3): No visual loss  Facial Palsy (4): Normal symmetrical movement  Motor Arm, Left (5a): No drift  Motor Arm, Right (5b): No drift  Motor Leg, Left (6a): No drift  Motor Leg, Right (6b): No drift  Limb Ataxia (7): Absent  Sensory (8): Normal  Best Language (9): No aphasia  Dysarthria (10): Normal  Extinction and Inattention (11): No abnormality  Total: 0                   Voice dictation software was used during the making of this note. This software is not perfect and grammatical and other typographical errors may be present. This note has not been completely proofread for errors.      Kitty Robin MD  07/12/22 9366

## 2022-07-12 NOTE — ED NOTES
I have reviewed discharge instructions with the patient. The patient verbalized understanding. Patient left ED via Discharge Method: ambulatory to Home with family. Opportunity for questions and clarification provided. Patient given 0 scripts. To continue your aftercare when you leave the hospital, you may receive an automated call from our care team to check in on how you are doing. This is a free service and part of our promise to provide the best care and service to meet your aftercare needs.  If you have questions, or wish to unsubscribe from this service please call [phone]. Thank you for Choosing our [facility] Emergency Department.       Osito Najera RN  07/12/22 3118       Osito Najera RN  07/12/22 1816

## 2022-07-13 LAB
EKG ATRIAL RATE: 58 BPM
EKG DIAGNOSIS: NORMAL
EKG P AXIS: 69 DEGREES
EKG P-R INTERVAL: 168 MS
EKG Q-T INTERVAL: 508 MS
EKG QRS DURATION: 98 MS
EKG QTC CALCULATION (BAZETT): 498 MS
EKG R AXIS: -9 DEGREES
EKG T AXIS: 54 DEGREES
EKG VENTRICULAR RATE: 58 BPM

## 2022-08-30 ENCOUNTER — OFFICE VISIT (OUTPATIENT)
Dept: CARDIOLOGY CLINIC | Age: 77
End: 2022-08-30
Payer: MEDICARE

## 2022-08-30 VITALS
BODY MASS INDEX: 27.28 KG/M2 | HEART RATE: 60 BPM | HEIGHT: 64 IN | SYSTOLIC BLOOD PRESSURE: 138 MMHG | WEIGHT: 159.8 LBS | DIASTOLIC BLOOD PRESSURE: 68 MMHG

## 2022-08-30 DIAGNOSIS — I10 ESSENTIAL HYPERTENSION: ICD-10-CM

## 2022-08-30 DIAGNOSIS — I48.19 PERSISTENT ATRIAL FIBRILLATION (HCC): Primary | ICD-10-CM

## 2022-08-30 PROCEDURE — 99214 OFFICE O/P EST MOD 30 MIN: CPT | Performed by: INTERNAL MEDICINE

## 2022-08-30 PROCEDURE — 1036F TOBACCO NON-USER: CPT | Performed by: INTERNAL MEDICINE

## 2022-08-30 PROCEDURE — G8400 PT W/DXA NO RESULTS DOC: HCPCS | Performed by: INTERNAL MEDICINE

## 2022-08-30 PROCEDURE — G8417 CALC BMI ABV UP PARAM F/U: HCPCS | Performed by: INTERNAL MEDICINE

## 2022-08-30 PROCEDURE — G8428 CUR MEDS NOT DOCUMENT: HCPCS | Performed by: INTERNAL MEDICINE

## 2022-08-30 PROCEDURE — 1123F ACP DISCUSS/DSCN MKR DOCD: CPT | Performed by: INTERNAL MEDICINE

## 2022-08-30 PROCEDURE — 1090F PRES/ABSN URINE INCON ASSESS: CPT | Performed by: INTERNAL MEDICINE

## 2022-08-30 RX ORDER — AMIODARONE HYDROCHLORIDE 200 MG/1
200 TABLET ORAL DAILY
Qty: 90 TABLET | Refills: 3 | Status: SHIPPED | OUTPATIENT
Start: 2022-08-30

## 2022-08-30 RX ORDER — LOSARTAN POTASSIUM 100 MG/1
100 TABLET ORAL DAILY
Qty: 90 TABLET | Refills: 3 | Status: SHIPPED | OUTPATIENT
Start: 2022-08-30

## 2022-08-30 ASSESSMENT — ENCOUNTER SYMPTOMS
BLOATING: 0
HEMOPTYSIS: 0
SHORTNESS OF BREATH: 0
VOMITING: 0
DOUBLE VISION: 0
BLURRED VISION: 0
ORTHOPNEA: 0
COUGH: 0
ABDOMINAL PAIN: 0
BACK PAIN: 0
NAUSEA: 0

## 2022-08-30 NOTE — PROGRESS NOTES
[facility] CARDIOLOGY  [address], [address], [address]  Dereje Gottron, [address] [facility]  PHONE: [phone]    22    NAME:  Alexx Britton  : 1945  MRN: [id number]         SUBJECTIVE:   Alexx Britton is a 76 y.o. female seen for a visit regarding the following:     Chief Complaint   Patient presents with    Irregular Heart Beat     6 month fu    Hypertension       HPI:      Prior history of persistent atrial fibrillation on anticoagulation. Also appears history of esophageal stricture status post dilatation [last in 10/15]. Other history of hypertension, hyperlipidemia. Appears prior cath in  with reported small-caliber LAD (@S; prior pt of Garibay 66). Also appears hx of vertigo. Echo with preserved EF and mod left atrial enlargement []. Negative Cardiolite stress test []. Noted lower GI bleed with Hb of 5.8 and supratherapeutic INR of 7.9 (states was given the wrong dosing of Coumadin); had a tagged red blood cell scan with noted bleeding source in the stomach/proximal small bowel; had for capsule endoscopy on discharge and follow-up with GI and results reviewed with stated negative capsule endoscopy (EGD from 2021 was negative); saw hematology on 2021 and attributed to be iron deficiency anemia and started on iron supplementation     No new issues since last visit. Off anticoagulation and last hemoglobin noted at 12.6 from 2022. Discussed trial of Eliquis last visit    Prior--off Coumadin; extensive discussion regarding options. Mostly issues with fatigue/dyspnea on exertion no significant change since last visit. Last hemoglobin noted at 8.8 from 2021; prior hemoglobin post PCPs check from 10/8/2021 at 7.5. Denies any active bleeding issues. Doing better with decreasing amlodipine with her edema; tolerating Aldactone. Off Lopressor with improved bradycardia. Well-controlled home blood pressures. No recurrent syncope.   Last noted creatinine from Pain in limb     Postmenopausal     Psychiatric disorder     anxiety    Sciatica     Tachycardia     Vaginitis     Vertigo      Past Surgical History:   Procedure Laterality Date    BREAST BIOPSY Right     COLONOSCOPY N/A 5/19/2021    COLONOSCOPY / BMI 31 performed by Eileen Butler MD at [facility] ENDOSCOPY    GYN      hysterectomy    KNEE ARTHROSCOPY Bilateral     [place] ABDOMEN SURGERY PROC UNLISTED      cyst removed from upper ABD. Family History   Problem Relation Age of Onset    Coronary Art Dis Neg Hx      Social History     Tobacco Use    Smoking status: Never    Smokeless tobacco: Never   Substance Use Topics    Alcohol use: No     Current Outpatient Medications   Medication Sig Dispense Refill    losartan (COZAAR) 100 MG tablet Take 1 tablet by mouth daily TAKE 1 TABLET BY MOUTH DAILY 90 tablet 3    amiodarone (CORDARONE) 200 MG tablet Take 1 tablet by mouth daily 90 tablet 3    apixaban (ELIQUIS) 5 MG TABS tablet Take 1 tablet by mouth 2 times daily 60 tablet 11    amLODIPine (NORVASC) 5 MG tablet Take 5 mg by mouth daily      atorvastatin (LIPITOR) 20 MG tablet Take 20 mg by mouth daily      ergocalciferol (ERGOCALCIFEROL) 1.25 MG (92999 UT) capsule Take 50,000 Units by mouth daily      famotidine (PEPCID) 40 MG tablet Take 40 mg by mouth daily      meclizine (ANTIVERT) 25 MG tablet Take 25 mg by mouth 3 times daily as needed      albuterol sulfate  (90 Base) MCG/ACT inhaler Inhale 2 puffs into the lungs every 6 hours as needed (Patient not taking: Reported on 8/30/2022)      cyanocobalamin 1000 MCG tablet Take 1,000 mcg by mouth daily       No current facility-administered medications for this visit. Review of Systems   Constitutional: Positive for malaise/fatigue. Negative for chills, decreased appetite, fever and weight gain. HENT:  Negative for nosebleeds. Eyes:  Negative for blurred vision and double vision. Cardiovascular:  Positive for dyspnea on exertion.  Negative for chest pain, claudication, leg swelling, orthopnea, palpitations, paroxysmal nocturnal dyspnea and syncope. Respiratory:  Negative for cough, hemoptysis and shortness of breath. Endocrine: Negative for cold intolerance and heat intolerance. Hematologic/Lymphatic: Negative for bleeding problem. Skin:  Negative for rash. Musculoskeletal:  Negative for back pain, joint pain, muscle weakness and myalgias. Gastrointestinal:  Negative for bloating, abdominal pain, nausea and vomiting. Genitourinary:  Negative for dysuria. Neurological:  Negative for dizziness, light-headedness and weakness. Psychiatric/Behavioral:  Negative for altered mental status. PHYSICAL EXAM:    /68   Pulse 60   Ht 5' 4\" (1.626 m)   Wt 159 lb 12.8 oz (72.5 kg)   BMI 27.43 kg/m²      Physical Exam  Constitutional:       Appearance: Normal appearance. HENT:      Head: Normocephalic and atraumatic. Mouth/Throat:      Mouth: Mucous membranes are moist.   Eyes:      Pupils: Pupils are equal, round, and reactive to light. Neck:      Vascular: No carotid bruit. Cardiovascular:      Rate and Rhythm: Normal rate and regular rhythm. Pulses: Normal pulses. Heart sounds: No murmur heard. Pulmonary:      Effort: Pulmonary effort is normal.      Breath sounds: Normal breath sounds. Abdominal:      General: There is no distension. Palpations: Abdomen is soft. Tenderness: There is no abdominal tenderness. Musculoskeletal:         General: No swelling. Cervical back: Normal range of motion. Skin:     General: Skin is warm and dry. Neurological:      General: No focal deficit present. Mental Status: She is alert. Psychiatric:         Mood and Affect: Mood normal.       Medical problems and test results were reviewed with the patient today. No results found for this or any previous visit (from the past 672 hour(s)).   No results found for: CHOL, CHOLPOCT, CHOLX, CHLST, CHOLV, HDL, HDLPOC, HDLC, LDL, LDLC, VLDLC, VLDL, TGLX, TRIGL    No results found for any visits on 08/30/22. ASSESSMENT and PLAN    Juan C Junior was seen today for irregular heart beat and hypertension. Diagnoses and all orders for this visit:    Persistent atrial fibrillation (HCC)  -     CBC with Auto Differential; Future    Essential hypertension    Other orders  -     losartan (COZAAR) 100 MG tablet; Take 1 tablet by mouth daily TAKE 1 TABLET BY MOUTH DAILY  -     amiodarone (CORDARONE) 200 MG tablet; Take 1 tablet by mouth daily  -     apixaban (ELIQUIS) 5 MG TABS tablet; Take 1 tablet by mouth 2 times daily      Overall Impression    Atrial fibrillation- not controlled; converted on amiodarone load (2/18). In sinus rhythm on last EKG. appears off amiodarone inadvertently and restarted (will need LFT/TFTs every 6 months and annual chest x-ray for maintenance). Prior ordered PFTs but states she could not do it when attempted. Last noted LFT/TFTs okay from. Noted GI evaluation and appears anemia secondary to iron deficiency anemia. Currently on iron supplementation. Improved hemoglobin. Plan trial off Eliquis and plan repeat CBC in 2 weeks once on anticoagulation. Prior bleed likely triggered by supratherapeutic INR with overdosing; records reviewed from recent admit from 10/2021. If not an anticoagulation candidate in the future, consideration for watchman device in the future. Fatigue-stable. Also consideration for sleep apnea and discussed testing if persistent. Discussed likely contributed by anemia     Dyspnea on exertion-controlled. Prior likely contributed by anemia. Also previously likely some component of atrial fibrillation. Negative stress. Hypertension-controlled; some degree of permissive hypertension with prior issues of near syncope. Can increase spironolactone dosage if needed. Maintain amlodipine at 5 mg daily; notified to cut back down with issues with edema.      Bradycardia-prior was off lopressor but appears was restarted by PCP and currently stopped. States at times with heart rates in the 40s. Discontinued. Hold off rate control agents in the future     Hyperlipidemia-on a moderate intensity statin    Return in about 6 weeks (around 10/11/2022).      Natalya Wesley MD  8/30/2022  12:28 PM

## 2022-09-12 DIAGNOSIS — I48.19 PERSISTENT ATRIAL FIBRILLATION (HCC): ICD-10-CM

## 2022-09-21 DIAGNOSIS — E78.49 OTHER HYPERLIPIDEMIA: ICD-10-CM

## 2022-09-21 DIAGNOSIS — E55.9 VITAMIN D DEFICIENCY: ICD-10-CM

## 2022-09-21 DIAGNOSIS — D50.8 OTHER IRON DEFICIENCY ANEMIAS: Primary | ICD-10-CM

## 2022-09-22 ENCOUNTER — HOSPITAL ENCOUNTER (OUTPATIENT)
Dept: LAB | Age: 77
Discharge: HOME | End: 2022-09-25
Payer: MEDICARE

## 2022-09-22 ENCOUNTER — OFFICE VISIT (OUTPATIENT)
Dept: ONCOLOGY | Age: 77
End: 2022-09-22
Payer: MEDICARE

## 2022-09-22 VITALS
HEART RATE: 56 BPM | HEIGHT: 64 IN | BODY MASS INDEX: 30.52 KG/M2 | DIASTOLIC BLOOD PRESSURE: 68 MMHG | WEIGHT: 178.8 LBS | SYSTOLIC BLOOD PRESSURE: 171 MMHG | TEMPERATURE: 98 F | RESPIRATION RATE: 23 BRPM | OXYGEN SATURATION: 97 %

## 2022-09-22 DIAGNOSIS — E55.9 VITAMIN D DEFICIENCY: ICD-10-CM

## 2022-09-22 DIAGNOSIS — D50.8 OTHER IRON DEFICIENCY ANEMIAS: ICD-10-CM

## 2022-09-22 DIAGNOSIS — E78.49 OTHER HYPERLIPIDEMIA: ICD-10-CM

## 2022-09-22 DIAGNOSIS — D50.8 OTHER IRON DEFICIENCY ANEMIA: Primary | ICD-10-CM

## 2022-09-22 LAB
25(OH)D3 SERPL-MCNC: 31.7 NG/ML (ref 30–100)
ALBUMIN SERPL-MCNC: 3.8 G/DL (ref 3.2–4.6)
ALBUMIN/GLOB SERPL: 1 {RATIO} (ref 1.2–3.5)
ALP SERPL-CCNC: 153 U/L (ref 50–136)
ALT SERPL-CCNC: 24 U/L (ref 12–65)
ANION GAP SERPL CALC-SCNC: 5 MMOL/L (ref 4–13)
AST SERPL-CCNC: 15 U/L (ref 15–37)
BASOPHILS # BLD: 0 K/UL (ref 0–0.2)
BASOPHILS NFR BLD: 0 % (ref 0–2)
BILIRUB SERPL-MCNC: 0.8 MG/DL (ref 0.2–1.1)
BUN SERPL-MCNC: 21 MG/DL (ref 8–23)
CALCIUM SERPL-MCNC: 9 MG/DL (ref 8.3–10.4)
CHLORIDE SERPL-SCNC: 112 MMOL/L (ref 101–110)
CO2 SERPL-SCNC: 24 MMOL/L (ref 21–32)
CREAT SERPL-MCNC: 1.2 MG/DL (ref 0.6–1)
DIFFERENTIAL METHOD BLD: ABNORMAL
EOSINOPHIL # BLD: 0.1 K/UL (ref 0–0.8)
EOSINOPHIL NFR BLD: 1 % (ref 0.5–7.8)
ERYTHROCYTE [DISTWIDTH] IN BLOOD BY AUTOMATED COUNT: 14.2 % (ref 11.9–14.6)
FERRITIN SERPL-MCNC: 79 NG/ML (ref 8–388)
GLOBULIN SER CALC-MCNC: 3.9 G/DL (ref 2.3–3.5)
GLUCOSE SERPL-MCNC: 81 MG/DL (ref 65–100)
HCT VFR BLD AUTO: 42.2 % (ref 35.8–46.3)
HGB BLD-MCNC: 12.8 G/DL (ref 11.7–15.4)
IMM GRANULOCYTES # BLD AUTO: 0 K/UL (ref 0–0.5)
IMM GRANULOCYTES NFR BLD AUTO: 0 % (ref 0–5)
LYMPHOCYTES # BLD: 1.8 K/UL (ref 0.5–4.6)
LYMPHOCYTES NFR BLD: 38 % (ref 13–44)
MCH RBC QN AUTO: 25.6 PG (ref 26.1–32.9)
MCHC RBC AUTO-ENTMCNC: 30.3 G/DL (ref 31.4–35)
MCV RBC AUTO: 84.4 FL (ref 79.6–97.8)
MONOCYTES # BLD: 0.4 K/UL (ref 0.1–1.3)
MONOCYTES NFR BLD: 9 % (ref 4–12)
NEUTS SEG # BLD: 2.4 K/UL (ref 1.7–8.2)
NEUTS SEG NFR BLD: 51 % (ref 43–78)
NRBC # BLD: 0 K/UL (ref 0–0.2)
PLATELET # BLD AUTO: 232 K/UL (ref 150–450)
PMV BLD AUTO: 10.7 FL (ref 9.4–12.3)
POTASSIUM SERPL-SCNC: 3.6 MMOL/L (ref 3.5–5.1)
PROT SERPL-MCNC: 7.7 G/DL (ref 6.3–8.2)
RBC # BLD AUTO: 5 M/UL (ref 4.05–5.2)
SODIUM SERPL-SCNC: 141 MMOL/L (ref 136–145)
WBC # BLD AUTO: 4.6 K/UL (ref 4.3–11.1)

## 2022-09-22 PROCEDURE — 36415 COLL VENOUS BLD VENIPUNCTURE: CPT

## 2022-09-22 PROCEDURE — 82728 ASSAY OF FERRITIN: CPT

## 2022-09-22 PROCEDURE — G8400 PT W/DXA NO RESULTS DOC: HCPCS | Performed by: INTERNAL MEDICINE

## 2022-09-22 PROCEDURE — 82306 VITAMIN D 25 HYDROXY: CPT

## 2022-09-22 PROCEDURE — 80053 COMPREHEN METABOLIC PANEL: CPT

## 2022-09-22 PROCEDURE — G8427 DOCREV CUR MEDS BY ELIG CLIN: HCPCS | Performed by: INTERNAL MEDICINE

## 2022-09-22 PROCEDURE — 1090F PRES/ABSN URINE INCON ASSESS: CPT | Performed by: INTERNAL MEDICINE

## 2022-09-22 PROCEDURE — 1036F TOBACCO NON-USER: CPT | Performed by: INTERNAL MEDICINE

## 2022-09-22 PROCEDURE — 85025 COMPLETE CBC W/AUTO DIFF WBC: CPT

## 2022-09-22 PROCEDURE — 1123F ACP DISCUSS/DSCN MKR DOCD: CPT | Performed by: INTERNAL MEDICINE

## 2022-09-22 PROCEDURE — G8417 CALC BMI ABV UP PARAM F/U: HCPCS | Performed by: INTERNAL MEDICINE

## 2022-09-22 PROCEDURE — 99215 OFFICE O/P EST HI 40 MIN: CPT | Performed by: INTERNAL MEDICINE

## 2022-09-22 ASSESSMENT — PATIENT HEALTH QUESTIONNAIRE - PHQ9
2. FEELING DOWN, DEPRESSED OR HOPELESS: 0
SUM OF ALL RESPONSES TO PHQ QUESTIONS 1-9: 0
1. LITTLE INTEREST OR PLEASURE IN DOING THINGS: 0
SUM OF ALL RESPONSES TO PHQ9 QUESTIONS 1 & 2: 0
SUM OF ALL RESPONSES TO PHQ QUESTIONS 1-9: 0

## 2022-09-22 NOTE — PATIENT INSTRUCTIONS
Patient Instructions from Today's Visit    Reason for Visit:  Follow Up    Diagnosis Information:  [email or web address]      Plan:  Lab work looks stable today  Ferritin level is coming down again, so you will need IV Iron again in the future     Follow Up: We will bring you back in March for a follow up with  and lab work prior.     Recent Lab Results:  Hospital Outpatient Visit on 09/22/2022   Component Date Value Ref Range Status    WBC 09/22/2022 4.6  4.3 - 11.1 K/uL Final    RBC 09/22/2022 5.00  4.05 - 5.2 M/uL Final    Hemoglobin 09/22/2022 12.8  11.7 - 15.4 g/dL Final    Hematocrit 09/22/2022 42.2  35.8 - 46.3 % Final    MCV 09/22/2022 84.4  79.6 - 97.8 FL Final    MCH 09/22/2022 25.6 (A) 26.1 - 32.9 PG Final    MCHC 09/22/2022 30.3 (A) 31.4 - 35.0 g/dL Final    RDW 09/22/2022 14.2  11.9 - 14.6 % Final    Platelets 25/12/5933 232  150 - 450 K/uL Final    MPV 09/22/2022 10.7  9.4 - 12.3 FL Final    nRBC 09/22/2022 0.00  0.0 - 0.2 K/uL Final    **Note: Absolute NRBC parameter is now reported with Hemogram**    Differential Type 09/22/2022 AUTOMATED    Final    Seg Neutrophils 09/22/2022 51  43 - 78 % Final    Lymphocytes 09/22/2022 38  13 - 44 % Final    Monocytes 09/22/2022 9  4.0 - 12.0 % Final    Eosinophils % 09/22/2022 1  0.5 - 7.8 % Final    Basophils 09/22/2022 0  0.0 - 2.0 % Final    Immature Granulocytes 09/22/2022 0  0.0 - 5.0 % Final    Segs Absolute 09/22/2022 2.4  1.7 - 8.2 K/UL Final    Absolute Lymph # 09/22/2022 1.8  0.5 - 4.6 K/UL Final    Absolute Mono # 09/22/2022 0.4  0.1 - 1.3 K/UL Final    Absolute Eos # 09/22/2022 0.1  0.0 - 0.8 K/UL Final    Basophils Absolute 09/22/2022 0.0  0.0 - 0.2 K/UL Final    Absolute Immature Granulocyte 09/22/2022 0.0  0.0 - 0.5 K/UL Final    Sodium 09/22/2022 141  136 - 145 mmol/L Final    Potassium 09/22/2022 3.6  3.5 - 5.1 mmol/L Final    Chloride 09/22/2022 112 (A) 101 - 110 mmol/L Final    CO2 09/22/2022 24  21 - 32 mmol/L Final    Anion Gap 09/22/2022 5  4 - 13 mmol/L Final    Glucose 09/22/2022 81  65 - 100 mg/dL Final    BUN 09/22/2022 21  8 - 23 MG/DL Final    Creatinine 09/22/2022 1.20 (A) 0.6 - 1.0 MG/DL Final    GFR  09/22/2022 56 (A) >60 ml/min/1.73m2 Final    GFR Non- 09/22/2022 46 (A) >60 ml/min/1.73m2 Final    Comment:      Estimated GFR is calculated using the Modification of Diet in Renal Disease (MDRD) Study equation, reported for both  Americans (GFRAA) and non- Americans (GFRNA), and normalized to 1.73m2 body surface area. The physician must decide which value applies to the patient. The MDRD study equation should only be used in individuals age 25 or older. It has not been validated for the following: pregnant women, patients with serious comorbid conditions,or on certain medications, or persons with extremes of body size, muscle mass, or nutritional status.       Calcium 09/22/2022 9.0  8.3 - 10.4 MG/DL Final    Total Bilirubin 09/22/2022 0.8  0.2 - 1.1 MG/DL Final    ALT 09/22/2022 24  12 - 65 U/L Final    AST 09/22/2022 15  15 - 37 U/L Final    Alk Phosphatase 09/22/2022 153 (A) 50 - 136 U/L Final    Total Protein 09/22/2022 7.7  6.3 - 8.2 g/dL Final    Albumin 09/22/2022 3.8  3.2 - 4.6 g/dL Final    Globulin 09/22/2022 3.9 (A) 2.3 - 3.5 g/dL Final    Albumin/Globulin Ratio 09/22/2022 1.0 (A) 1.2 - 3.5   Final    Ferritin 09/22/2022 79  8 - 388 NG/ML Final         Treatment Summary has been discussed and given to patient: n/a        -------------------------------------------------------------------------------------------------------------------  Please call our office at [phone] if you have any  of the following symptoms:   Fever of 100.5 or greater  Chills  Shortness of breath  Swelling or pain in one leg    After office hours an answering service is available and will contact a provider for emergencies or if you are experiencing any of the above symptoms. Patient did not express an interest in My Chart. My Chart log in information explained on the after visit summary printout at the . [facility] [address] desk.     Ander Song MA

## 2022-09-22 NOTE — PROGRESS NOTES
[address], [address] [place] [address]  Phone: [phone]           9/22/2022  Natalee Mensah  1945  [id number]         Natalee Mensah is a 76year old -American female who has returned to my clinic for a follow-up visit; she was initially referred to me by Dr. Alexandra Both for management of Iron Deficiency Anemia; her EGD and capsule video endoscopy were suggestive of small bowel hemorrhage; she received a course of Injectafer in 1/2022. ALLERGIES:    No known drug allergies. FAMILY HISTORY:    No hematologic disorders. SOCIAL HISTORY:    She is  and lives with her . She used to work as a house-keeper. She denies ever using any tobacco products. PAST MEDICAL HISTORY:    Hypertension, Atrial Fibrillation, GERD, Migraines, Hyperlipidemia, renal insufficiency, Vitamin D deficiency and Iron Deficiency Anemia. ROS:  The patient complained of fatigue; all other systems negative. PHYSICAL EXAM:   The patient was alert, awake and oriented, no acute distress was noted. Oral examination revealed dentures, no mucosal lesions were seen. Lymph node examination did not reveal any adenopathy. Neck examination revealed a supple neck, no thyromegaly or masses were noted. Chest examination revealed normal vesicular breath sounds. Heart examination revealed S-1 and S-2 with a 2/6 systolic murmur. Abdominal examination revealed a non-tender abdomen, bowel sounds were positive, no organomegaly could be appreciated. Examination of the extremities did not reveal any tenderness or erythema. Examination of the skin did not reveal any lesions. Medical problems and test results were reviewed with the patient today. KPS:     90. LABORATORY INVESTIGATIONS:  CBC showed a WBC count of 4.6, ANC was 2.4, Hemoglobin was 12.8 and Platelets were 067; her Ferritin level was 79.         ASSESSMENT:    Iron Deficiency Anemia; Vitamin D deficiency; Hyperlipidemia. I spent a total of 42 minutes on the day of the visit, managing the care of this patient. PLAN:   She should continue taking Atorvastatin and supplemental Vitamin D; at her next clinic visit I will re-check her CBC, CMP, Vitamin D level and Ferritin level.         Ruy Blue MD  Hematology/BMT

## 2022-11-28 ENCOUNTER — OFFICE VISIT (OUTPATIENT)
Dept: CARDIOLOGY CLINIC | Age: 77
End: 2022-11-28
Payer: MEDICARE

## 2022-11-28 VITALS
HEART RATE: 60 BPM | HEIGHT: 64 IN | WEIGHT: 183.2 LBS | SYSTOLIC BLOOD PRESSURE: 142 MMHG | BODY MASS INDEX: 31.28 KG/M2 | DIASTOLIC BLOOD PRESSURE: 84 MMHG

## 2022-11-28 DIAGNOSIS — I10 ESSENTIAL HYPERTENSION: ICD-10-CM

## 2022-11-28 DIAGNOSIS — I48.19 PERSISTENT ATRIAL FIBRILLATION (HCC): Primary | ICD-10-CM

## 2022-11-28 PROCEDURE — 99214 OFFICE O/P EST MOD 30 MIN: CPT | Performed by: INTERNAL MEDICINE

## 2022-11-28 PROCEDURE — G8484 FLU IMMUNIZE NO ADMIN: HCPCS | Performed by: INTERNAL MEDICINE

## 2022-11-28 PROCEDURE — 1036F TOBACCO NON-USER: CPT | Performed by: INTERNAL MEDICINE

## 2022-11-28 PROCEDURE — G8417 CALC BMI ABV UP PARAM F/U: HCPCS | Performed by: INTERNAL MEDICINE

## 2022-11-28 PROCEDURE — 1090F PRES/ABSN URINE INCON ASSESS: CPT | Performed by: INTERNAL MEDICINE

## 2022-11-28 PROCEDURE — 1123F ACP DISCUSS/DSCN MKR DOCD: CPT | Performed by: INTERNAL MEDICINE

## 2022-11-28 PROCEDURE — 3074F SYST BP LT 130 MM HG: CPT | Performed by: INTERNAL MEDICINE

## 2022-11-28 PROCEDURE — G8400 PT W/DXA NO RESULTS DOC: HCPCS | Performed by: INTERNAL MEDICINE

## 2022-11-28 PROCEDURE — G8428 CUR MEDS NOT DOCUMENT: HCPCS | Performed by: INTERNAL MEDICINE

## 2022-11-28 PROCEDURE — 3078F DIAST BP <80 MM HG: CPT | Performed by: INTERNAL MEDICINE

## 2022-11-28 RX ORDER — LOSARTAN POTASSIUM 100 MG/1
100 TABLET ORAL DAILY
Qty: 90 TABLET | Refills: 3 | Status: SHIPPED | OUTPATIENT
Start: 2022-11-28

## 2022-11-28 RX ORDER — NEOMYCIN SULFATE, POLYMYXIN B SULFATE AND HYDROCORTISONE 10; 3.5; 1 MG/ML; MG/ML; [USP'U]/ML
SUSPENSION/ DROPS AURICULAR (OTIC)
COMMUNITY
Start: 2022-11-18

## 2022-11-28 RX ORDER — AMLODIPINE BESYLATE 5 MG/1
5 TABLET ORAL DAILY
Qty: 90 TABLET | Refills: 3 | Status: SHIPPED | OUTPATIENT
Start: 2022-11-28

## 2022-11-28 RX ORDER — AMIODARONE HYDROCHLORIDE 200 MG/1
200 TABLET ORAL DAILY
Qty: 90 TABLET | Refills: 3 | Status: SHIPPED | OUTPATIENT
Start: 2022-11-28

## 2022-11-28 ASSESSMENT — ENCOUNTER SYMPTOMS
BLOATING: 0
NAUSEA: 0
SHORTNESS OF BREATH: 0
BACK PAIN: 0
COUGH: 0
BLURRED VISION: 0
VOMITING: 0
HEMOPTYSIS: 0
DOUBLE VISION: 0
ORTHOPNEA: 0
ABDOMINAL PAIN: 0

## 2022-11-28 NOTE — PROGRESS NOTES
[facility] CARDIOLOGY  [address] [facility] [address], [address]  PHONE: [phone]    22    NAME:  Edilberto Boston  : 1945  MRN: [id number]         SUBJECTIVE:   Edilberto Boston is a 76 y.o. female seen for a visit regarding the following:     Chief Complaint   Patient presents with    Atrial Fibrillation    Hypertension    Follow-up       HPI:      Prior history of persistent atrial fibrillation on anticoagulation. Also appears history of esophageal stricture status post dilatation [last in 10/15]. Other history of hypertension, hyperlipidemia. Appears prior cath in  with reported small-caliber LAD (@S; prior pt of Garibay 66). Also appears hx of vertigo. Echo with preserved EF and mod left atrial enlargement []. Negative Cardiolite stress test []. Noted lower GI bleed with Hb of 5.8 and supratherapeutic INR of 7.9 (states was given the wrong dosing of Coumadin); had a tagged red blood cell scan with noted bleeding source in the stomach/proximal small bowel; had for capsule endoscopy on discharge and follow-up with GI and results reviewed with stated negative capsule endoscopy (EGD from 2021 was negative); saw hematology on 2021 and attributed to be iron deficiency anemia and started on iron supplementation     No new issues since last visit. Off anticoagulation and last hemoglobin noted at 12.8 from 2022. States attempted trial with Eliquis but subsequent issues with recurrent melena and held off. Prior--off Coumadin; extensive discussion regarding options. Mostly issues with fatigue/dyspnea on exertion no significant change since last visit. Last hemoglobin noted at 8.8 from 2021; prior hemoglobin post PCPs check from 10/8/2021 at 7.5. Denies any active bleeding issues. Doing better with decreasing amlodipine with her edema; tolerating Aldactone. Off Lopressor with improved bradycardia.   Well-controlled home blood pressures. No recurrent syncope. Last noted creatinine from 10/2021 around 1.03  Some issues with chronic fatigue; some snoring     Prior noted ER visit with some near syncope noted worsening renal function and had hydrochlorothiazide/low-dose Lopressor held. Improved renal function. Prior converted with amiodarone load with improved dizziness. No recurrent chest pain. Prior--Recent ER visit with increased dyspnea noted to be back in atrial fibrillation. Mostly with issues with dyspnea on exertion with prior activities. Controlled home blood pressures. 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 Occ issues with swallowing with her esophageal stricture hx. Atrial fibrillation-not controlled, dyspnea on exertion-controlled, chest discomfort-atypical with neg stress, nawaf-controlled, edema-controlled, fatigue-not controlled       Past Medical History, Past Surgical History, Family history, Social History, and Medications were all reviewed with the patient today and updated as necessary.      No Known Allergies  Patient Active Problem List   Diagnosis    Hip fracture (HCC)    Migraine with vertigo    Long term (current) use of anticoagulants    Hyperlipidemia    Hypertension    GERD (gastroesophageal reflux disease)    Fracture of femoral neck, right, closed (HCC)    Dizziness    Other iron deficiency anemias    Opioid use, unspecified with unspecified opioid-induced disorder (HCC)    Acute blood loss anemia    Symptomatic anemia    Paroxysmal atrial fibrillation (HCC)    Elevated INR    Acute postoperative anemia due to expected blood loss    SSS (sick sinus syndrome) (HCC)    Dyspnea     Past Medical History:   Diagnosis Date    Arrhythmia     A-fib    Arthritis     Biceps tendonitis     CAD (coronary artery disease)     MI 1999    Dysphagia     Dysuria     Elevated glucose     GERD (gastroesophageal reflux disease)     managed with medication    Hypertension     controlled with medication Other ill-defined conditions(799.89)     high cholesterol    Pain in limb     Postmenopausal     Psychiatric disorder     anxiety    Sciatica     Tachycardia     Vaginitis     Vertigo      Past Surgical History:   Procedure Laterality Date    BREAST BIOPSY Right     COLONOSCOPY N/A 5/19/2021    COLONOSCOPY / BMI 31 performed by Elizabeth Gustafson MD at [address] [place] [address] Road      hysterectomy    KNEE ARTHROSCOPY Bilateral     [place] ABDOMEN SURGERY PROC UNLISTED      cyst removed from upper ABD. Family History   Problem Relation Age of Onset    Coronary Art Dis Neg Hx      Social History     Tobacco Use    Smoking status: Never    Smokeless tobacco: Never   Substance Use Topics    Alcohol use: No     Current Outpatient Medications   Medication Sig Dispense Refill    neomycin-polymyxin-hydrocortisone (CORTISPORIN) 3.5-51913-4 otic suspension SHAKE LIQUID AND INSTILL 4 DROPS TO AFFECTED EAR THREE TIMES DAILY FOR 7 DAYS      losartan (COZAAR) 100 MG tablet Take 1 tablet by mouth daily TAKE 1 TABLET BY MOUTH DAILY 90 tablet 3    amLODIPine (NORVASC) 5 MG tablet Take 1 tablet by mouth daily 90 tablet 3    amiodarone (CORDARONE) 200 MG tablet Take 1 tablet by mouth daily 90 tablet 3    albuterol sulfate  (90 Base) MCG/ACT inhaler Inhale 2 puffs into the lungs every 6 hours as needed      atorvastatin (LIPITOR) 20 MG tablet Take 20 mg by mouth daily      ergocalciferol (ERGOCALCIFEROL) 1.25 MG (77462 UT) capsule Take 50,000 Units by mouth daily      famotidine (PEPCID) 40 MG tablet Take 40 mg by mouth daily      meclizine (ANTIVERT) 25 MG tablet Take 25 mg by mouth 3 times daily as needed       No current facility-administered medications for this visit. Review of Systems   Constitutional: Positive for malaise/fatigue. Negative for chills, decreased appetite, fever and weight gain. HENT:  Negative for nosebleeds. Eyes:  Negative for blurred vision and double vision.    Cardiovascular:  Positive for dyspnea on CHOLPOCT, CHOLX, CHLST, CHOLV, HDL, HDLPOC, HDLC, LDL, LDLC, VLDLC, VLDL, TGLX, TRIGL    No results found for any visits on 11/28/22. ASSESSMENT and PLAN    Corbin Posada was seen today for atrial fibrillation, hypertension and follow-up. Diagnoses and all orders for this visit:    Persistent atrial fibrillation ([address]    Essential hypertension    Other orders  -     losartan (COZAAR) 100 MG tablet; Take 1 tablet by mouth daily TAKE 1 TABLET BY MOUTH DAILY  -     amLODIPine (NORVASC) 5 MG tablet; Take 1 tablet by mouth daily  -     amiodarone (CORDARONE) 200 MG tablet; Take 1 tablet by mouth daily      Overall Impression    Atrial fibrillation- not controlled; converted on amiodarone load (2/18). In sinus rhythm on last EKG. patient previously inadvertently stopped amiodarone and restarted (will need LFT/TFTs every 6 months and annual chest x-ray for maintenance). Prior ordered PFTs but states she could not do it when attempted. Last noted LFT/TFTs okay. Noted GI evaluation and appears anemia secondary to iron deficiency anemia. Currently on iron supplementation. Improved hemoglobin. Re-attempted trial with Eliquis but states recurrent melena and stopped. Prior bleed likely triggered by supratherapeutic INR with overdosing; records reviewed from recent admit from 10/2021. Discussed consideration for watchman device and she wants to discuss it with her family and call back. Fatigue-stable. Also consideration for sleep apnea and discussed testing if persistent. Discussed likely contributed by anemia     Dyspnea on exertion-controlled. Prior likely contributed by anemia. Also previously likely some component of atrial fibrillation. Negative stress. Hypertension-controlled; some degree of permissive hypertension with prior issues of near syncope. Can increase spironolactone dosage if needed. Maintain amlodipine at 5 mg daily; notified can cut back down with issues with edema. Bradycardia-prior was off lopressor but appears was restarted by PCP and currently stopped. States at times with heart rates in the 40s. Discontinued. Hold off rate control agents in the future     Hyperlipidemia-on a moderate intensity statin    Return in about 6 months (around 5/28/2023).      Ismael Bass MD  11/28/2022  8:52 AM

## 2023-01-10 ENCOUNTER — TELEPHONE (OUTPATIENT)
Dept: ONCOLOGY | Age: 78
End: 2023-01-10

## 2023-01-10 NOTE — TELEPHONE ENCOUNTER
Triage  PT stated her stool is black..       PT also stated she would like to get a sooner appt  if possible.

## 2023-01-10 NOTE — TELEPHONE ENCOUNTER
Patient states that she has had black stools since New Years. She thought  it may have been the collards or the fruitcake but it never went away. She mentioned she saw primary care yesterday at [facility] in [place]- [phone] and mentioned this to them. They did get blood work on her yesterday.  Checked Care Everywhere. Nothing seen. Have placed call to [facility] in [place]. Awaiting call back. Call back received. They will fax over labs that patient had drawn yesterday

## 2023-01-12 DIAGNOSIS — D50.8 OTHER IRON DEFICIENCY ANEMIA: ICD-10-CM

## 2023-01-12 DIAGNOSIS — E55.9 VITAMIN D DEFICIENCY: Primary | ICD-10-CM

## 2023-01-13 ENCOUNTER — HOSPITAL ENCOUNTER (OUTPATIENT)
Dept: LAB | Age: 78
Discharge: HOME | End: 2023-01-13
Payer: MEDICARE

## 2023-01-13 ENCOUNTER — OFFICE VISIT (OUTPATIENT)
Dept: ONCOLOGY | Age: 78
End: 2023-01-13
Payer: MEDICARE

## 2023-01-13 VITALS
TEMPERATURE: 97.9 F | OXYGEN SATURATION: 96 % | HEART RATE: 55 BPM | DIASTOLIC BLOOD PRESSURE: 72 MMHG | RESPIRATION RATE: 14 BRPM | WEIGHT: 188.2 LBS | HEIGHT: 64 IN | BODY MASS INDEX: 32.13 KG/M2 | SYSTOLIC BLOOD PRESSURE: 153 MMHG

## 2023-01-13 DIAGNOSIS — E78.49 OTHER HYPERLIPIDEMIA: ICD-10-CM

## 2023-01-13 DIAGNOSIS — E55.9 VITAMIN D DEFICIENCY: ICD-10-CM

## 2023-01-13 DIAGNOSIS — D50.8 OTHER IRON DEFICIENCY ANEMIA: ICD-10-CM

## 2023-01-13 DIAGNOSIS — D50.8 OTHER IRON DEFICIENCY ANEMIA: Primary | ICD-10-CM

## 2023-01-13 LAB
25(OH)D3 SERPL-MCNC: 27.5 NG/ML (ref 30–100)
ALBUMIN SERPL-MCNC: 3.5 G/DL (ref 3.2–4.6)
ALBUMIN/GLOB SERPL: 1 (ref 0.4–1.6)
ALP SERPL-CCNC: 135 U/L (ref 50–136)
ALT SERPL-CCNC: 23 U/L (ref 12–65)
ANION GAP SERPL CALC-SCNC: 10 MMOL/L (ref 2–11)
AST SERPL-CCNC: 20 U/L (ref 15–37)
BASOPHILS # BLD: 0 K/UL (ref 0–0.2)
BASOPHILS NFR BLD: 1 % (ref 0–2)
BILIRUB SERPL-MCNC: 0.9 MG/DL (ref 0.2–1.1)
BUN SERPL-MCNC: 17 MG/DL (ref 8–23)
CALCIUM SERPL-MCNC: 8.6 MG/DL (ref 8.3–10.4)
CHLORIDE SERPL-SCNC: 106 MMOL/L (ref 101–110)
CO2 SERPL-SCNC: 24 MMOL/L (ref 21–32)
CREAT SERPL-MCNC: 1.3 MG/DL (ref 0.6–1)
DIFFERENTIAL METHOD BLD: ABNORMAL
EOSINOPHIL # BLD: 0.1 K/UL (ref 0–0.8)
EOSINOPHIL NFR BLD: 2 % (ref 0.5–7.8)
ERYTHROCYTE [DISTWIDTH] IN BLOOD BY AUTOMATED COUNT: 14.1 % (ref 11.9–14.6)
FERRITIN SERPL-MCNC: 63 NG/ML (ref 8–388)
GLOBULIN SER CALC-MCNC: 3.6 G/DL (ref 2.8–4.5)
GLUCOSE SERPL-MCNC: 101 MG/DL (ref 65–100)
HCT VFR BLD AUTO: 35.9 % (ref 35.8–46.3)
HGB BLD-MCNC: 11 G/DL (ref 11.7–15.4)
IMM GRANULOCYTES # BLD AUTO: 0 K/UL (ref 0–0.5)
IMM GRANULOCYTES NFR BLD AUTO: 0 % (ref 0–5)
LYMPHOCYTES # BLD: 1.8 K/UL (ref 0.5–4.6)
LYMPHOCYTES NFR BLD: 36 % (ref 13–44)
MCH RBC QN AUTO: 25.2 PG (ref 26.1–32.9)
MCHC RBC AUTO-ENTMCNC: 30.6 G/DL (ref 31.4–35)
MCV RBC AUTO: 82.3 FL (ref 82–102)
MONOCYTES # BLD: 0.6 K/UL (ref 0.1–1.3)
MONOCYTES NFR BLD: 11 % (ref 4–12)
NEUTS SEG # BLD: 2.6 K/UL (ref 1.7–8.2)
NEUTS SEG NFR BLD: 50 % (ref 43–78)
NRBC # BLD: 0 K/UL (ref 0–0.2)
PLATELET # BLD AUTO: 220 K/UL (ref 150–450)
PMV BLD AUTO: 11 FL (ref 9.4–12.3)
POTASSIUM SERPL-SCNC: 3.5 MMOL/L (ref 3.5–5.1)
PROT SERPL-MCNC: 7.1 G/DL (ref 6.3–8.2)
RBC # BLD AUTO: 4.36 M/UL (ref 4.05–5.2)
SODIUM SERPL-SCNC: 140 MMOL/L (ref 133–143)
WBC # BLD AUTO: 5.1 K/UL (ref 4.3–11.1)

## 2023-01-13 PROCEDURE — G8484 FLU IMMUNIZE NO ADMIN: HCPCS | Performed by: INTERNAL MEDICINE

## 2023-01-13 PROCEDURE — 1123F ACP DISCUSS/DSCN MKR DOCD: CPT | Performed by: INTERNAL MEDICINE

## 2023-01-13 PROCEDURE — 82728 ASSAY OF FERRITIN: CPT

## 2023-01-13 PROCEDURE — G8417 CALC BMI ABV UP PARAM F/U: HCPCS | Performed by: INTERNAL MEDICINE

## 2023-01-13 PROCEDURE — 99215 OFFICE O/P EST HI 40 MIN: CPT | Performed by: INTERNAL MEDICINE

## 2023-01-13 PROCEDURE — 1036F TOBACCO NON-USER: CPT | Performed by: INTERNAL MEDICINE

## 2023-01-13 PROCEDURE — 3078F DIAST BP <80 MM HG: CPT | Performed by: INTERNAL MEDICINE

## 2023-01-13 PROCEDURE — 3077F SYST BP >= 140 MM HG: CPT | Performed by: INTERNAL MEDICINE

## 2023-01-13 PROCEDURE — 82306 VITAMIN D 25 HYDROXY: CPT

## 2023-01-13 PROCEDURE — G8400 PT W/DXA NO RESULTS DOC: HCPCS | Performed by: INTERNAL MEDICINE

## 2023-01-13 PROCEDURE — 36415 COLL VENOUS BLD VENIPUNCTURE: CPT

## 2023-01-13 PROCEDURE — 80053 COMPREHEN METABOLIC PANEL: CPT

## 2023-01-13 PROCEDURE — G8428 CUR MEDS NOT DOCUMENT: HCPCS | Performed by: INTERNAL MEDICINE

## 2023-01-13 PROCEDURE — 85025 COMPLETE CBC W/AUTO DIFF WBC: CPT

## 2023-01-13 PROCEDURE — 1090F PRES/ABSN URINE INCON ASSESS: CPT | Performed by: INTERNAL MEDICINE

## 2023-01-13 RX ORDER — SODIUM CHLORIDE 0.9 % (FLUSH) 0.9 %
5-40 SYRINGE (ML) INJECTION PRN
OUTPATIENT
Start: 2023-01-20

## 2023-01-13 RX ORDER — EPINEPHRINE 1 MG/ML
0.3 INJECTION, SOLUTION, CONCENTRATE INTRAVENOUS PRN
OUTPATIENT
Start: 2023-01-20

## 2023-01-13 RX ORDER — SODIUM CHLORIDE 9 MG/ML
INJECTION, SOLUTION INTRAVENOUS CONTINUOUS
OUTPATIENT
Start: 2023-01-20

## 2023-01-13 RX ORDER — HEPARIN SODIUM (PORCINE) LOCK FLUSH IV SOLN 100 UNIT/ML 100 UNIT/ML
500 SOLUTION INTRAVENOUS PRN
OUTPATIENT
Start: 2023-01-20

## 2023-01-13 RX ORDER — ONDANSETRON 2 MG/ML
8 INJECTION INTRAMUSCULAR; INTRAVENOUS
OUTPATIENT
Start: 2023-01-20

## 2023-01-13 RX ORDER — ALBUTEROL SULFATE 90 UG/1
4 AEROSOL, METERED RESPIRATORY (INHALATION) PRN
OUTPATIENT
Start: 2023-01-20

## 2023-01-13 RX ORDER — DIPHENHYDRAMINE HYDROCHLORIDE 50 MG/ML
50 INJECTION INTRAMUSCULAR; INTRAVENOUS
OUTPATIENT
Start: 2023-01-20

## 2023-01-13 RX ORDER — TRAMADOL HYDROCHLORIDE 50 MG/1
TABLET ORAL
COMMUNITY
Start: 2023-01-09

## 2023-01-13 RX ORDER — FAMOTIDINE 10 MG/ML
20 INJECTION, SOLUTION INTRAVENOUS
OUTPATIENT
Start: 2023-01-20

## 2023-01-13 RX ORDER — ACETAMINOPHEN 325 MG/1
650 TABLET ORAL
OUTPATIENT
Start: 2023-01-20

## 2023-01-13 RX ORDER — SODIUM CHLORIDE 9 MG/ML
5-250 INJECTION, SOLUTION INTRAVENOUS PRN
OUTPATIENT
Start: 2023-01-20

## 2023-01-13 RX ORDER — PANTOPRAZOLE SODIUM 40 MG/1
TABLET, DELAYED RELEASE ORAL
COMMUNITY
Start: 2023-01-09

## 2023-01-13 ASSESSMENT — PATIENT HEALTH QUESTIONNAIRE - PHQ9
SUM OF ALL RESPONSES TO PHQ QUESTIONS 1-9: 0
SUM OF ALL RESPONSES TO PHQ QUESTIONS 1-9: 0
2. FEELING DOWN, DEPRESSED OR HOPELESS: 0
SUM OF ALL RESPONSES TO PHQ QUESTIONS 1-9: 0
SUM OF ALL RESPONSES TO PHQ QUESTIONS 1-9: 0

## 2023-01-13 NOTE — PATIENT INSTRUCTIONS
Patient Instructions from Today's Visit    Reason for Visit:  Follow Up    Diagnosis Information:  [email or web address]-us/asco-answers-patient-education-materials/eqqr-[email or web address]-[email or web address]-[email or web address]      Plan:  Lab work looks stable today, but Iron Level has fallen  We will schedule you for IV Iron Treatments (2)    Follow Up: We will bring you back in February for a follow up with  and lab work prior.     Recent Lab Results:  Hospital Outpatient Visit on 01/13/2023   Component Date Value Ref Range Status    WBC 01/13/2023 5.1  4.3 - 11.1 K/uL Final    RBC 01/13/2023 4.36  4.05 - 5.2 M/uL Final    Hemoglobin 01/13/2023 11.0 (A)  11.7 - 15.4 g/dL Final    Hematocrit 01/13/2023 35.9  35.8 - 46.3 % Final    MCV 01/13/2023 82.3  82.0 - 102.0 FL Final    MCH 01/13/2023 25.2 (A)  26.1 - 32.9 PG Final    MCHC 01/13/2023 30.6 (A)  31.4 - 35.0 g/dL Final    RDW 01/13/2023 14.1  11.9 - 14.6 % Final    Platelets 03/27/7601 220  150 - 450 K/uL Final    MPV 01/13/2023 11.0  9.4 - 12.3 FL Final    nRBC 01/13/2023 0.00  0.0 - 0.2 K/uL Final    **Note: Absolute NRBC parameter is now reported with Hemogram**    Differential Type 01/13/2023 AUTOMATED    Final    Seg Neutrophils 01/13/2023 50  43 - 78 % Final    Lymphocytes 01/13/2023 36  13 - 44 % Final    Monocytes 01/13/2023 11  4.0 - 12.0 % Final    Eosinophils % 01/13/2023 2  0.5 - 7.8 % Final    Basophils 01/13/2023 1  0.0 - 2.0 % Final    Immature Granulocytes 01/13/2023 0  0.0 - 5.0 % Final    Segs Absolute 01/13/2023 2.6  1.7 - 8.2 K/UL Final    Absolute Lymph # 01/13/2023 1.8  0.5 - 4.6 K/UL Final    Absolute Mono # 01/13/2023 0.6  0.1 - 1.3 K/UL Final    Absolute Eos # 01/13/2023 0.1  0.0 - 0.8 K/UL Final    Basophils Absolute 01/13/2023 0.0  0.0 - 0.2 K/UL Final    Absolute Immature Granulocyte 01/13/2023 0.0  0.0 - 0.5 K/UL Final    Sodium 01/13/2023 140  133 - 143 mmol/L Final    Potassium 01/13/2023 3.5  3.5 - 5.1 mmol/L Final    Chloride 01/13/2023 106  101 - 110 mmol/L Final    CO2 01/13/2023 24  21 - 32 mmol/L Final    Anion Gap 01/13/2023 10  2 - 11 mmol/L Final    Glucose 01/13/2023 101 (A)  65 - 100 mg/dL Final    BUN 01/13/2023 17  8 - 23 MG/DL Final    Creatinine 01/13/2023 1.30 (A)  0.6 - 1.0 MG/DL Final    Est, Glom Filt Rate 01/13/2023 42 (A)  >60 ml/min/1.73m2 Final    Comment:      Pediatric calculator link: [employer].[email or web address] org/professionals/kdoqi/gfr_calculatorped       These results are not intended for use in patients <25years of age. eGFR results are calculated without a race factor using  the 2021 CKD-EPI equation. Careful clinical correlation is recommended, particularly when comparing to results calculated using previous equations. The CKD-EPI equation is less accurate in patients with extremes of muscle mass, extra-renal metabolism of creatinine, excessive creatine ingestion, or following therapy that affects renal tubular secretion.       Calcium 01/13/2023 8.6  8.3 - 10.4 MG/DL Final    Total Bilirubin 01/13/2023 0.9  0.2 - 1.1 MG/DL Final    ALT 01/13/2023 23  12 - 65 U/L Final    AST 01/13/2023 20  15 - 37 U/L Final    Alk Phosphatase 01/13/2023 135  50 - 136 U/L Final    Total Protein 01/13/2023 7.1  6.3 - 8.2 g/dL Final    Albumin 01/13/2023 3.5  3.2 - 4.6 g/dL Final    Globulin 01/13/2023 3.6  2.8 - 4.5 g/dL Final    Albumin/Globulin Ratio 01/13/2023 1.0  0.4 - 1.6   Final    Ferritin 01/13/2023 63  8 - 388 NG/ML Final         Treatment Summary has been discussed and given to patient: n/a        -------------------------------------------------------------------------------------------------------------------  Please call our office at [phone] if you have any  of the following symptoms:   Fever of 100.5 or greater  Chills  Shortness of breath  Swelling or pain in one leg    After office hours an answering service is available and will contact a provider for emergencies or if you are experiencing any of the above symptoms. Patient did not express an interest in My Chart. My Chart log in information explained on the after visit summary printout at the [facility]. [facility] desk.     Elizabeth Day MA

## 2023-01-13 NOTE — PROGRESS NOTES
[address], [address] [place] [address]  Phone: [phone]           1/13/2023  Luigi Joshua  1945  [id number]         Luigi Joshua is a 76year old -American female who has returned to my clinic for a follow-up visit; she was initially referred to me by Dr. Talib Jones for management of Iron Deficiency Anemia; her EGD and capsule video endoscopy were suggestive of small bowel hemorrhage; she received a course of Injectafer in 1/2022. ALLERGIES:    No known drug allergies. FAMILY HISTORY:    No hematologic disorders. SOCIAL HISTORY:    She is  and lives with her . She used to work as a house-keeper. She denies ever using any tobacco products. PAST MEDICAL HISTORY:    Hypertension, Atrial Fibrillation, GERD, Migraines, Hyperlipidemia, renal insufficiency, Vitamin D deficiency and Iron Deficiency Anemia. ROS:  The patient complained of fatigue; all other systems negative. PHYSICAL EXAM:   The patient was alert, awake and oriented, no acute distress was noted. Oral examination revealed dentures, no mucosal lesions were seen. Lymph node examination did not reveal any adenopathy. Neck examination revealed a supple neck, no thyromegaly or masses were noted. Chest examination revealed normal vesicular breath sounds. Heart examination revealed S-1 and S-2 with a 2/6 systolic murmur. Abdominal examination revealed a non-tender abdomen, bowel sounds were positive, no organomegaly could be appreciated. Examination of the extremities did not reveal any tenderness or erythema. Examination of the skin did not reveal any lesions. Medical problems and test results were reviewed with the patient today. KPS:     90. LABORATORY INVESTIGATIONS:  CBC showed a WBC count of 5.1, ANC was 2.6, Hemoglobin was 11.1 and Platelets were 507; her Ferritin level was pending.         ASSESSMENT:    Iron Deficiency Anemia; Vitamin D deficiency; Hyperlipidemia. I spent a total of 42 minutes on the day of the visit, managing the care of this patient. PLAN:   I will arrange for her to receive another course of Venofer, later she will need to undergo an EGD and a Colonoscopy. She should continue taking Atorvastatin and supplemental Vitamin D; at her next clinic visit I will re-check her CBC, CMP, Vitamin D level and Ferritin level.         Bhavin Khan MD  Hematology/BMT

## 2023-01-20 ENCOUNTER — HOSPITAL ENCOUNTER (OUTPATIENT)
Dept: INFUSION THERAPY | Age: 78
End: 2023-01-20

## 2023-01-27 ENCOUNTER — HOSPITAL ENCOUNTER (OUTPATIENT)
Dept: INFUSION THERAPY | Age: 78
Discharge: HOME | End: 2023-01-27
Payer: MEDICARE

## 2023-01-27 VITALS
DIASTOLIC BLOOD PRESSURE: 69 MMHG | RESPIRATION RATE: 16 BRPM | OXYGEN SATURATION: 97 % | SYSTOLIC BLOOD PRESSURE: 137 MMHG | HEART RATE: 65 BPM | TEMPERATURE: 98.5 F

## 2023-01-27 DIAGNOSIS — D50.8 OTHER IRON DEFICIENCY ANEMIAS: Primary | ICD-10-CM

## 2023-01-27 PROCEDURE — 2580000003 HC RX 258: Performed by: INTERNAL MEDICINE

## 2023-01-27 PROCEDURE — 96366 THER/PROPH/DIAG IV INF ADDON: CPT

## 2023-01-27 PROCEDURE — 96365 THER/PROPH/DIAG IV INF INIT: CPT

## 2023-01-27 PROCEDURE — 6360000002 HC RX W HCPCS: Performed by: INTERNAL MEDICINE

## 2023-01-27 RX ORDER — ONDANSETRON 2 MG/ML
8 INJECTION INTRAMUSCULAR; INTRAVENOUS
OUTPATIENT
Start: 2023-02-10

## 2023-01-27 RX ORDER — SODIUM CHLORIDE 9 MG/ML
INJECTION, SOLUTION INTRAVENOUS CONTINUOUS
Status: DISCONTINUED | OUTPATIENT
Start: 2023-01-27 | End: 2023-01-28 | Stop reason: HOSPADM

## 2023-01-27 RX ORDER — DIPHENHYDRAMINE HYDROCHLORIDE 50 MG/ML
50 INJECTION INTRAMUSCULAR; INTRAVENOUS
Status: DISCONTINUED | OUTPATIENT
Start: 2023-01-27 | End: 2023-01-28 | Stop reason: HOSPADM

## 2023-01-27 RX ORDER — ALBUTEROL SULFATE 90 UG/1
4 AEROSOL, METERED RESPIRATORY (INHALATION) PRN
OUTPATIENT
Start: 2023-02-10

## 2023-01-27 RX ORDER — DIPHENHYDRAMINE HYDROCHLORIDE 50 MG/ML
50 INJECTION INTRAMUSCULAR; INTRAVENOUS
OUTPATIENT
Start: 2023-02-10

## 2023-01-27 RX ORDER — ACETAMINOPHEN 325 MG/1
650 TABLET ORAL
Status: DISCONTINUED | OUTPATIENT
Start: 2023-01-27 | End: 2023-01-28 | Stop reason: HOSPADM

## 2023-01-27 RX ORDER — EPINEPHRINE 1 MG/ML
0.3 INJECTION, SOLUTION, CONCENTRATE INTRAVENOUS PRN
OUTPATIENT
Start: 2023-02-10

## 2023-01-27 RX ORDER — SODIUM CHLORIDE 0.9 % (FLUSH) 0.9 %
5-40 SYRINGE (ML) INJECTION PRN
OUTPATIENT
Start: 2023-02-10

## 2023-01-27 RX ORDER — SODIUM CHLORIDE 0.9 % (FLUSH) 0.9 %
5-40 SYRINGE (ML) INJECTION PRN
Status: DISCONTINUED | OUTPATIENT
Start: 2023-01-27 | End: 2023-01-28 | Stop reason: HOSPADM

## 2023-01-27 RX ORDER — ACETAMINOPHEN 325 MG/1
650 TABLET ORAL
OUTPATIENT
Start: 2023-02-10

## 2023-01-27 RX ORDER — SODIUM CHLORIDE 9 MG/ML
5-250 INJECTION, SOLUTION INTRAVENOUS PRN
OUTPATIENT
Start: 2023-02-10

## 2023-01-27 RX ORDER — SODIUM CHLORIDE 9 MG/ML
INJECTION, SOLUTION INTRAVENOUS CONTINUOUS
OUTPATIENT
Start: 2023-02-10

## 2023-01-27 RX ORDER — SODIUM CHLORIDE 9 MG/ML
INJECTION, SOLUTION INTRAVENOUS CONTINUOUS
Status: ACTIVE | OUTPATIENT
Start: 2023-01-27 | End: 2023-01-27

## 2023-01-27 RX ORDER — ALBUTEROL SULFATE 90 UG/1
4 AEROSOL, METERED RESPIRATORY (INHALATION) PRN
Status: DISCONTINUED | OUTPATIENT
Start: 2023-01-27 | End: 2023-01-28 | Stop reason: HOSPADM

## 2023-01-27 RX ORDER — ONDANSETRON 2 MG/ML
8 INJECTION INTRAMUSCULAR; INTRAVENOUS
Status: DISCONTINUED | OUTPATIENT
Start: 2023-01-27 | End: 2023-01-28 | Stop reason: HOSPADM

## 2023-01-27 RX ORDER — EPINEPHRINE 1 MG/ML
0.3 INJECTION, SOLUTION, CONCENTRATE INTRAVENOUS PRN
Status: DISCONTINUED | OUTPATIENT
Start: 2023-01-27 | End: 2023-01-28 | Stop reason: HOSPADM

## 2023-01-27 RX ORDER — HEPARIN SODIUM (PORCINE) LOCK FLUSH IV SOLN 100 UNIT/ML 100 UNIT/ML
500 SOLUTION INTRAVENOUS PRN
OUTPATIENT
Start: 2023-02-10

## 2023-01-27 RX ADMIN — IRON SUCROSE 500 MG: 20 INJECTION, SOLUTION INTRAVENOUS at 08:56

## 2023-01-27 RX ADMIN — SODIUM CHLORIDE: 9 INJECTION, SOLUTION INTRAVENOUS at 08:55

## 2023-01-27 RX ADMIN — SODIUM CHLORIDE, PRESERVATIVE FREE 10 ML: 5 INJECTION INTRAVENOUS at 08:55

## 2023-01-27 NOTE — PROGRESS NOTES
Arrived to the [facility]. Connor completed. Patient tolerated well. Any issues or concerns during appointment: no.  Patient aware of next infusion appointment on 2/10/23 (date) at 56 (time). Patient aware of next lab and [facility] office visit on 2/27/23 (date) at [phone] (time). Patient instructed to call provider with temperature of 100.4 or greater or nausea/vomiting/ diarrhea or pain not controlled by medications  Discharged ambulatory with family.

## 2023-02-10 ENCOUNTER — HOSPITAL ENCOUNTER (OUTPATIENT)
Dept: INFUSION THERAPY | Age: 78
Discharge: HOME | End: 2023-02-10
Payer: MEDICARE

## 2023-02-10 VITALS
DIASTOLIC BLOOD PRESSURE: 66 MMHG | TEMPERATURE: 97.8 F | RESPIRATION RATE: 16 BRPM | OXYGEN SATURATION: 93 % | HEART RATE: 62 BPM | SYSTOLIC BLOOD PRESSURE: 143 MMHG

## 2023-02-10 DIAGNOSIS — D50.8 OTHER IRON DEFICIENCY ANEMIAS: Primary | ICD-10-CM

## 2023-02-10 PROCEDURE — 6360000002 HC RX W HCPCS: Performed by: INTERNAL MEDICINE

## 2023-02-10 PROCEDURE — 96365 THER/PROPH/DIAG IV INF INIT: CPT

## 2023-02-10 PROCEDURE — 96366 THER/PROPH/DIAG IV INF ADDON: CPT

## 2023-02-10 PROCEDURE — 2580000003 HC RX 258: Performed by: INTERNAL MEDICINE

## 2023-02-10 RX ORDER — EPINEPHRINE 1 MG/ML
0.3 INJECTION, SOLUTION, CONCENTRATE INTRAVENOUS PRN
OUTPATIENT
Start: 2023-02-10

## 2023-02-10 RX ORDER — SODIUM CHLORIDE 9 MG/ML
INJECTION, SOLUTION INTRAVENOUS CONTINUOUS
OUTPATIENT
Start: 2023-02-10

## 2023-02-10 RX ORDER — SODIUM CHLORIDE 9 MG/ML
INJECTION, SOLUTION INTRAVENOUS CONTINUOUS
Status: ACTIVE | OUTPATIENT
Start: 2023-02-10 | End: 2023-02-10

## 2023-02-10 RX ORDER — ALBUTEROL SULFATE 90 UG/1
4 AEROSOL, METERED RESPIRATORY (INHALATION) PRN
OUTPATIENT
Start: 2023-02-10

## 2023-02-10 RX ORDER — SODIUM CHLORIDE 0.9 % (FLUSH) 0.9 %
5-40 SYRINGE (ML) INJECTION PRN
Status: DISCONTINUED | OUTPATIENT
Start: 2023-02-10 | End: 2023-02-11 | Stop reason: HOSPADM

## 2023-02-10 RX ORDER — HEPARIN SODIUM (PORCINE) LOCK FLUSH IV SOLN 100 UNIT/ML 100 UNIT/ML
500 SOLUTION INTRAVENOUS PRN
OUTPATIENT
Start: 2023-02-10

## 2023-02-10 RX ORDER — ONDANSETRON 2 MG/ML
8 INJECTION INTRAMUSCULAR; INTRAVENOUS
OUTPATIENT
Start: 2023-02-10

## 2023-02-10 RX ORDER — DIPHENHYDRAMINE HYDROCHLORIDE 50 MG/ML
50 INJECTION INTRAMUSCULAR; INTRAVENOUS
OUTPATIENT
Start: 2023-02-10

## 2023-02-10 RX ORDER — SODIUM CHLORIDE 0.9 % (FLUSH) 0.9 %
5-40 SYRINGE (ML) INJECTION PRN
OUTPATIENT
Start: 2023-02-10

## 2023-02-10 RX ORDER — SODIUM CHLORIDE 9 MG/ML
5-250 INJECTION, SOLUTION INTRAVENOUS PRN
OUTPATIENT
Start: 2023-02-10

## 2023-02-10 RX ORDER — ACETAMINOPHEN 325 MG/1
650 TABLET ORAL
OUTPATIENT
Start: 2023-02-10

## 2023-02-10 RX ADMIN — IRON SUCROSE 500 MG: 20 INJECTION, SOLUTION INTRAVENOUS at 08:47

## 2023-02-10 RX ADMIN — SODIUM CHLORIDE: 9 INJECTION, SOLUTION INTRAVENOUS at 08:23

## 2023-02-10 RX ADMIN — SODIUM CHLORIDE, PRESERVATIVE FREE 10 ML: 5 INJECTION INTRAVENOUS at 08:23

## 2023-02-16 DIAGNOSIS — E55.9 VITAMIN D DEFICIENCY: ICD-10-CM

## 2023-02-16 DIAGNOSIS — D50.8 OTHER IRON DEFICIENCY ANEMIA: Primary | ICD-10-CM

## 2023-02-27 ENCOUNTER — HOSPITAL ENCOUNTER (OUTPATIENT)
Dept: LAB | Age: 78
Discharge: HOME | End: 2023-03-02
Payer: MEDICARE

## 2023-02-27 ENCOUNTER — OFFICE VISIT (OUTPATIENT)
Dept: ONCOLOGY | Age: 78
End: 2023-02-27
Payer: MEDICARE

## 2023-02-27 VITALS
OXYGEN SATURATION: 98 % | HEART RATE: 61 BPM | WEIGHT: 182.6 LBS | BODY MASS INDEX: 31.18 KG/M2 | DIASTOLIC BLOOD PRESSURE: 73 MMHG | TEMPERATURE: 97.6 F | HEIGHT: 64 IN | RESPIRATION RATE: 14 BRPM | SYSTOLIC BLOOD PRESSURE: 163 MMHG

## 2023-02-27 DIAGNOSIS — D50.8 OTHER IRON DEFICIENCY ANEMIA: Primary | ICD-10-CM

## 2023-02-27 DIAGNOSIS — D50.8 OTHER IRON DEFICIENCY ANEMIA: ICD-10-CM

## 2023-02-27 DIAGNOSIS — E55.9 VITAMIN D DEFICIENCY: ICD-10-CM

## 2023-02-27 DIAGNOSIS — E78.49 OTHER HYPERLIPIDEMIA: ICD-10-CM

## 2023-02-27 DIAGNOSIS — K21.9 CHRONIC GERD: ICD-10-CM

## 2023-02-27 LAB
25(OH)D3 SERPL-MCNC: 31 NG/ML (ref 30–100)
ALBUMIN SERPL-MCNC: 3.6 G/DL (ref 3.2–4.6)
ALBUMIN/GLOB SERPL: 0.9 (ref 0.4–1.6)
ALP SERPL-CCNC: 152 U/L (ref 50–136)
ALT SERPL-CCNC: 27 U/L (ref 12–65)
ANION GAP SERPL CALC-SCNC: 8 MMOL/L (ref 2–11)
AST SERPL-CCNC: 17 U/L (ref 15–37)
BASOPHILS # BLD: 0 K/UL (ref 0–0.2)
BASOPHILS NFR BLD: 1 % (ref 0–2)
BILIRUB SERPL-MCNC: 0.6 MG/DL (ref 0.2–1.1)
BUN SERPL-MCNC: 21 MG/DL (ref 8–23)
CALCIUM SERPL-MCNC: 8.9 MG/DL (ref 8.3–10.4)
CHLORIDE SERPL-SCNC: 111 MMOL/L (ref 101–110)
CO2 SERPL-SCNC: 23 MMOL/L (ref 21–32)
CREAT SERPL-MCNC: 1.2 MG/DL (ref 0.6–1)
DIFFERENTIAL METHOD BLD: ABNORMAL
EOSINOPHIL # BLD: 0.1 K/UL (ref 0–0.8)
EOSINOPHIL NFR BLD: 1 % (ref 0.5–7.8)
ERYTHROCYTE [DISTWIDTH] IN BLOOD BY AUTOMATED COUNT: 15.5 % (ref 11.9–14.6)
FERRITIN SERPL-MCNC: 373 NG/ML (ref 8–388)
GLOBULIN SER CALC-MCNC: 3.9 G/DL (ref 2.8–4.5)
GLUCOSE SERPL-MCNC: 98 MG/DL (ref 65–100)
HCT VFR BLD AUTO: 42 % (ref 35.8–46.3)
HGB BLD-MCNC: 12.6 G/DL (ref 11.7–15.4)
IMM GRANULOCYTES # BLD AUTO: 0 K/UL (ref 0–0.5)
IMM GRANULOCYTES NFR BLD AUTO: 0 % (ref 0–5)
LYMPHOCYTES # BLD: 1.8 K/UL (ref 0.5–4.6)
LYMPHOCYTES NFR BLD: 40 % (ref 13–44)
MCH RBC QN AUTO: 25.2 PG (ref 26.1–32.9)
MCHC RBC AUTO-ENTMCNC: 30 G/DL (ref 31.4–35)
MCV RBC AUTO: 84 FL (ref 82–102)
MONOCYTES # BLD: 0.4 K/UL (ref 0.1–1.3)
MONOCYTES NFR BLD: 9 % (ref 4–12)
NEUTS SEG # BLD: 2.2 K/UL (ref 1.7–8.2)
NEUTS SEG NFR BLD: 49 % (ref 43–78)
NRBC # BLD: 0 K/UL (ref 0–0.2)
PLATELET # BLD AUTO: 216 K/UL (ref 150–450)
PMV BLD AUTO: 10.8 FL (ref 9.4–12.3)
POTASSIUM SERPL-SCNC: 3.7 MMOL/L (ref 3.5–5.1)
PROT SERPL-MCNC: 7.5 G/DL (ref 6.3–8.2)
RBC # BLD AUTO: 5 M/UL (ref 4.05–5.2)
SODIUM SERPL-SCNC: 142 MMOL/L (ref 133–143)
WBC # BLD AUTO: 4.5 K/UL (ref 4.3–11.1)

## 2023-02-27 PROCEDURE — G8400 PT W/DXA NO RESULTS DOC: HCPCS | Performed by: INTERNAL MEDICINE

## 2023-02-27 PROCEDURE — G8417 CALC BMI ABV UP PARAM F/U: HCPCS | Performed by: INTERNAL MEDICINE

## 2023-02-27 PROCEDURE — 1090F PRES/ABSN URINE INCON ASSESS: CPT | Performed by: INTERNAL MEDICINE

## 2023-02-27 PROCEDURE — 99215 OFFICE O/P EST HI 40 MIN: CPT | Performed by: INTERNAL MEDICINE

## 2023-02-27 PROCEDURE — 1123F ACP DISCUSS/DSCN MKR DOCD: CPT | Performed by: INTERNAL MEDICINE

## 2023-02-27 PROCEDURE — 36415 COLL VENOUS BLD VENIPUNCTURE: CPT

## 2023-02-27 PROCEDURE — 82306 VITAMIN D 25 HYDROXY: CPT

## 2023-02-27 PROCEDURE — 85025 COMPLETE CBC W/AUTO DIFF WBC: CPT

## 2023-02-27 PROCEDURE — G8484 FLU IMMUNIZE NO ADMIN: HCPCS | Performed by: INTERNAL MEDICINE

## 2023-02-27 PROCEDURE — 1036F TOBACCO NON-USER: CPT | Performed by: INTERNAL MEDICINE

## 2023-02-27 PROCEDURE — 82728 ASSAY OF FERRITIN: CPT

## 2023-02-27 PROCEDURE — G8427 DOCREV CUR MEDS BY ELIG CLIN: HCPCS | Performed by: INTERNAL MEDICINE

## 2023-02-27 PROCEDURE — 3074F SYST BP LT 130 MM HG: CPT | Performed by: INTERNAL MEDICINE

## 2023-02-27 PROCEDURE — 80053 COMPREHEN METABOLIC PANEL: CPT

## 2023-02-27 PROCEDURE — 3078F DIAST BP <80 MM HG: CPT | Performed by: INTERNAL MEDICINE

## 2023-02-27 RX ORDER — MELATONIN: COMMUNITY

## 2023-02-27 ASSESSMENT — PATIENT HEALTH QUESTIONNAIRE - PHQ9
SUM OF ALL RESPONSES TO PHQ QUESTIONS 1-9: 0
2. FEELING DOWN, DEPRESSED OR HOPELESS: 0

## 2023-02-27 NOTE — PROGRESS NOTES
[address]  Phone: [phone]           2/27/2023  Ti Esqueda  1945  [id number]         Ti Esqueda is a 76year old -American female who has returned to my clinic for a follow-up visit; she was initially referred to me by Dr. Bob Interiano for management of Iron Deficiency Anemia; her EGD and capsule video endoscopy were suggestive of small bowel hemorrhage; she received a course of Injectafer in 1/2022 and 2/2023. ALLERGIES:    No known drug allergies. FAMILY HISTORY:    No hematologic disorders. SOCIAL HISTORY:    She is  and lives with her . She used to work as a house-keeper. She denies ever using any tobacco products. PAST MEDICAL HISTORY:    Hypertension, Atrial Fibrillation, GERD, Migraines, Hyperlipidemia, renal insufficiency, Vitamin D deficiency and Iron Deficiency Anemia. ROS:  The patient complained of fatigue; all other systems negative. PHYSICAL EXAM:   The patient was alert, awake and oriented, no acute distress was noted. Oral examination revealed dentures, no mucosal lesions were seen. Lymph node examination did not reveal any adenopathy. Neck examination revealed a supple neck, no thyromegaly or masses were noted. Chest examination revealed normal vesicular breath sounds. Heart examination revealed S-1 and S-2 with a 2/6 systolic murmur. Abdominal examination revealed a non-tender abdomen, bowel sounds were positive, no organomegaly could be appreciated. Examination of the extremities did not reveal any tenderness or erythema. Examination of the skin did not reveal any lesions. Medical problems and test results were reviewed with the patient today. KPS:     90. LABORATORY INVESTIGATIONS:  CBC showed a WBC count of 4.5, ANC was 2.2, Hemoglobin was 12.6 and Platelets were 328; her Ferritin level was 373.         ASSESSMENT:    Iron Deficiency Anemia; Vitamin D deficiency; Hyperlipidemia; chronic GERD. I spent a total of 42 minutes on the day of the visit, managing the care of this patient. PLAN:   She does not want to undergo an EGD and a Colonoscopy at the present time. She should continue taking Pantoprazole, Atorvastatin and supplemental Vitamin D; at her next clinic visit I will re-check her CBC, CMP, Vitamin D level and Ferritin level.         Jony Quintana MD  Hematology/BMT

## 2023-02-27 NOTE — PATIENT INSTRUCTIONS
Patient Instructions from Today's Visit    Reason for Visit:  Follow Up    Diagnosis Information:  [email or web address] [email or web address]-us/[email or web address]-answers-patient-education-materials/jdzm-[email or web address]-sheets    Plan:  Lab work looks stable today    Follow Up: We will bring you back in June for a follow up with Theresa Caro and lab work prior.     Recent Lab Results:  Hospital Outpatient Visit on 02/27/2023   Component Date Value Ref Range Status    WBC 02/27/2023 4.5  4.3 - 11.1 K/uL Final    RBC 02/27/2023 5.00  4.05 - 5.2 M/uL Final    Hemoglobin 02/27/2023 12.6  11.7 - 15.4 g/dL Final    Hematocrit 02/27/2023 42.0  35.8 - 46.3 % Final    MCV 02/27/2023 84.0  82.0 - 102.0 FL Final    MCH 02/27/2023 25.2 (A)  26.1 - 32.9 PG Final    MCHC 02/27/2023 30.0 (A)  31.4 - 35.0 g/dL Final    RDW 02/27/2023 15.5 (A)  11.9 - 14.6 % Final    Platelets 21/13/7573 216  150 - 450 K/uL Final    MPV 02/27/2023 10.8  9.4 - 12.3 FL Final    nRBC 02/27/2023 0.00  0.0 - 0.2 K/uL Final    **Note: Absolute NRBC parameter is now reported with Hemogram**    Differential Type 02/27/2023 AUTOMATED    Final    Seg Neutrophils 02/27/2023 49  43 - 78 % Final    Lymphocytes 02/27/2023 40  13 - 44 % Final    Monocytes 02/27/2023 9  4.0 - 12.0 % Final    Eosinophils % 02/27/2023 1  0.5 - 7.8 % Final    Basophils 02/27/2023 1  0.0 - 2.0 % Final    Immature Granulocytes 02/27/2023 0  0.0 - 5.0 % Final    Segs Absolute 02/27/2023 2.2  1.7 - 8.2 K/UL Final    Absolute Lymph # 02/27/2023 1.8  0.5 - 4.6 K/UL Final    Absolute Mono # 02/27/2023 0.4  0.1 - 1.3 K/UL Final    Absolute Eos # 02/27/2023 0.1  0.0 - 0.8 K/UL Final    Basophils Absolute 02/27/2023 0.0  0.0 - 0.2 K/UL Final    Absolute Immature Granulocyte 02/27/2023 0.0  0.0 - 0.5 K/UL Final    Sodium 02/27/2023 142  133 - 143 mmol/L Final    Potassium 02/27/2023 3.7  3.5 - 5.1 mmol/L Final    Chloride 02/27/2023 111 (A)  101 - 110 mmol/L Final    CO2 02/27/2023 23  21 - 32 mmol/L Final    Anion Gap 02/27/2023 8  2 - 11 mmol/L Final    Glucose 02/27/2023 98  65 - 100 mg/dL Final    BUN 02/27/2023 21  8 - 23 MG/DL Final    Creatinine 02/27/2023 1.20 (A)  0.6 - 1.0 MG/DL Final    Est, Glom Filt Rate 02/27/2023 46 (A)  >60 ml/min/1.73m2 Final    Comment:      Pediatric calculator link: [employer]. org/professionals/kdoqi/gfr_calculatorped       These results are not intended for use in patients <25years of age. eGFR results are calculated without a race factor using  the 2021 CKD-EPI equation. Careful clinical correlation is recommended, particularly when comparing to results calculated using previous equations. The CKD-EPI equation is less accurate in patients with extremes of muscle mass, extra-renal metabolism of creatinine, excessive creatine ingestion, or following therapy that affects renal tubular secretion. Calcium 02/27/2023 8.9  8.3 - 10.4 MG/DL Final    Total Bilirubin 02/27/2023 0.6  0.2 - 1.1 MG/DL Final    ALT 02/27/2023 27  12 - 65 U/L Final    AST 02/27/2023 17  15 - 37 U/L Final    Alk Phosphatase 02/27/2023 152 (A)  50 - 136 U/L Final    Total Protein 02/27/2023 7.5  6.3 - 8.2 g/dL Final    Albumin 02/27/2023 3.6  3.2 - 4.6 g/dL Final    Globulin 02/27/2023 3.9  2.8 - 4.5 g/dL Final    Albumin/Globulin Ratio 02/27/2023 0.9  0.4 - 1.6   Final    Ferritin 02/27/2023 373  8 - 388 NG/ML Final         Treatment Summary has been discussed and given to patient: n/a        -------------------------------------------------------------------------------------------------------------------  Please call our office at [phone] if you have any  of the following symptoms:   Fever of 100.5 or greater  Chills  Shortness of breath  Swelling or pain in one leg    After office hours an answering service is available and will contact a provider for emergencies or if you are experiencing any of the above symptoms. Patient did not express an interest in My Chart.   My Chart log in information explained on the after visit summary printout at the [facility] desk.     Kaitlynn Fu MA

## 2023-06-01 DIAGNOSIS — D50.8 OTHER IRON DEFICIENCY ANEMIA: Primary | ICD-10-CM

## 2023-06-01 DIAGNOSIS — E55.9 VITAMIN D DEFICIENCY: ICD-10-CM

## 2023-06-13 NOTE — PROGRESS NOTES
[facility] Progress Note         Admit Date:  9/28/2021    Today's Date:  10/1/2021    CC: Anemia, gib    Subjective:     Patient reports black stool this morning with some red blood. Denies abd pain. Discussed findings of EGD and scans yesterday. EGD/ Small bowel enteroscopy Procedure Note 9/30/21   PreOp Diagnosis:   Acute blood loss anemia  Melena  Upper GI bleed   PostOp Diagnosis:  Probable small bowel bleeding   Findings:   Esophagus: The proximal and mid esophagus appeared normal.  In the distal esophagus, Z-line normal  Stomach: Normal, without ulcers, erosions, or polyps. No blood present. Small bowel:   Fresh blood seen pooling in the distal duodenum. Difficult to reach w/ the EGD scope in the 3rd/4th portion. Switch to pediatric scope. Scope advanced 90cm beyond the pylorus. No bleeding source identified. Some fresh clots present, non-adherent. Careful inspection and irrigation without a clear source. It may be distal to the visible small bowel.    Recommendations: Follow up Hgb results  A colon source is considered very unlikely, given the fresh blood in the duodenum and jejunum.    Tagged RBC scan  NPO for now   Shelbi Jhaveri MD    Medications:   Current Facility-Administered Medications   Medication Dose Route Frequency    lactated Ringers infusion  100 mL/hr IntraVENous CONTINUOUS    0.9% sodium chloride infusion  10 mL/hr IntraVENous CONTINUOUS    0.9% sodium chloride infusion 250 mL  250 mL IntraVENous PRN    0.9% sodium chloride infusion 250 mL  250 mL IntraVENous PRN    0.9% sodium chloride infusion 250 mL  250 mL IntraVENous PRN    0.9% sodium chloride infusion 250 mL  250 mL IntraVENous PRN    sodium chloride (NS) flush 5-10 mL  5-10 mL IntraVENous Q8H    sodium chloride (NS) flush 5-10 mL  5-10 mL IntraVENous PRN    0.9% sodium chloride infusion 250 mL  250 mL IntraVENous PRN    amiodarone (CORDARONE) tablet 200 mg  200 mg Oral DAILY    atorvastatin (LIPITOR) tablet 20 mg  20 mg Oral DAILY    cyanocobalamin tablet 1,000 mcg  1,000 mcg Oral DAILY    venlafaxine (EFFEXOR) tablet 25 mg  25 mg Oral DAILY    sodium chloride (NS) flush 5-40 mL  5-40 mL IntraVENous Q8H    sodium chloride (NS) flush 5-40 mL  5-40 mL IntraVENous PRN    acetaminophen (TYLENOL) tablet 650 mg  650 mg Oral Q6H PRN    Or    acetaminophen (TYLENOL) suppository 650 mg  650 mg Rectal Q6H PRN    polyethylene glycol (MIRALAX) packet 17 g  17 g Oral DAILY PRN    ondansetron (ZOFRAN ODT) tablet 4 mg  4 mg Oral Q8H PRN    Or    ondansetron (ZOFRAN) injection 4 mg  4 mg IntraVENous Q6H PRN    pantoprazole (PROTONIX) 40 mg in 0.9% sodium chloride 10 mL injection  40 mg IntraVENous Q12H    0.9% sodium chloride infusion 250 mL  250 mL IntraVENous PRN       Review of Systems:  ROS was obtained, with pertinent positives as listed above. No chest pain or SOB. Diet:      Objective:   Vitals:  Visit Vitals  /63   Pulse 73   Temp 98 °F (36.7 °C)   Resp 17   Ht 5' 5\" (1.651 m)   Wt 88 kg (194 lb)   SpO2 95%   BMI 32.28 kg/m²     Intake/Output:  No intake/output data recorded. 09/29 1901 - 10/01 0700  In: 1170.8 [I.V.:600]  Out: 2   Exam:  General appearance: alert, cooperative, no distress  Lungs: clear to auscultation bilaterally anteriorly  Heart: regular rate and rhythm  Abdomen: soft, non-tender.  Bowel sounds normal. No masses, no organomegaly  Extremities: extremities normal, atraumatic, no cyanosis or edema  Neuro:  alert and oriented    Data Review (Labs):    Recent Labs     10/01/21  0530 09/30/21  2104 09/30/21  1545 09/30/21  1149 09/30/21  0309 09/29/21  2120 09/29/21  1158 09/29/21  0418 09/29/21  0321 09/29/21  0008 09/28/21  1818 09/28/21  1739   WBC 14.2*  --   --   --  9.1  --   --   --   --   --   --  7.4   HGB 8.1* 6.4* 8.2* 7.5* 6.3* 7.0* 6.6* 5.8* 5.9* 7.0*  --  5.9*   HCT 26.9* 21.6* 28.7* 24.7* 21.5* 23.1* 22.7* 20.2* 20.1* 24.0*  --  21.0*     -- --   --  199  --   --   --   --   --   --  333   MCV 85.4  --   --   --  82.7  --   --   --   --   --   --  72.9*     --   --   --  144  --   --   --  143  --   --  143   K 3.9  --   --   --  3.8  --   --   --  4.0  --   --  4.4   *  --   --   --  114*  --   --   --  113*  --   --  112*   CO2 21  --   --   --  23  --   --   --  23  --   --  20*   BUN 31*  --   --   --  41*  --   --   --  36*  --   --  39*   CREA 1.08*  --   --   --  1.11*  --   --   --  1.06*  --   --  1.45*   CA 8.5  --   --   --  8.0*  --   --   --  8.4  --   --  8.8   MG 2.1  --   --   --  2.0  --   --   --   --   --   --  2.0   *  --   --   --  86  --   --   --  113*  --   --  104*   AP 82  --   --   --  79  --   --   --   --   --   --  132   AST 20  --   --   --  17  --   --   --   --   --   --  14*   ALT 20  --   --   --  14  --   --   --   --   --   --  19   TBILI 0.9  --   --   --  0.7  --   --   --   --   --   --  0.2   ALB 2.4*  --   --   --  2.2*  --   --   --   --   --   --  2.9*   TP 5.4*  --   --   --  5.0*  --   --   --   --   --   --  6.7   PTP 28.9*  --   --   --  24.6*  --   --   --  33.1*  --  65.3*  --    INR 2.7  --   --   --  2.2  --   --   --  3.2  --  7.9*  --      Nuclear medicine GI bleed scan 9/30/2021   INDICATION:  Acute blood loss. Large blood in small bowel without clear source.   COMPARISON: None.   TECHNIQUE: Planar imaging of the abdomen was performed following the uneventful  intravenous demonstration of 25.2 mCi of Tc-99m UltraTag labeled red blood cells  through 60 minutes   FINDINGS:   Today's study is limited given that the patient is obliqued to the right. A  technologist note states that the patient was repositioned several times yet  kept moving into an oblique position.   Immediately following radiotracer administration, normal blood pool activity is  seen.  Very early in the examination, abnormal activity is seen over the left  upper quadrant which subsequently extends in the small bowel pattern into the  left abdomen. The appearance suggests an active gastric bleed with progression  of radiotracer into proximal small bowel loops in the left abdomen.   IMPRESSION  1. Findings suggesting an active GI bleed within the stomach. CT abdomen and pelvis for GI bleed 9/30/2021   CLINICAL HISTORY: Positive GI bleed scan.   Findings:    CT ABDOMEN:  Limited evaluation of the lung bases and base of the mediastinum demonstrates no  significant abnormalities.    Assessment of the solid organs is limited by the lack of administered  intravenous contrast.  A subtle abnormality could be missed. The Liver  demonstrates scattered benign calcifications and a benign left lobe cyst.  The  spleen is homogeneous in attenuation. No contour deforming mass lesions are  seen of the pancreas or adrenal glands. The gallbladder has an unremarkable CT  appearance without radiopaque stones or pericholecystic fluid/inflammatory  changes. No radiopaque stones or hydronephrosis are seen of the kidneys. A 2.8  cm cyst is seen in the anterior mid pole cortex of the left kidney.   The visualized loops of small bowel and colon are normal in caliber. Today's  study is for assessment of GI bleeding. By nuclear medicine GI bleed scan, a  gastric source was suggested. Unfortunately, the stomach contains oral contrast  on the precontrast images as seen on precontrast image 19. Therefore, assessment  for GI bleed at the stomach could not be performed. Oral contrast is also  present at additional levels most evident in the distal colon. No additional  source of acute bleed is suggested on this examination. No free fluid, free air,  or focal inflammatory changes are seen in the abdomen. No adenopathy is seen. The abdominal aorta demonstrates moderate atherosclerotic ossification and mild  ectasia.   CT PELVIS:  A right total hip replacement is present obscured portions of the right pelvis.   No abnormal pelvic fluid collections or inflammatory changes are present in the  visualized pelvis. No pelvic adenopathy is seen. The partially visualized  urinary bladder is unremarkable.   IMPRESSION  1. This study is for assessment of a potential gastric bleed suggested by the  nuclear medicine GI bleed scan. The stomach contains oral contrast prior to the  administration of contrast. Therefore, a source of bleeding in the stomach  cannot be confirmed or excluded. No potential source of GI bleed is otherwise  suggested although some oral contrast is seen within additional segments of  bowel. Assessment:     Principal Problem:    Acute blood loss anemia (9/28/2021)    Active Problems:    Hypertension (1/23/2017)      Paroxysmal atrial fibrillation (HCC) (1/23/2017)      Migraine with vertigo (1/23/2017)      Hip fracture ([address] (7/12/2021)      Elevated INR (9/28/2021)      Symptomatic anemia (9/28/2021)    63 yo female is seen in consultation for evaluation of severe anemia with coagulopathy and melena, presenting to the ED with SOB. She had syncopal episode in the waiting room and was found to have hgb 5.9 with INR 7.9. She has had intermittent melena for the last few months, evaluated with EGD and colonoscopy in May; EUS was recommended for further evaluation of a gastric subepithelial lesion but not performed. She had hip fracture with kelley-arthroplasty in July and notes her black stools have been more frequent since that time. Given Vit K on admission with improvement in INR. Despite transfusion PRBC, hgb has vacillated with morning hgb pending. She has now received 3 unit PRBC. EGD 9/30 with no visible gastric blood but fresh blood/clots seen in distal duodenum. NM tagged RBC scan 9/30 suggests active gi bleed in the stomach. CT A/P gib 9/30 with oral contrast in the stomach with inability to ascertain bleeding in the stomach. Plan:     1. Monitor H/H, transfuse as needed  2.    If has recurrent bleeding and requires further transfusion, will need to consult IR. Dr. Ziggy Waters with IR already aware of this patient. Patient is seen and examined in collaboration with Dr. Cuco Hall. Assessment and plan as per Dr. Cuco Hall.     Ynes Harvey NP Graft Donor Site Bandage (Optional-Leave Blank If You Don't Want In Note): Steri-strips and a pressure bandage were applied to the donor site.

## 2023-08-28 ENCOUNTER — OFFICE VISIT (OUTPATIENT)
Age: 78
End: 2023-08-28
Payer: MEDICARE

## 2023-08-28 VITALS
HEART RATE: 60 BPM | BODY MASS INDEX: 29.42 KG/M2 | DIASTOLIC BLOOD PRESSURE: 80 MMHG | WEIGHT: 171.4 LBS | SYSTOLIC BLOOD PRESSURE: 156 MMHG

## 2023-08-28 DIAGNOSIS — Z79.899 LONG TERM CURRENT USE OF ANTIARRHYTHMIC DRUG: ICD-10-CM

## 2023-08-28 DIAGNOSIS — I10 ESSENTIAL HYPERTENSION: ICD-10-CM

## 2023-08-28 DIAGNOSIS — I48.19 PERSISTENT ATRIAL FIBRILLATION (HCC): Primary | ICD-10-CM

## 2023-08-28 PROCEDURE — 1090F PRES/ABSN URINE INCON ASSESS: CPT | Performed by: INTERNAL MEDICINE

## 2023-08-28 PROCEDURE — 99214 OFFICE O/P EST MOD 30 MIN: CPT | Performed by: INTERNAL MEDICINE

## 2023-08-28 PROCEDURE — 1036F TOBACCO NON-USER: CPT | Performed by: INTERNAL MEDICINE

## 2023-08-28 PROCEDURE — G8417 CALC BMI ABV UP PARAM F/U: HCPCS | Performed by: INTERNAL MEDICINE

## 2023-08-28 PROCEDURE — 93000 ELECTROCARDIOGRAM COMPLETE: CPT | Performed by: INTERNAL MEDICINE

## 2023-08-28 PROCEDURE — 3077F SYST BP >= 140 MM HG: CPT | Performed by: INTERNAL MEDICINE

## 2023-08-28 PROCEDURE — 3079F DIAST BP 80-89 MM HG: CPT | Performed by: INTERNAL MEDICINE

## 2023-08-28 PROCEDURE — G8399 PT W/DXA RESULTS DOCUMENT: HCPCS | Performed by: INTERNAL MEDICINE

## 2023-08-28 PROCEDURE — G8428 CUR MEDS NOT DOCUMENT: HCPCS | Performed by: INTERNAL MEDICINE

## 2023-08-28 PROCEDURE — 1123F ACP DISCUSS/DSCN MKR DOCD: CPT | Performed by: INTERNAL MEDICINE

## 2023-08-28 RX ORDER — SPIRONOLACTONE 25 MG/1
12.5 TABLET ORAL DAILY
Qty: 30 TABLET | Refills: 11 | Status: SHIPPED | OUTPATIENT
Start: 2023-08-28

## 2023-08-28 ASSESSMENT — ENCOUNTER SYMPTOMS
BLURRED VISION: 0
ORTHOPNEA: 0
VOMITING: 0
ABDOMINAL PAIN: 0
BLOATING: 0
COUGH: 0
DOUBLE VISION: 0
SHORTNESS OF BREATH: 0
NAUSEA: 0
BACK PAIN: 0
HEMOPTYSIS: 0

## 2023-08-28 NOTE — PROGRESS NOTES
[facility] CARDIOLOGY  [address] [facility], [address] [place] [address]  PHONE: [phone]    23    NAME:  Eliud Jones  : 1945  MRN: [id number]         SUBJECTIVE:   Eliud Jones is a 78 y.o. female seen for a visit regarding the following:     Chief Complaint   Patient presents with    Atrial Fibrillation    Hypertension       HPI:      Prior history of persistent atrial fibrillation on anticoagulation. Also appears history of esophageal stricture status post dilatation [last in 10/15]. Other history of hypertension, hyperlipidemia. Appears prior cath in  with reported small-caliber LAD (@S; prior pt of [address]). Also appears hx of vertigo. Echo with preserved EF and mod left atrial enlargement []. Negative Cardiolite stress test []. Noted lower GI bleed with Hb of 5.8 and supratherapeutic INR of 7.9 (states was given the wrong dosing of Coumadin); had a tagged red blood cell scan with noted bleeding source in the stomach/proximal small bowel; had for capsule endoscopy on discharge and follow-up with GI and results reviewed with stated negative capsule endoscopy (EGD from 2021 was negative); saw hematology on 2021 and attributed to be iron deficiency anemia and started on iron supplementation     No new issues since last visit. Off anticoagulation and stable hemoglobin; 12.6 from 2023 previously noted at 12.8 from 2022. States attempted trial with Eliquis but subsequent issues with recurrent melena and held off. Was to call back for consideration of Watchman device but wanted to hold off; also opted for no anticoagulation. States is being evaluated for possible knee surgery. Can walk a couple flights of stairs with no cardiac symptoms and mostly impeded by arthritis. Prior--off Coumadin; extensive discussion regarding options. Mostly issues with fatigue/dyspnea on exertion no significant change since last visit.   Last hemoglobin

## 2023-09-08 DIAGNOSIS — D50.8 OTHER IRON DEFICIENCY ANEMIA: Primary | ICD-10-CM

## 2023-09-08 DIAGNOSIS — E55.9 VITAMIN D DEFICIENCY: ICD-10-CM

## 2023-09-11 ENCOUNTER — OFFICE VISIT (OUTPATIENT)
Dept: ONCOLOGY | Age: 78
End: 2023-09-11
Payer: MEDICARE

## 2023-09-11 ENCOUNTER — HOSPITAL ENCOUNTER (OUTPATIENT)
Dept: LAB | Age: 78
Discharge: HOME | End: 2023-09-14
Payer: MEDICARE

## 2023-09-11 VITALS
WEIGHT: 171 LBS | HEART RATE: 55 BPM | OXYGEN SATURATION: 97 % | TEMPERATURE: 98 F | DIASTOLIC BLOOD PRESSURE: 43 MMHG | SYSTOLIC BLOOD PRESSURE: 133 MMHG | BODY MASS INDEX: 29.19 KG/M2 | RESPIRATION RATE: 18 BRPM | HEIGHT: 64 IN

## 2023-09-11 DIAGNOSIS — E55.9 VITAMIN D DEFICIENCY: ICD-10-CM

## 2023-09-11 DIAGNOSIS — E78.49 OTHER HYPERLIPIDEMIA: ICD-10-CM

## 2023-09-11 DIAGNOSIS — D50.8 OTHER IRON DEFICIENCY ANEMIA: ICD-10-CM

## 2023-09-11 DIAGNOSIS — D50.8 OTHER IRON DEFICIENCY ANEMIA: Primary | ICD-10-CM

## 2023-09-11 LAB
ALBUMIN SERPL-MCNC: 3.9 G/DL (ref 3.2–4.6)
ALBUMIN/GLOB SERPL: 0.9 (ref 0.4–1.6)
ALP SERPL-CCNC: 145 U/L (ref 50–136)
ALT SERPL-CCNC: 28 U/L (ref 12–65)
ANION GAP SERPL CALC-SCNC: 5 MMOL/L (ref 2–11)
AST SERPL-CCNC: 16 U/L (ref 15–37)
BASOPHILS # BLD: 0 K/UL (ref 0–0.2)
BASOPHILS NFR BLD: 1 % (ref 0–2)
BILIRUB SERPL-MCNC: 0.6 MG/DL (ref 0.2–1.1)
BUN SERPL-MCNC: 20 MG/DL (ref 8–23)
CALCIUM SERPL-MCNC: 9.4 MG/DL (ref 8.3–10.4)
CHLORIDE SERPL-SCNC: 112 MMOL/L (ref 101–110)
CO2 SERPL-SCNC: 23 MMOL/L (ref 21–32)
CREAT SERPL-MCNC: 1.3 MG/DL (ref 0.6–1)
DIFFERENTIAL METHOD BLD: ABNORMAL
EOSINOPHIL # BLD: 0.1 K/UL (ref 0–0.8)
EOSINOPHIL NFR BLD: 2 % (ref 0.5–7.8)
ERYTHROCYTE [DISTWIDTH] IN BLOOD BY AUTOMATED COUNT: 14.8 % (ref 11.9–14.6)
FERRITIN SERPL-MCNC: 307 NG/ML (ref 8–388)
GLOBULIN SER CALC-MCNC: 4.2 G/DL (ref 2.8–4.5)
GLUCOSE SERPL-MCNC: 105 MG/DL (ref 65–100)
HCT VFR BLD AUTO: 42.1 % (ref 35.8–46.3)
HGB BLD-MCNC: 12.7 G/DL (ref 11.7–15.4)
IMM GRANULOCYTES # BLD AUTO: 0 K/UL (ref 0–0.5)
IMM GRANULOCYTES NFR BLD AUTO: 0 % (ref 0–5)
LYMPHOCYTES # BLD: 2.1 K/UL (ref 0.5–4.6)
LYMPHOCYTES NFR BLD: 41 % (ref 13–44)
MCH RBC QN AUTO: 25.6 PG (ref 26.1–32.9)
MCHC RBC AUTO-ENTMCNC: 30.2 G/DL (ref 31.4–35)
MCV RBC AUTO: 84.9 FL (ref 82–102)
MONOCYTES # BLD: 0.4 K/UL (ref 0.1–1.3)
MONOCYTES NFR BLD: 8 % (ref 4–12)
NEUTS SEG # BLD: 2.5 K/UL (ref 1.7–8.2)
NEUTS SEG NFR BLD: 48 % (ref 43–78)
NRBC # BLD: 0 K/UL (ref 0–0.2)
PLATELET # BLD AUTO: 239 K/UL (ref 150–450)
PMV BLD AUTO: 10.9 FL (ref 9.4–12.3)
POTASSIUM SERPL-SCNC: 4 MMOL/L (ref 3.5–5.1)
PROT SERPL-MCNC: 8.1 G/DL (ref 6.3–8.2)
RBC # BLD AUTO: 4.96 M/UL (ref 4.05–5.2)
SODIUM SERPL-SCNC: 140 MMOL/L (ref 133–143)
WBC # BLD AUTO: 5.1 K/UL (ref 4.3–11.1)

## 2023-09-11 PROCEDURE — 36415 COLL VENOUS BLD VENIPUNCTURE: CPT

## 2023-09-11 PROCEDURE — 1036F TOBACCO NON-USER: CPT | Performed by: INTERNAL MEDICINE

## 2023-09-11 PROCEDURE — 82728 ASSAY OF FERRITIN: CPT

## 2023-09-11 PROCEDURE — G8417 CALC BMI ABV UP PARAM F/U: HCPCS | Performed by: INTERNAL MEDICINE

## 2023-09-11 PROCEDURE — G8399 PT W/DXA RESULTS DOCUMENT: HCPCS | Performed by: INTERNAL MEDICINE

## 2023-09-11 PROCEDURE — 85025 COMPLETE CBC W/AUTO DIFF WBC: CPT

## 2023-09-11 PROCEDURE — 3075F SYST BP GE 130 - 139MM HG: CPT | Performed by: INTERNAL MEDICINE

## 2023-09-11 PROCEDURE — 99215 OFFICE O/P EST HI 40 MIN: CPT | Performed by: INTERNAL MEDICINE

## 2023-09-11 PROCEDURE — 1090F PRES/ABSN URINE INCON ASSESS: CPT | Performed by: INTERNAL MEDICINE

## 2023-09-11 PROCEDURE — 3078F DIAST BP <80 MM HG: CPT | Performed by: INTERNAL MEDICINE

## 2023-09-11 PROCEDURE — 82306 VITAMIN D 25 HYDROXY: CPT

## 2023-09-11 PROCEDURE — 80053 COMPREHEN METABOLIC PANEL: CPT

## 2023-09-11 PROCEDURE — 1123F ACP DISCUSS/DSCN MKR DOCD: CPT | Performed by: INTERNAL MEDICINE

## 2023-09-11 PROCEDURE — G8427 DOCREV CUR MEDS BY ELIG CLIN: HCPCS | Performed by: INTERNAL MEDICINE

## 2023-09-11 ASSESSMENT — PATIENT HEALTH QUESTIONNAIRE - PHQ9
SUM OF ALL RESPONSES TO PHQ QUESTIONS 1-9: 0
1. LITTLE INTEREST OR PLEASURE IN DOING THINGS: 0
2. FEELING DOWN, DEPRESSED OR HOPELESS: 0
SUM OF ALL RESPONSES TO PHQ QUESTIONS 1-9: 0
SUM OF ALL RESPONSES TO PHQ9 QUESTIONS 1 & 2: 0
SUM OF ALL RESPONSES TO PHQ QUESTIONS 1-9: 0
SUM OF ALL RESPONSES TO PHQ QUESTIONS 1-9: 0

## 2023-09-11 NOTE — PATIENT INSTRUCTIONS
Patient Instructions from Today's Visit    Reason for Visit:  Follow Up    Diagnosis Information:  [email or web address]      Plan:  Lab work looks stable today  Hemoglobin and Iron levels are holding up nicely    Follow Up: We will bring you back in May 2024 for a follow up with  and lab work prior.     Recent Lab Results:  Hospital Outpatient Visit on 09/11/2023   Component Date Value Ref Range Status    WBC 09/11/2023 5.1  4.3 - 11.1 K/uL Final    RBC 09/11/2023 4.96  4.05 - 5.2 M/uL Final    Hemoglobin 09/11/2023 12.7  11.7 - 15.4 g/dL Final    Hematocrit 09/11/2023 42.1  35.8 - 46.3 % Final    MCV 09/11/2023 84.9  82.0 - 102.0 FL Final    MCH 09/11/2023 25.6 (L)  26.1 - 32.9 PG Final    MCHC 09/11/2023 30.2 (L)  31.4 - 35.0 g/dL Final    RDW 09/11/2023 14.8 (H)  11.9 - 14.6 % Final    Platelets 82/71/3218 239  150 - 450 K/uL Final    MPV 09/11/2023 10.9  9.4 - 12.3 FL Final    nRBC 09/11/2023 0.00  0.0 - 0.2 K/uL Final    **Note: Absolute NRBC parameter is now reported with Hemogram**    Neutrophils % 09/11/2023 48  43 - 78 % Final    Lymphocytes % 09/11/2023 41  13 - 44 % Final    Monocytes % 09/11/2023 8  4.0 - 12.0 % Final    Eosinophils % 09/11/2023 2  0.5 - 7.8 % Final    Basophils % 09/11/2023 1  0.0 - 2.0 % Final    Immature Granulocytes 09/11/2023 0  0.0 - 5.0 % Final    Neutrophils Absolute 09/11/2023 2.5  1.7 - 8.2 K/UL Final    Lymphocytes Absolute 09/11/2023 2.1  0.5 - 4.6 K/UL Final    Monocytes Absolute 09/11/2023 0.4  0.1 - 1.3 K/UL Final    Eosinophils Absolute 09/11/2023 0.1  0.0 - 0.8 K/UL Final    Basophils Absolute 09/11/2023 0.0  0.0 - 0.2 K/UL Final    Absolute Immature Granulocyte 09/11/2023 0.0  0.0 - 0.5 K/UL Final    Differential Type 09/11/2023 AUTOMATED    Final    Sodium 09/11/2023 140  133 - 143 mmol/L Final    Potassium 09/11/2023 4.0  3.5 - 5.1 mmol/L Final    Chloride 09/11/2023 112 (H)  101 -

## 2023-09-11 NOTE — PROGRESS NOTES
[address] [facility], [address] [place] [address] Drive  Phone: [phone]           9/11/2023  Omar Pérez  1945  [id number]         Omar Pérez is a 78year old -American female who has returned to my clinic for a follow-up visit; she was initially referred to me by Dr. Natali Stewart for management of Iron Deficiency Anemia; her EGD and capsule video endoscopy were suggestive of small bowel hemorrhage; she received parenteral Iron infusions in 1/2022 and 2/2023. ALLERGIES:    No known drug allergies. FAMILY HISTORY:    No hematologic disorders. SOCIAL HISTORY:    She is  and lives with her . She used to work as a house-keeper. She denies ever using any tobacco products. PAST MEDICAL HISTORY:    Hypertension, Atrial Fibrillation, GERD, Migraines, Hyperlipidemia, renal insufficiency, Vitamin D deficiency and Iron Deficiency Anemia. ROS:  The patient complained of fatigue; all other systems negative. PHYSICAL EXAM:   The patient was alert, awake and oriented, no acute distress was noted. Oral examination revealed dentures, no mucosal lesions were seen. Lymph node examination did not reveal any adenopathy. Neck examination revealed a supple neck, no thyromegaly or masses were noted. Chest examination revealed normal vesicular breath sounds. Heart examination revealed S-1 and S-2 with a 2/6 systolic murmur. Abdominal examination revealed a non-tender abdomen, bowel sounds were positive, no organomegaly could be appreciated. Examination of the extremities did not reveal any tenderness or erythema. Examination of the skin did not reveal any lesions. Medical problems and test results were reviewed with the patient today. KPS:     90. LABORATORY INVESTIGATIONS:  CBC showed a WBC count of 5.1, ANC was 2.5, Hemoglobin was 12.7 and Platelets were 065; her Ferritin level was 307.         ASSESSMENT:    Iron

## 2023-09-12 LAB — 25(OH)D3 SERPL-MCNC: 33.2 NG/ML (ref 30–100)

## 2023-09-21 ENCOUNTER — OFFICE VISIT (OUTPATIENT)
Dept: ENT CLINIC | Age: 78
End: 2023-09-21
Payer: MEDICARE

## 2023-09-21 VITALS — HEIGHT: 64 IN | OXYGEN SATURATION: 98 % | WEIGHT: 170.6 LBS | BODY MASS INDEX: 29.12 KG/M2 | HEART RATE: 63 BPM

## 2023-09-21 DIAGNOSIS — R42 DIZZINESS: Primary | ICD-10-CM

## 2023-09-21 DIAGNOSIS — H93.13 TINNITUS OF BOTH EARS: ICD-10-CM

## 2023-09-21 DIAGNOSIS — R42 DISEQUILIBRIUM: ICD-10-CM

## 2023-09-21 PROCEDURE — 1090F PRES/ABSN URINE INCON ASSESS: CPT | Performed by: STUDENT IN AN ORGANIZED HEALTH CARE EDUCATION/TRAINING PROGRAM

## 2023-09-21 PROCEDURE — 99204 OFFICE O/P NEW MOD 45 MIN: CPT | Performed by: STUDENT IN AN ORGANIZED HEALTH CARE EDUCATION/TRAINING PROGRAM

## 2023-09-21 PROCEDURE — 1036F TOBACCO NON-USER: CPT | Performed by: STUDENT IN AN ORGANIZED HEALTH CARE EDUCATION/TRAINING PROGRAM

## 2023-09-21 PROCEDURE — G8399 PT W/DXA RESULTS DOCUMENT: HCPCS | Performed by: STUDENT IN AN ORGANIZED HEALTH CARE EDUCATION/TRAINING PROGRAM

## 2023-09-21 PROCEDURE — G8417 CALC BMI ABV UP PARAM F/U: HCPCS | Performed by: STUDENT IN AN ORGANIZED HEALTH CARE EDUCATION/TRAINING PROGRAM

## 2023-09-21 PROCEDURE — G8427 DOCREV CUR MEDS BY ELIG CLIN: HCPCS | Performed by: STUDENT IN AN ORGANIZED HEALTH CARE EDUCATION/TRAINING PROGRAM

## 2023-09-21 PROCEDURE — 1123F ACP DISCUSS/DSCN MKR DOCD: CPT | Performed by: STUDENT IN AN ORGANIZED HEALTH CARE EDUCATION/TRAINING PROGRAM

## 2023-09-21 ASSESSMENT — ENCOUNTER SYMPTOMS
EYE REDNESS: 0
EYE ITCHING: 0
VOMITING: 0
SHORTNESS OF BREATH: 0

## 2023-09-23 ENCOUNTER — HOSPITAL ENCOUNTER (EMERGENCY)
Age: 78
Discharge: HOME | End: 2023-09-23
Attending: STUDENT IN AN ORGANIZED HEALTH CARE EDUCATION/TRAINING PROGRAM
Payer: MEDICARE

## 2023-09-23 VITALS
SYSTOLIC BLOOD PRESSURE: 136 MMHG | RESPIRATION RATE: 19 BRPM | WEIGHT: 170 LBS | DIASTOLIC BLOOD PRESSURE: 69 MMHG | HEIGHT: 64 IN | OXYGEN SATURATION: 97 % | BODY MASS INDEX: 29.02 KG/M2 | HEART RATE: 71 BPM | TEMPERATURE: 98.1 F

## 2023-09-23 DIAGNOSIS — I10 ASYMPTOMATIC HYPERTENSION: Primary | ICD-10-CM

## 2023-09-23 LAB
EKG ATRIAL RATE: 69 BPM
EKG DIAGNOSIS: NORMAL
EKG P AXIS: 60 DEGREES
EKG P-R INTERVAL: 176 MS
EKG Q-T INTERVAL: 442 MS
EKG QRS DURATION: 100 MS
EKG QTC CALCULATION (BAZETT): 473 MS
EKG R AXIS: -21 DEGREES
EKG T AXIS: 95 DEGREES
EKG VENTRICULAR RATE: 69 BPM

## 2023-09-23 PROCEDURE — 93010 ELECTROCARDIOGRAM REPORT: CPT | Performed by: INTERNAL MEDICINE

## 2023-09-23 PROCEDURE — 99283 EMERGENCY DEPT VISIT LOW MDM: CPT

## 2023-09-23 PROCEDURE — 93005 ELECTROCARDIOGRAM TRACING: CPT | Performed by: STUDENT IN AN ORGANIZED HEALTH CARE EDUCATION/TRAINING PROGRAM

## 2023-09-23 ASSESSMENT — LIFESTYLE VARIABLES
HOW OFTEN DO YOU HAVE A DRINK CONTAINING ALCOHOL: NEVER
HOW MANY STANDARD DRINKS CONTAINING ALCOHOL DO YOU HAVE ON A TYPICAL DAY: PATIENT DOES NOT DRINK

## 2023-09-23 ASSESSMENT — PAIN - FUNCTIONAL ASSESSMENT: PAIN_FUNCTIONAL_ASSESSMENT: NONE - DENIES PAIN

## 2023-09-23 NOTE — DISCHARGE INSTRUCTIONS
Please help with your memory care doctor in the next few weeks in regards to medications. They may consider starting you on something to help with your nerves and your sleep issues.   Return to the ER for any new or worsening symptoms

## 2023-09-23 NOTE — ED PROVIDER NOTES
[address] [facility]  Emergency Department    DISPOSITION Decision To Discharge 09/23/2023 03:58:03 AM       ICD-10-CM    1. Asymptomatic hypertension  I10         ED Course     ED Course as of 09/23/23 0406   Sat Sep 23, 2023   659 78-year-old female presents with asymptomatic hypertension. /64, heart rate 73. Neuro intact; NIH 0. No evidence of endorgan damage on history or physical exam.  Will obtain EKG and observe briefly in the ER. She seems primarily anxious and fixated on her blood pressure readings that she continuously checks them overnight as she cannot sleep. [ER]   0319 EKG: Normal sinus rhythm, rate 69. No STEMI. Normal intervals. Nonspecific ST and T wave changes. Time: 1012 [ER]   6075 Remains asymptomatic on reassessment. Repeat blood pressure 136/69, heart rate 60s. Once again believe that her symptoms are primarily coming from her being anxious this evening. Stable for discharge home to follow-up with her PCP. Return precautions given [ER]      ED Course User Index  [ER] Galina Brooks MD     Complexity of Problems Addressed:  1 or more stable acute illnesses    Data Reviewed and Analyzed:  Category 1:   I independently ordered and reviewed each unique test.  I reviewed external records: provider visit note from PCP. I reviewed external records: provider visit note from outside specialist.     Category 2:   I independently ordered and interpreted the ED EKG in the absence of a Cardiologist.      Category 3: Discussion of management or test interpretation. Please see ED course above    Risk of Complications and/or Morbidity of Patient Management:    Is this patient to be included in the SEP-1 core measure due to severe sepsis or septic shock? No Exclusion criteria - the patient is NOT to be included for SEP-1 Core Measure due to:  Infection is not suspected     HPI   Su Rice is a 78 y.o. female with a history of atrial fibrillation, HTN,

## 2023-10-02 RX ORDER — LOSARTAN POTASSIUM 100 MG/1
100 TABLET ORAL DAILY
Qty: 90 TABLET | Refills: 3 | Status: SHIPPED | OUTPATIENT
Start: 2023-10-02

## 2023-10-02 NOTE — TELEPHONE ENCOUNTER
Requested Prescriptions     Pending Prescriptions Disp Refills    losartan (COZAAR) 100 MG tablet [Pharmacy Med Name: LOSARTAN 100MG TABLETS] 90 tablet 3     Sig: TAKE 1 TABLET BY MOUTH DAILY

## 2024-03-21 ENCOUNTER — OFFICE VISIT (OUTPATIENT)
Dept: AUDIOLOGY | Age: 79
End: 2024-03-21
Payer: MEDICARE

## 2024-03-21 ENCOUNTER — OFFICE VISIT (OUTPATIENT)
Dept: ENT CLINIC | Age: 79
End: 2024-03-21
Payer: MEDICARE

## 2024-03-21 VITALS
DIASTOLIC BLOOD PRESSURE: 70 MMHG | WEIGHT: 165.4 LBS | SYSTOLIC BLOOD PRESSURE: 134 MMHG | BODY MASS INDEX: 28.24 KG/M2 | HEIGHT: 64 IN

## 2024-03-21 DIAGNOSIS — H90.3 SENSORINEURAL HEARING LOSS (SNHL) OF BOTH EARS: Primary | ICD-10-CM

## 2024-03-21 DIAGNOSIS — H90.3 SENSORINEURAL HEARING LOSS, BILATERAL: Primary | ICD-10-CM

## 2024-03-21 PROCEDURE — 3075F SYST BP GE 130 - 139MM HG: CPT | Performed by: STUDENT IN AN ORGANIZED HEALTH CARE EDUCATION/TRAINING PROGRAM

## 2024-03-21 PROCEDURE — 99213 OFFICE O/P EST LOW 20 MIN: CPT | Performed by: STUDENT IN AN ORGANIZED HEALTH CARE EDUCATION/TRAINING PROGRAM

## 2024-03-21 PROCEDURE — G8484 FLU IMMUNIZE NO ADMIN: HCPCS | Performed by: STUDENT IN AN ORGANIZED HEALTH CARE EDUCATION/TRAINING PROGRAM

## 2024-03-21 PROCEDURE — 1123F ACP DISCUSS/DSCN MKR DOCD: CPT | Performed by: STUDENT IN AN ORGANIZED HEALTH CARE EDUCATION/TRAINING PROGRAM

## 2024-03-21 PROCEDURE — 1090F PRES/ABSN URINE INCON ASSESS: CPT | Performed by: STUDENT IN AN ORGANIZED HEALTH CARE EDUCATION/TRAINING PROGRAM

## 2024-03-21 PROCEDURE — 92557 COMPREHENSIVE HEARING TEST: CPT | Performed by: AUDIOLOGIST

## 2024-03-21 PROCEDURE — 3078F DIAST BP <80 MM HG: CPT | Performed by: STUDENT IN AN ORGANIZED HEALTH CARE EDUCATION/TRAINING PROGRAM

## 2024-03-21 PROCEDURE — G8417 CALC BMI ABV UP PARAM F/U: HCPCS | Performed by: STUDENT IN AN ORGANIZED HEALTH CARE EDUCATION/TRAINING PROGRAM

## 2024-03-21 PROCEDURE — G8399 PT W/DXA RESULTS DOCUMENT: HCPCS | Performed by: STUDENT IN AN ORGANIZED HEALTH CARE EDUCATION/TRAINING PROGRAM

## 2024-03-21 PROCEDURE — G8427 DOCREV CUR MEDS BY ELIG CLIN: HCPCS | Performed by: STUDENT IN AN ORGANIZED HEALTH CARE EDUCATION/TRAINING PROGRAM

## 2024-03-21 PROCEDURE — 1036F TOBACCO NON-USER: CPT | Performed by: STUDENT IN AN ORGANIZED HEALTH CARE EDUCATION/TRAINING PROGRAM

## 2024-03-21 ASSESSMENT — ENCOUNTER SYMPTOMS
SHORTNESS OF BREATH: 0
VOMITING: 0
EYE REDNESS: 0
EYE ITCHING: 0

## 2024-03-21 NOTE — PROGRESS NOTES
AUDIOLOGY EVALUATION    Gabriela Garcia had Audiometry performed today.    The patient reports dizziness and hearing loss.     Results as follows:    Audiometry    Test Performed - Comprehensive Audiogram    Type of Loss - Right Ear: abnormal hearing: degree of loss is normal to moderately severe sensorineural hearing loss                           Left Ear: abnormal hearing: degree of loss is normal to severe sensorineural hearing loss     SRT   Measurement Right Ear Left Ear   Value 35 30   Unit dB dB     Discrimination  Measurement Right Ear Left Ear   Value 76% 64%   Unit dB dB     Recommend  Binaural amplification and annual audios    Jermaine Kamara [facility]  Audiologist

## 2024-03-21 NOTE — PROGRESS NOTES
[facility] Office Note    Patient: Gabriela Garcia  MRN: [id number]  : 1945  Gender:  female  Vital Signs: /70 (Site: Left Upper Arm, Position: Sitting)   Ht 1.626 m (5' 4\")   Wt 75 kg (165 lb 6.4 oz)   BMI 28.39 kg/m²   Date: 3/21/2024    CC:   Chief Complaint   Patient presents with    Hearing Problem     Patient presents today for tinnitus. Her right ear tinnitus is worse than her left.        HPI:  Gabriela Garcia is a 78 y.o. female who had previously seen for dizziness.  She had seen physical therapy for this in the past.  She also has tinnitus, worse on the right side.  She does not wear hearing aids.    Past Medical History, Past Surgical History, Family history, Social History, and Medications were all reviewed with the patient today and updated as necessary.     No Known Allergies  Patient Active Problem List   Diagnosis    Hip fracture (HCC)    Migraine with vertigo    Long term (current) use of anticoagulants    Hyperlipidemia    Hypertension    GERD (gastroesophageal reflux disease)    Fracture of femoral neck, right, closed (HCC)    Dizziness    Other iron deficiency anemias    Opioid use, unspecified with unspecified opioid-induced disorder (HCC)    Acute blood loss anemia    Symptomatic anemia    Paroxysmal atrial fibrillation (HCC)    Elevated INR    Acute postoperative anemia due to expected blood loss    SSS (sick sinus syndrome) (HCC)    Dyspnea     Current Outpatient Medications   Medication Sig    losartan (COZAAR) 100 MG tablet TAKE 1 TABLET BY MOUTH DAILY    spironolactone (ALDACTONE) 25 MG tablet Take 0.5 tablets by mouth daily    vitamin D3 (CHOLECALCIFEROL) 25 MCG (1000 UT) TABS tablet 1 tablet Orally Once a day    pantoprazole (PROTONIX) 40 MG tablet TAKE 1 TABLET BY MOUTH EVERY DAY FOR STOMACH    traMADol (ULTRAM) 50 MG tablet     neomycin-polymyxin-hydrocortisone (CORTISPORIN) 3.5-02088-0 otic suspension SHAKE LIQUID AND INSTILL 4 DROPS TO AFFECTED EAR

## 2024-03-29 ENCOUNTER — TELEPHONE (OUTPATIENT)
Age: 79
End: 2024-03-29

## 2024-03-29 NOTE — TELEPHONE ENCOUNTER
Per Dr. Crews,   During her last evaluation from 8/2023, addressed for preop evaluation for knee surgery.  If no symptoms changed since then, okay to proceed with no further cardiac workup. Continue aspirin perioperatively     Clearance printed and faxed

## 2024-03-29 NOTE — TELEPHONE ENCOUNTER
Patient having Left Total Knee Replacement on 04/30/24 with Dr. Rust under Spinal. Requesting risk assessment and any medication hold. Fax: [phone]

## 2024-05-13 DIAGNOSIS — D50.8 OTHER IRON DEFICIENCY ANEMIA: Primary | ICD-10-CM

## 2024-05-13 DIAGNOSIS — E55.9 VITAMIN D DEFICIENCY: ICD-10-CM

## 2024-05-14 ENCOUNTER — OFFICE VISIT (OUTPATIENT)
Dept: ONCOLOGY | Age: 79
End: 2024-05-14
Payer: MEDICARE

## 2024-05-14 ENCOUNTER — HOSPITAL ENCOUNTER (OUTPATIENT)
Dept: LAB | Age: 79
Discharge: HOME | End: 2024-05-17
Payer: MEDICARE

## 2024-05-14 VITALS
SYSTOLIC BLOOD PRESSURE: 135 MMHG | TEMPERATURE: 98.1 F | OXYGEN SATURATION: 97 % | HEIGHT: 64 IN | RESPIRATION RATE: 16 BRPM | BODY MASS INDEX: 28.13 KG/M2 | WEIGHT: 164.8 LBS | HEART RATE: 63 BPM | DIASTOLIC BLOOD PRESSURE: 57 MMHG

## 2024-05-14 DIAGNOSIS — E55.9 VITAMIN D DEFICIENCY: ICD-10-CM

## 2024-05-14 DIAGNOSIS — E78.49 OTHER HYPERLIPIDEMIA: ICD-10-CM

## 2024-05-14 DIAGNOSIS — D50.8 OTHER IRON DEFICIENCY ANEMIA: ICD-10-CM

## 2024-05-14 DIAGNOSIS — D50.8 OTHER IRON DEFICIENCY ANEMIA: Primary | ICD-10-CM

## 2024-05-14 LAB
25(OH)D3 SERPL-MCNC: 32.6 NG/ML (ref 30–100)
ALBUMIN SERPL-MCNC: 3.4 G/DL (ref 3.2–4.6)
ALBUMIN/GLOB SERPL: 1 (ref 1–1.9)
ALP SERPL-CCNC: 98 U/L (ref 35–104)
ALT SERPL-CCNC: 20 U/L (ref 12–65)
ANION GAP SERPL CALC-SCNC: 13 MMOL/L (ref 9–18)
AST SERPL-CCNC: 27 U/L (ref 15–37)
BASOPHILS # BLD: 0.1 K/UL (ref 0–0.2)
BASOPHILS NFR BLD: 1 % (ref 0–2)
BILIRUB SERPL-MCNC: 0.6 MG/DL (ref 0–1.2)
BUN SERPL-MCNC: 13 MG/DL (ref 8–23)
CALCIUM SERPL-MCNC: 9.2 MG/DL (ref 8.8–10.2)
CHLORIDE SERPL-SCNC: 107 MMOL/L (ref 98–107)
CO2 SERPL-SCNC: 21 MMOL/L (ref 20–28)
CREAT SERPL-MCNC: 1.13 MG/DL (ref 0.6–1.1)
DIFFERENTIAL METHOD BLD: ABNORMAL
EOSINOPHIL # BLD: 0.5 K/UL (ref 0–0.8)
EOSINOPHIL NFR BLD: 6 % (ref 0.5–7.8)
ERYTHROCYTE [DISTWIDTH] IN BLOOD BY AUTOMATED COUNT: 14.5 % (ref 11.9–14.6)
FERRITIN SERPL-MCNC: 482 NG/ML (ref 8–388)
GLOBULIN SER CALC-MCNC: 3.3 G/DL (ref 2.3–3.5)
GLUCOSE SERPL-MCNC: 95 MG/DL (ref 70–99)
HCT VFR BLD AUTO: 33 % (ref 35.8–46.3)
HGB BLD-MCNC: 10.3 G/DL (ref 11.7–15.4)
IMM GRANULOCYTES # BLD AUTO: 0 K/UL (ref 0–0.5)
IMM GRANULOCYTES NFR BLD AUTO: 0 % (ref 0–5)
LYMPHOCYTES # BLD: 1.6 K/UL (ref 0.5–4.6)
LYMPHOCYTES NFR BLD: 17 % (ref 13–44)
MCH RBC QN AUTO: 26.2 PG (ref 26.1–32.9)
MCHC RBC AUTO-ENTMCNC: 31.2 G/DL (ref 31.4–35)
MCV RBC AUTO: 84 FL (ref 82–102)
MONOCYTES # BLD: 0.6 K/UL (ref 0.1–1.3)
MONOCYTES NFR BLD: 7 % (ref 4–12)
NEUTS SEG # BLD: 6.4 K/UL (ref 1.7–8.2)
NEUTS SEG NFR BLD: 69 % (ref 43–78)
NRBC # BLD: 0 K/UL (ref 0–0.2)
PLATELET # BLD AUTO: 348 K/UL (ref 150–450)
PMV BLD AUTO: 8.9 FL (ref 9.4–12.3)
POTASSIUM SERPL-SCNC: 3.9 MMOL/L (ref 3.5–5.1)
PROT SERPL-MCNC: 6.6 G/DL (ref 6.3–8.2)
RBC # BLD AUTO: 3.93 M/UL (ref 4.05–5.2)
SODIUM SERPL-SCNC: 141 MMOL/L (ref 136–145)
WBC # BLD AUTO: 9.3 K/UL (ref 4.3–11.1)

## 2024-05-14 PROCEDURE — 1090F PRES/ABSN URINE INCON ASSESS: CPT | Performed by: INTERNAL MEDICINE

## 2024-05-14 PROCEDURE — G8417 CALC BMI ABV UP PARAM F/U: HCPCS | Performed by: INTERNAL MEDICINE

## 2024-05-14 PROCEDURE — 99215 OFFICE O/P EST HI 40 MIN: CPT | Performed by: INTERNAL MEDICINE

## 2024-05-14 PROCEDURE — 80053 COMPREHEN METABOLIC PANEL: CPT

## 2024-05-14 PROCEDURE — 3075F SYST BP GE 130 - 139MM HG: CPT | Performed by: INTERNAL MEDICINE

## 2024-05-14 PROCEDURE — 1036F TOBACCO NON-USER: CPT | Performed by: INTERNAL MEDICINE

## 2024-05-14 PROCEDURE — G8399 PT W/DXA RESULTS DOCUMENT: HCPCS | Performed by: INTERNAL MEDICINE

## 2024-05-14 PROCEDURE — 3078F DIAST BP <80 MM HG: CPT | Performed by: INTERNAL MEDICINE

## 2024-05-14 PROCEDURE — 82306 VITAMIN D 25 HYDROXY: CPT

## 2024-05-14 PROCEDURE — G8427 DOCREV CUR MEDS BY ELIG CLIN: HCPCS | Performed by: INTERNAL MEDICINE

## 2024-05-14 PROCEDURE — 1123F ACP DISCUSS/DSCN MKR DOCD: CPT | Performed by: INTERNAL MEDICINE

## 2024-05-14 PROCEDURE — 85025 COMPLETE CBC W/AUTO DIFF WBC: CPT

## 2024-05-14 PROCEDURE — 36415 COLL VENOUS BLD VENIPUNCTURE: CPT

## 2024-05-14 PROCEDURE — 82728 ASSAY OF FERRITIN: CPT

## 2024-05-14 RX ORDER — HYDROCODONE BITARTRATE AND ACETAMINOPHEN 5; 325 MG/1; MG/1
1 TABLET ORAL EVERY 6 HOURS PRN
COMMUNITY

## 2024-05-14 NOTE — PATIENT INSTRUCTIONS
ingestion, or following therapy that affects renal tubular secretion.      Calcium 05/14/2024 9.2  8.8 - 10.2 MG/DL Final    Total Bilirubin 05/14/2024 0.6  0.0 - 1.2 MG/DL Final    ALT 05/14/2024 20  12 - 65 U/L Final    AST 05/14/2024 27  15 - 37 U/L Final    Alk Phosphatase 05/14/2024 98  35 - 104 U/L Final    Total Protein 05/14/2024 6.6  6.3 - 8.2 g/dL Final    Albumin 05/14/2024 3.4  3.2 - 4.6 g/dL Final    Globulin 05/14/2024 3.3  2.3 - 3.5 g/dL Final    Albumin/Globulin Ratio 05/14/2024 1.0  1.0 - 1.9   Final    WBC 05/14/2024 9.3  4.3 - 11.1 K/uL Final    RBC 05/14/2024 3.93 (L)  4.05 - 5.2 M/uL Final    Hemoglobin 05/14/2024 10.3 (L)  11.7 - 15.4 g/dL Final    Hematocrit 05/14/2024 33.0 (L)  35.8 - 46.3 % Final    MCV 05/14/2024 84.0  82.0 - 102.0 FL Final    MCH 05/14/2024 26.2  26.1 - 32.9 PG Final    MCHC 05/14/2024 31.2 (L)  31.4 - 35.0 g/dL Final    RDW 05/14/2024 14.5  11.9 - 14.6 % Final    Platelets 05/14/2024 348  150 - 450 K/uL Final    MPV 05/14/2024 8.9 (L)  9.4 - 12.3 FL Final    nRBC 05/14/2024 0.00  0.0 - 0.2 K/uL Final    **Note: Absolute NRBC parameter is now reported with Hemogram**    Neutrophils % 05/14/2024 69  43 - 78 % Final    Lymphocytes % 05/14/2024 17  13 - 44 % Final    Monocytes % 05/14/2024 7  4.0 - 12.0 % Final    Eosinophils % 05/14/2024 6  0.5 - 7.8 % Final    Basophils % 05/14/2024 1  0.0 - 2.0 % Final    Immature Granulocytes % 05/14/2024 0  0.0 - 5.0 % Final    Neutrophils Absolute 05/14/2024 6.4  1.7 - 8.2 K/UL Final    Lymphocytes Absolute 05/14/2024 1.6  0.5 - 4.6 K/UL Final    Monocytes Absolute 05/14/2024 0.6  0.1 - 1.3 K/UL Final    Eosinophils Absolute 05/14/2024 0.5  0.0 - 0.8 K/UL Final    Basophils Absolute 05/14/2024 0.1  0.0 - 0.2 K/UL Final    Immature Granulocytes Absolute 05/14/2024 0.0  0.0 - 0.5 K/UL Final    Differential Type 05/14/2024 AUTOMATED    Final                 Please refer to After Visit Summary or MyChart for upcoming appointment

## 2024-05-14 NOTE — PROGRESS NOTES
[place] [id number]  Phone: [phone]           5/14/2024  Gabriela Garcia  1945  [id number]         Gabriela Garcia is a 78 year old -American female who has returned to my clinic for a follow-up visit; she was initially referred to me by Dr. Tonya Mejia for management of Iron Deficiency Anemia; her EGD and capsule video endoscopy were suggestive of small bowel hemorrhage; she received parenteral Iron infusions in 1/2022 and 2/2023.        ALLERGIES:    No known drug allergies.        FAMILY HISTORY:    No hematologic disorders.        SOCIAL HISTORY:    She is  and lives with her . She used to work as a house-keeper. She denies ever using any tobacco products.        PAST MEDICAL HISTORY:    Hypertension, Atrial Fibrillation, GERD, Migraines, Hyperlipidemia, renal insufficiency, Vitamin D deficiency and Iron Deficiency Anemia.        ROS:  The patient complained of fatigue; all other systems negative.        PHYSICAL EXAM:   The patient was alert, awake and oriented, no acute distress was noted. Oral examination revealed dentures, no mucosal lesions were seen. Lymph node examination did not reveal any adenopathy. Neck examination revealed a supple neck, no thyromegaly or masses were noted. Chest examination revealed normal vesicular breath sounds. Heart examination revealed S-1 and S-2 with a 2/6 systolic murmur. Abdominal examination revealed a non-tender abdomen, bowel sounds were positive, no organomegaly could be appreciated. Examination of the extremities did not reveal any tenderness or erythema. Examination of the skin did not reveal any lesions.  Medical problems and test results were reviewed with the patient today.        KPS:     90.        LABORATORY INVESTIGATIONS:  CBC showed a WBC count of 9.3, ANC was 6.4, Hemoglobin was 10.3 and Platelets were 348; her Ferritin level was pending.        ASSESSMENT:

## 2024-05-27 ENCOUNTER — HOSPITAL ENCOUNTER (EMERGENCY)
Age: 79
Discharge: HOME | End: 2024-05-27
Attending: EMERGENCY MEDICINE
Payer: MEDICARE

## 2024-05-27 VITALS
OXYGEN SATURATION: 96 % | HEART RATE: 63 BPM | RESPIRATION RATE: 18 BRPM | TEMPERATURE: 97.9 F | WEIGHT: 164 LBS | BODY MASS INDEX: 28 KG/M2 | DIASTOLIC BLOOD PRESSURE: 55 MMHG | SYSTOLIC BLOOD PRESSURE: 115 MMHG | HEIGHT: 64 IN

## 2024-05-27 DIAGNOSIS — R03.0 ELEVATED BLOOD PRESSURE READING: Primary | ICD-10-CM

## 2024-05-27 PROCEDURE — 99282 EMERGENCY DEPT VISIT SF MDM: CPT

## 2024-05-27 ASSESSMENT — PAIN DESCRIPTION - LOCATION: LOCATION: KNEE

## 2024-05-27 ASSESSMENT — ENCOUNTER SYMPTOMS
NAUSEA: 0
DIARRHEA: 0
VOMITING: 0
ABDOMINAL PAIN: 0
CONSTIPATION: 0
PHOTOPHOBIA: 0
COUGH: 0
SORE THROAT: 0
BACK PAIN: 0
SHORTNESS OF BREATH: 0

## 2024-05-27 ASSESSMENT — PAIN SCALES - GENERAL: PAINLEVEL_OUTOF10: 3

## 2024-05-27 ASSESSMENT — PAIN - FUNCTIONAL ASSESSMENT: PAIN_FUNCTIONAL_ASSESSMENT: 0-10

## 2024-05-27 ASSESSMENT — PAIN DESCRIPTION - DESCRIPTORS: DESCRIPTORS: ACHING;DISCOMFORT

## 2024-05-27 ASSESSMENT — PAIN DESCRIPTION - ORIENTATION: ORIENTATION: LEFT;RIGHT

## 2024-05-27 ASSESSMENT — PAIN DESCRIPTION - FREQUENCY: FREQUENCY: CONTINUOUS

## 2024-05-27 ASSESSMENT — PAIN DESCRIPTION - PAIN TYPE: TYPE: CHRONIC PAIN

## 2024-05-27 NOTE — ED TRIAGE NOTES
Pt presents for fluctuating blood pressure over the past week. SBP up to 157, has been taking HTN medication as prescribed. Also endorses some headache and dizziness when BP elevates.     A&Ox4

## 2024-05-27 NOTE — DISCHARGE INSTRUCTIONS
BP in ER is 123/60.  Take home medications including BP medication as prescribed.  Schedule close follow-up with PCP.   Please return if symptoms worsen or progress in any way.

## 2024-05-27 NOTE — ED PROVIDER NOTES
Emergency Department Provider Note       PCP: Aashish Nixon MD   Age: 78 y.o.   Sex: female     DISPOSITION Decision To Discharge 05/27/2024 03:55:26 PM       ICD-10-CM    1. Elevated blood pressure reading  R03.0           Medical Decision Making     78-year-old female with history of hypertension, paroxysmal atrial fibrillation, iron deficiency anemia presents with complaint of elevated blood pressure at home.  States systolic blood pressure was 157 earlier today.  Patient with no active symptoms.  Denies headache, dizziness, chest pain, shortness of breath.  Repeat blood pressure 115/55 and ERP remainder vital signs stable.  Patient completely asymptomatic at this time.  At this time it does not appear that any workup needs to be obtained his blood pressure is stable.  Concerned that home blood pressure cuff may be inaccurate or that it is applied to wrong site leading to abnormal values.  Patient states that she would like to be discharged home at this time.  Patient struck on need for close follow-up with primary care physician.       1 or more chronic illnesses with a severe exacerbation or progression.  Chronic medical problems impacting care include hypertension.  Shared medical decision making was utilized in creating the patients health plan today.    I independently ordered and reviewed each unique test.  I reviewed external records: provider visit note from PCP.  I reviewed external records: previous lab results from outside ED.                   History     78-year-old female with history of hypertension, paroxysmal atrial fibrillation, iron deficiency anemia presents with complaint of elevated blood pressure at home.  States that her systolic blood pressure earlier today when checking it with her home cuff was 157.  In contrast to triage documentation patient denies any headache, dizziness.  Patient with no complaints at this time.  Patient denies chest pain, shortness of breath, nausea, vomiting,

## 2024-05-27 NOTE — ED NOTES
Patient mobility status  wheelchair bound. Provider aware     I have reviewed discharge instructions with the patient and spouse.  The patient and spouse verbalized understanding.    Patient left ED via Discharge Method: wheelchair to Home with Spouse.    Opportunity for questions and clarification provided.     Patient given 0 scripts.           Eli Rios LPN  05/27/24 [id number]

## 2024-09-16 NOTE — ED PROVIDER NOTES
Patient is a 73-year-old female who comes to the ER today complaining of a cough for the past 3 days. States she is bringing up white and yellow sputum. She denies any fevers. The history is provided by the patient. Cough   This is a new problem. The current episode started more than 2 days ago. The problem occurs hourly. The problem has not changed since onset. The cough is productive of sputum. There has been no fever. Pertinent negatives include no chest pain, no chills, no rhinorrhea, no sore throat, no shortness of breath, no wheezing, no nausea and no vomiting. She has tried nothing for the symptoms. She is not a smoker. Her past medical history does not include pneumonia, COPD, asthma or CHF. Past Medical History:   Diagnosis Date    Arthritis     Biceps tendonitis     CAD (coronary artery disease)     MI 1999    Dysphagia     Dysuria     Elevated glucose     Hypertension     Other ill-defined conditions(799.89)     high cholesterol    Pain in limb     Postmenopausal     Psychiatric disorder     anxiety    Sciatica     Tachycardia     Vaginitis     Vertigo        Past Surgical History:   Procedure Laterality Date    ABDOMEN SURGERY PROC UNLISTED      cyst removed from upper ABD.     HX BREAST BIOPSY Right     HX GYN      hysterectomy    HX KNEE ARTHROSCOPY Bilateral          Family History:   Problem Relation Age of Onset    Coronary Artery Disease Neg Hx        Social History     Socioeconomic History    Marital status:      Spouse name: Not on file    Number of children: Not on file    Years of education: Not on file    Highest education level: Not on file   Occupational History    Not on file   Social Needs    Financial resource strain: Not on file    Food insecurity:     Worry: Not on file     Inability: Not on file    Transportation needs:     Medical: Not on file     Non-medical: Not on file   Tobacco Use    Smoking status: Never Smoker    Smokeless tobacco: Never Used   Substance and Sexual Activity    Alcohol use: No    Drug use: No    Sexual activity: Not Currently   Lifestyle    Physical activity:     Days per week: Not on file     Minutes per session: Not on file    Stress: Not on file   Relationships    Social connections:     Talks on phone: Not on file     Gets together: Not on file     Attends Gnosticist service: Not on file     Active member of club or organization: Not on file     Attends meetings of clubs or organizations: Not on file     Relationship status: Not on file    Intimate partner violence:     Fear of current or ex partner: Not on file     Emotionally abused: Not on file     Physically abused: Not on file     Forced sexual activity: Not on file   Other Topics Concern    Not on file   Social History Narrative    Not on file         ALLERGIES: Patient has no known allergies. Review of Systems   Constitutional: Negative for chills, fatigue and fever. HENT: Negative for congestion, rhinorrhea and sore throat. Eyes: Negative for pain, discharge and visual disturbance. Respiratory: Positive for cough. Negative for shortness of breath and wheezing. Cardiovascular: Negative for chest pain and palpitations. Gastrointestinal: Negative for abdominal pain, diarrhea, nausea and vomiting. Endocrine: Negative for polydipsia and polyuria. Genitourinary: Negative for dysuria, frequency and urgency. Musculoskeletal: Negative for back pain and neck pain. Skin: Negative for rash. Neurological: Negative for seizures, syncope and weakness. Hematological: Negative. Vitals:    01/12/20 1202   BP: 127/73   Pulse: 64   Resp: 16   Temp: 98.5 °F (36.9 °C)   SpO2: 95%   Weight: 84.4 kg (186 lb)   Height: 5' 4\" (1.626 m)            Physical Exam  Vitals signs and nursing note reviewed. Constitutional:       Appearance: Normal appearance. She is well-developed. HENT:      Head: Normocephalic and atraumatic. Mouth/Throat:      Pharynx: Oropharynx is clear. No oropharyngeal exudate. Eyes:      Conjunctiva/sclera: Conjunctivae normal.      Pupils: Pupils are equal, round, and reactive to light. Neck:      Musculoskeletal: Normal range of motion and neck supple. Cardiovascular:      Rate and Rhythm: Normal rate and regular rhythm. Pulses: Normal pulses. Pulmonary:      Effort: Pulmonary effort is normal.      Breath sounds: Normal breath sounds. Abdominal:      Palpations: Abdomen is soft. Tenderness: There is no tenderness. There is no guarding or rebound. Musculoskeletal: Normal range of motion. General: No deformity. Right lower leg: No edema. Left lower leg: No edema. Lymphadenopathy:      Cervical: No cervical adenopathy. Skin:     General: Skin is warm and dry. Findings: No rash. Neurological:      Mental Status: She is alert and oriented to person, place, and time. GCS: GCS eye subscore is 4. GCS verbal subscore is 5. GCS motor subscore is 6. Cranial Nerves: No cranial nerve deficit. Sensory: No sensory deficit. MDM  Number of Diagnoses or Management Options  Diagnosis management comments: Flu swab is negative    1:57 PM  Chest x-ray negative    Patient is adamant that she needs a course of antibiotics. Voice dictation software was used during the making of this note. This software is not perfect and grammatical and other typographical errors may be present. This note has been proofread, but may still contain errors.   Bobbi Larios MD; 1/12/2020 @1:57 PM   ===================================================================         Amount and/or Complexity of Data Reviewed  Clinical lab tests: ordered and reviewed  Tests in the radiology section of CPT®: ordered and reviewed  Independent visualization of images, tracings, or specimens: yes    Risk of Complications, Morbidity, and/or Mortality  Presenting problems: low  Diagnostic procedures: low  Management options: low    Patient Progress  Patient progress: stable         Procedures [id number] [facility] [place]

## 2024-10-29 ENCOUNTER — HOSPITAL ENCOUNTER (EMERGENCY)
Age: 79
Discharge: HOME | End: 2024-10-29
Attending: EMERGENCY MEDICINE
Payer: MEDICARE

## 2024-10-29 ENCOUNTER — APPOINTMENT (OUTPATIENT)
Dept: CT IMAGING | Age: 79
End: 2024-10-29
Payer: MEDICARE

## 2024-10-29 ENCOUNTER — APPOINTMENT (OUTPATIENT)
Dept: GENERAL RADIOLOGY | Age: 79
End: 2024-10-29
Payer: MEDICARE

## 2024-10-29 VITALS
DIASTOLIC BLOOD PRESSURE: 66 MMHG | HEART RATE: 60 BPM | OXYGEN SATURATION: 95 % | SYSTOLIC BLOOD PRESSURE: 156 MMHG | RESPIRATION RATE: 18 BRPM | HEIGHT: 64 IN | TEMPERATURE: 98 F | WEIGHT: 161 LBS | BODY MASS INDEX: 27.49 KG/M2

## 2024-10-29 DIAGNOSIS — S70.01XA CONTUSION OF RIGHT HIP, INITIAL ENCOUNTER: Primary | ICD-10-CM
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
## 2024-10-29 PROCEDURE — 6370000000 HC RX 637 (ALT 250 FOR IP): Performed by: EMERGENCY MEDICINE

## 2024-10-29 PROCEDURE — 70450 CT HEAD/BRAIN W/O DYE: CPT

## 2024-10-29 PROCEDURE — 73502 X-RAY EXAM HIP UNI 2-3 VIEWS: CPT

## 2024-10-29 PROCEDURE — 99284 EMERGENCY DEPT VISIT MOD MDM: CPT

## 2024-10-29 RX ORDER — ACETAMINOPHEN 500 MG
1000 TABLET ORAL
Status: COMPLETED | OUTPATIENT
Start: 2024-10-29 | End: 2024-10-29

## 2024-10-29 RX ORDER — OXYCODONE HYDROCHLORIDE 5 MG/1
2.5 TABLET ORAL EVERY 6 HOURS PRN
Qty: 6 TABLET | Refills: 0 | Status: SHIPPED | OUTPATIENT
Start: 2024-10-29 | End: 2024-11-01

## 2024-10-29 RX ADMIN — ACETAMINOPHEN 1000 MG: 500 TABLET, FILM COATED ORAL at 10:16

## 2024-10-29 ASSESSMENT — PAIN DESCRIPTION - LOCATION
LOCATION: LEG
LOCATION: HIP
LOCATION: LEG

## 2024-10-29 ASSESSMENT — PAIN - FUNCTIONAL ASSESSMENT: PAIN_FUNCTIONAL_ASSESSMENT: 0-10

## 2024-10-29 ASSESSMENT — PAIN DESCRIPTION - ORIENTATION
ORIENTATION: RIGHT
ORIENTATION: RIGHT

## 2024-10-29 ASSESSMENT — PAIN SCALES - GENERAL
PAINLEVEL_OUTOF10: 5
PAINLEVEL_OUTOF10: 5
PAINLEVEL_OUTOF10: 10

## 2024-10-29 NOTE — ED PROVIDER NOTES
mouth every 6 hours as needed for Pain. Max Daily Amount: 4 tablets    LOSARTAN (COZAAR) 100 MG TABLET    TAKE 1 TABLET BY MOUTH DAILY    MECLIZINE (ANTIVERT) 25 MG TABLET    Take 1 tablet by mouth 3 times daily as needed    NEOMYCIN-POLYMYXIN-HYDROCORTISONE (CORTISPORIN) 3.5-92970-1 OTIC SUSPENSION    SHAKE LIQUID AND INSTILL 4 DROPS TO AFFECTED EAR THREE TIMES DAILY FOR 7 DAYS    PANTOPRAZOLE (PROTONIX) 40 MG TABLET    TAKE 1 TABLET BY MOUTH EVERY DAY FOR STOMACH    SPIRONOLACTONE (ALDACTONE) 25 MG TABLET    Take 0.5 tablets by mouth daily    TRAMADOL (ULTRAM) 50 MG TABLET        VITAMIN D3 (CHOLECALCIFEROL) 25 MCG (1000 UT) TABS TABLET    1 tablet Orally Once a day        Results for orders placed or performed during the hospital encounter of 10/29/24   CT Head W/O Contrast    Narrative    Head CT    INDICATION: Head injury    TECHNIQUE: Multiple 2D axial images obtained through the brain without  intravenous contrast.  Radiation dose reduction techniques were used for this  study:  All CT scans performed at this facility use one or all of the following:  Automated exposure control, adjustment of the mA and/or kVp according to  patient's size, iterative reconstruction.    COMPARISON: 7/12/2022    FINDINGS:  Stable chronic periventricular and deep white matter small vessel ischemic  changes. There is no CT evidence of acute hemorrhage or infarction. The  ventricles are normal in size. There are no extra-axial fluid collections. No  masses are seen. Stable chronic right frontal sinusitis. There are no bony  lesions.      Impression    No significant changes compared to prior study with no CT evidence of acute  intracranial abnormality.    Stable chronic periventricular and deep white matter small vessel ischemic  changes.    Stable chronic right frontal sinusitis.     Electronically signed by LATOSHA NGUYỄN   XR HIP 2-3 VW W PELVIS RIGHT    Narrative    Right Hip    INDICATION: Fell, Pain    COMPARISON: X-ray right

## 2024-10-29 NOTE — ED NOTES
Patient mobility status  with difficulty, uses a walker. Provider aware     I have reviewed discharge instructions with the patient and spouse.  The patient and spouse verbalized understanding.    Patient left ED via Discharge Method: wheelchair to Home with Spouse.    Opportunity for questions and clarification provided.     Patient given 1 scripts.            Denise Hooks, RN  10/29/24 1114

## 2024-10-29 NOTE — ED TRIAGE NOTES
Pt arrived via EMS from home c/o mechanical fall. Pt hit her head and denies LOC or thinners. Pt c/o rt hip pain. Hx rt hip replacement. VSS en route

## 2025-03-13 DIAGNOSIS — E55.9 VITAMIN D DEFICIENCY: ICD-10-CM

## 2025-03-13 DIAGNOSIS — D50.8 OTHER IRON DEFICIENCY ANEMIA: Primary | ICD-10-CM

## 2025-03-19 ENCOUNTER — TRANSCRIBE ORDERS (OUTPATIENT)
Dept: SCHEDULING | Age: 80
End: 2025-03-19

## 2025-03-19 DIAGNOSIS — R42 DIZZINESS: Primary | ICD-10-CM

## 2025-07-20 ENCOUNTER — APPOINTMENT (OUTPATIENT)
Dept: GENERAL RADIOLOGY | Age: 80
End: 2025-07-20
Payer: MEDICARE

## 2025-07-20 ENCOUNTER — HOSPITAL ENCOUNTER (EMERGENCY)
Age: 80
Discharge: HOME | End: 2025-07-20
Attending: EMERGENCY MEDICINE
Payer: MEDICARE

## 2025-07-20 VITALS
WEIGHT: 164 LBS | RESPIRATION RATE: 17 BRPM | BODY MASS INDEX: 28 KG/M2 | HEART RATE: 61 BPM | SYSTOLIC BLOOD PRESSURE: 152 MMHG | TEMPERATURE: 97.9 F | HEIGHT: 64 IN | OXYGEN SATURATION: 97 % | DIASTOLIC BLOOD PRESSURE: 82 MMHG

## 2025-07-20 DIAGNOSIS — R10.13 DYSPEPSIA: Primary | ICD-10-CM

## 2025-07-20 LAB
ALBUMIN SERPL-MCNC: 3.7 G/DL (ref 3.2–4.6)
ALBUMIN/GLOB SERPL: 0.9 (ref 1–1.9)
ALP SERPL-CCNC: 120 U/L (ref 35–104)
ALT SERPL-CCNC: 27 U/L (ref 8–45)
ANION GAP SERPL CALC-SCNC: 12 MMOL/L (ref 7–16)
AST SERPL-CCNC: 31 U/L (ref 15–37)
BASOPHILS # BLD: 0.03 K/UL (ref 0–0.2)
BASOPHILS NFR BLD: 0.4 % (ref 0–2)
BILIRUB SERPL-MCNC: 0.9 MG/DL (ref 0–1.2)
BUN SERPL-MCNC: 16 MG/DL (ref 8–23)
CALCIUM SERPL-MCNC: 9.2 MG/DL (ref 8.8–10.2)
CHLORIDE SERPL-SCNC: 108 MMOL/L (ref 98–107)
CO2 SERPL-SCNC: 20 MMOL/L (ref 20–29)
CREAT SERPL-MCNC: 1.25 MG/DL (ref 0.6–1.1)
DIFFERENTIAL METHOD BLD: ABNORMAL
EOSINOPHIL # BLD: 0.03 K/UL (ref 0–0.8)
EOSINOPHIL NFR BLD: 0.4 % (ref 0.5–7.8)
ERYTHROCYTE [DISTWIDTH] IN BLOOD BY AUTOMATED COUNT: 15.7 % (ref 11.9–14.6)
GLOBULIN SER CALC-MCNC: 3.9 G/DL (ref 2.3–3.5)
GLUCOSE SERPL-MCNC: 112 MG/DL (ref 70–99)
HCT VFR BLD AUTO: 40.2 % (ref 35.8–46.3)
HGB BLD-MCNC: 12.7 G/DL (ref 11.7–15.4)
IMM GRANULOCYTES # BLD AUTO: 0.01 K/UL (ref 0–0.5)
IMM GRANULOCYTES NFR BLD AUTO: 0.1 % (ref 0–5)
LIPASE SERPL-CCNC: 10 U/L (ref 13–60)
LYMPHOCYTES # BLD: 1.25 K/UL (ref 0.5–4.6)
LYMPHOCYTES NFR BLD: 17.6 % (ref 13–44)
MCH RBC QN AUTO: 26.2 PG (ref 26.1–32.9)
MCHC RBC AUTO-ENTMCNC: 31.6 G/DL (ref 31.4–35)
MCV RBC AUTO: 83.1 FL (ref 82–102)
MONOCYTES # BLD: 0.45 K/UL (ref 0.1–1.3)
MONOCYTES NFR BLD: 6.3 % (ref 4–12)
NEUTS SEG # BLD: 5.33 K/UL (ref 1.7–8.2)
NEUTS SEG NFR BLD: 75.2 % (ref 43–78)
NRBC # BLD: 0 K/UL (ref 0–0.2)
PLATELET # BLD AUTO: 244 K/UL (ref 150–450)
PLATELET COMMENT: ADEQUATE
PMV BLD AUTO: 11 FL (ref 9.4–12.3)
POTASSIUM SERPL-SCNC: 4.3 MMOL/L (ref 3.5–5.1)
PROT SERPL-MCNC: 7.6 G/DL (ref 6.3–8.2)
RBC # BLD AUTO: 4.84 M/UL (ref 4.05–5.2)
RBC MORPH BLD: ABNORMAL
SODIUM SERPL-SCNC: 141 MMOL/L (ref 136–145)
WBC # BLD AUTO: 7.1 K/UL (ref 4.3–11.1)
WBC MORPH BLD: ABNORMAL

## 2025-07-20 PROCEDURE — 83690 ASSAY OF LIPASE: CPT

## 2025-07-20 PROCEDURE — 74019 RADEX ABDOMEN 2 VIEWS: CPT

## 2025-07-20 PROCEDURE — 85025 COMPLETE CBC W/AUTO DIFF WBC: CPT

## 2025-07-20 PROCEDURE — 6370000000 HC RX 637 (ALT 250 FOR IP): Performed by: EMERGENCY MEDICINE

## 2025-07-20 PROCEDURE — 99284 EMERGENCY DEPT VISIT MOD MDM: CPT

## 2025-07-20 PROCEDURE — 80053 COMPREHEN METABOLIC PANEL: CPT

## 2025-07-20 RX ORDER — HYOSCYAMINE SULFATE 0.12 MG/1
0.12 TABLET SUBLINGUAL
Status: COMPLETED | OUTPATIENT
Start: 2025-07-20 | End: 2025-07-20

## 2025-07-20 RX ORDER — ONDANSETRON 4 MG/1
4 TABLET, ORALLY DISINTEGRATING ORAL
Status: COMPLETED | OUTPATIENT
Start: 2025-07-20 | End: 2025-07-20

## 2025-07-20 RX ORDER — HYOSCYAMINE SULFATE 0.12 MG/1
0.12 TABLET SUBLINGUAL EVERY 6 HOURS PRN
Qty: 20 TABLET | Refills: 3 | Status: SHIPPED | OUTPATIENT
Start: 2025-07-20

## 2025-07-20 RX ORDER — MAGNESIUM HYDROXIDE/ALUMINUM HYDROXICE/SIMETHICONE 120; 1200; 1200 MG/30ML; MG/30ML; MG/30ML
30 SUSPENSION ORAL
Status: COMPLETED | OUTPATIENT
Start: 2025-07-20 | End: 2025-07-20

## 2025-07-20 RX ORDER — ONDANSETRON 4 MG/1
4 TABLET, ORALLY DISINTEGRATING ORAL 3 TIMES DAILY PRN
Qty: 21 TABLET | Refills: 0 | Status: SHIPPED | OUTPATIENT
Start: 2025-07-20

## 2025-07-20 RX ADMIN — ALUMINUM HYDROXIDE, MAGNESIUM HYDROXIDE, AND SIMETHICONE 30 ML: 200; 200; 20 SUSPENSION ORAL at 16:14

## 2025-07-20 RX ADMIN — HYOSCYAMINE SULFATE 0.12 MG: 0.12 TABLET ORAL; SUBLINGUAL at 16:15

## 2025-07-20 RX ADMIN — ONDANSETRON 4 MG: 4 TABLET, ORALLY DISINTEGRATING ORAL at 16:15

## 2025-07-20 ASSESSMENT — PAIN DESCRIPTION - LOCATION: LOCATION: ABDOMEN

## 2025-07-20 ASSESSMENT — PAIN - FUNCTIONAL ASSESSMENT: PAIN_FUNCTIONAL_ASSESSMENT: 0-10

## 2025-07-20 ASSESSMENT — PAIN SCALES - GENERAL: PAINLEVEL_OUTOF10: 9

## 2025-07-20 NOTE — ED TRIAGE NOTES
Patient arrived with nausea, vomiting, dizziness, and bloating since last night. Unable to tolerate food.     History of GERD, HTN, memory loss

## (undated) DEVICE — HIP HEMIARTHROPLASTY: Brand: MEDLINE INDUSTRIES, INC.

## (undated) DEVICE — SOL IRR SOD CL 0.9% 3000ML --

## (undated) DEVICE — STRYKER PERFORMANCE SERIES SAGITTAL BLADE: Brand: STRYKER PERFORMANCE SERIES

## (undated) DEVICE — CONNECTOR TBNG OD5-7MM O2 END DISP

## (undated) DEVICE — NDL PRT INJ NSAF BLNT 18GX1.5 --

## (undated) DEVICE — PREP SKN CHLRAPRP APL 26ML STR --

## (undated) DEVICE — HOOD: Brand: FLYTE

## (undated) DEVICE — CANNULA NSL ORAL AD FOR CAPNOFLEX CO2 O2 AIRLFE

## (undated) DEVICE — DRAPE,HIP,W/POUCHES,STERILE: Brand: MEDLINE

## (undated) DEVICE — 3M™ IOBAN™ 2 ANTIMICROBIAL INCISE DRAPE 6650EZ: Brand: IOBAN™ 2

## (undated) DEVICE — 3000CC GUARDIAN II: Brand: GUARDIAN

## (undated) DEVICE — SYR 5ML 1/5 GRAD LL NSAF LF --

## (undated) DEVICE — 132 DEGREE CEMENTLESS HIP STEM
Type: IMPLANTABLE DEVICE | Site: HIP | Status: NON-FUNCTIONAL
Brand: SECUR-FIT

## (undated) DEVICE — FORCEPS BX L240CM JAW DIA2.8MM L CAP W/ NDL MIC MESH TOOTH

## (undated) DEVICE — SUTURE VCRL SZ 1 L36IN ABSRB UD CTX L48MM 1/2 CIR J977H

## (undated) DEVICE — SUTURE STRATAFIX SPRL SZ 1 L14IN ABSRB VLT L48CM CTX 1/2 SXPD2B405

## (undated) DEVICE — SYR 3ML LL TIP 1/10ML GRAD --

## (undated) DEVICE — KENDALL RADIOLUCENT FOAM MONITORING ELECTRODE RECTANGULAR SHAPE: Brand: KENDALL

## (undated) DEVICE — CONTAINER PREFIL FRMLN 40ML --

## (undated) DEVICE — PAD,ABDOMINAL,5"X9",ST,LF,25/BX: Brand: MEDLINE INDUSTRIES, INC.

## (undated) DEVICE — 7 DAY SILVER-COATED ANTIMICROBIAL BARRIER DRESSING: Brand: ACTICOAT 7  4" X 5"

## (undated) DEVICE — Device

## (undated) DEVICE — DRAPE,U/SHT,SPLIT,FILM,60X84,STERILE: Brand: MEDLINE

## (undated) DEVICE — DRAPE SHT 3 QTR PROXIMA 53X77 --

## (undated) DEVICE — BLOCK BITE AD 60FR W/ VELC STRP ADDRESSES MOST PT AND

## (undated) DEVICE — GAUZE,SPONGE,4"X4",16PLY,STRL,LF,10/TRAY: Brand: MEDLINE

## (undated) DEVICE — REM POLYHESIVE ADULT PATIENT RETURN ELECTRODE: Brand: VALLEYLAB

## (undated) DEVICE — SUTURE STRATAFIX SPRL SZ 3-0 L9IN ABSRB VLT FS L26MM 3/8 SXPD2B419

## (undated) DEVICE — SYRINGE, LUER SLIP, STERILE, 60ML: Brand: MEDLINE